# Patient Record
Sex: MALE | Race: WHITE | Employment: OTHER | URBAN - METROPOLITAN AREA
[De-identification: names, ages, dates, MRNs, and addresses within clinical notes are randomized per-mention and may not be internally consistent; named-entity substitution may affect disease eponyms.]

---

## 2020-03-23 ENCOUNTER — HOSPITAL ENCOUNTER (EMERGENCY)
Age: 85
Discharge: HOME | End: 2020-03-23
Payer: MEDICARE

## 2020-03-23 VITALS
TEMPERATURE: 97.8 F | RESPIRATION RATE: 18 BRPM | HEIGHT: 67 IN | BODY MASS INDEX: 22.76 KG/M2 | WEIGHT: 145 LBS | OXYGEN SATURATION: 96 % | DIASTOLIC BLOOD PRESSURE: 75 MMHG | HEART RATE: 81 BPM | SYSTOLIC BLOOD PRESSURE: 153 MMHG

## 2020-03-23 LAB
ALBUMIN SERPL-MCNC: 4.3 GM/DL (ref 3.4–5)
ALP BLD-CCNC: 88 IU/L (ref 40–129)
ALT SERPL-CCNC: 12 U/L (ref 10–40)
ANION GAP SERPL CALCULATED.3IONS-SCNC: 11 MMOL/L (ref 4–16)
ANION GAP SERPL CALCULATED.3IONS-SCNC: 14 MMOL/L (ref 4–16)
AST SERPL-CCNC: 23 IU/L (ref 15–37)
BACTERIA: NEGATIVE /HPF
BASOPHILS ABSOLUTE: 0 K/CU MM
BASOPHILS RELATIVE PERCENT: 0.2 % (ref 0–1)
BILIRUB SERPL-MCNC: 0.7 MG/DL (ref 0–1)
BILIRUBIN URINE: NEGATIVE MG/DL
BLOOD, URINE: ABNORMAL
BUN BLDV-MCNC: 13 MG/DL (ref 6–23)
BUN BLDV-MCNC: 16 MG/DL (ref 6–23)
CALCIUM SERPL-MCNC: 8.5 MG/DL (ref 8.3–10.6)
CALCIUM SERPL-MCNC: 8.8 MG/DL (ref 8.3–10.6)
CHLORIDE BLD-SCNC: 87 MMOL/L (ref 99–110)
CHLORIDE BLD-SCNC: 95 MMOL/L (ref 99–110)
CLARITY: CLEAR
CO2: 22 MMOL/L (ref 21–32)
CO2: 22 MMOL/L (ref 21–32)
COLOR: YELLOW
CREAT SERPL-MCNC: 0.5 MG/DL (ref 0.9–1.3)
CREAT SERPL-MCNC: 0.5 MG/DL (ref 0.9–1.3)
DIFFERENTIAL TYPE: ABNORMAL
EOSINOPHILS ABSOLUTE: 0 K/CU MM
EOSINOPHILS RELATIVE PERCENT: 0 % (ref 0–3)
GFR AFRICAN AMERICAN: >60 ML/MIN/1.73M2
GFR AFRICAN AMERICAN: >60 ML/MIN/1.73M2
GFR NON-AFRICAN AMERICAN: >60 ML/MIN/1.73M2
GFR NON-AFRICAN AMERICAN: >60 ML/MIN/1.73M2
GLUCOSE BLD-MCNC: 101 MG/DL (ref 70–99)
GLUCOSE BLD-MCNC: 142 MG/DL (ref 70–99)
GLUCOSE, URINE: NEGATIVE MG/DL
HCT VFR BLD CALC: 47.1 % (ref 42–52)
HEMOGLOBIN: 15.7 GM/DL (ref 13.5–18)
IMMATURE NEUTROPHIL %: 0.4 % (ref 0–0.43)
KETONES, URINE: ABNORMAL MG/DL
LEUKOCYTE ESTERASE, URINE: NEGATIVE
LYMPHOCYTES ABSOLUTE: 0.5 K/CU MM
LYMPHOCYTES RELATIVE PERCENT: 3.7 % (ref 24–44)
MCH RBC QN AUTO: 30 PG (ref 27–31)
MCHC RBC AUTO-ENTMCNC: 33.3 % (ref 32–36)
MCV RBC AUTO: 89.9 FL (ref 78–100)
MONOCYTES ABSOLUTE: 0.8 K/CU MM
MONOCYTES RELATIVE PERCENT: 6 % (ref 0–4)
NITRITE URINE, QUANTITATIVE: NEGATIVE
NUCLEATED RBC %: 0 %
PDW BLD-RTO: 13.7 % (ref 11.7–14.9)
PH, URINE: 6 (ref 5–8)
PLATELET # BLD: 363 K/CU MM (ref 140–440)
PMV BLD AUTO: 10.9 FL (ref 7.5–11.1)
POTASSIUM SERPL-SCNC: 4.3 MMOL/L (ref 3.5–5.1)
POTASSIUM SERPL-SCNC: 4.3 MMOL/L (ref 3.5–5.1)
PROTEIN UA: 30 MG/DL
RBC # BLD: 5.24 M/CU MM (ref 4.6–6.2)
RBC URINE: 14 /HPF (ref 0–3)
SEGMENTED NEUTROPHILS ABSOLUTE COUNT: 11.8 K/CU MM
SEGMENTED NEUTROPHILS RELATIVE PERCENT: 89.7 % (ref 36–66)
SODIUM BLD-SCNC: 123 MMOL/L (ref 135–145)
SODIUM BLD-SCNC: 128 MMOL/L (ref 135–145)
SPECIFIC GRAVITY UA: 1.01 (ref 1–1.03)
TOTAL IMMATURE NEUTOROPHIL: 0.05 K/CU MM
TOTAL NUCLEATED RBC: 0 K/CU MM
TOTAL PROTEIN: 7.1 GM/DL (ref 6.4–8.2)
TRICHOMONAS: ABNORMAL /HPF
UROBILINOGEN, URINE: NORMAL MG/DL (ref 0.2–1)
WBC # BLD: 13.1 K/CU MM (ref 4–10.5)
WBC UA: <1 /HPF (ref 0–2)

## 2020-03-23 PROCEDURE — 80048 BASIC METABOLIC PNL TOTAL CA: CPT

## 2020-03-23 PROCEDURE — 36415 COLL VENOUS BLD VENIPUNCTURE: CPT

## 2020-03-23 PROCEDURE — 81001 URINALYSIS AUTO W/SCOPE: CPT

## 2020-03-23 PROCEDURE — 87086 URINE CULTURE/COLONY COUNT: CPT

## 2020-03-23 PROCEDURE — 80053 COMPREHEN METABOLIC PANEL: CPT

## 2020-03-23 PROCEDURE — 4500000027

## 2020-03-23 PROCEDURE — 2580000003 HC RX 258: Performed by: PHYSICIAN ASSISTANT

## 2020-03-23 PROCEDURE — 85025 COMPLETE CBC W/AUTO DIFF WBC: CPT

## 2020-03-23 PROCEDURE — 99284 EMERGENCY DEPT VISIT MOD MDM: CPT

## 2020-03-23 PROCEDURE — 51798 US URINE CAPACITY MEASURE: CPT

## 2020-03-23 RX ORDER — 0.9 % SODIUM CHLORIDE 0.9 %
1000 INTRAVENOUS SOLUTION INTRAVENOUS ONCE
Status: COMPLETED | OUTPATIENT
Start: 2020-03-23 | End: 2020-03-23

## 2020-03-23 RX ADMIN — SODIUM CHLORIDE 1000 ML: 9 INJECTION, SOLUTION INTRAVENOUS at 10:09

## 2020-03-23 ASSESSMENT — PAIN SCALES - GENERAL
PAINLEVEL_OUTOF10: 8
PAINLEVEL_OUTOF10: 0

## 2020-03-23 ASSESSMENT — PAIN DESCRIPTION - LOCATION: LOCATION: ABDOMEN

## 2020-03-23 ASSESSMENT — PAIN DESCRIPTION - FREQUENCY: FREQUENCY: CONTINUOUS

## 2020-03-23 ASSESSMENT — PAIN DESCRIPTION - PAIN TYPE: TYPE: ACUTE PAIN

## 2020-03-23 ASSESSMENT — PAIN DESCRIPTION - DESCRIPTORS: DESCRIPTORS: ACHING;TENDER

## 2020-03-23 ASSESSMENT — PAIN DESCRIPTION - PROGRESSION: CLINICAL_PROGRESSION: GRADUALLY WORSENING

## 2020-03-23 NOTE — ED TRIAGE NOTES
Started to have abd pain on Saturday. C/o that he is having trouble with urination. Stated he is still going but its not a lot.

## 2020-03-23 NOTE — ED NOTES
16fr daniels cath put in place. 900cc output of clear yellow urine. Pt stated feeling of relief.       Kranthi Young RN  03/23/20 7907

## 2020-03-23 NOTE — ED PROVIDER NOTES
eMERGENCY dEPARTMENT eNCOUnter      PCP: No primary care provider on file. CHIEF COMPLAINT    Chief Complaint   Patient presents with    Abdominal Pain    Urinary Retention       HPI    Windy Severino is a 81 y.o. male who presents with the difficulty passing urine since the onset past 2-3 days. The patient has associated abdominal pain located in the suprapubic area of the abdomen. The pain quality is sharp and is very severe. There are no alleviating factors. The context is stating that he is dribbling some amount of urine but no full stream.  Patient has a history of a few episodes of urinary retention in the past but nothing in the last few years. Denies any injury or trauma. Has some moderate lower abdominal pain over the suprapubic area. Denies any fevers or chills. No chest pain or shortness of breath. No cough or cold symptoms. No noted blood no testicular pain or swelling. REVIEW OF SYSTEMS    GI: Patient complains of abdominal pain, No vomiting or diarrhea   Cardiac: No chest pain or syncope  Pulmonary: No difficulty breathing or new cough  General: No fevers or chills  : See HPI  See HPI for further details. All other systems reviewed and are negative.     PAST MEDICAL & SURGICAL HISTORY    Past Medical History:   Diagnosis Date    COPD (chronic obstructive pulmonary disease) ([facility] [address]     Prostatitis      Past Surgical History:   Procedure Laterality Date    HERNIA REPAIR         CURRENT MEDICATIONS    Current Outpatient Rx   Medication Sig Dispense Refill    aspirin 81 MG tablet Take 81 mg by mouth daily      Multiple Vitamins-Minerals (THERAPEUTIC MULTIVITAMIN-MINERALS) tablet Take 1 tablet by mouth daily      finasteride (PROSCAR) 5 MG tablet Take 1 tablet by mouth daily 20 tablet 0    tamsulosin (FLOMAX) 0.4 MG capsule Take 1 capsule by mouth daily 20 capsule 0       ALLERGIES    No Known Allergies    SOCIAL AND FAMILY HISTORY    Social History     Socioeconomic History    Marital status:      Spouse name: Not on file    Number of children: Not on file    Years of education: Not on file    Highest education level: Not on file   Occupational History    Not on file   Social Needs    Financial resource strain: Not on file    Food insecurity     Worry: Not on file     Inability: Not on file    Transportation needs     Medical: Not on file     Non-medical: Not on file   Tobacco Use    Smoking status: Current Every Day Smoker     Packs/day: 0.50     Types: Cigarettes    Smokeless tobacco: Current User   Substance and Sexual Activity    Alcohol use: Yes    Drug use: Not on file    Sexual activity: Not on file   Lifestyle    Physical activity     Days per week: Not on file     Minutes per session: Not on file    Stress: Not on file   Relationships    Social connections     Talks on phone: Not on file     Gets together: Not on file     Attends Buddhism service: Not on file     Active member of club or organization: Not on file     Attends meetings of clubs or organizations: Not on file     Relationship status: Not on file    Intimate partner violence     Fear of current or ex partner: Not on file     Emotionally abused: Not on file     Physically abused: Not on file     Forced sexual activity: Not on file   Other Topics Concern    Not on file   Social History Narrative    Not on file     No family history on file. PHYSICAL EXAM    VITAL SIGNS: BP (!) 153/75   Pulse 81   Temp 97.8 °F (36.6 °C) (Oral)   Resp 18   Ht 5' 7\" (1.702 m)   Wt 145 lb (65.8 kg)   SpO2 96%   BMI 22.71 kg/m²   Constitutional:  Well developed, well nourished. Afebrile.    Eyes:  Sclera nonicteric, conjunctiva moist  HENT:  Atraumatic, nose normal  Neck: Supple, no JVD  Respiratory:  No retractions, no accessory muscle use, normal breath sounds   Cardiovascular:  regular rate, normal rhythm, no murmurs  GI:  Soft, + suprapubic abdominal fullness and tenderness, bowel sounds present, no Neutrophil 0.05 K/CU MM    Immature Neutrophil % 0.4 0 - 0.43 %   Comprehensive Metabolic Panel   Result Value Ref Range    Sodium 123 (L) 135 - 145 MMOL/L    Potassium 4.3 3.5 - 5.1 MMOL/L    Chloride 87 (L) 99 - 110 mMol/L    CO2 22 21 - 32 MMOL/L    BUN 16 6 - 23 MG/DL    CREATININE 0.5 (L) 0.9 - 1.3 MG/DL    Glucose 142 (H) 70 - 99 MG/DL    Calcium 8.8 8.3 - 10.6 MG/DL    Alb 4.3 3.4 - 5.0 GM/DL    Total Protein 7.1 6.4 - 8.2 GM/DL    Total Bilirubin 0.7 0.0 - 1.0 MG/DL    ALT 12 10 - 40 U/L    AST 23 15 - 37 IU/L    Alkaline Phosphatase 88 40 - 129 IU/L    GFR Non-African American >60 >60 mL/min/1.73m2    GFR African American >60 >60 mL/min/1.73m2    Anion Gap 14 4 - 16   Basic Metabolic Panel w/ Reflex to MG   Result Value Ref Range    Sodium 128 (L) 135 - 145 MMOL/L    Potassium 4.3 3.5 - 5.1 MMOL/L    Chloride 95 (L) 99 - 110 mMol/L    CO2 22 21 - 32 MMOL/L    Anion Gap 11 4 - 16    BUN 13 6 - 23 MG/DL    CREATININE 0.5 (L) 0.9 - 1.3 MG/DL    Glucose 101 (H) 70 - 99 MG/DL    Calcium 8.5 8.3 - 10.6 MG/DL    GFR Non-African American >60 >60 mL/min/1.73m2    GFR African American >60 >60 mL/min/1.73m2           ED COURSE & MEDICAL DECISION MAKING    Pertinent Labs-See chart for details    Goodman catheter inserted and 900+ cc drained into the bag. See chart for details of medications given during the ED stay. Vitals:    03/23/20 0948 03/23/20 0954 03/23/20 1045 03/23/20 1303   BP: 111/86 111/86 (!) 158/70 (!) 153/75   Pulse:   81    Resp:       Temp:       TempSrc:       SpO2: 97%  95% 96%   Weight:       Height:              Vital signs and nursing notes reviewed during ED course. Patient care and presentation staffed with supervising MD.  All pertinent Lab data and radiographic results reviewed with patient at bedside. The patient and/or the family were informed of the results of any tests/labs/imaging, the treatment plan, and time was allotted to answer questions. Pt presents as above. Patient is afebrile 97% on room air. Non-tachycardic. Bladder scanner showed over 900 cc of urine. Patient attempted to urinate and was only able to get a small amount out. Urinary catheter placed, over 900 cc of urine was drained successfully, does not appear to be cloudy or bloody. CBC with slight leukocytosis CMP with normal serum creatinine. Patient does have hyponatremia, he is not here very frequently has not had this in the past.  Otherwise patient would be able to be discharged therefore he is very apprehensive about being admitted, will go ahead and try giving him a liter of fluids, recheck his sodium. He is alert, oriented, given this I am comfortable with this plan. Repeat BMP: with improved sodium to 128, will need to follow-up with PCP for recheck of his sodium encouraged good hydration. Patient comfortable with this work-up and plan. Patient will be discharged with Goodman to follow-up with urology for decision of removal.  Patient comfortable with this work-up and plan. Clinical  IMPRESSION    Acute urinary retention  Hyponatremia        Diagnosis and plan discussed in detail with patient who understands and agrees. Patient agrees to return emergency department if symptoms worsen or any new symptoms develop. Comment: Please note this report has been produced using speech recognition software and may contain errors related to that system including errors in grammar, punctuation, and spelling, as well as words and phrases that may be inappropriate. If there are any questions or concerns please feel free to contact the dictating provider for clarification.        Alejandro Leon PA-C  03/23/20 2356

## 2020-03-24 LAB
CULTURE: NORMAL
Lab: NORMAL
SPECIMEN: NORMAL

## 2020-04-08 ENCOUNTER — HOSPITAL ENCOUNTER (INPATIENT)
Age: 85
LOS: 2 days | Discharge: NURSING, REHAB OR LONG-TERM CARE | DRG: 040 | End: 2020-04-10
Attending: INTERNAL MEDICINE | Admitting: INTERNAL MEDICINE
Payer: MEDICARE

## 2020-04-08 ENCOUNTER — APPOINTMENT (OUTPATIENT)
Dept: CT IMAGING | Age: 85
DRG: 040 | End: 2020-04-08
Payer: MEDICARE

## 2020-04-08 ENCOUNTER — APPOINTMENT (OUTPATIENT)
Dept: GENERAL RADIOLOGY | Age: 85
DRG: 040 | End: 2020-04-08
Payer: MEDICARE

## 2020-04-08 PROBLEM — R42 DIZZINESS: Status: ACTIVE | Noted: 2020-04-08

## 2020-04-08 LAB
ALBUMIN SERPL-MCNC: 4.1 GM/DL (ref 3.4–5)
ALP BLD-CCNC: 114 IU/L (ref 40–129)
ALT SERPL-CCNC: 63 U/L (ref 10–40)
ANION GAP SERPL CALCULATED.3IONS-SCNC: 11 MMOL/L (ref 4–16)
AST SERPL-CCNC: 81 IU/L (ref 15–37)
BASOPHILS ABSOLUTE: 0 K/CU MM
BASOPHILS RELATIVE PERCENT: 0.5 % (ref 0–1)
BILIRUB SERPL-MCNC: 0.6 MG/DL (ref 0–1)
BUN BLDV-MCNC: 26 MG/DL (ref 6–23)
CALCIUM SERPL-MCNC: 9.3 MG/DL (ref 8.3–10.6)
CHLORIDE BLD-SCNC: 100 MMOL/L (ref 99–110)
CO2: 27 MMOL/L (ref 21–32)
CREAT SERPL-MCNC: 0.7 MG/DL (ref 0.9–1.3)
DIFFERENTIAL TYPE: ABNORMAL
EOSINOPHILS ABSOLUTE: 0 K/CU MM
EOSINOPHILS RELATIVE PERCENT: 0.5 % (ref 0–3)
GFR AFRICAN AMERICAN: >60 ML/MIN/1.73M2
GFR NON-AFRICAN AMERICAN: >60 ML/MIN/1.73M2
GLUCOSE BLD-MCNC: 153 MG/DL (ref 70–99)
HCT VFR BLD CALC: 49.7 % (ref 42–52)
HEMOGLOBIN: 16.1 GM/DL (ref 13.5–18)
IMMATURE NEUTROPHIL %: 0.6 % (ref 0–0.43)
LACTATE: 1.2 MMOL/L (ref 0.4–2)
LYMPHOCYTES ABSOLUTE: 0.6 K/CU MM
LYMPHOCYTES RELATIVE PERCENT: 6.8 % (ref 24–44)
MCH RBC QN AUTO: 30.2 PG (ref 27–31)
MCHC RBC AUTO-ENTMCNC: 32.4 % (ref 32–36)
MCV RBC AUTO: 93.2 FL (ref 78–100)
MONOCYTES ABSOLUTE: 0.5 K/CU MM
MONOCYTES RELATIVE PERCENT: 5.7 % (ref 0–4)
NUCLEATED RBC %: 0 %
PDW BLD-RTO: 14.2 % (ref 11.7–14.9)
PLATELET # BLD: 299 K/CU MM (ref 140–440)
PMV BLD AUTO: 11.2 FL (ref 7.5–11.1)
POTASSIUM SERPL-SCNC: 5.1 MMOL/L (ref 3.5–5.1)
PRO-BNP: 198.6 PG/ML
RBC # BLD: 5.33 M/CU MM (ref 4.6–6.2)
SEGMENTED NEUTROPHILS ABSOLUTE COUNT: 7.4 K/CU MM
SEGMENTED NEUTROPHILS RELATIVE PERCENT: 85.9 % (ref 36–66)
SODIUM BLD-SCNC: 138 MMOL/L (ref 135–145)
TOTAL IMMATURE NEUTOROPHIL: 0.05 K/CU MM
TOTAL NUCLEATED RBC: 0 K/CU MM
TOTAL PROTEIN: 6.7 GM/DL (ref 6.4–8.2)
TROPONIN T: <0.01 NG/ML
WBC # BLD: 8.6 K/CU MM (ref 4–10.5)

## 2020-04-08 PROCEDURE — 83880 ASSAY OF NATRIURETIC PEPTIDE: CPT

## 2020-04-08 PROCEDURE — 80053 COMPREHEN METABOLIC PANEL: CPT

## 2020-04-08 PROCEDURE — 84484 ASSAY OF TROPONIN QUANT: CPT

## 2020-04-08 PROCEDURE — 85025 COMPLETE CBC W/AUTO DIFF WBC: CPT

## 2020-04-08 PROCEDURE — 83605 ASSAY OF LACTIC ACID: CPT

## 2020-04-08 PROCEDURE — 71045 X-RAY EXAM CHEST 1 VIEW: CPT

## 2020-04-08 PROCEDURE — 70450 CT HEAD/BRAIN W/O DYE: CPT

## 2020-04-08 PROCEDURE — 99285 EMERGENCY DEPT VISIT HI MDM: CPT

## 2020-04-08 PROCEDURE — 93005 ELECTROCARDIOGRAM TRACING: CPT | Performed by: PHYSICIAN ASSISTANT

## 2020-04-08 PROCEDURE — 2140000000 HC CCU INTERMEDIATE R&B

## 2020-04-08 RX ORDER — TAMSULOSIN HYDROCHLORIDE 0.4 MG/1
0.4 CAPSULE ORAL DAILY
Status: DISCONTINUED | OUTPATIENT
Start: 2020-04-09 | End: 2020-04-10 | Stop reason: HOSPADM

## 2020-04-08 RX ORDER — PROMETHAZINE HYDROCHLORIDE 25 MG/1
12.5 TABLET ORAL EVERY 6 HOURS PRN
Status: DISCONTINUED | OUTPATIENT
Start: 2020-04-08 | End: 2020-04-10 | Stop reason: HOSPADM

## 2020-04-08 RX ORDER — SODIUM CHLORIDE 0.9 % (FLUSH) 0.9 %
10 SYRINGE (ML) INJECTION PRN
Status: DISCONTINUED | OUTPATIENT
Start: 2020-04-08 | End: 2020-04-10 | Stop reason: HOSPADM

## 2020-04-08 RX ORDER — POLYETHYLENE GLYCOL 3350 17 G/17G
17 POWDER, FOR SOLUTION ORAL DAILY PRN
Status: DISCONTINUED | OUTPATIENT
Start: 2020-04-08 | End: 2020-04-10 | Stop reason: HOSPADM

## 2020-04-08 RX ORDER — ASPIRIN 81 MG/1
81 TABLET ORAL DAILY
Status: DISCONTINUED | OUTPATIENT
Start: 2020-04-09 | End: 2020-04-10 | Stop reason: HOSPADM

## 2020-04-08 RX ORDER — ASPIRIN 300 MG/1
300 SUPPOSITORY RECTAL DAILY
Status: DISCONTINUED | OUTPATIENT
Start: 2020-04-09 | End: 2020-04-10 | Stop reason: HOSPADM

## 2020-04-08 RX ORDER — SODIUM CHLORIDE 0.9 % (FLUSH) 0.9 %
10 SYRINGE (ML) INJECTION EVERY 12 HOURS SCHEDULED
Status: DISCONTINUED | OUTPATIENT
Start: 2020-04-09 | End: 2020-04-10 | Stop reason: HOSPADM

## 2020-04-08 RX ORDER — ONDANSETRON 2 MG/ML
4 INJECTION INTRAMUSCULAR; INTRAVENOUS EVERY 6 HOURS PRN
Status: DISCONTINUED | OUTPATIENT
Start: 2020-04-08 | End: 2020-04-10 | Stop reason: HOSPADM

## 2020-04-08 RX ORDER — FINASTERIDE 5 MG/1
5 TABLET, FILM COATED ORAL DAILY
Status: DISCONTINUED | OUTPATIENT
Start: 2020-04-09 | End: 2020-04-10 | Stop reason: HOSPADM

## 2020-04-08 RX ORDER — ATORVASTATIN CALCIUM 40 MG/1
80 TABLET, FILM COATED ORAL NIGHTLY
Status: DISCONTINUED | OUTPATIENT
Start: 2020-04-09 | End: 2020-04-10 | Stop reason: HOSPADM

## 2020-04-08 ASSESSMENT — PAIN SCALES - GENERAL: PAINLEVEL_OUTOF10: 0

## 2020-04-09 ENCOUNTER — APPOINTMENT (OUTPATIENT)
Dept: ULTRASOUND IMAGING | Age: 85
DRG: 040 | End: 2020-04-09
Payer: MEDICARE

## 2020-04-09 ENCOUNTER — APPOINTMENT (OUTPATIENT)
Dept: MRI IMAGING | Age: 85
DRG: 040 | End: 2020-04-09
Payer: MEDICARE

## 2020-04-09 ENCOUNTER — APPOINTMENT (OUTPATIENT)
Dept: CT IMAGING | Age: 85
DRG: 040 | End: 2020-04-09
Payer: MEDICARE

## 2020-04-09 LAB
BACTERIA: ABNORMAL /HPF
BILIRUBIN URINE: NEGATIVE MG/DL
BLOOD, URINE: ABNORMAL
CHOLESTEROL: 146 MG/DL
CLARITY: ABNORMAL
COLOR: ABNORMAL
ERYTHROCYTE SEDIMENTATION RATE: 10 MM/HR (ref 0–20)
ESTIMATED AVERAGE GLUCOSE: 117 MG/DL
FOLATE: 20 NG/ML (ref 3.1–17.5)
GLUCOSE, URINE: NEGATIVE MG/DL
HBA1C MFR BLD: 5.7 % (ref 4.2–6.3)
HCT VFR BLD CALC: 48.2 % (ref 42–52)
HDLC SERPL-MCNC: 36 MG/DL
HEMOGLOBIN: 15.2 GM/DL (ref 13.5–18)
KETONES, URINE: ABNORMAL MG/DL
LDL CHOLESTEROL DIRECT: 103 MG/DL
LEUKOCYTE ESTERASE, URINE: ABNORMAL
MCH RBC QN AUTO: 30.1 PG (ref 27–31)
MCHC RBC AUTO-ENTMCNC: 31.5 % (ref 32–36)
MCV RBC AUTO: 95.4 FL (ref 78–100)
MUCUS: ABNORMAL HPF
NITRITE URINE, QUANTITATIVE: POSITIVE
PDW BLD-RTO: 14.3 % (ref 11.7–14.9)
PH, URINE: 8 (ref 5–8)
PLATELET # BLD: 285 K/CU MM (ref 140–440)
PMV BLD AUTO: 11.2 FL (ref 7.5–11.1)
PROTEIN UA: 100 MG/DL
RBC # BLD: 5.05 M/CU MM (ref 4.6–6.2)
RBC URINE: 19 /HPF (ref 0–3)
SPECIFIC GRAVITY UA: 1.01 (ref 1–1.03)
TRICHOMONAS: ABNORMAL /HPF
TRIGL SERPL-MCNC: 48 MG/DL
TRIPLE PHOSPHATE CRYSTALS: ABNORMAL /HPF
TROPONIN T: <0.01 NG/ML
TROPONIN T: <0.01 NG/ML
TSH HIGH SENSITIVITY: 1.22 UIU/ML (ref 0.27–4.2)
UROBILINOGEN, URINE: NORMAL MG/DL (ref 0.2–1)
VITAMIN B-12: 1629 PG/ML (ref 211–911)
WBC # BLD: 9.1 K/CU MM (ref 4–10.5)
WBC CLUMP: ABNORMAL /HPF
WBC UA: 299 /HPF (ref 0–2)

## 2020-04-09 PROCEDURE — 70496 CT ANGIOGRAPHY HEAD: CPT

## 2020-04-09 PROCEDURE — 92610 EVALUATE SWALLOWING FUNCTION: CPT

## 2020-04-09 PROCEDURE — 6370000000 HC RX 637 (ALT 250 FOR IP): Performed by: INTERNAL MEDICINE

## 2020-04-09 PROCEDURE — 2580000003 HC RX 258: Performed by: INTERNAL MEDICINE

## 2020-04-09 PROCEDURE — 87077 CULTURE AEROBIC IDENTIFY: CPT

## 2020-04-09 PROCEDURE — 83721 ASSAY OF BLOOD LIPOPROTEIN: CPT

## 2020-04-09 PROCEDURE — 6360000002 HC RX W HCPCS: Performed by: INTERNAL MEDICINE

## 2020-04-09 PROCEDURE — 93306 TTE W/DOPPLER COMPLETE: CPT

## 2020-04-09 PROCEDURE — 70551 MRI BRAIN STEM W/O DYE: CPT

## 2020-04-09 PROCEDURE — 6360000004 HC RX CONTRAST MEDICATION: Performed by: INTERNAL MEDICINE

## 2020-04-09 PROCEDURE — 97162 PT EVAL MOD COMPLEX 30 MIN: CPT

## 2020-04-09 PROCEDURE — 97530 THERAPEUTIC ACTIVITIES: CPT

## 2020-04-09 PROCEDURE — 85652 RBC SED RATE AUTOMATED: CPT

## 2020-04-09 PROCEDURE — 2140000000 HC CCU INTERMEDIATE R&B

## 2020-04-09 PROCEDURE — 85027 COMPLETE CBC AUTOMATED: CPT

## 2020-04-09 PROCEDURE — 84484 ASSAY OF TROPONIN QUANT: CPT

## 2020-04-09 PROCEDURE — 80061 LIPID PANEL: CPT

## 2020-04-09 PROCEDURE — 81001 URINALYSIS AUTO W/SCOPE: CPT

## 2020-04-09 PROCEDURE — 87086 URINE CULTURE/COLONY COUNT: CPT

## 2020-04-09 PROCEDURE — 93880 EXTRACRANIAL BILAT STUDY: CPT

## 2020-04-09 PROCEDURE — 83036 HEMOGLOBIN GLYCOSYLATED A1C: CPT

## 2020-04-09 PROCEDURE — 82607 VITAMIN B-12: CPT

## 2020-04-09 PROCEDURE — 87186 SC STD MICRODIL/AGAR DIL: CPT

## 2020-04-09 PROCEDURE — 36415 COLL VENOUS BLD VENIPUNCTURE: CPT

## 2020-04-09 PROCEDURE — 94761 N-INVAS EAR/PLS OXIMETRY MLT: CPT

## 2020-04-09 PROCEDURE — 84443 ASSAY THYROID STIM HORMONE: CPT

## 2020-04-09 PROCEDURE — APPSS180 APP SPLIT SHARED TIME > 60 MINUTES: Performed by: NURSE PRACTITIONER

## 2020-04-09 PROCEDURE — 82746 ASSAY OF FOLIC ACID SERUM: CPT

## 2020-04-09 PROCEDURE — 97166 OT EVAL MOD COMPLEX 45 MIN: CPT

## 2020-04-09 RX ORDER — SODIUM CHLORIDE 9 MG/ML
INJECTION, SOLUTION INTRAVENOUS CONTINUOUS
Status: DISCONTINUED | OUTPATIENT
Start: 2020-04-09 | End: 2020-04-10 | Stop reason: HOSPADM

## 2020-04-09 RX ORDER — SODIUM CHLORIDE 0.9 % (FLUSH) 0.9 %
10 SYRINGE (ML) INJECTION PRN
Status: DISCONTINUED | OUTPATIENT
Start: 2020-04-09 | End: 2020-04-10 | Stop reason: HOSPADM

## 2020-04-09 RX ORDER — CLOPIDOGREL BISULFATE 75 MG/1
75 TABLET ORAL DAILY
Status: DISCONTINUED | OUTPATIENT
Start: 2020-04-10 | End: 2020-04-09

## 2020-04-09 RX ORDER — LISINOPRIL 5 MG/1
5 TABLET ORAL DAILY
Status: DISCONTINUED | OUTPATIENT
Start: 2020-04-09 | End: 2020-04-10 | Stop reason: HOSPADM

## 2020-04-09 RX ORDER — CLOPIDOGREL BISULFATE 75 MG/1
300 TABLET ORAL ONCE
Status: DISCONTINUED | OUTPATIENT
Start: 2020-04-09 | End: 2020-04-09

## 2020-04-09 RX ADMIN — ENOXAPARIN SODIUM 40 MG: 40 INJECTION SUBCUTANEOUS at 08:39

## 2020-04-09 RX ADMIN — SODIUM CHLORIDE: 9 INJECTION, SOLUTION INTRAVENOUS at 14:22

## 2020-04-09 RX ADMIN — IOPAMIDOL 75 ML: 755 INJECTION, SOLUTION INTRAVENOUS at 14:09

## 2020-04-09 RX ADMIN — CEFTRIAXONE SODIUM 1 G: 1 INJECTION, POWDER, FOR SOLUTION INTRAMUSCULAR; INTRAVENOUS at 11:27

## 2020-04-09 RX ADMIN — LISINOPRIL 5 MG: 5 TABLET ORAL at 14:22

## 2020-04-09 RX ADMIN — SODIUM CHLORIDE, PRESERVATIVE FREE 10 ML: 5 INJECTION INTRAVENOUS at 14:09

## 2020-04-09 RX ADMIN — ATORVASTATIN CALCIUM 80 MG: 40 TABLET, FILM COATED ORAL at 21:35

## 2020-04-09 RX ADMIN — FINASTERIDE 5 MG: 5 TABLET, FILM COATED ORAL at 08:39

## 2020-04-09 RX ADMIN — ASPIRIN 81 MG: 81 TABLET, COATED ORAL at 08:39

## 2020-04-09 RX ADMIN — ATORVASTATIN CALCIUM 80 MG: 40 TABLET, FILM COATED ORAL at 00:43

## 2020-04-09 RX ADMIN — SODIUM CHLORIDE, PRESERVATIVE FREE 10 ML: 5 INJECTION INTRAVENOUS at 08:40

## 2020-04-09 RX ADMIN — TAMSULOSIN HYDROCHLORIDE 0.4 MG: 0.4 CAPSULE ORAL at 08:39

## 2020-04-09 ASSESSMENT — PAIN SCALES - GENERAL
PAINLEVEL_OUTOF10: 0

## 2020-04-09 NOTE — ED NOTES
Report given to floor nurse. Pt to be transported to floor.       Griselda Villegas RN  04/08/20 2741

## 2020-04-09 NOTE — ED PROVIDER NOTES
HERNIA REPAIR       Social History     Socioeconomic History    Marital status:      Spouse name: None    Number of children: None    Years of education: None    Highest education level: None   Occupational History    None   Social Needs    Financial resource strain: None    Food insecurity     Worry: None     Inability: None    Transportation needs     Medical: None     Non-medical: None   Tobacco Use    Smoking status: Current Every Day Smoker     Packs/day: 0.50     Types: Cigarettes    Smokeless tobacco: Current User   Substance and Sexual Activity    Alcohol use: Yes    Drug use: None    Sexual activity: None   Lifestyle    Physical activity     Days per week: None     Minutes per session: None    Stress: None   Relationships    Social connections     Talks on phone: None     Gets together: None     Attends Christian service: None     Active member of club or organization: None     Attends meetings of clubs or organizations: None     Relationship status: None    Intimate partner violence     Fear of current or ex partner: None     Emotionally abused: None     Physically abused: None     Forced sexual activity: None   Other Topics Concern    None   Social History Narrative    None       Medications/Allergies     Previous Medications    ASPIRIN 81 MG TABLET    Take 81 mg by mouth daily    FINASTERIDE (PROSCAR) 5 MG TABLET    Take 1 tablet by mouth daily    MULTIPLE VITAMINS-MINERALS (THERAPEUTIC MULTIVITAMIN-MINERALS) TABLET    Take 1 tablet by mouth daily    TAMSULOSIN (FLOMAX) 0.4 MG CAPSULE    Take 1 capsule by mouth daily     No Known Allergies     Physical Exam       ED Triage Vitals [04/08/20 2013]   BP Temp Temp Source Pulse Resp SpO2 Height Weight   (!) 182/77 97.9 °F (36.6 °C) Oral 73 18 96 % 5' 6\" (1.676 m) 120 lb (54.4 kg)     GENERAL APPEARANCE:  Elderly male in no acute distress. HEAD:  NC/AT. EYES:  Sclera anicteric.    ENT:  Ears, nose, mouth normal.     NECK:

## 2020-04-09 NOTE — ED NOTES
Bed: ED-31  Expected date: 4/8/20  Expected time: 8:02 PM  Means of arrival:   Comments:  [address] January Sanchez RN  04/08/20 2011

## 2020-04-09 NOTE — H&P
HISTORY AND PHYSICAL  (Hospitalist, Internal Medicine)  IDENTIFYING INFORMATION   PATIENT:  Nina Moody  MRN:  [id number]  ADMIT DATE: 4/8/2020  TIME OF EVALUATION: 4/8/2020 11:02 PM    CHIEF COMPLAINT     Dizziness  HISTORY OF PRESENT ILLNESS   Nina Moody is a 81 y.o. male admitted for dizziness, as per EMT report. On examination patient states that he does not know what he is here for nor does he remember why he came. Ancillary information obtained from nurse taking care of him in the emergency department. Patient was reported to have some suprapubic discomfort, however has a indwelling Goodman catheter which is draining urine, patient did not complain to me of a suprapubic pain. Pt otherwise has no complaints of CP, SOB, dizziness, N/V/C/D, abdominal pain, dysuria, joint pains, rash/boils, or fevers. PMH listed below:    PAST MEDICAL, SURGICAL, FAMILY, and SOCIAL HISTORY     Past Medical History:   Diagnosis Date    COPD (chronic obstructive pulmonary disease) ([facility] [address]     Prostatitis      Past Surgical History:   Procedure Laterality Date    HERNIA REPAIR       History reviewed. No pertinent family history. Family Hx of HTN  Family Hx as reviewed above, otherwise non-contributory  Social History     Socioeconomic History    Marital status:      Spouse name: None    Number of children: None    Years of education: None    Highest education level: None   Occupational History    None   Social Needs    Financial resource strain: None    Food insecurity     Worry: None     Inability: None    Transportation needs     Medical: None     Non-medical: None   Tobacco Use    Smoking status: Current Every Day Smoker     Packs/day: 0.50     Types: Cigarettes    Smokeless tobacco: Current User   Substance and Sexual Activity    Alcohol use:  Yes    Drug use: None    Sexual activity: None   Lifestyle    Physical activity     Days per week: None     Minutes per session: None    Stress: None   Relationships    Social connections     Talks on phone: None     Gets together: None     Attends Protestant service: None     Active member of club or organization: None     Attends meetings of clubs or organizations: None     Relationship status: None    Intimate partner violence     Fear of current or ex partner: None     Emotionally abused: None     Physically abused: None     Forced sexual activity: None   Other Topics Concern    None   Social History Narrative    None       MEDICATIONS   Medications Prior to Admission  Not in a hospital admission. Current Medications  No current facility-administered medications for this encounter. Current Outpatient Medications   Medication Sig Dispense Refill    aspirin 81 MG tablet Take 81 mg by mouth daily      Multiple Vitamins-Minerals (THERAPEUTIC MULTIVITAMIN-MINERALS) tablet Take 1 tablet by mouth daily      finasteride (PROSCAR) 5 MG tablet Take 1 tablet by mouth daily 20 tablet 0    tamsulosin (FLOMAX) 0.4 MG capsule Take 1 capsule by mouth daily 20 capsule 0         Allergies  No Known Allergies    REVIEW OF SYSTEMS   Within above limitations. 14 point review of systems reviewed. Pertinent positive or negative as per HPI or otherwise negative per 14 point systems review. PHYSICAL EXAM     Wt Readings from Last 3 Encounters:   04/08/20 120 lb (54.4 kg)   03/23/20 145 lb (65.8 kg)   04/07/18 140 lb (63.5 kg)       Blood pressure (!) 159/73, pulse 71, temperature 97.9 °F (36.6 °C), temperature source Oral, resp. rate 22, height 5' 6\" (1.676 m), weight 120 lb (54.4 kg), SpO2 94 %. General - AAO x 2, to himself and hospital, not to date or situation  Psych - Appropriate affect/speech. No agitation  Eyes - Eye lids intact. No scleral icterus  ENT - Lips wnl. External ear clear/dry/intact. No thyromegaly on inspection  Neuro - No gross peripheral or central neuro deficits on inspection  Heart - Sinus. RRR. S1 and S2 present.  No added HS/murmurs appreciated. No elevated JVD appreciated  Lung - Adequate air entry b/l, No crackles/wheezes appreciated  GI - Soft. No guarding/rigidity. No hepatosplenomegaly/ascites. BS+   - No CVA/suprapubic tenderness or palpable bladder distension  Skin - Intact. No rash/petechiae/ecchymosis. Warm extremities  MSK - Joints with normal ROM. No joint swellings    Lines/Drains/Airways/Wounds:  [unfilled]    LABS AND IMAGING   CBC  [unfilled]    Last 3 Hemoglobin  Lab Results   Component Value Date    HGB 16.1 04/08/2020    HGB 15.7 03/23/2020    HGB 15.3 04/07/2018     Last 3 WBC/ANC  Lab Results   Component Value Date    WBC 8.6 04/08/2020    WBC 13.1 03/23/2020    WBC 11.3 04/07/2018     No components found for: GRNLOCTYABS  Last 3 Platelets  No results found for: PLATELET  Chemistry  [unfilled]  [unfilled]  No results found for: LDH  Coagulation Studies  No results found for: PTT, INR  Liver Function Studies  Lab Results   Component Value Date    ALT 63 04/08/2020    AST 81 04/08/2020    ALKPHOS 114 04/08/2020       Recent Imaging        Relevant labs and imaging reviewed    ASSESSMENT AND PLAN   Corrie Unger is a 81 y.o. male p/w    Dizziness,  No focal neurological deficit on examination  CT with findings of hypodensity in the right occipital lobe and posterior parietal lobe, may be subacute infarct, admitted for further stroke work-up  -Follow-up MRI, carotid ultrasound, neurology consult  -Neurochecks every 4 hours  -PT/OT     Possible suprapubic pain, in setting of indwelling catheter and BPH  - c/w proscar and flomax  -Follow-up UA  -Consider changing Goodman catheter in the morning  -Consult urology if difficulty      Patient cannot recall his medications, need to be confirmed in the morning.       Case d/w ED provider    DVT ppx: Lovenox  Code status: Full code    [facility], Internal Medicine  4/8/2020 at 11:02 PM

## 2020-04-10 ENCOUNTER — HOSPITAL ENCOUNTER (INPATIENT)
Age: 85
LOS: 10 days | Discharge: NURSING, REHAB OR LONG-TERM CARE | DRG: 057 | End: 2020-04-20
Attending: PHYSICAL MEDICINE & REHABILITATION | Admitting: PHYSICAL MEDICINE & REHABILITATION
Payer: MEDICARE

## 2020-04-10 VITALS
BODY MASS INDEX: 15.41 KG/M2 | OXYGEN SATURATION: 97 % | HEART RATE: 72 BPM | RESPIRATION RATE: 24 BRPM | SYSTOLIC BLOOD PRESSURE: 138 MMHG | WEIGHT: 98.2 LBS | TEMPERATURE: 97.6 F | DIASTOLIC BLOOD PRESSURE: 59 MMHG | HEIGHT: 67 IN

## 2020-04-10 PROBLEM — I63.9 ACUTE CEREBROVASCULAR ACCIDENT (CVA) (HCC): Status: ACTIVE | Noted: 2020-04-10

## 2020-04-10 LAB
LV EF: 45 %
LVEF MODALITY: NORMAL

## 2020-04-10 PROCEDURE — 99223 1ST HOSP IP/OBS HIGH 75: CPT | Performed by: PHYSICAL MEDICINE & REHABILITATION

## 2020-04-10 PROCEDURE — 93312 ECHO TRANSESOPHAGEAL: CPT

## 2020-04-10 PROCEDURE — 94761 N-INVAS EAR/PLS OXIMETRY MLT: CPT

## 2020-04-10 PROCEDURE — 6370000000 HC RX 637 (ALT 250 FOR IP): Performed by: NURSE PRACTITIONER

## 2020-04-10 PROCEDURE — 1280000000 HC REHAB R&B

## 2020-04-10 PROCEDURE — 2580000003 HC RX 258: Performed by: INTERNAL MEDICINE

## 2020-04-10 PROCEDURE — 92526 ORAL FUNCTION THERAPY: CPT

## 2020-04-10 PROCEDURE — 33285 INSJ SUBQ CAR RHYTHM MNTR: CPT

## 2020-04-10 PROCEDURE — 0JH632Z INSERTION OF MONITORING DEVICE INTO CHEST SUBCUTANEOUS TISSUE AND FASCIA, PERCUTANEOUS APPROACH: ICD-10-PCS | Performed by: INTERNAL MEDICINE

## 2020-04-10 PROCEDURE — 6370000000 HC RX 637 (ALT 250 FOR IP): Performed by: INTERNAL MEDICINE

## 2020-04-10 PROCEDURE — 7100000001 HC PACU RECOVERY - ADDTL 15 MIN

## 2020-04-10 PROCEDURE — 6360000002 HC RX W HCPCS: Performed by: INTERNAL MEDICINE

## 2020-04-10 PROCEDURE — 2709999900 HC NON-CHARGEABLE SUPPLY

## 2020-04-10 PROCEDURE — 99233 SBSQ HOSP IP/OBS HIGH 50: CPT | Performed by: NURSE PRACTITIONER

## 2020-04-10 PROCEDURE — 93010 ELECTROCARDIOGRAM REPORT: CPT | Performed by: INTERNAL MEDICINE

## 2020-04-10 PROCEDURE — 97116 GAIT TRAINING THERAPY: CPT

## 2020-04-10 PROCEDURE — 7100000000 HC PACU RECOVERY - FIRST 15 MIN

## 2020-04-10 PROCEDURE — C1764 EVENT RECORDER, CARDIAC: HCPCS

## 2020-04-10 PROCEDURE — 97530 THERAPEUTIC ACTIVITIES: CPT

## 2020-04-10 RX ORDER — ATORVASTATIN CALCIUM 40 MG/1
80 TABLET, FILM COATED ORAL NIGHTLY
Status: DISCONTINUED | OUTPATIENT
Start: 2020-04-10 | End: 2020-04-20 | Stop reason: HOSPADM

## 2020-04-10 RX ORDER — POLYETHYLENE GLYCOL 3350 17 G/17G
17 POWDER, FOR SOLUTION ORAL DAILY PRN
Status: CANCELLED | OUTPATIENT
Start: 2020-04-10

## 2020-04-10 RX ORDER — LISINOPRIL 5 MG/1
5 TABLET ORAL DAILY
Status: DISCONTINUED | OUTPATIENT
Start: 2020-04-11 | End: 2020-04-20 | Stop reason: HOSPADM

## 2020-04-10 RX ORDER — ONDANSETRON 2 MG/ML
4 INJECTION INTRAMUSCULAR; INTRAVENOUS EVERY 6 HOURS PRN
Status: CANCELLED | OUTPATIENT
Start: 2020-04-10

## 2020-04-10 RX ORDER — ASPIRIN 81 MG/1
81 TABLET ORAL DAILY
Status: DISCONTINUED | OUTPATIENT
Start: 2020-04-11 | End: 2020-04-20 | Stop reason: HOSPADM

## 2020-04-10 RX ORDER — POLYETHYLENE GLYCOL 3350 17 G/17G
17 POWDER, FOR SOLUTION ORAL DAILY PRN
Status: DISCONTINUED | OUTPATIENT
Start: 2020-04-10 | End: 2020-04-10

## 2020-04-10 RX ORDER — TAMSULOSIN HYDROCHLORIDE 0.4 MG/1
0.4 CAPSULE ORAL DAILY
Status: CANCELLED | OUTPATIENT
Start: 2020-04-11

## 2020-04-10 RX ORDER — ATORVASTATIN CALCIUM 40 MG/1
80 TABLET, FILM COATED ORAL NIGHTLY
Status: CANCELLED | OUTPATIENT
Start: 2020-04-10

## 2020-04-10 RX ORDER — FINASTERIDE 5 MG/1
5 TABLET, FILM COATED ORAL DAILY
Status: CANCELLED | OUTPATIENT
Start: 2020-04-11

## 2020-04-10 RX ORDER — SODIUM CHLORIDE 0.9 % (FLUSH) 0.9 %
10 SYRINGE (ML) INJECTION PRN
Status: CANCELLED | OUTPATIENT
Start: 2020-04-10

## 2020-04-10 RX ORDER — TAMSULOSIN HYDROCHLORIDE 0.4 MG/1
0.4 CAPSULE ORAL DAILY
Status: DISCONTINUED | OUTPATIENT
Start: 2020-04-11 | End: 2020-04-20 | Stop reason: HOSPADM

## 2020-04-10 RX ORDER — ONDANSETRON 2 MG/ML
4 INJECTION INTRAMUSCULAR; INTRAVENOUS EVERY 6 HOURS PRN
Status: DISCONTINUED | OUTPATIENT
Start: 2020-04-10 | End: 2020-04-10

## 2020-04-10 RX ORDER — SODIUM CHLORIDE 0.9 % (FLUSH) 0.9 %
10 SYRINGE (ML) INJECTION EVERY 12 HOURS SCHEDULED
Status: CANCELLED | OUTPATIENT
Start: 2020-04-10

## 2020-04-10 RX ORDER — ACETAMINOPHEN 325 MG/1
650 TABLET ORAL EVERY 4 HOURS PRN
Status: DISCONTINUED | OUTPATIENT
Start: 2020-04-10 | End: 2020-04-20 | Stop reason: HOSPADM

## 2020-04-10 RX ORDER — PROMETHAZINE HYDROCHLORIDE 25 MG/1
12.5 TABLET ORAL EVERY 6 HOURS PRN
Status: DISCONTINUED | OUTPATIENT
Start: 2020-04-10 | End: 2020-04-10

## 2020-04-10 RX ORDER — PROMETHAZINE HYDROCHLORIDE 25 MG/1
12.5 TABLET ORAL EVERY 6 HOURS PRN
Status: CANCELLED | OUTPATIENT
Start: 2020-04-10

## 2020-04-10 RX ORDER — ASPIRIN 81 MG/1
81 TABLET ORAL DAILY
Status: CANCELLED | OUTPATIENT
Start: 2020-04-11

## 2020-04-10 RX ORDER — POLYETHYLENE GLYCOL 3350 17 G/17G
17 POWDER, FOR SOLUTION ORAL DAILY PRN
Status: DISCONTINUED | OUTPATIENT
Start: 2020-04-10 | End: 2020-04-20 | Stop reason: HOSPADM

## 2020-04-10 RX ORDER — LISINOPRIL 5 MG/1
5 TABLET ORAL DAILY
Status: CANCELLED | OUTPATIENT
Start: 2020-04-11

## 2020-04-10 RX ORDER — ASPIRIN 300 MG/1
300 SUPPOSITORY RECTAL DAILY
Status: DISCONTINUED | OUTPATIENT
Start: 2020-04-11 | End: 2020-04-10

## 2020-04-10 RX ORDER — CLOPIDOGREL BISULFATE 75 MG/1
300 TABLET ORAL ONCE
Status: COMPLETED | OUTPATIENT
Start: 2020-04-10 | End: 2020-04-10

## 2020-04-10 RX ORDER — SODIUM CHLORIDE 0.9 % (FLUSH) 0.9 %
10 SYRINGE (ML) INJECTION PRN
Status: DISCONTINUED | OUTPATIENT
Start: 2020-04-10 | End: 2020-04-14

## 2020-04-10 RX ORDER — SODIUM CHLORIDE 0.9 % (FLUSH) 0.9 %
10 SYRINGE (ML) INJECTION PRN
Status: DISCONTINUED | OUTPATIENT
Start: 2020-04-10 | End: 2020-04-10

## 2020-04-10 RX ORDER — ASPIRIN 300 MG/1
300 SUPPOSITORY RECTAL DAILY
Status: CANCELLED | OUTPATIENT
Start: 2020-04-11

## 2020-04-10 RX ORDER — SODIUM CHLORIDE 0.9 % (FLUSH) 0.9 %
10 SYRINGE (ML) INJECTION EVERY 12 HOURS SCHEDULED
Status: DISCONTINUED | OUTPATIENT
Start: 2020-04-10 | End: 2020-04-18

## 2020-04-10 RX ORDER — CLOPIDOGREL BISULFATE 75 MG/1
75 TABLET ORAL DAILY
Status: DISCONTINUED | OUTPATIENT
Start: 2020-04-11 | End: 2020-04-20 | Stop reason: HOSPADM

## 2020-04-10 RX ORDER — ONDANSETRON 4 MG/1
4 TABLET, ORALLY DISINTEGRATING ORAL 4 TIMES DAILY PRN
Status: DISCONTINUED | OUTPATIENT
Start: 2020-04-10 | End: 2020-04-20 | Stop reason: HOSPADM

## 2020-04-10 RX ORDER — FINASTERIDE 5 MG/1
5 TABLET, FILM COATED ORAL DAILY
Status: DISCONTINUED | OUTPATIENT
Start: 2020-04-11 | End: 2020-04-20 | Stop reason: HOSPADM

## 2020-04-10 RX ADMIN — CLOPIDOGREL BISULFATE 300 MG: 75 TABLET ORAL at 14:39

## 2020-04-10 RX ADMIN — TAMSULOSIN HYDROCHLORIDE 0.4 MG: 0.4 CAPSULE ORAL at 10:04

## 2020-04-10 RX ADMIN — ENOXAPARIN SODIUM 40 MG: 40 INJECTION SUBCUTANEOUS at 10:05

## 2020-04-10 RX ADMIN — LISINOPRIL 5 MG: 5 TABLET ORAL at 10:04

## 2020-04-10 RX ADMIN — SODIUM CHLORIDE: 9 INJECTION, SOLUTION INTRAVENOUS at 04:26

## 2020-04-10 RX ADMIN — ATORVASTATIN CALCIUM 80 MG: 40 TABLET, FILM COATED ORAL at 21:16

## 2020-04-10 RX ADMIN — Medication 10 ML: at 21:16

## 2020-04-10 RX ADMIN — ASPIRIN 81 MG: 81 TABLET, COATED ORAL at 10:04

## 2020-04-10 RX ADMIN — FINASTERIDE 5 MG: 5 TABLET, FILM COATED ORAL at 10:04

## 2020-04-10 RX ADMIN — CEFTRIAXONE SODIUM 1 G: 1 INJECTION, POWDER, FOR SOLUTION INTRAMUSCULAR; INTRAVENOUS at 10:04

## 2020-04-10 ASSESSMENT — PAIN SCALES - GENERAL
PAINLEVEL_OUTOF10: 0

## 2020-04-10 NOTE — PROCEDURES
[address] [place] [address], [address] [place] [address]                                 ECHOCARDIOGRAM    PATIENT NAME: Vasquez Dickinson                    :        1935  MED REC NO:   [id number]                          ROOM:       2972  ACCOUNT NO:   [de-identified]                           ADMIT DATE: 2020  PROVIDER:     Bruno Kaur MD    DATE OF STUDY:  04/10/2020    PROCEDURE:  KAYLEY.    INDICATION:  Stroke. TECHNIQUE:  This is an 81-year-old male patient, brought to noninvasive  lab today. Informed consent was obtained from the patient. The patient  received 2 mg of Versed and 25 mcg of fentanyl. Posterior pharynx was  anesthetized using lidocaine viscous gel. A mouthguard was placed. The  KAYLEY probe was advanced to the posterior pharynx and mid esophagus. Images were obtained. FINDINGS:  Left atrium is normal in size. No clot or thrombus noted in  the left atrium or left atrial appendage. Mitral valve leaflets are  slightly thickened. There is mild mitral prolapse noted. _____ mitral  regurgitation present. LV cavity is normal in size. LV function is  preserved, around 50% range present. No pericardial effusion noted. Aortic valve leaflets are slightly sclerotic. 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 Mild-to-moderate aortic  regurgitation noted. There is a central jet present. Ascending aorta  is normal.  No ASD or PFO is present. Color Doppler and bubble study  both were negative. Tricuspid valve and pulmonic valve are grossly  normal.  Ascending aorta is normal.  Mild plaque is noted in the  descending aorta. IMPRESSION:  1. Left atrium is normal in size. No clot or thrombus noted in the  left atrium or left atrial appendage. 2.  LV function is preserved, 50% to 55%. 3.  No pericardial effusion noted. 4.  No ASD or PFO is noted.   5.  Tricuspid valve and pulmonic valve are grossly normal.  6.  Mild prolapse of the mitral valve leaflets is present, both of them,  but _____ mitral regurgitation noted. 7.  Aortic valve leaflet is sclerotic, but not stenotic. Lambl's  excrescences are present. No thrombi are noted on that. Mild-to-moderate aortic regurgitation noted. There is a central jet  present. The patient tolerated the procedure well. No complications noted. No obvious cardioembolic source is noted for stroke.         Redmond Prader, MD    D: 04/10/2020 8:27:58       T: 04/10/2020 9:44:50     [email or web address]  Job#: [id number]     Doc#: [id number]    CC:

## 2020-04-10 NOTE — CARE COORDINATION
Call from [facility]. Pt has been approved to go to ARU when medically stable.  VIET
Called pt to discuss the PT recommendation for ARU. Pt is agreeable to go to ARU. Referral made to [facility]. Requested OT to see pt today via WB. Pt will not require a pre-cert. [facility] will have the Medical Director review and she will notify CM of his decision.    TE
Received referral for ARU. Will review patients clinicals and PT/OT notes.   Thank you for the referral.
ordered? NA  Was the patient given an antithrombotic by end of day 2? (day 1 starts on patients arrival date)  Yes  If the pt did not have an order for antithrombotic by day 2, did the physician document why the pt did not receive it? ( NPO status and an allergy to an antithrombotic are not acceptable reasons)  NA    Did the pt receive education on how to call 911 and documented that handout was given to pt/caregiver? Yes  Did the pt receive education on stroke symptoms and documented that handout was given to pt/caregiver? Yes  Did the patient receive education on risk factors for stroke and documented that handout was given to pt/caregiver? Yes  Does the pt have the personalized stroke factors checklist added the the AVS with appropriate risk factors checked? Yes    Did the pt receive a list of medications that are DC'D on the AVS ?  Yes-ARU  Was the pt assessed by PT/OT or SLP? If not is there a physician note that pt is back to baseline,no PT/OT/SLP eval needed or if there is an outpt referral. PT/OT phone #[phone] SLP phone # [phone]  Yes   If pt has history of A-fib/flutter do they have a RX for an anticoagulation medication or a reason charted by physician on why one was not prescribed to the patient?   Yes  Does the pt have  RX for a statin on DC? (pt does not need a RX for the following: LDL less than 70 MG/DL, allergy/intolerance or a written reason why one was not prescribed per physician)  Yes

## 2020-04-10 NOTE — PLAN OF CARE
speech-language that would indicate difficulty with the oral phase of the swallow. LTG   Bedside swallow eval to be completed 1 time with diet modifications as needed. Treatments may include speech/language/communication therapy, cognitive training, animal assisted therapy, group therapy, education, and/or dysphagia therapy based on the above goals. Co-treats where appropriate with PT or OT to facilitate patient goals in functional tasks. These goals were reviewed with this patient at the time of assessment and Juan Melara is in agreement. CASE MANAGEMENT:  Goals:   Assist patient/family with discharge planning, patient/family counseling, assistance in obtaining recommended equipment and other services, and coordination with insurance during ARU stay. Patient Goals: Patient will achieve maximum independence and return home with community support. PT IRF-NEO scores and goals for initial assessment:   Roll Left and Right  Assistance Needed: Independent  CARE Score: 6  Discharge Goal: Independent  Sit to Lying  Assistance Needed: Independent  CARE Score: 6  Discharge Goal: Independent  Sit to Stand  Assistance Needed: Supervision or touching assistance  CARE Score: 4  Discharge Goal: Independent  Chair/Bed-to-Chair Transfer  Assistance Needed: Supervision or touching assistance  CARE Score: 4  Discharge Goal: Independent  Toilet Transfer  Assistance Needed: Supervision or touching assistance  CARE Score: 4  Discharge Goal: Independent  Car Transfer  Assistance Needed: Supervision or touching assistance  CARE Score: 4  Discharge Goal: Independent  Walk 10 Feet?   Walk 10 Feet?: Yes  1 Step  1 Step?: Yes  Picking Up Object  Assistance Needed: Supervision or touching assistance  Physical Assistance Level: Less than 25%  CARE Score: 4  Discharge Goal: Independent                   OT IRF-NEO scores and goals for initial assessment:    Eating  Assistance Needed: Setup or clean-up assistance  CARE Score: 5  Discharge Goal: Independent  Oral Hygiene  Assistance Needed: Setup or clean-up assistance  CARE Score: 5  Discharge Goal: [id number]  Reason if not Attempted: Patient refused  CARE Score: 7  Discharge Goal: Independent  Shower/Bathe Self  Assistance Needed: Supervision or touching assistance  CARE Score: 4  Discharge Goal: Independent  Upper Body Dressing  Assistance Needed: Supervision or touching assistance  CARE Score: 4  Discharge Goal: Independent  Lower Body Dressing  Assistance Needed: Partial/moderate assistance  CARE Score: 3  Discharge Goal: Independent  Putting On/Taking Off Footwear  Assistance Needed: Supervision or touching assistance  CARE Score: 4  Discharge Goal: Independent    Activities Prior to Admit:   Homemaking Responsibilities: Yes  Active : No     Occupation: Retired  Leisure & Hobbies: (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 He appeared very knowledgable about music and music theory. )         Intensity of Therapy  Juan Melara will be seen a minimum of 3 hours of therapy per day/a minimum of 5 out of 7 days per week. [] In this rare instance due to the nature of this patient's medical involvement, this patient will be seen 15 hours per week (900 minutes within a 7 day period). Treatments may include therapeutic exercises, gait training, neuromuscular re-ed, transfer training, community reintegration, bed mobility, w/c mobility and training, self care, home mgmt, cognitive training, energy conservation,dysphagia tx, speech/language/communication therapy, group therapy, and patient/family education. In addition, dietician/nutritionist may monitor calorie count as well as intake and collaboratively work with SLP on dietary upgrades. Neuropsychology/Psychology may evaluate and provide necessary support.   Group therapy as appropriate to facilitate team.    ________________________________________________   ______________________  Patient/Significant Other      Date    I have reviewed this initial plan of care and agree with its contents:    Title   Name    Date    Time    Physician: Greta Chin 4/13/2020 6:59 PM    Case Mgmt: NIGEL Macdonald/MYAH-S  4/13/2020, 2:52 PM    OT: NOA Hdez, OTR/L 04/11/2020    PT: [place], [facility][note 3]1 [facility] 4/11/2020 12:06PM     RN:  Aurelio Hobson RN,    04/10/2020    ST:  Danica Rodriguez MA, CCC-SLP [place].  SP. [phone]     04/11/2020    Dietician

## 2020-04-10 NOTE — PROGRESS NOTES
Loop recorder insertion complete. Patient on monitor, call light within reach.
Intact light touch UE's/LE's b/l    Coordination/Cerebellum:       Tremors--none      Rapidly alternating movements: no dysdiadochokinesia b/l                Heel-to-Shin: no dysmetria b/l      Finger-to-Nose: no dysmetria b/l    Gait and stance:      Gait: deferred for safety due to vision loss I want him to walk with PT/OT      LABS:     Recent Labs     04/08/20 2027 04/09/20  0122   WBC 8.6 9.1     --    K 5.1  --      --    CO2 27  --    BUN 26*  --    CREATININE 0.7*  --    GLUCOSE 153*  --    ESR  --  10     Results for Kyleigh Pimentel (MRN [id number]) as of 4/9/2020 12:19   Ref. Range 4/9/2020 04:00   Color, UA Latest Ref Range: YELLOW  FLORES (A)   Clarity, UA Latest Ref Range: CLEAR  SLIGHTLY CLOUDY (A)   Bilirubin, Urine Latest Ref Range: NEGATIVE MG/DL NEGATIVE   Ketones, Urine Latest Ref Range: NEGATIVE MG/DL MODERATE (A)   Specific [place], UA Latest Ref Range: 1.001 - 1.035  1.015   Blood, Urine Latest Ref Range: NEGATIVE  SMALL (A)   Protein, UA Latest Ref Range: NEGATIVE MG/ (A)   Urobilinogen, Urine Latest Ref Range: 0.2 - 1.0 MG/DL NORMAL   Leukocyte Esterase, Urine Latest Ref Range: NEGATIVE  LARGE (A)   Glucose, Urine Latest Ref Range: NEGATIVE MG/DL NEGATIVE   Nitrite Urine, Quantitative Latest Ref Range: NEGATIVE  POSITIVE (A)   pH, Urine Latest Ref Range: 5.0 - 8.0  8.0   Mucus, UA Latest Ref Range: NEGATIVE HPF RARE (A)   WBC, UA Latest Ref Range: 0 - 2 / (H)   WBC Clumps, UA Latest Units: /HPF RARE   RBC, UA Latest Ref Range: 0 - 3 /HPF 19 (H)   Bacteria, UA Latest Ref Range: NEGATIVE /HPF MANY (A)   TRIPLE PHOSPHATE CRYSTALS Latest Units: /HPF OCCASIONAL   Trichomonas, UA Latest Ref Range: NONE SEEN /HPF NONE SEEN     Results for Kyleigh Pimentel (MRN [id number]) as of 4/9/2020 12:19   Ref. Range 4/9/2020 04:23   Vitamin B-12 Latest Ref Range: 211 - 911 pg/ml 1629 (H)     Results for Kyleigh Pimentel (MRN [id number]) as of 4/9/2020 12:19   Ref.  Range 4/9/2020 01:22
Labs     04/08/20 2027      K 5.1      CO2 27   BUN 26*   CREATININE 0.7*     Recent Labs     04/08/20 2027   AST 81*   ALT 63*   BILITOT 0.6   ALKPHOS 114     No results for input(s): INR in the last 72 hours.   Recent Labs     04/08/20 2027 04/09/20  0122 04/09/20  0423   TROPONINT <0.010 <0.010 <0.010      Imaging reviewed    Electronically signed by Rashmi Garcia MD on 4/9/2020 at 1:19 PM

## 2020-04-10 NOTE — DISCHARGE SUMMARY
compatible with COPD. No acute osseous abnormality. 1. No acute process. 2. COPD. Vl Dup Carotid Bilateral    Result Date: 4/9/2020  EXAMINATION: ULTRASOUND EVALUATION OF THE CAROTID ARTERIES 4/9/2020 COMPARISON: Correlation with concurrent MRI and CT HISTORY: Acute occipital lobe infarcts. FINDINGS: RIGHT: The right common carotid artery demonstrates peak systolic velocities of 45, 69 cm/sec in the proximal and distal segments respectively. The right internal carotid artery demonstrates peak systolic velocities of 571, 95, 102 cm/sec in the proximal, mid and distal segments respectively. The external carotid artery is patent. The vertebral artery demonstrates normal antegrade flow. Moderate to severe atherosclerotic plaque. ICA/CCA ratio of 1.8. LEFT: The left common carotid artery demonstrates peak systolic velocities of 66, 60 cm/sec in the proximal and distal segments respectively. The left internal carotid artery demonstrates peak systolic velocities of 58, 81, 65 cm/sec in the proximal, mid and distal segments respectively. The external carotid artery is patent but demonstrates markedly elevated velocity due to plaque. The vertebral artery demonstrates normal antegrade flow. Severe atherosclerotic plaque, particularly within the carotid bulb and proximal ICA. ICA/CCA ratio of 1.2. Bilateral internal carotid arteries demonstrate 0-50% stenosis. Patent bilateral vertebral arteries with normal directional flow. Extensive atherosclerotic plaque, left greater than right. Cta Head W Contrast    Result Date: 4/9/2020  EXAMINATION: CTA OF THE HEAD WITH CONTRAST 4/9/2020 1:58 pm: TECHNIQUE: CTA of the head/brain was performed with the administration of intravenous contrast. Multiplanar reformatted images are provided for review. MIP images are provided for review.  Dose modulation, iterative reconstruction, and/or weight based adjustment of the mA/kV was utilized to reduce the radiation dose to as

## 2020-04-10 NOTE — PROCEDURES
[address] [place] [address], [address] [facility]                            CARDIAC CATHETERIZATION    PATIENT NAME: Ksenia Blanco                    :        1935  MED REC NO:   [id number]                          ROOM:       9068  ACCOUNT NO:   [de-identified]                           ADMIT DATE: 2020  PROVIDER:     Mayra Villa MD    DATE OF PROCEDURE:  04/10/2020    PROCEDURE:  Loop recorder insertion. This is an 81-year-old male patient, who currently has a stroke. Therefore, a loop recorder is being placed to rule out paroxysmal atrial  fibrillation. TECHNIQUE:  Procedure was performed in the cath lab holding area. The  patient received 10 mL of 2% lidocaine in the left pectoral region, and  using the standard technique, loop recorder was inserted with no  complications. Device was interrogated and has good R-R signals  present. It is a [employer] device, serial number is  [id number]. IMPRESSION:  Successful loop recorder insertion for cryptogenic stroke,  for rule out atrial fibrillation. The patient tolerated the procedure well. No complications noted. Steri-Strips were applied and pressure dressing was placed.         Dedrick Valdovinos MD    D: 04/10/2020 8:51:03       T: 04/10/2020 9:18:32     [email or web address]  Job#: [id number]     Doc#: [id number]    CC:

## 2020-04-11 LAB
CULTURE: ABNORMAL
CULTURE: ABNORMAL
Lab: ABNORMAL
SPECIMEN: ABNORMAL
TOTAL COLONY COUNT: ABNORMAL

## 2020-04-11 PROCEDURE — 97162 PT EVAL MOD COMPLEX 30 MIN: CPT

## 2020-04-11 PROCEDURE — 2580000003 HC RX 258: Performed by: INTERNAL MEDICINE

## 2020-04-11 PROCEDURE — 6370000000 HC RX 637 (ALT 250 FOR IP): Performed by: INTERNAL MEDICINE

## 2020-04-11 PROCEDURE — 97116 GAIT TRAINING THERAPY: CPT

## 2020-04-11 PROCEDURE — 97166 OT EVAL MOD COMPLEX 45 MIN: CPT

## 2020-04-11 PROCEDURE — 97112 NEUROMUSCULAR REEDUCATION: CPT

## 2020-04-11 PROCEDURE — 6360000002 HC RX W HCPCS: Performed by: INTERNAL MEDICINE

## 2020-04-11 PROCEDURE — 92523 SPEECH SOUND LANG COMPREHEN: CPT

## 2020-04-11 PROCEDURE — 94761 N-INVAS EAR/PLS OXIMETRY MLT: CPT

## 2020-04-11 PROCEDURE — 1280000000 HC REHAB R&B

## 2020-04-11 PROCEDURE — 6370000000 HC RX 637 (ALT 250 FOR IP): Performed by: NURSE PRACTITIONER

## 2020-04-11 RX ADMIN — TAMSULOSIN HYDROCHLORIDE 0.4 MG: 0.4 CAPSULE ORAL at 09:10

## 2020-04-11 RX ADMIN — CEFTRIAXONE 1 G: 1 INJECTION, POWDER, FOR SOLUTION INTRAMUSCULAR; INTRAVENOUS at 10:47

## 2020-04-11 RX ADMIN — ATORVASTATIN CALCIUM 80 MG: 40 TABLET, FILM COATED ORAL at 21:56

## 2020-04-11 RX ADMIN — ENOXAPARIN SODIUM 40 MG: 40 INJECTION SUBCUTANEOUS at 09:10

## 2020-04-11 RX ADMIN — LISINOPRIL 5 MG: 5 TABLET ORAL at 09:10

## 2020-04-11 RX ADMIN — Medication 10 ML: at 21:56

## 2020-04-11 RX ADMIN — Medication 10 ML: at 09:11

## 2020-04-11 RX ADMIN — FINASTERIDE 5 MG: 5 TABLET, FILM COATED ORAL at 09:10

## 2020-04-11 RX ADMIN — CLOPIDOGREL BISULFATE 75 MG: 75 TABLET ORAL at 09:10

## 2020-04-11 RX ADMIN — ASPIRIN 81 MG: 81 TABLET, COATED ORAL at 09:10

## 2020-04-11 ASSESSMENT — PAIN SCALES - GENERAL
PAINLEVEL_OUTOF10: 0

## 2020-04-11 ASSESSMENT — PAIN - FUNCTIONAL ASSESSMENT: PAIN_FUNCTIONAL_ASSESSMENT: 0-10

## 2020-04-11 NOTE — PROGRESS NOTES
Speech Language Pathology  Facility/Department: [facility] ARU  Initial Speech/Language/Cognitive Assessment    NAME: Navjot Issa  : 1935   MRN: [id number]  ADMISSION DATE: 4/10/2020  ADMITTING DIAGNOSIS: has Dizziness and Acute cerebrovascular accident (CVA) ([address] on their problem list.  DATE ONSET: 2020    Date of Eval: 2020   Evaluating Therapist: TANK Green    RECENT RESULTS  CT OF HEAD/MRI:   No high-grade stenosis or focal occlusion involving the intracranial   vasculature.  No evidence of aneurysm. Primary Complaint: Some gaps in memory where he reports having lost track of time and cannot remember certain events in his past.    Pain:  Pain Assessment  Pain Assessment: 0-10  Pain Level: 0  Patient's Stated Pain Goal: No pain    Assessment:  Cognitive Diagnosis: Cognitive skills appear to be consistent with age. He demonstrated mild short-term memory deficits that appeared related to this acute incident. Aphasia Diagnosis: None present  Speech Diagnosis: Clear and precise speech and language  Communication Diagnosis: Functional for conversation   Diagnosis: No cognitive and/or speech-language impairment at the present time. Pt had mild difficulty with short-term memory loss. Recommendations:  Requires SLP Intervention: No     D/C Recommendations: To be determined  Referral To: Dysphagia evaluation    Plan:   Goals:      Patient/family involved in developing goals and treatment plan: SLP attempted to call the pt's niece, Ana Patterson at the number listed in his file, however, the number has been disconnected. Subjective:   Previous level of function and limitations: Pt was independent at home but had friends to drive him places. General  Additional Pertinent Hx: Pt reported having a 'peculiar upbringing', being raised by his aunt who reinforced the importance of education and philosophy. 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 A. and had

## 2020-04-11 NOTE — PROGRESS NOTES
Occupational Therapy                                                  [facility] ARU OCCUPATIONAL THERAPY EVALUATION    Chart Review:  Past Medical History:   Diagnosis Date    COPD (chronic obstructive pulmonary disease) ([address]     Prostatitis      Past Surgical History:   Procedure Laterality Date    HERNIA REPAIR       Social History:  Social/Functional History  Lives With: Alone  Type of Home: Apartment  Home Layout: One level  Home Access: Elevator  Bathroom Shower/Tub: Tub/Shower unit, Shower chair with back  Bathroom Toilet: Standard  Bathroom Equipment: Grab bars in shower  Bathroom Accessibility: Walker accessible  Home Equipment: Cane(Uses cane for fxl mobility)  Receives Help From: Friend(s)  ADL Assistance: [address]: Needs assistance  Homemaking Responsibilities: Yes  Ambulation Assistance: Independent  Transfer Assistance: Independent  Active : No  Patient's  Info: Hired help or friends to drive to Medication Review  Education: Bachelors degree  Occupation: Retired  Type of occupation: (Pt reported getting his degree in history, working in the education field, 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   He appeared very knowledgable about music and music theory. )  IADL Comments: Neighbors/friends assist with IADLs. (+) med mgmt  Additional Comments: Pt sleeps on flat bed at baseline    Restrictions:  Restrictions/Precautions  Restrictions/Precautions: General Precautions, Fall Risk  Required Braces or Orthoses?: No             Subjective  Subjective: \"I feel good today\"    Pain Level: 0       Objective:       Orientation  Overall Orientation Status: Impaired  Orientation Level: Disoriented to time, Oriented to person, Disoriented to place, Disoriented to situation(Oriented to year)        Vision  Vision: Impaired  Vision Exceptions: Wears glasses at all times  Vision - Basic Assessment  Prior Vision: Wears glasses all the time  Patient Visual Report: Other (add comment)(Pt reports \"distorted vision\")  Visual Field Cut: (Pt demo possible visual field cut. Unable to identify number of fingers in both R and L visual field)  Vision Comments: Impaired tracking to L side and through midline. Pt demo possible visual field cut. Unable to identify number of fingers in both R and L visual field. Hearing  Hearing: Within functional limits    ROM:      LUE AROM (degrees)  LUE AROM : WNL     Left Hand AROM (degrees)  Left Hand AROM: WNL     RUE AROM (degrees)  RUE AROM : WNL     Right Hand AROM (degrees)  Right Hand AROM: WNL    Strength:    LUE Strength  Gross LUE Strength: WFL  L Hand General: 4/5  RUE Strength  Gross RUE Strength: WFL  R Hand General: 4/5    Quality of Movement: Tone RUE  RUE Tone: Normotonic  Tone LUE  LUE Tone: Normotonic          Sensation:    Sensation  Overall Sensation Status: Impaired(Dec sensation in BUE)    ADL's:    ADL  Feeding: Setup(Per pt reports, able to self-feed breakfast this AM with setup assist to open containers)  Grooming: Stand by assistance, Verbal cueing(Pt brushed hair, washed hands, and brushed teeth w/c level with min verbal cues for completion)  UE Bathing: Stand by assistance, Verbal cueing(Sponge bath w/c level, requires mod verbal cues for completion of task)  LE Bathing: Verbal cueing, Contact guard assistance(Sponge bath w/c level, required mod verbal cues for completion of task. STS at sink with CGA to wash brunilda area.)  UE Dressing: Stand by assistance(Don shirt seated in w/c with SBA.)  LE Dressing: Moderate assistance, Verbal cueing, Stand by assistance(Don depends and pants seated in w/c with Mod A to thread LLE to manage catheter bag and assist to fasten at waist. Required mod verbal cues for orientation of depends.  Able to don/dof socks using figure four method with SBA.)  Toileting: (Pt declines needing to use bathroom)  Additional Comments: Pt requires extended time d/t dec attention to task. Bed Mobility:    Bed mobility  Supine to Sit: Stand by assistance(Min verbal cues for technique)    Transfers:    Transfers  Stand Pivot Transfers: Contact guard assistance(SPT EOB to w/c with RW and CGA for stability and safety. Mod verbal cues for technique and safety)  Sit to stand: Contact guard assistance(STS from EOB with RW and min verbal cues for hand placement)  Toilet Transfers  Toilet - Technique: Stand pivot  Equipment Used: Grab bars  Toilet Transfer: Contact guard assistance  Toilet Transfers Comments: SPT w/c<>toilet with RW and GB, provided mod verbal cues for technique and CGA for safety. Functional Mobility:    Balance  Sitting Balance: Supervision(Pt sits EOB with Sup)  Standing Balance: Contact guard assistance(CGA dynamic stance during ADLs for balance and safety)           Cognition:  Cognition  Overall Cognitive Status: Exceptions  Arousal/Alertness: Appropriate responses to stimuli  Following Commands:  Follows one step commands with increased time, Follows multistep commands with repitition  Attention Span: Difficulty attending to directions, Attends with cues to redirect  Memory: Decreased short term memory, Decreased recall of recent events  Safety Judgement: Decreased awareness of need for safety(Slightly impulsive, requires mod cues to slow down)  Problem Solving: Decreased awareness of errors, Assistance required to generate solutions  Insights: Fully aware of deficits  Initiation: Requires cues for some  Sequencing: Does not require cues  Cognition Comment: Pt confused at times, requires cues to redirect attention and problem solve, tangential conversation    Perception:         OT IRF-NEO scores and goals for initial assessment:    Eating  Assistance Needed: Setup or clean-up assistance  CARE Score: 5  Discharge Goal: Independent  Oral Hygiene  Assistance Needed: Setup or clean-up

## 2020-04-11 NOTE — H&P
Sveta Diaz    : 1935  [facility]  MRN: [id number]              History and physical      Admitting diagnosis: Right cerebrovascular accident    Comorbid diagnoses impacting rehabilitation: Left hemiparesis, dysphagia, dysarthria, gait disturbance, COPD, chronic indwelling urinary catheter with new UTI    Chief complaint: Fatigue and left-sided weakness. History of present illness: The patient is an 27-year-old right-hand-dominant male who presented to our ED on 2020 with acute onset of difficulty speaking and left-sided weakness. There was no head trauma, recent illness or seizure activity described at the onset of his weakness. He has had difficulty swallowing solids and liquids and is required a modified diet. Brain imaging showed a significant right parietal and occipital infarction without hemorrhage or gross mass-effect. He has been treated with antiplatelet therapy and high-dose statins while correcting his blood pressure. He has required a modified diet and continuation of a chronic indwelling Goodman. Urinary tract infection was identified as well. He is not safe to return home as he cannot do his own mobility, personal care and hygiene. Review of systems: He is slurring his speech and coughing with meals. He has left-sided weakness. He denies significant pain. He has some tingling in the left hand. No recent travels, traumas or obvious exposures to contagious diseases. The remainder of their review of systems was negative except as mentioned in the history of present illness. Social History: The patient lives alone in an elevator access single level apartment with a tub shower combination for bathing with a shower chair and a standard height commode. There are grab bars in the bathroom. He typically uses a cane for ambulation. His friend set up his medications. He does light homemaking and meal prep but also has friends to help with home upkeep.   He does not packet 17 g, 17 g, Oral, Daily PRN, C Salazar Quinteros MD    [COMPLETED] clopidogrel (PLAVIX) tablet 300 mg, 300 mg, Oral, Once, 300 mg at 04/10/20 1439 **FOLLOWED BY** [START ON 4/11/2020] clopidogrel (PLAVIX) tablet 75 mg, 75 mg, Oral, Daily, Ricci Cousins, APRN - CNP    Family History:   No family history on file. Exam:    Blood pressure (!) 143/63, pulse 101, temperature 98.3 °F (36.8 °C), temperature source Oral, resp. rate 22, height 5' 7\" (1.702 m), weight 109 lb 8 oz (49.7 kg), SpO2 96 %. General: Patient is seen reclined in bed. He has a diminutive elderly gentleman who is somewhat hard of hearing. He is pleasant and participates in simple conversation but has marked dysarthria. HEENT: Left facial droop. Marked dysarthria. Right visual field preference. Neck is supple and there is no adenopathy. No signs of trauma to his head or neck. Pulmonary: Shallow but clear. Cardiac: Regular rate and rhythm. Abdomen: Patient's abdomen was soft and nondistended. Bowel sounds were present throughout. There was no rebound, guarding or masses noted. Spinal exam: Skin overlying the spinous pink with marked bony prominences. Guarded trunk rotation due to generalized weakness. Upper extremities: Increased tone with weakness across left shoulder, elbow and wrist.  Very weak left grasp. Poor coordination. Right arm moves more freely. Lower extremities: Coarse movements only at the left knee and ankle. Incomplete ankle dorsiflexion. Trace edema. Heels clear. No signs of DVT. Sitting balance was fair-.  Standing balance was poor.     Lab Results   Component Value Date    WBC 9.1 04/09/2020    HGB 15.2 04/09/2020    HCT 48.2 04/09/2020    MCV 95.4 04/09/2020     04/09/2020     No results found for: INR, PROTIME  Lab Results   Component Value Date    CREATININE 0.7 (L) 04/08/2020    BUN 26 (H) 04/08/2020     04/08/2020    K 5.1 04/08/2020     04/08/2020    CO2 27 must monitor him for signs of dehydration. 4. Chronic indwelling Goodman catheter: Chronic prostatitis and urinary retention seems to be the reason for the indwelling Goodman. He will get a full course of antibiotics for his current infection. He is on both Proscar and Flomax. Will review with him proper care and maintenance of his catheter. 5. COPD: Aggressive pulmonary hygiene and and PRN nebulizers. 6. Urinary tract infection: Citrobacter brachii sensitive to ceftriaxone. IV Rocephin for a full 7-day course. I personally performed a history and physical on this patient within 24 hours of admission to the rehab unit. I have reviewed the preadmission screening and concur with its findings without change. A detailed plan of care will be established by hospital day 4 and I attest the patient is appropriate for inpatient rehabilitation at this time. I have compared the patient's current functional status noted during my history and physical with that of the preadmission screen and I have found no significant differences.

## 2020-04-11 NOTE — FLOWSHEET NOTE
again. Pain Level: 0       Objective:  Orientation  Overall Orientation Status: Within Normal Limits(alert and oriented to person and situation, oriented to setting but not building, does not recall day of week or date.)        Vision  Vision: Impaired(patient with significant impairments consistent with lower half impairment and right-sided impairments. not consistent with field cuts, but certainly with blurriness and difficulty recognizing things on right side and inferior half.  )  Vision Exceptions: Wears glasses at all times  Hearing  Hearing: Within functional limits    ROM:   PROM RLE (degrees)  RLE PROM: WFL  AROM RLE (degrees)  RLE AROM: WFL  PROM LLE (degrees)  LLE PROM: WFL  AROM LLE (degrees)  LLE AROM : WFL  PROM RUE (degrees)  RUE PROM: WFL  AROM RUE (degrees)  RUE AROM : WFL  PROM LUE (degrees)  LUE PROM: WFL  AROM LUE (degrees)  LUE AROM : WFL    Strength:    Strength RLE  Strength RLE: Exception  Comment: 4/5 grossly, impaired ecc control, good concentric control. Strength LLE  Strength LLE: Exception  Comment: 4/5 grossly, impaired ecc control, good concentric control. Strength RUE  Strength RUE: WFL  Strength LUE  Strength LUE: WFL       Bed Mobility:    Bed mobility  Bridging: Independent  Rolling to Left: Independent  Rolling to Right: Independent  Supine to Sit: Independent(no rails)  Sit to Supine: Independent  Scooting: Independent    Transfers:    Transfers  Sit to Stand: Supervision  Stand to sit: Supervision  Bed to Chair: Supervision  Stand Pivot Transfers: Supervision  Squat Pivot Transfers: Supervision  Lateral Transfers: Supervision  Car Transfer: Supervision  Comment: some visual impairment, requires increased time and patient caution    Ambulation:    Ambulation?: Yes  Ambulation 1  Surface: level tile  Device: Rolling Walker  Assistance: Stand by assistance  Quality of Gait: slow, safe.   impaired path at times whereby patient has decreased recognition of space around feet and [address]  Equipment Recommendations:  RW is necessary. Goals:        Long term goals  Time Frame for Long term goals : one week  Long term goal 1: indep all basic bed mobility skills, basic sit-stand-SPT and car xfers  Long term goal 2: mod indep ambulation with RW for 400 ft, safely, with independent performance of visual scanning of environment  Long term goal 3: mod indep up and down 12 stairs with one rail  Long term goal 4: indep with HEP, including activities to improve LUE coordination testing. Plan:    REQUIRES PT FOLLOW UP: Yes  Pt will be seen at least 60 minutes per day for a minimum of 5 days per week, plus group therapy as appropriate  Plan  Times per week: 5+  Current Treatment Recommendations: Strengthening, ROM, Balance Training, Functional Mobility Training, Transfer Training, ADL/Self-care Training, IADL Training, Cognitive/Perceptual Training, Endurance Training, Gait Training, Stair training, Neuromuscular Re-education, Home Exercise Program, Safety Education & Training, Equipment Evaluation, Education, & procurement, Patient/Caregiver Education & Training, Positioning    PT Individual Minutes  Time In: 1055  Time Out: 1200  Minutes: 65        Timed Code Treatment Minutes: 35 Minutes(plus 30 for eval)    Number of Minutes/Billable Intervention  PT Evaluation 30   Gait Training 15   Therapeutic Exercise 5   Neuro Re-Ed 15   Therapeutic Activity    Wheelchair Propulsion    Group    Other:    TOTAL 65         Electronically signed by:     Ese Dahl, PT    4/11/2020, 12:00 PM

## 2020-04-12 PROCEDURE — 6370000000 HC RX 637 (ALT 250 FOR IP): Performed by: NURSE PRACTITIONER

## 2020-04-12 PROCEDURE — 2580000003 HC RX 258: Performed by: INTERNAL MEDICINE

## 2020-04-12 PROCEDURE — 99232 SBSQ HOSP IP/OBS MODERATE 35: CPT | Performed by: PHYSICAL MEDICINE & REHABILITATION

## 2020-04-12 PROCEDURE — 6360000002 HC RX W HCPCS: Performed by: INTERNAL MEDICINE

## 2020-04-12 PROCEDURE — 1280000000 HC REHAB R&B

## 2020-04-12 PROCEDURE — 6370000000 HC RX 637 (ALT 250 FOR IP): Performed by: INTERNAL MEDICINE

## 2020-04-12 PROCEDURE — 94761 N-INVAS EAR/PLS OXIMETRY MLT: CPT

## 2020-04-12 RX ADMIN — CLOPIDOGREL BISULFATE 75 MG: 75 TABLET ORAL at 09:42

## 2020-04-12 RX ADMIN — ATORVASTATIN CALCIUM 80 MG: 40 TABLET, FILM COATED ORAL at 19:54

## 2020-04-12 RX ADMIN — ASPIRIN 81 MG: 81 TABLET, COATED ORAL at 09:42

## 2020-04-12 RX ADMIN — Medication 10 ML: at 09:44

## 2020-04-12 RX ADMIN — ENOXAPARIN SODIUM 40 MG: 40 INJECTION SUBCUTANEOUS at 09:42

## 2020-04-12 RX ADMIN — TAMSULOSIN HYDROCHLORIDE 0.4 MG: 0.4 CAPSULE ORAL at 09:42

## 2020-04-12 RX ADMIN — LISINOPRIL 5 MG: 5 TABLET ORAL at 09:42

## 2020-04-12 RX ADMIN — Medication 10 ML: at 19:54

## 2020-04-12 RX ADMIN — FINASTERIDE 5 MG: 5 TABLET, FILM COATED ORAL at 09:42

## 2020-04-12 RX ADMIN — CEFTRIAXONE 1 G: 1 INJECTION, POWDER, FOR SOLUTION INTRAMUSCULAR; INTRAVENOUS at 11:13

## 2020-04-12 ASSESSMENT — PAIN SCALES - GENERAL
PAINLEVEL_OUTOF10: 0
PAINLEVEL_OUTOF10: 0

## 2020-04-12 NOTE — PATIENT CARE CONFERENCE
environment  Equipment needed at discharge:TBD      PT IRF-NEO scores and goals for initial assessment:   Roll Left and Right  Assistance Needed: Independent  CARE Score: 6  Discharge Goal: Independent  Sit to Lying  Assistance Needed: Independent  CARE Score: 6  Discharge Goal: Independent  Sit to Stand  Assistance Needed: Supervision or touching assistance  CARE Score: 4  Discharge Goal: Independent  Chair/Bed-to-Chair Transfer  Assistance Needed: Supervision or touching assistance  CARE Score: 4  Discharge Goal: Independent  Toilet Transfer  Assistance Needed: Supervision or touching assistance  CARE Score: 4  Discharge Goal: Independent  Car Transfer  Assistance Needed: Supervision or touching assistance  CARE Score: 4  Discharge Goal: Independent  Walk 10 Feet? Walk 10 Feet?: Yes  1 Step  1 Step?: Yes  Picking Up Object  Assistance Needed: Supervision or touching assistance  Physical Assistance Level: Less than 25%  CARE Score: 4  Discharge Goal: Independent                     Fall Risk: []  Yes  []  No    OCCUPATIONAL THERAPY   Short term goals  Time Frame for Short term goals: STG=LTG :   Long term goals  Time Frame for Long term goals : 5-7 days  Long term goal 1: Pt will perform feeding with Mod I. NMET; setup  Long term goal 2: Pt will perform grooming with Mod I. NMET; supervision  Long term goal 3: Pt will perform bathing with Mod I, DME/AE PRN. NMET; min A  Long term goal 4: Pt will perform UB dressing with Mod I. NMET; supervision  Long term goal 5: Pt will perform LB dressing with Mod I, AE PRN. NMET; CGA + assist with catheter  Long term goals 6: Pt will don/dof footwear with Mod I, AE PRN. NMET; supervision. Requiring cues for orientation and sequencing 2* inconsistent performance  Long term goal 7: Pt will complete toileting with Mod I, DME PRN. ??? Has catheter  Long term goal 8: Pt will perform all functional transfers, including toilet and shower, with Mod I, DME PRN.  NMET; CGA  Long term Pleasant    Ongoing tx following discharge: [x]HHC PT  OT NSG   []OUTPATIENT     [] No Further Treatment     [] Family/Caregiver Training  []  Transitional Living Arrangement   [] Home Assessment (date  )     [] Family Conference   []  Therapeutic Pass       []  Other: (specify)    Estimated Discharge Date: 4/21/2020    Estimated Discharge Destination: []home alone   []home alone with assist prn  [x]Continuous supervision []Return home with spouse/family   [] Assisted living  []SNF     See team signature page for team members participating in today's conference. Goals have been updated to reflect recent status.       I have led this Team Conference and agree with the plan, Roger Rutherford MD, 4/14/2020, 1:02 PM    Team conference note transcribed this date by: Linh Arroyo, [place], [id number] [facility], Therapy Coordinator

## 2020-04-12 NOTE — PROGRESS NOTES
Kacy Duran    : 1935  Acct #: [de-identified]  MRN: [id number]              PM&R Progress Note      Admitting diagnosis: Right cerebrovascular accident     Comorbid diagnoses impacting rehabilitation: Left hemiparesis, dysphagia, dysarthria, gait disturbance, COPD, chronic indwelling urinary catheter with new UTI    Chief complaint: Visual disturbances of various sorts. Does not like the hospital food. Prior (baseline) level of function: Independent. Current level of function:         Current  IRF-NEO and Goals:   Hearing, Speech, and Vision  Expression of Ideas and Wants: Without difficulty  Understanding Verbal and Non-Verbal Content: Understands  Prior Functioning: Everyday Activities  Self Care: Independent  Indoor Mobility (Ambulation):  Independent  Stairs: Independent  Functional Cognition: Independent  Prior Device Use: None of the given options    Eating  Assistance Needed: Setup or clean-up assistance  CARE Score: 5  Discharge Goal: Independent  Oral Hygiene  Assistance Needed: Setup or clean-up assistance  CARE Score: 5  Discharge Goal: [id number]  Reason if not Attempted: Patient refused  CARE Score: 7  Discharge Goal: Independent  Shower/Bathe Self  Assistance Needed: Supervision or touching assistance  CARE Score: 4  Discharge Goal: Independent  Upper Body Dressing  Assistance Needed: Supervision or touching assistance  CARE Score: 4  Discharge Goal: Independent  Lower Body Dressing  Assistance Needed: Partial/moderate assistance  CARE Score: 3  Discharge Goal: Independent  Putting On/Taking Off Footwear  Assistance Needed: Supervision or touching assistance  CARE Score: 4  Discharge Goal: Independent    Roll Left and Right  Assistance Needed: Independent  CARE Score: 6  Discharge Goal: Independent  Sit to Lying  Assistance Needed: Independent  CARE Score: 6  Discharge Goal: Independent  Sit to Stand  Assistance Needed: Supervision or touching assistance  CARE Score: 4  Discharge Goal: Independent  Chair/Bed-to-Chair Transfer  Assistance Needed: Supervision or touching assistance  CARE Score: 4  Discharge Goal: Independent  Toilet Transfer  Assistance Needed: Supervision or touching assistance  CARE Score: 4  Discharge Goal: Independent  Car Transfer  Assistance Needed: Supervision or touching assistance  CARE Score: 4  Discharge Goal: Independent   Walk 10 Feet? Walk 10 Feet?: Yes  1 Step  1 Step?: Yes  Picking Up Object  Assistance Needed: Supervision or touching assistance  Physical Assistance Level: Less than 25%  CARE Score: 4  Discharge Goal: Independent                     I      Exam:    Blood pressure 135/69, pulse 72, temperature 97.8 °F (36.6 °C), temperature source Oral, resp. rate 16, height 5' 7\" (1.702 m), weight 109 lb 8 oz (49.7 kg), SpO2 94 %. General: Sitting up in a bed. Paraphasias noted. Alert and in no distress. Oriented x1-2. HEENT: Mild left facial droop. Mucous membranes moist.  Visual fields seem full. Neck supple. Pulmonary: Shallow but clear. Cardiac: Regular rate and rhythm. Abdomen: Patient's abdomen is soft and nondistended. Bowel sounds were present throughout. There was no rebound, guarding or masses noted. Upper extremities: Clumsy hand movements bilaterally with weaker  on the left. Lower extremities: Clumsy movements of the legs with weaker ankle dorsiflexion on the left. No signs of DVT. Sitting balance was fair. Standing balance was poor.     Lab Results   Component Value Date    WBC 9.1 04/09/2020    HGB 15.2 04/09/2020    HCT 48.2 04/09/2020    MCV 95.4 04/09/2020     04/09/2020     No results found for: INR, PROTIME  Lab Results   Component Value Date    CREATININE 0.7 (L) 04/08/2020    BUN 26 (H) 04/08/2020     04/08/2020    K 5.1 04/08/2020     04/08/2020    CO2 27 04/08/2020     Lab Results   Component Value Date    ALT 63 (H) 04/08/2020    AST 81 (H) 04/08/2020    ALKPHOS 114 04/08/2020    BILITOT 0.6 04/08/2020       Expected length of stay  prior to a supervised level of function for discharge home with a walker and [facility] OT/PT is 2 weeks. Recommendations:    1. Right cerebrovascular accident with left hemiparesis: Developing the routine for his daily occupational and physical therapy with speech-language pathology. He requires a modified diet for thickness of liquids and consistency of solids. He requires ongoing treatment of his urinary tract infection. Ongoing treatment with antiplatelet therapy and a statin. He requires DVT prophylaxis. We must emphasize pulmonary hygiene and bowel and bladder retraining, noting that he has a chronic indwelling Goodman catheter. 2. DVT prophylaxis: Lovenox 40 mg subcu daily. I must monitor his hemoglobin and platelet count periodically while on this medication. Weightbearing activities are pursued daily. GI prophylaxis offered. No signs of acute bleeding. 3. Dysphagia: Patient is on a diet modified for thickness of liquids and consistency of solids. He must be upright for any meals. Seems to be tolerating his oral medications. I must monitor him for signs of dehydration. 4. Chronic indwelling Goodman catheter: Chronic prostatitis and urinary retention seems to be the reason for the indwelling Goodman. He will get a full course of antibiotics for his current infection. He is on both Proscar and Flomax. Will review with him proper care and maintenance of his catheter. 5. COPD: Aggressive pulmonary hygiene and and PRN nebulizers. 6. Urinary tract infection: Citrobacter brachii sensitive to ceftriaxone.   IV Rocephin for a full 7-day course.

## 2020-04-13 LAB
EKG ATRIAL RATE: 72 BPM
EKG DIAGNOSIS: NORMAL
EKG P AXIS: 78 DEGREES
EKG P-R INTERVAL: 124 MS
EKG Q-T INTERVAL: 412 MS
EKG QRS DURATION: 80 MS
EKG QTC CALCULATION (BAZETT): 451 MS
EKG R AXIS: 73 DEGREES
EKG T AXIS: 77 DEGREES
EKG VENTRICULAR RATE: 72 BPM

## 2020-04-13 PROCEDURE — 6360000002 HC RX W HCPCS: Performed by: INTERNAL MEDICINE

## 2020-04-13 PROCEDURE — 97530 THERAPEUTIC ACTIVITIES: CPT

## 2020-04-13 PROCEDURE — 6370000000 HC RX 637 (ALT 250 FOR IP): Performed by: INTERNAL MEDICINE

## 2020-04-13 PROCEDURE — 2580000003 HC RX 258: Performed by: INTERNAL MEDICINE

## 2020-04-13 PROCEDURE — 92610 EVALUATE SWALLOWING FUNCTION: CPT

## 2020-04-13 PROCEDURE — 94761 N-INVAS EAR/PLS OXIMETRY MLT: CPT

## 2020-04-13 PROCEDURE — 97112 NEUROMUSCULAR REEDUCATION: CPT

## 2020-04-13 PROCEDURE — 97535 SELF CARE MNGMENT TRAINING: CPT

## 2020-04-13 PROCEDURE — 99232 SBSQ HOSP IP/OBS MODERATE 35: CPT | Performed by: PHYSICAL MEDICINE & REHABILITATION

## 2020-04-13 PROCEDURE — 6370000000 HC RX 637 (ALT 250 FOR IP): Performed by: NURSE PRACTITIONER

## 2020-04-13 PROCEDURE — 1280000000 HC REHAB R&B

## 2020-04-13 PROCEDURE — 97116 GAIT TRAINING THERAPY: CPT

## 2020-04-13 RX ADMIN — LISINOPRIL 5 MG: 5 TABLET ORAL at 09:03

## 2020-04-13 RX ADMIN — ATORVASTATIN CALCIUM 80 MG: 40 TABLET, FILM COATED ORAL at 20:05

## 2020-04-13 RX ADMIN — CLOPIDOGREL BISULFATE 75 MG: 75 TABLET ORAL at 09:03

## 2020-04-13 RX ADMIN — ENOXAPARIN SODIUM 40 MG: 40 INJECTION SUBCUTANEOUS at 09:04

## 2020-04-13 RX ADMIN — Medication 10 ML: at 16:02

## 2020-04-13 RX ADMIN — CEFTRIAXONE 1 G: 1 INJECTION, POWDER, FOR SOLUTION INTRAMUSCULAR; INTRAVENOUS at 16:01

## 2020-04-13 RX ADMIN — ASPIRIN 81 MG: 81 TABLET, COATED ORAL at 09:03

## 2020-04-13 RX ADMIN — Medication 10 ML: at 20:06

## 2020-04-13 RX ADMIN — Medication 10 ML: at 09:06

## 2020-04-13 RX ADMIN — FINASTERIDE 5 MG: 5 TABLET, FILM COATED ORAL at 09:03

## 2020-04-13 RX ADMIN — TAMSULOSIN HYDROCHLORIDE 0.4 MG: 0.4 CAPSULE ORAL at 09:04

## 2020-04-13 ASSESSMENT — PAIN SCALES - GENERAL: PAINLEVEL_OUTOF10: 0

## 2020-04-13 NOTE — PROGRESS NOTES
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 He appeared very knowledgable about music and music theory. )  IADL Comments: Neighbors/friends assist with IADLs. (+) med mgmt  Additional Comments: Pt sleeps on flat bed at baseline    Objective                                                                                    Goals:  (Update in navigator)  Short term goals  Time Frame for Short term goals: STG=LTG:  Long term goals  Time Frame for Long term goals : 5-7 days  Long term goal 1: Pt will perform feeding with Mod I.  Long term goal 2: Pt will perform grooming with Mod I.  Long term goal 3: Pt will perform bathing with Mod I, DME/AE PRN. Long term goal 4: Pt will perform UB dressing with Mod I.  Long term goal 5: Pt will perform LB dressing with Mod I, AE PRN. Long term goals 6: Pt will don/dof footwear with Mod I, AE PRN. Long term goal 7: Pt will complete toileting with Mod I, DME PRN. Long term goal 8: Pt will perform all functional transfers, including toilet and shower, with Mod I, DME PRN. Long term goal 9: Pt will participate in ther ex/ther act with emphasis on dynamic stance >15 min to increase independence with standing ADL/IADL tasks and fxl mobility.:        Plan of Care                                                                              Times per week: 5 days per week for a minimum of 60 minutes/day plus group as appropriate for 60 minutes.   Treatment to include Plan  Times per week: 6x  Times per day: Daily  Plan weeks: 5- days  Current Treatment Recommendations: Strengthening,

## 2020-04-13 NOTE — PLAN OF CARE
Problem: Falls - Risk of:  Goal: Will remain free from falls  Outcome: Ongoing  Goal: Absence of physical injury  Outcome: Ongoing     Problem: SAFETY  Goal: Free from intentional harm  Outcome: Ongoing     Problem: DAILY CARE  Goal: Daily care needs are met  Outcome: Ongoing     Problem: PAIN  Goal: Patient's pain/discomfort is manageable  Outcome: Ongoing     Problem: SKIN INTEGRITY  Goal: Skin integrity is maintained or improved  Outcome: Ongoing     Problem: KNOWLEDGE DEFICIT  Goal: Patient/S.O. demonstrates understanding of disease process, treatment plan, medications, and discharge instructions.   Outcome: Ongoing     Problem: DISCHARGE BARRIERS  Goal: Patient's continuum of care needs are met  Outcome: Ongoing

## 2020-04-13 NOTE — PROGRESS NOTES
Case Management Admission Note      Patient:Xavier Razo      :1935  MIX:[id number]  Rehab Dx/Hx: Acute cerebrovascular accident (CVA) ([facility] [I63.9]  Acute cerebrovascular accident (CVA) ([facility] [I63.9]    Chief Complaint:   Past Medical History:   Diagnosis Date    COPD (chronic obstructive pulmonary disease) ([facility]     Prostatitis      Past Surgical History:   Procedure Laterality Date    HERNIA REPAIR       No Known Allergies  Precautions: falls    Date of Admit: 4/10/2020  Room #: 1027/1027-A      Current functional status at time of admit:        Home Living/DME Available:      Type of Home: Apartment  Home Access: Elevator  Bathroom Shower/Tub: Tub/Shower unit, Shower chair with back  Bathroom Toilet: Standard  Bathroom Equipment: Grab bars in [address]: Cane(Uses cane for fxl mobility)       IADL Hx:   Homemaking Responsibilities: Yes  Active : No     Occupation: Retired  Leisure & Hobbies: (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 He appeared very knowledgable about music and music theory. )       Spouse: No spouse  Family: Patient reports that he has a daughter near [facility], but a niece is listed as his     Comments: Patient reports that he will not be returning to his apartment, but will go stay with someone else. He is vague about who this may be and then states that he is trying to reach [facility] because she and her family would help him. Patient gives permission for MSW to contact his niece.      Maryjo Koo, 2020, 4:27 PM

## 2020-04-13 NOTE — PROGRESS NOTES
4  Discharge Goal: Independent  Chair/Bed-to-Chair Transfer  Assistance Needed: Supervision or touching assistance  CARE Score: 4  Discharge Goal: Independent  Toilet Transfer  Assistance Needed: Supervision or touching assistance  CARE Score: 4  Discharge Goal: Independent  Car Transfer  Assistance Needed: Supervision or touching assistance  CARE Score: 4  Discharge Goal: Independent   Walk 10 Feet? Walk 10 Feet?: Yes  1 Step  1 Step?: Yes  Picking Up Object  Assistance Needed: Supervision or touching assistance  Physical Assistance Level: Less than 25%  CARE Score: 4  Discharge Goal: Independent                     I      Exam:    Blood pressure (!) 141/61, pulse 80, temperature 98 °F (36.7 °C), temperature source Oral, resp. rate 16, height 5' 7\" (1.702 m), weight 109 lb 8 oz (49.7 kg), SpO2 95 %. General: Sitting up in wheelchair. Talkative. Somewhat tangential.  In no distress. HEENT: Visual fields are full. Neck supple. Mild dysarthria. MMM. Pulmonary: Shallow but clear. Cardiac: Regular rate and rhythm. Abdomen: Patient's abdomen is soft and nondistended. Bowel sounds were present throughout. There was no rebound, guarding or masses noted. Upper extremities: Clumsy left hand with functional  strength. No cramping. Lower extremities: Poor coordination in the distal left lower limb. 4-/5 strength across left ankle. No signs of DVT. Sitting balance was fair+.   Standing balance was fair-.    Lab Results   Component Value Date    WBC 9.1 04/09/2020    HGB 15.2 04/09/2020    HCT 48.2 04/09/2020    MCV 95.4 04/09/2020     04/09/2020     No results found for: INR, PROTIME  Lab Results   Component Value Date    CREATININE 0.7 (L) 04/08/2020    BUN 26 (H) 04/08/2020     04/08/2020    K 5.1 04/08/2020     04/08/2020    CO2 27 04/08/2020     Lab Results   Component Value Date    ALT 63 (H) 04/08/2020    AST 81 (H) 04/08/2020    ALKPHOS 114 04/08/2020    BILITOT

## 2020-04-13 NOTE — PROGRESS NOTES
Speech Language Pathology  Facility/Department: [address] [place] [address]   CLINICAL BEDSIDE SWALLOW EVALUATION    NAME: Aidan Dela Cruz  : 1935  MRN: [id number]    ADMISSION DATE: 4/10/2020  ADMITTING DIAGNOSIS: has Dizziness; Acute cerebrovascular accident (CVA) ([address] Hemiparesis of left nondominant side as late effect of cerebral infarction ([address] Dysphagia due to recent stroke; Dysarthria due to acute stroke ([address] Gait disturbance; Simple chronic bronchitis ([address] Urinary tract infection associated with indwelling urethral catheter ([address] and Acute cystitis without hematuria on their problem list.  ONSET DATE: 2020    Recent Chest Xray/CT of Chest:  FINDINGS:   The cardiomediastinal silhouette is unremarkable.  No pneumothorax, vascular   congestion, consolidation, or pleural effusion is identified.  Findings   compatible with COPD.  No acute osseous abnormality.           Impression   1. No acute process. 2. COPD. Date of Eval: 2020  Evaluating Therapist: Phil Foley    Current Diet level:  Current Diet : Dysphagia Minced and Moist (Dysphagia II)  Current Liquid Diet : Thin      Primary Complaint  Patient Complaint: Vision changes and UE weakness    Pain:  Pain Assessment  Pain Assessment: 0-10  Pain Level: 0  Patient's Stated Pain Goal: No pain    Reason for Referral  Aidan Dela Cruz was referred for a bedside swallow evaluation to assess the efficiency of his swallow function, identify signs and symptoms of aspiration and make recommendations regarding safe dietary consistencies, effective compensatory strategies, and safe eating environment. Impression  This 80 yo male was admitted to the ED on 2020 with acute onset of difficulty speaking and L sided weakness. Pt with a R parietal and occipital infarct wtihout hemorrhage. UTI was also identified. Pt lives alone in an apt. Pt with dysphagia and was admitted on a minced and moist diet.     Dysphagia Diagnosis: Swallow function having teeth present. Speech/language eval completed over weekend. Visual perceptual deficits are present as pt tried to keep setting cup on space between chair and window ledge. Some memory deficits observed. Will f/u with PT/OT in fx for any speech needs. Patient Education Response: Verbalizes understanding  Safety Devices in place: Yes  Type of devices:  All fall risk precautions in place       Therapy Time  SLP Individual Minutes  Time In: 1100  Time Out: 1 [place] January  Minutes: [address], [address] [facility]- SLP  4/13/2020 1:16 PM

## 2020-04-13 NOTE — PROGRESS NOTES
--> Chair:   CG  Toilet Transfer (if applicable): Other:    Assistive device required for transfer:   2ww    Gait:    Distance:  949+19N9+77' with 2ww, 48' with straight cane   Assistance: With 2ww, SBA on straight line gait, CG on turns with cues for foot placement in walker and obstacle awareness and negotiation. With cane, CG  Device:  As noted  Gait Quality:  Pt more steady with 2ww. Use of straight cane increased pain in L foot with nursing called in to assess foot      Additional Therapeutic activities/exercises completed this date:     []   Nu-step:  Time:        Level:         #Steps:       []   Rebounder:    []  Seated     []  Standing        [x]   Balance trainin square activity with min assist with pt able to improve in the ssequence and decrease balance to CG with repetition.          []   Postural training    []   Supine ther ex (reps/sets):     []   Seated ther ex (reps/sets):     []   Standing ther ex (reps/sets):     []   Picking up object from floor (standing):                   []   Reacher used   [x]   Other: PNF standing all 4 diagonals with SBA after CGA and max verbal cues for orientation to task, then pt able to reach 4-6\" from midline with good weight shift and rotation   []   Other:    Comments:      Patient/Caregiver Education and Training:   []   Role of PT  []   Education about Dx  []   Use of call light for assist   []   Wheelchair mobility/management  []   HEP provided and explained   []   Treatment plan reviewed  []   Home safety  []   Body mechanics  []   Positioning  [x]   Bed Mobility/Transfer technique  [x]   Gait technique/sequencing  []   Proper use of assistive device/adaptive equipment  [x]   Stair training/Advanced mobility safety and technique  []   Reinforced patient's precautions/mobility while maintaining precautions  []   Postural awareness  []   Family/caregiver training  [x]   Progress was updated and reviewed in Rehabtracker with patient and/or family this music.)  Leisure & Hobbies: (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 He appeared very knowledgable about music and music theory. )  IADL Comments: Neighbors/friends assist with IADLs. (+) med mgmt  Additional Comments: Pt sleeps on flat bed at baseline    Objective                                                                                    Goals:  (Update in navigator)   : Long term goals  Time Frame for Long term goals : one week  Long term goal 1: indep all basic bed mobility skills, basic sit-stand-SPT and car xfers  Long term goal 2: mod indep ambulation with RW for 400 ft, safely, with independent performance of visual scanning of environment  Long term goal 3: mod indep up and down 12 stairs with one rail  Long term goal 4: indep with HEP, including activities to improve LUE coordination testing.:        Plan of Care                                                                              Times per week: 5 days per week for a minimum of 60 minutes/day plus group as appropriate for 60 minutes.   Treatment to include Current Treatment Recommendations: Strengthening, ROM, Balance Training, Functional Mobility Training, Transfer Training, ADL/Self-care Training, IADL Training, Cognitive/Perceptual Training, Endurance Training, Gait Training, Stair training, Neuromuscular Re-education, Home Exercise Program, Safety Education & Training, Equipment Evaluation, Education, & procurement, Patient/Caregiver Education & Training, Positioning    Electronically signed by   Saul Simon, PT  4/13/2020, 3:58 PM

## 2020-04-14 PROCEDURE — 97535 SELF CARE MNGMENT TRAINING: CPT

## 2020-04-14 PROCEDURE — 6370000000 HC RX 637 (ALT 250 FOR IP): Performed by: NURSE PRACTITIONER

## 2020-04-14 PROCEDURE — 97116 GAIT TRAINING THERAPY: CPT

## 2020-04-14 PROCEDURE — 1280000000 HC REHAB R&B

## 2020-04-14 PROCEDURE — 97530 THERAPEUTIC ACTIVITIES: CPT

## 2020-04-14 PROCEDURE — 99233 SBSQ HOSP IP/OBS HIGH 50: CPT | Performed by: PHYSICAL MEDICINE & REHABILITATION

## 2020-04-14 PROCEDURE — 6360000002 HC RX W HCPCS: Performed by: INTERNAL MEDICINE

## 2020-04-14 PROCEDURE — 2580000003 HC RX 258: Performed by: INTERNAL MEDICINE

## 2020-04-14 PROCEDURE — 6370000000 HC RX 637 (ALT 250 FOR IP): Performed by: INTERNAL MEDICINE

## 2020-04-14 PROCEDURE — 97112 NEUROMUSCULAR REEDUCATION: CPT

## 2020-04-14 PROCEDURE — 94761 N-INVAS EAR/PLS OXIMETRY MLT: CPT

## 2020-04-14 RX ADMIN — ENOXAPARIN SODIUM 40 MG: 40 INJECTION SUBCUTANEOUS at 10:48

## 2020-04-14 RX ADMIN — CLOPIDOGREL BISULFATE 75 MG: 75 TABLET ORAL at 10:48

## 2020-04-14 RX ADMIN — LISINOPRIL 5 MG: 5 TABLET ORAL at 10:48

## 2020-04-14 RX ADMIN — Medication 10 ML: at 20:24

## 2020-04-14 RX ADMIN — Medication 10 ML: at 10:54

## 2020-04-14 RX ADMIN — ASPIRIN 81 MG: 81 TABLET, COATED ORAL at 10:48

## 2020-04-14 RX ADMIN — ATORVASTATIN CALCIUM 80 MG: 40 TABLET, FILM COATED ORAL at 20:23

## 2020-04-14 RX ADMIN — CEFTRIAXONE 1 G: 1 INJECTION, POWDER, FOR SOLUTION INTRAMUSCULAR; INTRAVENOUS at 16:54

## 2020-04-14 RX ADMIN — FINASTERIDE 5 MG: 5 TABLET, FILM COATED ORAL at 10:48

## 2020-04-14 RX ADMIN — TAMSULOSIN HYDROCHLORIDE 0.4 MG: 0.4 CAPSULE ORAL at 10:48

## 2020-04-14 NOTE — PROGRESS NOTES
Occupational Therapy  Physical Rehabilitation: OCCUPATIONAL THERAPY     [x] daily progress note       [] discharge       Patient Name:  Kala Gatica   :  1935 MRN: [id number]  Room:  [address] [place] [address] Date of Admission: 4/10/2020  Rehabilitation Diagnosis:   Acute cerebrovascular accident (CVA) [facility]) [I63.9]  Acute cerebrovascular accident (CVA) ([facility] [address] [I63.9]       Date 2020       Day of ARU Week:  5   Time IN/OUT 1442/1542   Individual Tx Minutes 60   Group Tx Minutes    Co-Treat Minutes    Concurrent Tx Minutes    TOTAL Tx Time Mins 60   Variance Time    Variance Time []   Refusal due to:     []   Medical hold/reason:    []   Illness   []   Off Unit for test/procedure  []   Extra time needed to complete task  []   Therapeutic need  []   Other (specify):   Restrictions Restrictions/Precautions: General Precautions, Fall Risk         Communication with other providers: [x]   OK to see per nursing:     []   Spoke with team member regarding:      Subjective observations and cognitive status: Pt in bed on approach, agreeable to tx session, pleasantly confused.        Pain level/location:    /10       Location: Denied   Discharge recommendations  Anticipated discharge date:    Destination: []home alone   [x]home alone w assist prn vs   [] home w/ family    [x] Continuous supervision       []SNF    [] Assisted living     [] Other:   Continued therapy: [x]HHC OT  []OUTPATIENT  OT   [] No Further OT  Equipment needs:  TBD; pt's discharge location/setup is unclear     Toileting:   Denied need      Bed Mobility:           []   Pt received out of bed   Supine --> Sit:  Supervision      Transfers:    Sit--> Stand:  CGA-SBA  Stand --> Sit:   CGA  Stand-Pivot:   CGA, min safety cues for RW  Other:    Assistive device required for transfer:   RW      Functional Mobility:    Assistance:  CGA ~ 60 ft x2, max cues for topographical navigation of unit 2* vision and decreased memory  Device:   [x]   Rolling Walker     [] Standard Walker []   Wheelchair        []   D[place]       []   4-Wheeled Krys [place]         []   Cardiac Walker       []   Other:        Additional Therapeutic activities/exercises completed this date:     []   ADL Training   [x]   Balance/Postural training: Pt stood x 13 min at RW with SBA while completing dynamic stand task while reaching outside base of support to facilitate increased balance for ADL tasks and transfers. [x]   Bed/Transfer Training   [x]   Endurance Training   []   Neuromuscular Re-ed   []   Nu-step:  Time:        Level:         #Steps:       []   Rebounder:    []  Seated     []  Standing        []   Supine Ther Ex (reps/sets):     [x]   Seated Ther Ex (reps/sets): To increase BUE endurance for ADLs and transfers pt completed 3 sets x15 reps on [place] c 36#   []   Standing Ther Ex (reps/sets):     [x]   Visual perceptual retraining: Pt completed tabletop ax addressing visual scanning, tracking, saccades, memory: letter x cancellation (initially completed 4 errors, however with repetition pt's performance improved, progressing to self error correction and requiring no cues by end of activity), and 2 mazes (able to complete c 0 VCs but required finger scanning). During aforementioned standing activity, pt completed visual scanning activity requiring pt to match cards. In 13 mins pt matched 16 cards, c 3 VCs for error ID/correction. Pt initially with very poor implementation of organized scanning strategy, however with repetition including [place] assist twice to perform saccades as required to read, pt then demo'ed carryover remainder of activity. Comments: All intervention performed to increase pt's strength, endurance, ax tolerance, and balance in prep for increased I c ADL/IADLs and functional transfers/mobility.        Patient/Caregiver Education and Training:   []   [employer] Equipment Use  [x]   Bed Mobility/Transfer Technique/Safety  []   Energy Conservation Tips  []   Family

## 2020-04-14 NOTE — PROGRESS NOTES
(H) 04/08/2020    AST 81 (H) 04/08/2020    ALKPHOS 114 04/08/2020    BILITOT 0.6 04/08/2020       Expected length of stay  prior to a supervised level of function for discharge home with a walker and [address] OT/PT is 4/21/2020. Recommendations:    1. Right cerebrovascular accident with left hemiparesis:   Improving physical tolerance but limited insight limits the benefit of his daily occupational and physical therapy with speech-language pathology.   He has been advanced to a dental soft diet with thin liquids under nursing and therapy supervision. 1 more day of Rocephin to complete his UTI treatment. Tolerating the antiplatelet therapy and statin. Emphasizing pulmonary hygiene and bowel and bladder retraining, noting that he has a chronic indwelling Goodman catheter. 2. DVT prophylaxis: Lovenox 40 mg subcu daily.  I must monitor his hemoglobin and platelet count periodically while on this medication.  Weightbearing activities are pursued daily.  GI prophylaxis offered.  No  signs of acute blood loss. Walking a little more each day. 3. Dysphagia: Patient is on a dental soft diet with thin liquids. No clinical signs of aspiration at this point. 4. Chronic indwelling Goodman catheter: Chronic prostatitis and urinary retention seems to be the reason for the indwelling Goodman. Chas Avila will get a full course of antibiotics for his current infection.  He is on both Proscar and Flomax.  Expect nursing to review with him proper care and maintenance of his catheter. 5. COPD: Less coughing noted. Aggressive pulmonary hygiene and and PRN nebulizers. 6. Urinary tract infection: Citrobacter brachii sensitive to ceftriaxone.  IV Rocephin for a full 7-day course.     Counseling and Coordination of Care: In care conference today I met with the patient's OT, PT, RN and . We discussed the patient's problems, progress and prognosis.  Disposition issues were clarified and plans were established for ongoing

## 2020-04-14 NOTE — PROGRESS NOTES
Nutrition Assessment    Type and Reason for Visit: Reassess    Nutrition Recommendations:   Continue current diet and oral nutrition supplement    Nutrition Assessment: Improved intake on Dental Soft Diet, consuming 76% to all of recent meals and taking some frozen oral supplement as well. Malnutrition Assessment:  · Malnutrition Status: Meets the criteria for moderate malnutrition(maybe greater degree depending on po intake )  · Context: Chronic illness  · Findings of the 6 clinical characteristics of malnutrition (Minimum of 2 out of 6 clinical characteristics is required to make the diagnosis of moderate or severe Protein Calorie Malnutrition based on AND/ASPEN Guidelines):  1. Energy Intake-Unable to assess,      2. Weight Loss-No significant weight loss(remains underweight), in 3 months  3. Fat Loss-Moderate subcutaneous fat loss, Orbital  4. Muscle Loss-Moderate muscle mass loss, Temples (temporalis muscle), Clavicles (pectoralis and deltoids)  5. Fluid Accumulation-No significant fluid accumulation, Extremities  6.  Strength-Not measured    Nutrition Risk Level: Moderate    Nutrient Needs:  · Estimated Daily Total Kcal: 7528-4848 (30-35 markel/kg current weight)   · Estimated Daily Protein (g): 75 (1.5 g/kg)  · Estimated Daily Total Fluid (ml/day): 1700 (1ml/markel)     Nutrition Diagnosis:   · Problem:  Moderate malnutrition  · Etiology: related to Cognitive or neurological impairment, Insufficient energy/nutrient consumption     Signs and symptoms:  as evidenced by BMI, Weight loss, Diet history of poor intake    Objective Information:  · Nutrition-Focused Physical Findings: thin male, missing teeth and dentures misplaced but does not like to wear them   · Wound Type: None  · Current Nutrition Therapies:  · Oral Diet Orders: Dental Soft   · Oral Diet intake: %  · Oral Nutrition Supplement (ONS) Orders: Standard High Calorie Oral Supplement  · ONS intake: 51-75%  · Anthropometric Measures:  · Ht:

## 2020-04-14 NOTE — PROGRESS NOTES
IV finally removed. Swelling in arm reduced greatly, no complaints of pain at site. Will continue to monitor.

## 2020-04-14 NOTE — PROGRESS NOTES
Occupational Therapy  Physical Rehabilitation: OCCUPATIONAL THERAPY     [x] daily progress note       [] discharge       Patient Name:  Lazarus Daniels   :  1935 MRN: [id number]  Room:  [address] [place] [address] Date of Admission: 4/10/2020  Rehabilitation Diagnosis:   Acute cerebrovascular accident (CVA) [facility]) [I63.9]  Acute cerebrovascular accident (CVA) ([facility] [address] [I63.9]       Date 2020       Day of ARU Week:  5   Time IN/OUT 5963-6596   Individual Tx Minutes    Group Tx Minutes    Co-Treat Minutes    Concurrent Tx Minutes    TOTAL Tx Time Mins 60   Variance Time    Variance Time []   Refusal due to:     []   Medical hold/reason:    []   Illness   []   Off Unit for test/procedure  []   Extra time needed to complete task  []   Therapeutic need  []   Other (specify):   Restrictions Restrictions/Precautions  Restrictions/Precautions: General Precautions, Fall Risk  Required Braces or Orthoses?: No      Communication with other providers: [x]   OK to see per nursing:     []   Spoke with team member regarding:      Subjective observations and cognitive status: Pt agree with therapy session. Pain level/location:    /10       Location: Denied   Discharge recommendations  Anticipated discharge date:  TBD  Destination: []home alone   []home alone w assist prn [] home w/ family    [] Continuous supervision       []SNF            [] Assisted living     [] Other:   Continued therapy: []HHC OT  []OUTPATIENT  OT   [] No Further OT  Equipment needs: TBD   (HIT F2 to transition between stars)    Eating/Feeding:        Extremity Used:        []    R UE []    L UE         Dentures: []   N/A    []    Partial []    Full  Adaptive Equipment Used:        Grooming:   Supervison   Oral Hygiene:  Supervision      Bathing:  Engage sponge bathing with Min A with LB in w/c. CGA with UB during standing position due to LOB noted x2.        Dressing:      Upper Body Dressing:  Supervision  Lower Body Dressing: Require CGA in stance after Min A and music theory. )  IADL Comments: Neighbors/friends assist with IADLs. (+) med mgmt  Additional Comments: Pt sleeps on flat bed at baseline    Objective                                                                                    Goals:  (Update in navigator)  Short term goals  Time Frame for Short term goals: STG=LTG:  Long term goals  Time Frame for Long term goals : 5-7 days  Long term goal 1: Pt will perform feeding with Mod I.  Long term goal 2: Pt will perform grooming with Mod I.  Long term goal 3: Pt will perform bathing with Mod I, DME/AE PRN. Long term goal 4: Pt will perform UB dressing with Mod I.  Long term goal 5: Pt will perform LB dressing with Mod I, AE PRN. Long term goals 6: Pt will don/dof footwear with Mod I, AE PRN. Long term goal 7: Pt will complete toileting with Mod I, DME PRN. Long term goal 8: Pt will perform all functional transfers, including toilet and shower, with Mod I, DME PRN. Long term goal 9: Pt will participate in ther ex/ther act with emphasis on dynamic stance >15 min to increase independence with standing ADL/IADL tasks and fxl mobility.:        Plan of Care                                                                              Times per week: 5 days per week for a minimum of 60 minutes/day plus group as appropriate for 60 minutes.   Treatment to include Plan  Times per week: 6x  Times per day: Daily  Plan weeks: 5- days  Current Treatment Recommendations: Strengthening, Endurance Training, Self-Care / ADL, Equipment Evaluation, Education, & procurement, Balance Training, Home Management Training, Cognitive/Perceptual Training, Functional Mobility Training, Safety Education & Training    Electronically signed by   JEFFREY Ash  4/14/2020, 8:10 AM

## 2020-04-14 NOTE — PROGRESS NOTES
Case Management Note     [] Daily  [x] Weekly Care Conference Note  [] Discharge    Patient:Xavier Armstrong  J:[id number]  Rehab Dx/Hx: Acute cerebrovascular accident (CVA) ([facility] [address] [I63.9]  Acute cerebrovascular accident (CVA) ([facility] [address] [I63.9]  Contact Info: (Name/phone):  Date:  4/14/2020 Contact:  [] Spouse/Family  [] Insurance    [x] Team Care Conference  [] Patient  [] Other Comments: Patient lives alone in an apartment with elevator access. Patient requires CGA/SBA with transfers and ambulation, Min A with bathing, supervision with upper body dressing and CGA with lower body dressing. Patient will likely discharge with a daniels catheter. He is also displaying a visual field cut and some cognitive deficits. Signed:      Date:  4/14/2020 Contact:  [] Spouse/Family  [] Insurance    [] Team Care Conference  [x] Patient  [] Other Comments: MSW informed patient of planned discharge date. Patient verbalizes understanding that he will need assistance at discharge, but is struggling to understand the details. He also states that he [place] lot of friends\" but cannot remember their names. MSW located a  for his phone so that it can be charged to access contacts for patient. Equipment Needs       [] Estee Salamanca  []  [facility] 2 W   []  [place]  [] Wheelchair  [] Tub Seat  [] Tub Bench  [] Bedside Commode  []    []  []    Cane vs. 2W [facility]   Anticipated Discharge Date      4/21/2020   Home Eval Scheduled Date:    Additional Therapy Needs after Discharge: [x] [facility] AT [facility]  [x] MSW  [x] PT     [x] Nursing  [x] OT    [] Aide  [x] ST               Current level of fx:   Bathing:   Grooming:   Dressing:   Mobility:   Cognition:   Communication:   Swallowing:   Nursing issues:   Signed:  Jeannine Johnson, 4/14/2020, 3:09 PM

## 2020-04-14 NOTE — PROGRESS NOTES
Physical Therapy  [x] daily progress note       [] discharge       Patient Name:  Petey Mcclain   :  1935 MRN: [id number]  Room:  [address] [place], [place] [address] Date of Admission: 4/10/2020  Rehabilitation Diagnosis:   Acute cerebrovascular accident (CVA) [facility]) [I63.9]  Acute cerebrovascular accident (CVA) ([facility] [address] [I63.9]       Date 2020       Day of ARU Week:  5   Time IN/OUT 1312/1412   Individual Tx Minutes 60   Group Tx Minutes    Co-Treat Minutes    Concurrent Tx Minutes    TOTAL Tx Time Mins 60   Variance Time    Variance Time []   Refusal due to:     []   Medical hold/reason:    []   Illness   []   Off Unit for test/procedure  []   Extra time needed to complete task  []   Therapeutic need  []   Other (specify):   Restrictions Restrictions/Precautions  Restrictions/Precautions: General Precautions, Fall Risk  Required Braces or Orthoses?: No      Interdisciplinary communication [x]   Cleared for therapy per nursing     []   RN notified about issues during session  []   RN updated on pt performance  []   Spoke with   []   Spoke with OT  []   Spoke with MD  []   Other:    Subjective observations and cognitive status: Pt pleasant and agreeable to therapy.     Pain level/location:   0 /10       Location:    Discharge recommendations  Anticipated discharge date:  20  Destination: []home alone   []home alone with assist PRN     [] home w/ family      [] Continuous supervision  []SNF    [] Assisted living     [x] Other: recommend supervision  Continued therapy: [x]HHC PT  []OUTPATIENT  PT   [] No Further PT  []SNF PT  Caregiver training recommended: [x]Yes  [] No   Equipment needs: 2ww vs straight cane     Bed Mobility:           [x]   Pt received out of bed   Scooting:  supervision  Lying --> Sit:  supervision  Sit --> lying:  supervision  Bed features used: [] Yes  [x] No       Transfers:    Sit--> Stand:  Supervision with 40% cues for hand placement  Stand --> Sit:   supervision  Assistive device cane training during therapy, dual tasking and obstacle negotiation strategies. Elevator trials when able     Assessment:  Pt showed improved balance reactions today allowing for improved training on straight cane, but feel at this time pt should use 2ww with staff.      Treatment/Activity Tolerance:   [x] Tolerated treatment with no adverse effects    [] Patient limited by fatigue  [] Patient limited by pain   [] Patient limited by medical complications:    [] Adverse reaction to Tx:   [] Significant change in status    Barrier/s to progress/learning:   []   None  [x]   Cognition  []   Hearing deficit  []   Pre-morbid mental/psychological status   []   Motivation  []   Communication  []   Anxiety  [x]   Vision deficit  [x]   Attention  []   Other:      Safety:       [x]  bed alarm set    []  chair alarm set    []  Pt refused alarms                []  Telesitter activated      [x]  Gait belt used during tx session      []other:         Number of Minutes/Billable Intervention  Gait Training 30   Therapeutic Exercise    Neuro Re-Ed 30   Therapeutic Activity    Wheelchair Propulsion    Group    Other:    TOTAL 60         Social History  Social/Functional History  Lives With: Alone  Type of Home: Apartment  Home Layout: One level  Home Access: Elevator  Bathroom Shower/Tub: Tub/Shower unit, Shower chair with back  Bathroom Toilet: Standard  Bathroom Equipment: Grab bars in shower  Bathroom Accessibility: Walker accessible  Home Equipment: Cane(Uses cane for fxl mobility)  Receives Help From: Friend(s)  ADL Assistance: Independent  Homemaking Assistance: Needs assistance  Homemaking Responsibilities: Yes  Ambulation Assistance: Independent  Transfer Assistance: Independent  Active : No  Patient's  Info: Hired help or friends to drive to [place]  Education: Bachelors degree  Occupation: Retired  Type of occupation: (Pt reported getting his degree in history, working in the education field, 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 He appeared very knowledgable about music and music theory. )  IADL Comments: Neighbors/friends assist with IADLs. (+) med mgmt  Additional Comments: Pt sleeps on flat bed at baseline    Objective                                                                                    Goals:  (Update in navigator)   : Long term goals  Time Frame for Long term goals : one week  Long term goal 1: indep all basic bed mobility skills, basic sit-stand-SPT and car xfers  Long term goal 2: mod indep ambulation with RW for 400 ft, safely, with independent performance of visual scanning of environment  Long term goal 3: mod indep up and down 12 stairs with one rail  Long term goal 4: indep with HEP, including activities to improve LUE coordination testing.:        Plan of Care                                                                              Times per week: 5 days per week for a minimum of 60 minutes/day plus group as appropriate for 60 minutes.   Treatment to include Current Treatment Recommendations: Strengthening, ROM, Balance Training, Functional Mobility Training, Transfer Training, ADL/Self-care Training, IADL Training, Cognitive/Perceptual Training, Endurance Training, Gait Training, Stair training, Neuromuscular Re-education, Home Exercise Program, Safety Education & Training, Equipment Evaluation, Education, & procurement, Patient/Caregiver Education & Training, Positioning    Electronically signed by   Josemanuel Shannon  4/14/2020, 3:45 PM

## 2020-04-15 LAB
ANION GAP SERPL CALCULATED.3IONS-SCNC: 9 MMOL/L (ref 4–16)
BUN BLDV-MCNC: 18 MG/DL (ref 6–23)
CALCIUM SERPL-MCNC: 8.8 MG/DL (ref 8.3–10.6)
CHLORIDE BLD-SCNC: 98 MMOL/L (ref 99–110)
CO2: 27 MMOL/L (ref 21–32)
CREAT SERPL-MCNC: 0.6 MG/DL (ref 0.9–1.3)
GFR AFRICAN AMERICAN: >60 ML/MIN/1.73M2
GFR NON-AFRICAN AMERICAN: >60 ML/MIN/1.73M2
GLUCOSE BLD-MCNC: 95 MG/DL (ref 70–99)
HCT VFR BLD CALC: 42.4 % (ref 42–52)
HEMOGLOBIN: 13.4 GM/DL (ref 13.5–18)
MCH RBC QN AUTO: 30 PG (ref 27–31)
MCHC RBC AUTO-ENTMCNC: 31.6 % (ref 32–36)
MCV RBC AUTO: 94.9 FL (ref 78–100)
PDW BLD-RTO: 14.3 % (ref 11.7–14.9)
PLATELET # BLD: 286 K/CU MM (ref 140–440)
PMV BLD AUTO: 11.2 FL (ref 7.5–11.1)
POTASSIUM SERPL-SCNC: 4.7 MMOL/L (ref 3.5–5.1)
RBC # BLD: 4.47 M/CU MM (ref 4.6–6.2)
SODIUM BLD-SCNC: 134 MMOL/L (ref 135–145)
WBC # BLD: 7.9 K/CU MM (ref 4–10.5)

## 2020-04-15 PROCEDURE — 6370000000 HC RX 637 (ALT 250 FOR IP): Performed by: INTERNAL MEDICINE

## 2020-04-15 PROCEDURE — 97130 THER IVNTJ EA ADDL 15 MIN: CPT

## 2020-04-15 PROCEDURE — 99232 SBSQ HOSP IP/OBS MODERATE 35: CPT | Performed by: PHYSICAL MEDICINE & REHABILITATION

## 2020-04-15 PROCEDURE — 80048 BASIC METABOLIC PNL TOTAL CA: CPT

## 2020-04-15 PROCEDURE — 97535 SELF CARE MNGMENT TRAINING: CPT

## 2020-04-15 PROCEDURE — 94761 N-INVAS EAR/PLS OXIMETRY MLT: CPT

## 2020-04-15 PROCEDURE — 6360000002 HC RX W HCPCS: Performed by: INTERNAL MEDICINE

## 2020-04-15 PROCEDURE — 97129 THER IVNTJ 1ST 15 MIN: CPT

## 2020-04-15 PROCEDURE — 97116 GAIT TRAINING THERAPY: CPT

## 2020-04-15 PROCEDURE — 6370000000 HC RX 637 (ALT 250 FOR IP): Performed by: NURSE PRACTITIONER

## 2020-04-15 PROCEDURE — 1280000000 HC REHAB R&B

## 2020-04-15 PROCEDURE — 85027 COMPLETE CBC AUTOMATED: CPT

## 2020-04-15 PROCEDURE — 97110 THERAPEUTIC EXERCISES: CPT

## 2020-04-15 RX ADMIN — ASPIRIN 81 MG: 81 TABLET, COATED ORAL at 08:29

## 2020-04-15 RX ADMIN — TAMSULOSIN HYDROCHLORIDE 0.4 MG: 0.4 CAPSULE ORAL at 08:29

## 2020-04-15 RX ADMIN — FINASTERIDE 5 MG: 5 TABLET, FILM COATED ORAL at 08:29

## 2020-04-15 RX ADMIN — ATORVASTATIN CALCIUM 80 MG: 40 TABLET, FILM COATED ORAL at 21:52

## 2020-04-15 RX ADMIN — LISINOPRIL 5 MG: 5 TABLET ORAL at 08:29

## 2020-04-15 RX ADMIN — ENOXAPARIN SODIUM 40 MG: 40 INJECTION SUBCUTANEOUS at 08:29

## 2020-04-15 RX ADMIN — CLOPIDOGREL BISULFATE 75 MG: 75 TABLET ORAL at 08:29

## 2020-04-15 NOTE — PROGRESS NOTES
Variance/Reason:  [] Refusal due to   [] Medical hold/reason  [] Illness   [] Off Unit for test/procedure  [] Extra time needed to complete task  [] Other (specify)    Activity completed: Pt seen for cog assessment via BCAT    Pain: denies  Current Diet: Dietary Nutrition Supplements: Standard High Calorie Oral Supplement  DIET DENTAL SOFT;  Subjective: alert and cooperative; visual deficits persist      Goals and POC: Co-treats where appropriate with PT or OT to facilitate patient goals in functional tasks. [facility]         The Brief Cognitive Assessment Tool (BCAT®) was administered 4/15/2020 to assess the following cognitive domains: orientation, verbal recall, visual recognition, visual recall, attention, abstraction, language, executive functions, and visuo-spatial processing. The emphasis of the tool is assessing contextual memory, executive functions, and attentional capacity. Cognitive Impairment Scale:  NORMAL:    46-50   NO FUNCTIONAL DEFICIT; INDEPENDENT LIVING; MAY BE  SUBJECTIVE MEMORY COMPLAINTS BUT LITTLE TO NO EVIDENCE  MILD COGNITIVE  IMPAIRMENT; 34-46   GENERALLY FUNCTIONAL WITH EARLY SPECIFIC FX DECLINE (IADL)   LOWER SCORES MORE SUGGESTIVE OF ADDITIONAL SUPPORT NEEDS   BOTTOM RANGE SCORES CONSIDER MED MGMT AND SUPPORT FOR COMMUNITY REINTEGRATION    MILD DEMENTIA 26-34   IADL DEFICITS; CLEARLY OBJECTIVE EVIDENCE OF MEMORY AND OTHER COGNITIVE DECLINE; MED MGMT AND COMMUNITY REINTEGRATION SUPPORT NEEDED    MODERATE TO   SEVERE DEMENTIA 0-25   MODERATE (UPPER END OF RANGE)--PERVASIVE FX DEFICITS (IADLS) BUT ADLS GENERALLY INTACT   MARKED DEFICITS IN MEMORY AND EXECUTIVE FX; BEHAVIORAL AND PSYCH SYMPTOMS ARE COMMON      Dementia Impairment Scale:  Mild Cognitive Impairment  38  Mild Dementia  28  Moderate Dementia  18      BCAT score for Petey Mcclain: 36/50 indicating mild cognitive impairment.   Strengths:  Ability to put names to objects  Ability to concentrate and focus  Determine how objects insight and awareness of limitations for problem solving in fx via implelmentation of strategies with min cues with 75% acc. Short-term Goals  Timeframe for Short-term Goals: No swallow tx recommended. Alarm placed: []bed [x]chair   []other:   []JORGE activated        Barriers to progress:   [] Fatigue        [x] Cognitive Deficits   [] Memory Deficits   [] Reduced Attn   [] Self Limiting Behaviors    [x] Reduced insight/awareness     [x] Visual Deficits   [] Premorbid Conditions  [] Impulsivity     [] Other      Education/Interventions used this date: Results and recommendations and rehab expectations. [x]   Progress was updated and reviewed in Rehabtracker with patient and/or family this         date. [] Attending Care Conference for pt this date. See Team Patient Care Conference Note for updates.     Interventions used this date:  [] Speech/Language Treatment    [] Instruction in HEP    Group   [] Dysphagia Treatment   [x] Cognitive Skill Isak    Other:         Assessment / Impression                                                          Treatment/Activity Tolerance:   [x] Tolerated Treatment well:     [] Patient limited by fatigue/pain:       [] Patient limited by medical complications:    [] Adverse Reaction to Tx:   [] Significant change in status:      Electronically Signed by  Jamaica Leung MA, CCC-SLP    4/15/2020  3:00 PM

## 2020-04-15 NOTE — PROGRESS NOTES
MSW talked with patient again. He states that he is having difficulty using the phone. MSW offered to help him go through the names. As MSW read each name, patient was still unspecific about who might be a contact for him. He states now that his daughter lives in [facility], but is currently in [facility] [place] [facility]. He mentions going to [place]. After patient rejected everyone who seems to be in the [facility] area, MSW asked who was in [place]. Patient reports that his daughter has a house there and then states that Rita Rice has her \"headquarters\" there. Patient states that he does not want to call his daughter because he does not want her to \"rush back. \"  Patient then states that he could just go back to his apartment. MSW reviewed that he should have a support system in place when he is discharged. He suggested that we just discharge him at the front of the hospital.  He also states that he could go to a nursing facility until he can contact someone. NIGEL Junior/AMIRAH  4/15/2020, 3:38 PM    MSW talked with patient again. He continues to state that he does not want to call his daughter. He now describes her as traveling around the [place]. He would consider assisted living. He would likely be eligible for Aid and Attendance through [facility]. He could also be eligible for Assisted Living Waiver. MSW left message for [facility] to determine cost and availability.   NIGEL Junior/AMIRAH  4/15/2020, 3:57 PM

## 2020-04-15 NOTE — PROGRESS NOTES
celso)  Short term goals  Time Frame for Short term goals: STG=LTG:  Long term goals  Time Frame for Long term goals : 5-7 days  Long term goal 1: Pt will perform feeding with Mod I.  Long term goal 2: Pt will perform grooming with Mod I.  Long term goal 3: Pt will perform bathing with Mod I, DME/AE PRN. Long term goal 4: Pt will perform UB dressing with Mod I.  Long term goal 5: Pt will perform LB dressing with Mod I, AE PRN. Long term goals 6: Pt will don/dof footwear with Mod I, AE PRN. Long term goal 7: Pt will complete toileting with Mod I, DME PRN. Long term goal 8: Pt will perform all functional transfers, including toilet and shower, with Mod I, DME PRN. Long term goal 9: Pt will participate in ther ex/ther act with emphasis on dynamic stance >15 min to increase independence with standing ADL/IADL tasks and fxl mobility.:        Plan of Care                                                                              Times per week: 5 days per week for a minimum of 60 minutes/day plus group as appropriate for 60 minutes.   Treatment to include Plan  Times per week: 6x  Times per day: Daily  Plan weeks: 5- days  Current Treatment Recommendations: Strengthening, Endurance Training, Self-Care / ADL, Equipment Evaluation, Education, & procurement, Balance Training, Home Management Training, Cognitive/Perceptual Training, Functional Mobility Training, Safety Education & Training    Electronically signed by   Yessenia Holman. mindi Owens  4/15/2020, 8:09 AM

## 2020-04-16 PROCEDURE — 97130 THER IVNTJ EA ADDL 15 MIN: CPT

## 2020-04-16 PROCEDURE — 6370000000 HC RX 637 (ALT 250 FOR IP): Performed by: NURSE PRACTITIONER

## 2020-04-16 PROCEDURE — 99233 SBSQ HOSP IP/OBS HIGH 50: CPT | Performed by: PHYSICAL MEDICINE & REHABILITATION

## 2020-04-16 PROCEDURE — 6370000000 HC RX 637 (ALT 250 FOR IP): Performed by: INTERNAL MEDICINE

## 2020-04-16 PROCEDURE — 97116 GAIT TRAINING THERAPY: CPT

## 2020-04-16 PROCEDURE — 1280000000 HC REHAB R&B

## 2020-04-16 PROCEDURE — 6360000002 HC RX W HCPCS: Performed by: INTERNAL MEDICINE

## 2020-04-16 PROCEDURE — 97530 THERAPEUTIC ACTIVITIES: CPT

## 2020-04-16 PROCEDURE — 94761 N-INVAS EAR/PLS OXIMETRY MLT: CPT

## 2020-04-16 PROCEDURE — 97112 NEUROMUSCULAR REEDUCATION: CPT

## 2020-04-16 PROCEDURE — 97535 SELF CARE MNGMENT TRAINING: CPT

## 2020-04-16 PROCEDURE — 97129 THER IVNTJ 1ST 15 MIN: CPT

## 2020-04-16 RX ADMIN — ASPIRIN 81 MG: 81 TABLET, COATED ORAL at 08:14

## 2020-04-16 RX ADMIN — FINASTERIDE 5 MG: 5 TABLET, FILM COATED ORAL at 08:14

## 2020-04-16 RX ADMIN — ATORVASTATIN CALCIUM 80 MG: 40 TABLET, FILM COATED ORAL at 20:12

## 2020-04-16 RX ADMIN — LISINOPRIL 5 MG: 5 TABLET ORAL at 08:14

## 2020-04-16 RX ADMIN — TAMSULOSIN HYDROCHLORIDE 0.4 MG: 0.4 CAPSULE ORAL at 08:14

## 2020-04-16 RX ADMIN — CLOPIDOGREL BISULFATE 75 MG: 75 TABLET ORAL at 08:14

## 2020-04-16 RX ADMIN — ENOXAPARIN SODIUM 40 MG: 40 INJECTION SUBCUTANEOUS at 08:14

## 2020-04-16 NOTE — PROGRESS NOTES
Treatment Recommendations: Strengthening, ROM, Balance Training, Functional Mobility Training, Transfer Training, ADL/Self-care Training, IADL Training, Cognitive/Perceptual Training, Endurance Training, Gait Training, Stair training, Neuromuscular Re-education, Home Exercise Program, Safety Education & Training, Equipment Evaluation, Education, & procurement, Patient/Caregiver Education & Training, Positioning    Electronically signed by   Razia Espinal, PT  4/16/2020, 9:08 AM

## 2020-04-16 NOTE — PROGRESS NOTES
Occupational Therapy  Physical Rehabilitation: OCCUPATIONAL THERAPY     [x] daily progress note       [] discharge       Patient Name:  Miesha Murguia   :  1935 MRN: [id number]  Room:  [address] [place], [place] [address] Date of Admission: 4/10/2020  Rehabilitation Diagnosis:   Acute cerebrovascular accident (CVA) [facility]) [I63.9]  Acute cerebrovascular accident (CVA) ([facility] [address] [I63.9]       Date 2020       Day of ARU Week:  7   Time IN/OUT 1400/1505   Individual Tx Minutes 72   Group Tx Minutes    Co-Treat Minutes    Concurrent Tx Minutes    TOTAL Tx Time Mins 65   Variance Time    Variance Time []   Refusal due to:     []   Medical hold/reason:    []   Illness   []   Off Unit for test/procedure  []   Extra time needed to complete task  []   Therapeutic need  []   Other (specify):   Restrictions Restrictions/Precautions: General Precautions, Fall Risk         Communication with other providers: [x]   OK to see per nursing:     []   Spoke with team member regarding:      Subjective observations and cognitive status: Pt in W/C on approach, urine observed to be red/orange. Contacted LPN who checked catheter site; everything was intact so this was likely 2* light trauma. At beginning of session pt doubled over from W/C seat feeling floor, stating \"I see an airline receipt\", requiring redirection and re-education on vision deficits from CVA. Pt also stated \"I don't know what happened but in 2 days I lost 25% of my intellect. \"  Pt verbalized understanding that he had a stroke, but did not comprehend this was the cause of his deficits. Pain level/location:    /10       Location: Denied   Discharge recommendations  Anticipated discharge date:    Destination: []home alone   []home alone w assist prn   [] home w/ family    [x] Continuous supervision       [x]SNF    [x] Assisted living     [] Other:   Continued therapy: []HHC OT  []OUTPATIENT  OT   [] No Further OT  Equipment needs: SC?      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 He appeared very knowledgable about music and music theory. )  IADL Comments: Neighbors/friends assist with IADLs. (+) med mgmt  Additional Comments: Pt sleeps on flat bed at baseline    Objective                                                                                    Goals:  (Update in navigator)  Short term goals  Time Frame for Short term goals: STG=LTG:  Long term goals  Time Frame for Long term goals : 5-7 days  Long term goal 1: Pt will perform feeding with Mod I.  Long term goal 2: Pt will perform grooming with Mod I.  Long term goal 3: Pt will perform bathing with Mod I, DME/AE PRN. Long term goal 4: Pt will perform UB dressing with Mod I.  Long term goal 5: Pt will perform LB dressing with Mod I, AE PRN. Long term goals 6: Pt will don/dof footwear with Mod I, AE PRN. Long term goal 7: Pt will complete toileting with Mod I, DME PRN. Long term goal 8: Pt will perform all functional transfers, including toilet and shower, with Mod I, DME PRN. Long term goal 9: Pt will participate in ther ex/ther act with emphasis on dynamic stance >15 min to increase independence with standing ADL/IADL tasks and fxl mobility.:        Plan of Care                                                                              Times per week: 5 days per week for a minimum of 60 minutes/day plus group as appropriate for 60 minutes.   Treatment to include Plan  Times per week: 6x  Times per day: Daily  Plan weeks: 5- days  Current Treatment Recommendations: Strengthening, Endurance Training, Self-Care / ADL, Equipment Evaluation, Education, & procurement, Balance Training, Home Management Training, Cognitive/Perceptual Training, Functional Mobility Training, Safety Education &

## 2020-04-17 LAB
AMMONIA: 41 UMOL/L (ref 16–60)
ANION GAP SERPL CALCULATED.3IONS-SCNC: 11 MMOL/L (ref 4–16)
BACTERIA: ABNORMAL /HPF
BILIRUBIN URINE: NEGATIVE MG/DL
BLOOD, URINE: ABNORMAL
BUN BLDV-MCNC: 14 MG/DL (ref 6–23)
CALCIUM SERPL-MCNC: 9.1 MG/DL (ref 8.3–10.6)
CHLORIDE BLD-SCNC: 98 MMOL/L (ref 99–110)
CLARITY: ABNORMAL
CO2: 27 MMOL/L (ref 21–32)
COLOR: ABNORMAL
CREAT SERPL-MCNC: 0.6 MG/DL (ref 0.9–1.3)
GFR AFRICAN AMERICAN: >60 ML/MIN/1.73M2
GFR NON-AFRICAN AMERICAN: >60 ML/MIN/1.73M2
GLUCOSE BLD-MCNC: 142 MG/DL (ref 70–99)
GLUCOSE, URINE: NEGATIVE MG/DL
HCT VFR BLD CALC: 44.9 % (ref 42–52)
HEMOGLOBIN: 14.2 GM/DL (ref 13.5–18)
KETONES, URINE: NEGATIVE MG/DL
LEUKOCYTE ESTERASE, URINE: ABNORMAL
MCH RBC QN AUTO: 30 PG (ref 27–31)
MCHC RBC AUTO-ENTMCNC: 31.6 % (ref 32–36)
MCV RBC AUTO: 94.9 FL (ref 78–100)
MUCUS: ABNORMAL HPF
NITRITE URINE, QUANTITATIVE: NEGATIVE
PDW BLD-RTO: 14.2 % (ref 11.7–14.9)
PH, URINE: 6 (ref 5–8)
PLATELET # BLD: 374 K/CU MM (ref 140–440)
PMV BLD AUTO: 10.7 FL (ref 7.5–11.1)
POTASSIUM SERPL-SCNC: 5 MMOL/L (ref 3.5–5.1)
PROTEIN UA: 100 MG/DL
RBC # BLD: 4.73 M/CU MM (ref 4.6–6.2)
RBC URINE: 2312 /HPF (ref 0–3)
SODIUM BLD-SCNC: 136 MMOL/L (ref 135–145)
SPECIFIC GRAVITY UA: 1.01 (ref 1–1.03)
SQUAMOUS EPITHELIAL: 1 /HPF
TRICHOMONAS: ABNORMAL /HPF
UROBILINOGEN, URINE: NORMAL MG/DL (ref 0.2–1)
WBC # BLD: 9.1 K/CU MM (ref 4–10.5)
WBC UA: 74 /HPF (ref 0–2)

## 2020-04-17 PROCEDURE — 97130 THER IVNTJ EA ADDL 15 MIN: CPT

## 2020-04-17 PROCEDURE — 80048 BASIC METABOLIC PNL TOTAL CA: CPT

## 2020-04-17 PROCEDURE — 99232 SBSQ HOSP IP/OBS MODERATE 35: CPT | Performed by: PHYSICAL MEDICINE & REHABILITATION

## 2020-04-17 PROCEDURE — 1280000000 HC REHAB R&B

## 2020-04-17 PROCEDURE — 6370000000 HC RX 637 (ALT 250 FOR IP): Performed by: PHYSICAL MEDICINE & REHABILITATION

## 2020-04-17 PROCEDURE — 6370000000 HC RX 637 (ALT 250 FOR IP): Performed by: INTERNAL MEDICINE

## 2020-04-17 PROCEDURE — 97129 THER IVNTJ 1ST 15 MIN: CPT

## 2020-04-17 PROCEDURE — 97150 GROUP THERAPEUTIC PROCEDURES: CPT

## 2020-04-17 PROCEDURE — 85027 COMPLETE CBC AUTOMATED: CPT

## 2020-04-17 PROCEDURE — 81001 URINALYSIS AUTO W/SCOPE: CPT

## 2020-04-17 PROCEDURE — 82140 ASSAY OF AMMONIA: CPT

## 2020-04-17 PROCEDURE — 97116 GAIT TRAINING THERAPY: CPT

## 2020-04-17 PROCEDURE — 97530 THERAPEUTIC ACTIVITIES: CPT

## 2020-04-17 PROCEDURE — 87086 URINE CULTURE/COLONY COUNT: CPT

## 2020-04-17 PROCEDURE — 6370000000 HC RX 637 (ALT 250 FOR IP): Performed by: NURSE PRACTITIONER

## 2020-04-17 PROCEDURE — 6360000002 HC RX W HCPCS: Performed by: INTERNAL MEDICINE

## 2020-04-17 RX ADMIN — FINASTERIDE 5 MG: 5 TABLET, FILM COATED ORAL at 09:36

## 2020-04-17 RX ADMIN — ASPIRIN 81 MG: 81 TABLET, COATED ORAL at 09:42

## 2020-04-17 RX ADMIN — ENOXAPARIN SODIUM 40 MG: 40 INJECTION SUBCUTANEOUS at 09:36

## 2020-04-17 RX ADMIN — LISINOPRIL 5 MG: 5 TABLET ORAL at 09:35

## 2020-04-17 RX ADMIN — TAMSULOSIN HYDROCHLORIDE 0.4 MG: 0.4 CAPSULE ORAL at 09:36

## 2020-04-17 RX ADMIN — ATORVASTATIN CALCIUM 80 MG: 40 TABLET, FILM COATED ORAL at 21:55

## 2020-04-17 RX ADMIN — ACETAMINOPHEN 650 MG: 325 TABLET ORAL at 09:36

## 2020-04-17 RX ADMIN — CLOPIDOGREL BISULFATE 75 MG: 75 TABLET ORAL at 09:36

## 2020-04-17 ASSESSMENT — PAIN SCALES - GENERAL: PAINLEVEL_OUTOF10: 1

## 2020-04-17 NOTE — PROGRESS NOTES
MSW received a return call from Dalia at [facility]. They will accept patient at discharge. MSW also received a voicemail from Audie at [facility] They are anticipating an opening soon and could talk to him about moving there also. NIGEL Guerin/AMIRAH  4/17/2020, 10:21 AM    MSW talked with patient about plan for discharge. He asks about his apartment. MSW discussed with him that it seems that he now seems to need more support that he can obtain by living alone and having friends around. He agrees. MSW discusses with him that he may need to give up his apartment if he is able to go to assisted living after [facility]. He acknowledges that he does not have adequate assistance at his apartment. MSW discussed discharge plan with Dr. Raymond Aguila who states patient will be ready for discharge on Monday 4/20. MSW informed therapy , Rizwan Karimi.   NIGEL Guerin/AMIRAH  4/17/2020, 2:49 PM

## 2020-04-17 NOTE — PROGRESS NOTES
Occupational Therapy  Physical Rehabilitation: OCCUPATIONAL THERAPY     [x] daily progress note       [] discharge       Patient Name:  Corrie Unger   :  1935 MRN: [id number]  Room:  [address] [place] [address] Date of Admission: 4/10/2020  Rehabilitation Diagnosis:   Acute cerebrovascular accident (CVA) [facility]) [I63.9]  Acute cerebrovascular accident (CVA) ([facility] [address] [I63.9]       Date 2020       Day of ARU Week:  1   Time IN/OUT 1310/1340   Individual Tx Minutes 30   Group Tx Minutes    Co-Treat Minutes    Concurrent Tx Minutes    TOTAL Tx Time Mins 30   Variance Time    Variance Time []   Refusal due to:     []   Medical hold/reason:    []   Illness   []   Off Unit for test/procedure  []   Extra time needed to complete task  []   Therapeutic need  []   Other (specify):   Restrictions Restrictions/Precautions: General Precautions, Fall Risk         Communication with other providers: [x]   OK to see per nursing:     []   Spoke with team member regarding:      Subjective observations and cognitive status: Pt in recliner on approach, agreeable to tx session. Once standing, therapist observed drops of urine on floor, discovered that catheter bag tubing disconnected from catheter. Therapist reattached and notified RN.       Pain level/location:    /10       Location: Denied   Discharge recommendations  Anticipated discharge date:   Destination: []home alone   []home alone w assist prn   [] home w/ family    [] Continuous supervision       [x]SNF    [] Assisted living     [] Other:   Continued therapy: []HHC OT  []OUTPATIENT  OT   [] No Further OT  Equipment needs: N/A     Toileting:   N/A       Bed Mobility:           [x]   Pt received out of bed       Transfers:    Sit--> Stand:  SBA  Stand --> Sit:   SBA  Stand-Pivot:   SBA  Other:    Assistive device required for transfer:   RW      Functional Mobility:    Assistance:  SBA ~75 ft x2; despite time on unit pt continues to demo poor topographical recall, cue dependent cognitive retraining        Treatment/Activity Tolerance:   [x] Tolerated treatment with no adverse effects    [] Patient limited by fatigue  [] Patient limited by pain   [] Patient limited by medical complications:    [] Adverse reaction to Tx:   [] Significant change in status    Safety:       []  bed alarm set    [x]  chair alarm set    []  Pt refused alarms                []  Telesitter activated      [x]  Gait belt used during tx session      []other:       Number of Minutes/Billable Intervention  Therapeutic Exercise    ADL Self-care    Neuro Re-Ed    Therapeutic Activity 30   Group    Other:    TOTAL 30       Social History  Social/Functional History  Lives With: Alone  Type of Home: Apartment  Home Layout: One level  Home Access: Elevator  Bathroom Shower/Tub: Tub/Shower unit, Shower chair with back  Bathroom Toilet: Standard  Bathroom Equipment: Grab bars in shower  Bathroom Accessibility: Walker accessible  Home Equipment: Cane(Uses cane for fxl mobility)  Receives Help From: Friend(s)  ADL Assistance: [address]: Needs assistance  Homemaking Responsibilities: Yes  Ambulation Assistance: Independent  Transfer Assistance: Independent  Active : No  Patient's  Info: Hired help or friends to drive to [employer]  Education: Bachelors degree  Occupation: Retired  Type of occupation: (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   He appeared very knowledgable about music and music theory. )  IADL Comments: Neighbors/friends assist with IADLs. (+) med mgmt  Additional Comments: Pt sleeps on flat bed at baseline    Objective                                                                                    Goals:  (Update in navigator)  Short term goals  Time Frame for Short term goals:

## 2020-04-17 NOTE — DISCHARGE INSTR - COC
Continuity of Care Form    Patient Name: Velvet Almodovar   :  1935  MRN:  [id number]    Admit date:  4/10/2020  Discharge date:  2020    Code Status Order: Full Code   Advance Directives:   [address] [facility] Documentation     Date/Time Healthcare Directive Type of Healthcare Directive Copy in [facility] [address] Agent's Name Healthcare Agent's Phone Number    04/10/20 1314  No, patient does not have an advance directive for healthcare treatment -- -- -- -- --          Admitting Physician:  Trace Cabezas MD  PCP: No primary care provider on file. Discharging Nurse: Darci Mortensen RN  [address] Unit/Room#: 1027/1027-A  Discharging Unit Phone Number: [phone]    Emergency Contact:   Extended Emergency Contact Information  Primary Emergency Contact: [facility] Phone: [phone]  Relation: Niece/Nephew    Past Surgical History:  Past Surgical History:   Procedure Laterality Date    HERNIA REPAIR         Immunization History: There is no immunization history on file for this patient.     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 [facility]) I69.354    Dysphagia due to recent stroke I69.391    Dysarthria due to acute stroke ([facility] [address] I63.9, R47.1    Gait disturbance R26.9    Simple chronic bronchitis (HCC) J41.0    Urinary tract infection associated with indwelling urethral catheter (HCC) T83.511A, N39.0    Acute cystitis without hematuria N30.00       Isolation/Infection:   Isolation          No Isolation        Patient Infection Status     None to display          Nurse Assessment:  Last Vital Signs: /70   Pulse 72   Temp 97.8 °F (36.6 °C) (Oral)   Resp 16   Ht 5' 7\" (1.702 m)   Wt 109 lb 8 oz (49.7 kg)   SpO2 92%   BMI 17.15 kg/m²     Last documented pain score (0-10

## 2020-04-17 NOTE — PROGRESS NOTES
the opportunity for pt & group members to have fun, build camaraderie, increase endurance, improve breathing techniques, balance, and strength. Also focused on warm-up, warm-down, endurance monitoring & strategies, problem solving, functional mobility, safety, and general social & communication opportunities with other group members.       Functional areas targeted:   [x] Communication [] Memory  [x] Coordination    [] Kitchen Safety [x] Problem Solving  [x] Strengthening     [x] Attention  [x] Endurance   [] Mobility    [x] Awareness/Insight [x] Balance  [] Transfers  [x] Social/Pragmatics [] Sequencing  [] Equipment Use    [x] Safety   [] Other (specify)    Participation of Mendez Kamara:  [x] Actively participated             Assessment / Impression                                                          Treatment/Activity Tolerance:   [x] Tolerated Treatment well:     [] Patient limited by fatigue/pain:       [] Patient limited by medical complications:    [] Adverse Reaction to Tx:   [] Significant change in status    Number of Minutes/Billable Intervention  Therapeutic Exercise    ADL Self-care    Neuro Re-Ed    Therapeutic Activity    Group 60   Other:    TOTAL 60     Electronically signed by   mindi Oconnor  4/17/2020, 8:28 AM

## 2020-04-17 NOTE — PROGRESS NOTES
[facility] UNIT  SPEECH/LANGUAGE PATHOLOGY      [x] Daily           [] Discharge    Patient:Xavier Aguila      ODW:[birth year]  MJY:[id number]  Rehab Dx/Hx: Acute cerebrovascular accident (CVA) ([facility] [address] [I63.9]  Acute cerebrovascular accident (CVA) ([facility] [address] [I63.9]   No Known Allergies  Precautions:  Restrictions/Precautions: General Precautions, Fall Risk          Home Situation/IADL:   Social/Functional History  Lives With: Alone  Type of Home: Apartment  Home Layout: One level  Home Access: Elevator  Bathroom Shower/Tub: Tub/Shower unit, Shower chair with back  Bathroom Toilet: Standard  Bathroom Equipment: Grab bars in shower  Bathroom Accessibility: Walker accessible  Home Equipment: Cane(Uses cane for fxl mobility)  Receives Help From: Friend(s)  ADL Assistance: [address]: Needs assistance  Homemaking Responsibilities: Yes  Ambulation Assistance: Independent  Transfer Assistance: Independent  Active : No  Patient's  Info: Hired help or friends to drive to [employer]  Education: Bachelors degree  Occupation: Retired  Type of occupation: (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   He appeared very knowledgable about music and music theory. )  IADL Comments: Neighbors/friends assist with IADLs. (+) med mgmt  Additional Comments: Pt sleeps on flat bed at baseline      Date of Admit: 4/10/2020  Room #: 1027/1027-A     ST Number of Minutes/Billable Intervention  Cog/Memory Deficits 45    Aphasia/Language     Dysarthria/Speech     Apraxia/Speech     Dysphagia/Swallowing     Group     Other    TOTAL Minutes Billed  45              Date: 4/17/2020  Day of ARU Week:  1       SLP Individual Minutes  Time In: 1110  Time Out: 5585  Minutes: 39 group       Barriers to progress:   [] Fatigue        [x] Cognitive Deficits   [x] Memory Deficits   [x] Reduced Attn   [] Self Limiting Behaviors    [] Reduced insight/awareness     [] Visual Deficits   [] Premorbid Conditions  [] Impulsivity     [] Other      Education/Interventions used this date: visual scanning strategies. [x]   Progress was updated and reviewed in Rehabtracker with patient and/or family this         date. [] Attending Care Conference for pt this date. See Team Patient Care Conference Note for updates.     Interventions used this date:  [] Speech/Language Treatment    [] Instruction in HEP    Group   [] Dysphagia Treatment   [x] Cognitive Skill Isak    Other:         Assessment / Impression                                                          Treatment/Activity Tolerance:   [x] Tolerated Treatment well:     [] Patient limited by fatigue/pain:       [] Patient limited by medical complications:    [] Adverse Reaction to Tx:   [] Significant change in status:      Electronically Signed by  Lyssa Mccall MA, [id number] [facility]    4/17/2020  11:44 AM

## 2020-04-18 LAB
CULTURE: NORMAL
Lab: NORMAL
SPECIMEN: NORMAL

## 2020-04-18 PROCEDURE — 6360000002 HC RX W HCPCS: Performed by: INTERNAL MEDICINE

## 2020-04-18 PROCEDURE — 1280000000 HC REHAB R&B

## 2020-04-18 PROCEDURE — 94761 N-INVAS EAR/PLS OXIMETRY MLT: CPT

## 2020-04-18 PROCEDURE — 6370000000 HC RX 637 (ALT 250 FOR IP): Performed by: INTERNAL MEDICINE

## 2020-04-18 PROCEDURE — 6370000000 HC RX 637 (ALT 250 FOR IP): Performed by: NURSE PRACTITIONER

## 2020-04-18 RX ADMIN — CLOPIDOGREL BISULFATE 75 MG: 75 TABLET ORAL at 09:36

## 2020-04-18 RX ADMIN — ATORVASTATIN CALCIUM 80 MG: 40 TABLET, FILM COATED ORAL at 20:10

## 2020-04-18 RX ADMIN — ENOXAPARIN SODIUM 40 MG: 40 INJECTION SUBCUTANEOUS at 09:36

## 2020-04-18 RX ADMIN — ASPIRIN 81 MG: 81 TABLET, COATED ORAL at 09:30

## 2020-04-18 RX ADMIN — LISINOPRIL 5 MG: 5 TABLET ORAL at 09:36

## 2020-04-18 RX ADMIN — TAMSULOSIN HYDROCHLORIDE 0.4 MG: 0.4 CAPSULE ORAL at 09:36

## 2020-04-18 RX ADMIN — FINASTERIDE 5 MG: 5 TABLET, FILM COATED ORAL at 09:36

## 2020-04-18 ASSESSMENT — PAIN SCALES - GENERAL: PAINLEVEL_OUTOF10: 0

## 2020-04-18 NOTE — PROGRESS NOTES
Harshila Labs    : 1935  Acct #: [de-identified]  MRN: [id number]              PM&R Progress Note      Admitting diagnosis: Right cerebrovascular accident     Comorbid diagnoses impacting rehabilitation: Left hemiparesis, dysphagia, dysarthria, gait disturbance, COPD, chronic indwelling urinary catheter with new UTI    Chief complaint: Tired after therapy. Reports not sleeping well. Prior (baseline) level of function: Independent. Current level of function:         Current  IRF-NEO and Goals:   Hearing, Speech, and Vision  Expression of Ideas and Wants: Without difficulty  Understanding Verbal and Non-Verbal Content: Understands  Prior Functioning: Everyday Activities  Self Care: Independent  Indoor Mobility (Ambulation):  Independent  Stairs: Independent  Functional Cognition: Independent  Prior Device Use: None of the given options    Eating  Assistance Needed: Setup or clean-up assistance  CARE Score: 5  Discharge Goal: Independent  Oral Hygiene  Assistance Needed: Setup or clean-up assistance  CARE Score: 5  Discharge Goal: [address]  Reason if not Attempted: Patient refused  CARE Score: 7  Discharge Goal: Independent  Shower/Bathe Self  Assistance Needed: Supervision or touching assistance  CARE Score: 4  Discharge Goal: Independent  Upper Body Dressing  Assistance Needed: Supervision or touching assistance  CARE Score: 4  Discharge Goal: Independent  Lower Body Dressing  Assistance Needed: Partial/moderate assistance  CARE Score: 3  Discharge Goal: Independent  Putting On/Taking Off Footwear  Assistance Needed: Supervision or touching assistance  CARE Score: 4  Discharge Goal: Independent    Roll Left and Right  Assistance Needed: Independent  CARE Score: 6  Discharge Goal: Independent  Sit to Lying  Assistance Needed: Independent  CARE Score: 6  Discharge Goal: Independent  Sit to Stand  Assistance Needed: Supervision or touching assistance  CARE Score: 4  Discharge Goal: Independent  Chair/Bed-to-Chair Transfer  Assistance Needed: Supervision or touching assistance  CARE Score: 4  Discharge Goal: Independent  Toilet Transfer  Assistance Needed: Supervision or touching assistance  CARE Score: 4  Discharge Goal: Independent  Car Transfer  Assistance Needed: Supervision or touching assistance  CARE Score: 4  Discharge Goal: Independent   Walk 10 Feet? Walk 10 Feet?: Yes  1 Step  1 Step?: Yes  Picking Up Object  Assistance Needed: Supervision or touching assistance  Physical Assistance Level: Less than 25%  CARE Score: 4  Discharge Goal: Independent                     I      Exam:    Blood pressure 124/68, pulse 78, temperature 98 °F (36.7 °C), temperature source Oral, resp. rate 18, height 5' 7\" (1.702 m), weight 109 lb 8 oz (49.7 kg), SpO2 96 %. General: Lying back in bed. Poor attention to his left side. Mild left facial droop. Easily distracted. HEENT: Slurred speech. MMM. Neck supple. Pulmonary: Shallow but clear. Cardiac: RRR. Abdomen: Patient's abdomen is soft and nondistended. Bowel sounds were present throughout. There was no rebound, guarding or masses noted. Upper extremities: Clumsy left upper limb movements with incomplete grasp. Lower extremities: Stiffness across left knee and ankle with poor control. No signs of DVT. Sitting balance was fair. Standing balance was poor.     Lab Results   Component Value Date    WBC 9.1 04/17/2020    HGB 14.2 04/17/2020    HCT 44.9 04/17/2020    MCV 94.9 04/17/2020     04/17/2020     No results found for: INR, PROTIME  Lab Results   Component Value Date    CREATININE 0.6 (L) 04/17/2020    BUN 14 04/17/2020     04/17/2020    K 5.0 04/17/2020    CL 98 (L) 04/17/2020    CO2 27 04/17/2020     Lab Results   Component Value Date    ALT 63 (H) 04/08/2020    AST 81 (H) 04/08/2020    ALKPHOS 114 04/08/2020    BILITOT 0.6 04/08/2020       Expected length of stay  prior to a min assist level at a local skilled nursing facility continuing OT/PT is 4/20/2020. Recommendations:       1. Right cerebrovascular accident with left hemiparesis: Remains inconsistent with his participation in the daily occupational and physical therapy with speech-language pathology.  Visual disturbance creates balance difficulties and visual spatial obstacles to transfer safety. Ilya Atwood has been advanced to a dental soft diet with thin liquids under nursing and therapy supervision.  He has completed his Rocephin for the UTI today.  Tolerating the antiplatelet therapy and statin.  Emphasizing pulmonary hygiene and bowel and bladder retraining, noting that he has a chronic indwelling Goodman catheter. It seems unlikely with his limited support at home that he will make it there safely. Ongoing therapies in a more structured setting such as a skilled nursing facility is the plan. 2. DVT prophylaxis: Lovenox 40 mg subcu daily.  I must monitor his hemoglobin and platelet count periodically while on this medication.  Weightbearing activities are gradually increasing.  GI prophylaxis offered.  No new bruising or swelling.  Nii Blizzard is inconsistent. .    3. Dysphagia: Patient is on a dental soft diet with thin liquids.  No clinical signs of aspiration at this point. 4. Chronic indwelling Goodman catheter: Chronic prostatitis and urinary retention seems to be the reason for the indwelling Reji Martínez has now completed a full course of antibiotics for his most recent infection.  He is on both Proscar and Flomax.  Expect nursing to review with him proper care and maintenance of his catheter. 5. COPD: Less coughing noted.  Aggressive pulmonary hygiene and and PRN nebulizers.   6. Urinary tract infection: Citrobacter brachii sensitive to ceftriaxone.  IV Rocephin 7-day course completed.

## 2020-04-19 PROCEDURE — 6370000000 HC RX 637 (ALT 250 FOR IP): Performed by: INTERNAL MEDICINE

## 2020-04-19 PROCEDURE — 1280000000 HC REHAB R&B

## 2020-04-19 PROCEDURE — 99232 SBSQ HOSP IP/OBS MODERATE 35: CPT | Performed by: PHYSICAL MEDICINE & REHABILITATION

## 2020-04-19 PROCEDURE — 6360000002 HC RX W HCPCS: Performed by: INTERNAL MEDICINE

## 2020-04-19 PROCEDURE — 6370000000 HC RX 637 (ALT 250 FOR IP): Performed by: NURSE PRACTITIONER

## 2020-04-19 RX ORDER — TAMSULOSIN HYDROCHLORIDE 0.4 MG/1
0.4 CAPSULE ORAL DAILY
Qty: 30 CAPSULE | Refills: 0
Start: 2020-04-20

## 2020-04-19 RX ORDER — FINASTERIDE 5 MG/1
5 TABLET, FILM COATED ORAL DAILY
Qty: 30 TABLET | Refills: 0
Start: 2020-04-20

## 2020-04-19 RX ORDER — ATORVASTATIN CALCIUM 80 MG/1
80 TABLET, FILM COATED ORAL NIGHTLY
Qty: 30 TABLET | Refills: 0
Start: 2020-04-19

## 2020-04-19 RX ORDER — POLYETHYLENE GLYCOL 3350 17 G/17G
17 POWDER, FOR SOLUTION ORAL DAILY PRN
Qty: 527 G | Refills: 1 | COMMUNITY
Start: 2020-04-19 | End: 2020-05-19

## 2020-04-19 RX ORDER — CLOPIDOGREL BISULFATE 75 MG/1
75 TABLET ORAL DAILY
Qty: 30 TABLET | Refills: 0
Start: 2020-04-20

## 2020-04-19 RX ORDER — ASPIRIN 81 MG/1
81 TABLET ORAL DAILY
Qty: 30 TABLET | Refills: 3 | COMMUNITY
Start: 2020-04-20

## 2020-04-19 RX ORDER — LISINOPRIL 5 MG/1
5 TABLET ORAL DAILY
Qty: 30 TABLET | Refills: 0
Start: 2020-04-20

## 2020-04-19 RX ADMIN — CLOPIDOGREL BISULFATE 75 MG: 75 TABLET ORAL at 09:14

## 2020-04-19 RX ADMIN — ENOXAPARIN SODIUM 40 MG: 40 INJECTION SUBCUTANEOUS at 09:14

## 2020-04-19 RX ADMIN — ASPIRIN 81 MG: 81 TABLET, COATED ORAL at 09:14

## 2020-04-19 RX ADMIN — FINASTERIDE 5 MG: 5 TABLET, FILM COATED ORAL at 09:14

## 2020-04-19 RX ADMIN — LISINOPRIL 5 MG: 5 TABLET ORAL at 09:14

## 2020-04-19 RX ADMIN — TAMSULOSIN HYDROCHLORIDE 0.4 MG: 0.4 CAPSULE ORAL at 09:14

## 2020-04-19 RX ADMIN — ATORVASTATIN CALCIUM 80 MG: 40 TABLET, FILM COATED ORAL at 21:56

## 2020-04-19 ASSESSMENT — PAIN SCALES - GENERAL: PAINLEVEL_OUTOF10: 0

## 2020-04-20 VITALS
HEIGHT: 67 IN | DIASTOLIC BLOOD PRESSURE: 69 MMHG | RESPIRATION RATE: 16 BRPM | BODY MASS INDEX: 17.19 KG/M2 | WEIGHT: 109.5 LBS | OXYGEN SATURATION: 94 % | SYSTOLIC BLOOD PRESSURE: 127 MMHG | HEART RATE: 70 BPM | TEMPERATURE: 97.8 F

## 2020-04-20 PROCEDURE — 6370000000 HC RX 637 (ALT 250 FOR IP): Performed by: PHYSICAL MEDICINE & REHABILITATION

## 2020-04-20 PROCEDURE — 97535 SELF CARE MNGMENT TRAINING: CPT

## 2020-04-20 PROCEDURE — 97129 THER IVNTJ 1ST 15 MIN: CPT

## 2020-04-20 PROCEDURE — 6370000000 HC RX 637 (ALT 250 FOR IP): Performed by: INTERNAL MEDICINE

## 2020-04-20 PROCEDURE — 94761 N-INVAS EAR/PLS OXIMETRY MLT: CPT

## 2020-04-20 PROCEDURE — 99239 HOSP IP/OBS DSCHRG MGMT >30: CPT | Performed by: PHYSICAL MEDICINE & REHABILITATION

## 2020-04-20 PROCEDURE — 97530 THERAPEUTIC ACTIVITIES: CPT

## 2020-04-20 PROCEDURE — 97116 GAIT TRAINING THERAPY: CPT

## 2020-04-20 PROCEDURE — 6370000000 HC RX 637 (ALT 250 FOR IP): Performed by: NURSE PRACTITIONER

## 2020-04-20 PROCEDURE — 97110 THERAPEUTIC EXERCISES: CPT

## 2020-04-20 RX ADMIN — ASPIRIN 81 MG: 81 TABLET, COATED ORAL at 09:33

## 2020-04-20 RX ADMIN — ACETAMINOPHEN 650 MG: 325 TABLET ORAL at 09:33

## 2020-04-20 RX ADMIN — LISINOPRIL 5 MG: 5 TABLET ORAL at 09:32

## 2020-04-20 RX ADMIN — FINASTERIDE 5 MG: 5 TABLET, FILM COATED ORAL at 09:33

## 2020-04-20 RX ADMIN — CLOPIDOGREL BISULFATE 75 MG: 75 TABLET ORAL at 09:33

## 2020-04-20 RX ADMIN — TAMSULOSIN HYDROCHLORIDE 0.4 MG: 0.4 CAPSULE ORAL at 09:33

## 2020-04-20 ASSESSMENT — PAIN DESCRIPTION - ORIENTATION: ORIENTATION: RIGHT;LEFT

## 2020-04-20 ASSESSMENT — PAIN DESCRIPTION - DESCRIPTORS: DESCRIPTORS: ACHING;SORE

## 2020-04-20 ASSESSMENT — PAIN SCALES - GENERAL
PAINLEVEL_OUTOF10: 0
PAINLEVEL_OUTOF10: 3

## 2020-04-20 ASSESSMENT — PAIN - FUNCTIONAL ASSESSMENT: PAIN_FUNCTIONAL_ASSESSMENT: ACTIVITIES ARE NOT PREVENTED

## 2020-04-20 ASSESSMENT — PAIN DESCRIPTION - LOCATION: LOCATION: ARM

## 2020-04-20 ASSESSMENT — PAIN DESCRIPTION - ONSET: ONSET: ON-GOING

## 2020-04-20 ASSESSMENT — PAIN DESCRIPTION - PROGRESSION: CLINICAL_PROGRESSION: NOT CHANGED

## 2020-04-20 ASSESSMENT — PAIN DESCRIPTION - FREQUENCY: FREQUENCY: INTERMITTENT

## 2020-04-20 NOTE — PROGRESS NOTES
this         date. Treatment Plan for Next Session: Continue OT POC, visual and cognitive retraining        Treatment/Activity Tolerance:   [x] Tolerated treatment with no adverse effects    [] Patient limited by fatigue  [] Patient limited by pain   [] Patient limited by medical complications:    [] Adverse reaction to Tx:   [] Significant change in status    Safety:       []  bed alarm set    [x]  chair alarm set    []  Pt refused alarms                []  Telesitter activated      [x]  Gait belt used during tx session      []other:       Number of Minutes/Billable Intervention  Therapeutic Exercise    ADL Self-care    Neuro Re-Ed    Therapeutic Activity 30   Group    Other:    TOTAL 30       Social History  Social/Functional History  Lives With: Alone  Type of Home: Apartment  Home Layout: One level  Home Access: Elevator  Bathroom Shower/Tub: Tub/Shower unit, Shower chair with back  Bathroom Toilet: Standard  Bathroom Equipment: Grab bars in shower  Bathroom Accessibility: Walker accessible  Home Equipment: Cane(Uses cane for fxl mobility)  Receives Help From: Friend(s)  ADL Assistance: [address]: Needs assistance  Homemaking Responsibilities: Yes  Ambulation Assistance: Independent  Transfer Assistance: Independent  Active : No  Patient's  Info: Hired help or friends to drive to [employer]  Education: Bachelors degree  Occupation: Retired  Type of occupation: (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   He appeared very knowledgable about music and music theory. )  IADL Comments: Neighbors/friends assist with IADLs. (+) med mgmt  Additional Comments: Pt sleeps on flat bed at baseline    Objective

## 2020-04-20 NOTE — PLAN OF CARE
Problem: Falls - Risk of:  Goal: Will remain free from falls  Description: Will remain free from falls  Outcome: Ongoing  Goal: Absence of physical injury  Description: Absence of physical injury  Outcome: Ongoing     Problem: SAFETY  Goal: Free from intentional harm  Outcome: Ongoing     Problem: DAILY CARE  Goal: Daily care needs are met  Outcome: Ongoing     Problem: PAIN  Goal: Patient's pain/discomfort is manageable  Outcome: Ongoing     Problem: SKIN INTEGRITY  Goal: Skin integrity is maintained or improved  Outcome: Ongoing     Problem: KNOWLEDGE DEFICIT  Goal: Patient/S.O. demonstrates understanding of disease process, treatment plan, medications, and discharge instructions.   Outcome: Ongoing     Problem: DISCHARGE BARRIERS  Goal: Patient's continuum of care needs are met  Outcome: Ongoing     Problem: Infection:  Goal: Will remain free from infection  Description: Will remain free from infection  Outcome: Ongoing     Problem: Safety:  Goal: Free from accidental physical injury  Description: Free from accidental physical injury  Outcome: Ongoing  Goal: Free from intentional harm  Description: Free from intentional harm  Outcome: Ongoing     Problem: Daily Care:  Goal: Daily care needs are met  Description: Daily care needs are met  Outcome: Ongoing     Problem: Pain:  Goal: Patient's pain/discomfort is manageable  Description: Patient's pain/discomfort is manageable  Outcome: Ongoing     Problem: Skin Integrity:  Goal: Skin integrity will stabilize  Description: Skin integrity will stabilize  Outcome: Ongoing     Problem: Discharge Planning:  Goal: Patients continuum of care needs are met  Description: Patients continuum of care needs are met  Outcome: Ongoing

## 2020-04-20 NOTE — PROGRESS NOTES
Scott Mara    : 1935  Acct #: [de-identified]  MRN: [id number]              PM&R Progress Note      Admitting diagnosis: Right cerebrovascular accident     Comorbid diagnoses impacting rehabilitation: Left hemiparesis, dysphagia, dysarthria, gait disturbance, COPD, chronic indwelling urinary catheter with new UTI    Chief complaint: Verbalizes awareness that he is going to a nursing home tomorrow. Prior (baseline) level of function: Independent. Current level of function:         Current  IRF-NEO and Goals:   Hearing, Speech, and Vision  Expression of Ideas and Wants: Without difficulty  Understanding Verbal and Non-Verbal Content: Understands  Prior Functioning: Everyday Activities  Self Care: Independent  Indoor Mobility (Ambulation):  Independent  Stairs: Independent  Functional Cognition: Independent  Prior Device Use: None of the given options    Eating  Assistance Needed: Setup or clean-up assistance  CARE Score: 5  Discharge Goal: Independent  Oral Hygiene  Assistance Needed: Setup or clean-up assistance  CARE Score: 5  Discharge Goal: [address]  Reason if not Attempted: Patient refused  CARE Score: 7  Discharge Goal: Independent  Shower/Bathe Self  Assistance Needed: Supervision or touching assistance  CARE Score: 4  Discharge Goal: Independent  Upper Body Dressing  Assistance Needed: Supervision or touching assistance  CARE Score: 4  Discharge Goal: Independent  Lower Body Dressing  Assistance Needed: Partial/moderate assistance  CARE Score: 3  Discharge Goal: Independent  Putting On/Taking Off Footwear  Assistance Needed: Supervision or touching assistance  CARE Score: 4  Discharge Goal: Independent    Roll Left and Right  Assistance Needed: Independent  CARE Score: 6  Discharge Goal: Independent  Sit to Lying  Assistance Needed: Independent  CARE Score: 6  Discharge Goal: Independent  Sit to Stand  Assistance Needed: Supervision or touching assistance  CARE Score: 4  Discharge Goal: Independent  Chair/Bed-to-Chair Transfer  Assistance Needed: Supervision or touching assistance  CARE Score: 4  Discharge Goal: Independent  Toilet Transfer  Assistance Needed: Supervision or touching assistance  CARE Score: 4  Discharge Goal: Independent  Car Transfer  Assistance Needed: Supervision or touching assistance  CARE Score: 4  Discharge Goal: Independent   Walk 10 Feet? Walk 10 Feet?: Yes  1 Step  1 Step?: Yes  Picking Up Object  Assistance Needed: Supervision or touching assistance  Physical Assistance Level: Less than 25%  CARE Score: 4  Discharge Goal: Independent                     I      Exam:    Blood pressure 111/63, pulse 66, temperature 98.2 °F (36.8 °C), temperature source Oral, resp. rate 16, height 5' 7\" (1.702 m), weight 109 lb 8 oz (49.7 kg), SpO2 94 %. General: Lying back in bed. Poor attention. Mild left facial droop. HEENT: Decreased attention to the left visual field. Slurred speech. MMM. Pulmonary: No rales or rhonchi. Cardiac: RRR. Abdomen: Patient's abdomen is soft and nondistended. Bowel sounds were present throughout. There was no rebound, guarding or masses noted. Upper extremities: Clumsy left hand and elbow weakness. No swelling or bruising. Lower extremities: No signs of DVT. Heels clear. Coarse movements of the left knee and ankle. Sitting balance was fair. Standing balance was poor.     Lab Results   Component Value Date    WBC 9.1 04/17/2020    HGB 14.2 04/17/2020    HCT 44.9 04/17/2020    MCV 94.9 04/17/2020     04/17/2020     No results found for: INR, PROTIME  Lab Results   Component Value Date    CREATININE 0.6 (L) 04/17/2020    BUN 14 04/17/2020     04/17/2020    K 5.0 04/17/2020    CL 98 (L) 04/17/2020    CO2 27 04/17/2020     Lab Results   Component Value Date    ALT 63 (H) 04/08/2020    AST 81 (H) 04/08/2020    ALKPHOS 114 04/08/2020    BILITOT 0.6 04/08/2020       Expected length of stay  prior to a minimum physical assistance level function at a local skilled nursing facility with ongoing OT/PT is 4/20/2020. Recommendations:    1. Right cerebrovascular accident with left hemiparesis: His poor insight and carryover are severely limiting progress in his daily occupational and physical therapy with speech-language pathology.  Visual disturbance creates balance difficulties and visual spatial obstacles to transfer safety. Meet Adams has been advanced to a dental soft diet with thin liquids under nursing and therapy supervision.  He has completed his Rocephin for the UTI today.  Tolerating the antiplatelet therapy and statin.  Emphasizing pulmonary hygiene and bowel and bladder retraining, noting that he has a chronic indwelling Goodman catheter.  It seems unlikely with his limited support at home that he will make it there safely.  Ongoing therapies in a more structured setting such as a skilled nursing facility is the plan. 2. DVT prophylaxis: Lovenox 40 mg subcu daily.  I must monitor his hemoglobin and platelet count periodically while on this medication.  Weightbearing activities are gradually increasing.  GI prophylaxis offered.  No new bruising or swelling.   Walking is inconsistent.  .    3. Dysphagia: Patient is on a dental soft diet with thin liquids.  No clinical signs of aspiration at this point. 4. Chronic indwelling Goodman catheter: Chronic prostatitis and urinary retention seems to be the reason for the indwelling Goodman. Meet Adams has now completed a full course of antibiotics for his most recent infection. Meet Adams is on both Proscar and Flomax.  Expect nursing to review with him proper care and maintenance of his catheter. 5. COPD: Less coughing noted.  Aggressive pulmonary hygiene and and PRN nebulizers.   6. Urinary tract infection: Citrobacter brachii sensitive to ceftriaxone.  IV Rocephin 7-day course completed.

## 2020-04-20 NOTE — PROGRESS NOTES
History  Lives With: Alone  Type of Home: Apartment  Home Layout: One level  Home Access: Elevator  Bathroom Shower/Tub: Tub/Shower unit, Shower chair with back  Bathroom Toilet: Standard  Bathroom Equipment: Grab bars in [address] Accessibility: Walker accessible  Home Equipment: Cane(Uses cane for fxl mobility)  Receives Help From: Friend(s)  ADL Assistance: Independent  Homemaking Assistance: Needs assistance  Homemaking Responsibilities: Yes  Ambulation Assistance: Independent  Transfer Assistance: Independent  Active : No  Patient's  Info: Hired help or friends to drive to [place]  Education: Bachelors degree  Occupation: Retired  Type of occupation: (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 He appeared very knowledgable about music and music theory. )  IADL Comments: Neighbors/friends assist with IADLs. (+) med mgmt  Additional Comments: Pt sleeps on flat bed at baseline    Objective                                                                                    Goals:  (Update in navigator)   : Long term goals  Time Frame for Long term goals : one week  Long term goal 1: indep all basic bed mobility skills, basic sit-stand-SPT and car xfers  Long term goal 2: mod indep ambulation with RW for 400 ft, safely, with independent performance of visual scanning of environment  Long term goal 3: mod indep up and down 12 stairs with one rail  Long term goal 4: indep with HEP, including activities to improve LUE coordination testing.:        Plan of Care                                                                              Times per week: 5 days per week for a minimum of 60 minutes/day plus group as appropriate for 60 minutes.   Treatment to include Current Treatment Recommendations:

## 2020-04-20 NOTE — PROGRESS NOTES
Case mgt met with patient in room. Patient did not schedule his own transport to [facility]. QCT today @1400 to [address] [place]. Dana Key RN aware.

## 2020-04-22 ENCOUNTER — HOSPITAL ENCOUNTER (OUTPATIENT)
Age: 85
Setting detail: SPECIMEN
Discharge: HOME | End: 2020-04-22
Payer: MEDICARE

## 2020-04-22 LAB
ALBUMIN SERPL-MCNC: 3.6 GM/DL (ref 3.4–5)
ALP BLD-CCNC: 86 IU/L (ref 40–128)
ALT SERPL-CCNC: 33 U/L (ref 10–40)
ANION GAP SERPL CALCULATED.3IONS-SCNC: 10 MMOL/L (ref 4–16)
AST SERPL-CCNC: 30 IU/L (ref 15–37)
BASOPHILS ABSOLUTE: 0.1 K/CU MM
BASOPHILS RELATIVE PERCENT: 0.7 % (ref 0–1)
BILIRUB SERPL-MCNC: 0.5 MG/DL (ref 0–1)
BUN BLDV-MCNC: 16 MG/DL (ref 6–23)
CALCIUM SERPL-MCNC: 8.8 MG/DL (ref 8.3–10.6)
CHLORIDE BLD-SCNC: 99 MMOL/L (ref 99–110)
CHOLESTEROL: 100 MG/DL
CO2: 28 MMOL/L (ref 21–32)
CREAT SERPL-MCNC: 0.6 MG/DL (ref 0.9–1.3)
DIFFERENTIAL TYPE: ABNORMAL
EOSINOPHILS ABSOLUTE: 0.3 K/CU MM
EOSINOPHILS RELATIVE PERCENT: 3.4 % (ref 0–3)
GFR AFRICAN AMERICAN: >60 ML/MIN/1.73M2
GFR NON-AFRICAN AMERICAN: >60 ML/MIN/1.73M2
GLUCOSE BLD-MCNC: 78 MG/DL (ref 70–99)
HCT VFR BLD CALC: 39.9 % (ref 42–52)
HDLC SERPL-MCNC: 38 MG/DL
HEMOGLOBIN: 12.7 GM/DL (ref 13.5–18)
IMMATURE NEUTROPHIL %: 0.3 % (ref 0–0.43)
LDL CHOLESTEROL DIRECT: 55 MG/DL
LYMPHOCYTES ABSOLUTE: 1.1 K/CU MM
LYMPHOCYTES RELATIVE PERCENT: 15.3 % (ref 24–44)
MCH RBC QN AUTO: 30.6 PG (ref 27–31)
MCHC RBC AUTO-ENTMCNC: 31.8 % (ref 32–36)
MCV RBC AUTO: 96.1 FL (ref 78–100)
MONOCYTES ABSOLUTE: 0.8 K/CU MM
MONOCYTES RELATIVE PERCENT: 10.2 % (ref 0–4)
NUCLEATED RBC %: 0 %
PDW BLD-RTO: 14.3 % (ref 11.7–14.9)
PLATELET # BLD: 348 K/CU MM (ref 140–440)
PMV BLD AUTO: 10.7 FL (ref 7.5–11.1)
POTASSIUM SERPL-SCNC: 5 MMOL/L (ref 3.5–5.1)
RBC # BLD: 4.15 M/CU MM (ref 4.6–6.2)
SEGMENTED NEUTROPHILS ABSOLUTE COUNT: 5.2 K/CU MM
SEGMENTED NEUTROPHILS RELATIVE PERCENT: 70.1 % (ref 36–66)
SODIUM BLD-SCNC: 137 MMOL/L (ref 135–145)
TOTAL IMMATURE NEUTOROPHIL: 0.02 K/CU MM
TOTAL NUCLEATED RBC: 0 K/CU MM
TOTAL PROTEIN: 5.4 GM/DL (ref 6.4–8.2)
TRIGL SERPL-MCNC: 44 MG/DL
WBC # BLD: 7.5 K/CU MM (ref 4–10.5)

## 2020-04-22 PROCEDURE — 85025 COMPLETE CBC W/AUTO DIFF WBC: CPT

## 2020-04-22 PROCEDURE — 83721 ASSAY OF BLOOD LIPOPROTEIN: CPT

## 2020-04-22 PROCEDURE — 80053 COMPREHEN METABOLIC PANEL: CPT

## 2020-04-22 PROCEDURE — 80061 LIPID PANEL: CPT

## 2020-04-22 PROCEDURE — 36415 COLL VENOUS BLD VENIPUNCTURE: CPT

## 2020-05-08 ENCOUNTER — CARE COORDINATION (OUTPATIENT)
Dept: CASE MANAGEMENT | Age: 85
End: 2020-05-08

## 2020-05-13 ENCOUNTER — CARE COORDINATION (OUTPATIENT)
Dept: CASE MANAGEMENT | Age: 85
End: 2020-05-13

## 2020-05-21 NOTE — DISCHARGE SUMMARY
Decreased attention to the left visual field. Slurred speech. MMM. Pulmonary: No rales or rhonchi. Cardiac: RRR. Abdomen: Patient's abdomen is soft and nondistended. Bowel sounds were present throughout. There was no rebound, guarding or masses noted. Upper extremities: Clumsy left hand and elbow weakness. No swelling or bruising. Lower extremities: No signs of DVT. Heels clear. Coarse movements of the left knee and ankle. Sitting balance was fair. Standing balance was poor. Lab Results   Component Value Date    WBC 7.5 04/22/2020    HGB 12.7 (L) 04/22/2020    HCT 39.9 (L) 04/22/2020    MCV 96.1 04/22/2020     04/22/2020     No results found for: INR, PROTIME  Lab Results   Component Value Date    CREATININE 0.6 (L) 04/22/2020    BUN 16 04/22/2020     04/22/2020    K 5.0 04/22/2020    CL 99 04/22/2020    CO2 28 04/22/2020     Lab Results   Component Value Date    ALT 33 04/22/2020    AST 30 04/22/2020    ALKPHOS 86 04/22/2020    BILITOT 0.5 04/22/2020         The patient presented to the ARU with the above history requiring a multidisciplinary treatment plan including close medical supervision by the [facility] Sin Director. The patient participated in the prescribed therapy treatment plan with poor compliance and limited tolerance. They avoided significant medical complications. By the time of discharge the patient had become minimally safer with adaptive equipment to transfer and toilet with a FWW with the supervision of staff. He did not have support at home to provide the level of assistance he would need in order to prepare him going home, so the decision was made to continue his recovery at a local skilled nursing facility. The patient was tolerating an oral diet without choking/coughing and was back to their baseline with regards to bowel and bladder control. Discharge instructions were reviewed with the patient.  The patient is to follow up with the PCP in 1 week. No driving. A skilled nursing facility for ongoing PT/OT/RN will be arranged. Nancy CLARKE   Home Medication Instructions FAYE:[id number]    Printed on:05/21/20 3464   Medication Information                      aspirin 81 MG EC tablet  Take 1 tablet by mouth daily             atorvastatin (LIPITOR) 80 MG tablet  Take 1 tablet by mouth nightly             clopidogrel (PLAVIX) 75 MG tablet  Take 1 tablet by mouth daily             finasteride (PROSCAR) 5 MG tablet  Take 1 tablet by mouth daily             lisinopril (PRINIVIL;ZESTRIL) 5 MG tablet  Take 1 tablet by mouth daily             tamsulosin (FLOMAX) 0.4 MG capsule  Take 1 capsule by mouth daily                 CONDITION ON DISCHARGE: Stable. The prognosis is fair- for further improvements in ADL's and safety with adapted gait/transfers. Record review, patient exam, discharge instructions, medication reconciliation and summary for this discharge visit took more than 30 minutes.

## 2020-08-05 NOTE — PROGRESS NOTES
[facility] UNIT  SPEECH/LANGUAGE PATHOLOGY      [x] Daily           [] Discharge    Patient:Xavier Easton      VQK:[birth year]  OTL:[id number]  Rehab Dx/Hx: Acute cerebrovascular accident (CVA) ([facility] [address] [I63.9]  Acute cerebrovascular accident (CVA) ([facility] [address] [I63.9]   No Known Allergies  Precautions:  Restrictions/Precautions: General Precautions, Fall Risk          Home Situation/IADL:   Social/Functional History  Lives With: Alone  Type of Home: Apartment  Home Layout: One level  Home Access: Elevator  Bathroom Shower/Tub: Tub/Shower unit, Shower chair with back  Bathroom Toilet: Standard  Bathroom Equipment: Grab bars in shower  Bathroom Accessibility: Walker accessible  Home Equipment: Cane(Uses cane for fxl mobility)  Receives Help From: Friend(s)  ADL Assistance: [facility]: Needs assistance  Homemaking Responsibilities: Yes  Ambulation Assistance: Independent  Transfer Assistance: Independent  Active : No  Patient's  Info: Hired help or friends to drive to [place]  Education: Bachelors degree  Occupation: Retired  Type of occupation: (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   He appeared very knowledgable about music and music theory. )  IADL Comments: Neighbors/friends assist with IADLs. (+) med mgmt  Additional Comments: Pt sleeps on flat bed at baseline      Date of Admit: 4/10/2020  Room #: 1027/1027-A     ST Number of Minutes/Billable Intervention  Cog/Memory Deficits 30    Aphasia/Language     Dysarthria/Speech     Apraxia/Speech     Dysphagia/Swallowing     Group     Other    TOTAL Minutes Billed  30              Date: 4/16/2020  Day of ARU Week:  7       SLP Individual Minutes  Time In: 7755  Time Out: [facility]  Minutes: 30 Variance/Reason:  [] Refusal due to   [] Medical hold/reason  [] Illness   [] Off Unit for test/procedure  [] Extra time needed to complete task  [] Other (specify)    Activity completed: 30    Pain: denies  Current Diet: Dietary Nutrition Supplements: Standard High Calorie Oral Supplement  DIET DENTAL SOFT;  Subjective: Pt hallucinating this date. Stated, \"I woke up and I was in a tiny 8x8 room. It didn't seem like the place I went to sleep in. There was a lady and she had me rearranging the knick-knacks on her wall. Now they told me I've been here in the hospital and never left but I swear I was there. I have a lot of dreams that I come in and out on. \"  \"Whatever this is it hit me hard; my mind is not right. When I look at direct light it pops around. \"  Pt with tangential utterances, confabulations, and reduced attn. Pt appears to take some tasks that have occurred and get them mixed up with other things. Pt talks about his dtr who is a  and is in [facility]. He jumped stories and said she's working on the movie [employer] right now. Then he said she's on [employer].  Then he reported she is [place] Howard . Assisted CM in getting permission to contact people to assist with d/c planning. Pt gave 3 names: Pieter Doran who he reported is a [facility] nurse and helps him with housekeeping. George Devries drives him around. Marcus who is a good friend at [facility]. Pt asked why it was important for these people to help--doesn't recognize limitations even though he will comment on them. Goals and POC: Co-treats where appropriate with PT or OT to facilitate patient goals in functional tasks. LTG                           Short-term Goals  Timeframe for Short-term Goals: 3x/week x2 weeks 30 mins min  LTG: Improve problem solving and memory for implementation and carryover of strategies for safe return home .    Goal 1: Pt will  be given memory and visual strategies to assist with carryover 167.64

## 2020-08-13 ENCOUNTER — APPOINTMENT (OUTPATIENT)
Dept: CT IMAGING | Age: 85
DRG: 252 | End: 2020-08-13
Payer: MEDICARE

## 2020-08-13 ENCOUNTER — HOSPITAL ENCOUNTER (INPATIENT)
Age: 85
LOS: 14 days | Discharge: NURSING, REHAB OR LONG-TERM CARE | DRG: 252 | End: 2020-08-27
Attending: EMERGENCY MEDICINE | Admitting: INTERNAL MEDICINE
Payer: MEDICARE

## 2020-08-13 ENCOUNTER — APPOINTMENT (OUTPATIENT)
Dept: GENERAL RADIOLOGY | Age: 85
DRG: 252 | End: 2020-08-13
Payer: MEDICARE

## 2020-08-13 ENCOUNTER — APPOINTMENT (OUTPATIENT)
Dept: ULTRASOUND IMAGING | Age: 85
DRG: 252 | End: 2020-08-13
Payer: MEDICARE

## 2020-08-13 PROBLEM — I70.201 FEMORAL ARTERY OCCLUSION, RIGHT (HCC): Status: ACTIVE | Noted: 2020-08-13

## 2020-08-13 PROBLEM — G93.40 ACUTE ENCEPHALOPATHY: Status: ACTIVE | Noted: 2020-08-13

## 2020-08-13 LAB
ALBUMIN SERPL-MCNC: 4.2 GM/DL (ref 3.4–5)
ALP BLD-CCNC: 95 IU/L (ref 40–129)
ALT SERPL-CCNC: 12 U/L (ref 10–40)
AMMONIA: 51 UMOL/L (ref 16–60)
ANION GAP SERPL CALCULATED.3IONS-SCNC: 14 MMOL/L (ref 4–16)
APTT: 29.8 SECONDS (ref 25.1–37.1)
APTT: 33 SECONDS (ref 25.1–37.1)
AST SERPL-CCNC: 19 IU/L (ref 15–37)
BACTERIA: NEGATIVE /HPF
BASOPHILS ABSOLUTE: 0 K/CU MM
BASOPHILS RELATIVE PERCENT: 0.3 % (ref 0–1)
BILIRUB SERPL-MCNC: 0.7 MG/DL (ref 0–1)
BILIRUBIN URINE: NEGATIVE MG/DL
BLOOD, URINE: NEGATIVE
BUN BLDV-MCNC: 43 MG/DL (ref 6–23)
CALCIUM SERPL-MCNC: 9.4 MG/DL (ref 8.3–10.6)
CHLORIDE BLD-SCNC: 99 MMOL/L (ref 99–110)
CLARITY: ABNORMAL
CO2: 21 MMOL/L (ref 21–32)
COLOR: ABNORMAL
CREAT SERPL-MCNC: 0.8 MG/DL (ref 0.9–1.3)
DIFFERENTIAL TYPE: ABNORMAL
EOSINOPHILS ABSOLUTE: 0 K/CU MM
EOSINOPHILS RELATIVE PERCENT: 0.1 % (ref 0–3)
GFR AFRICAN AMERICAN: >60 ML/MIN/1.73M2
GFR NON-AFRICAN AMERICAN: >60 ML/MIN/1.73M2
GLUCOSE BLD-MCNC: 111 MG/DL (ref 70–99)
GLUCOSE BLD-MCNC: 124 MG/DL (ref 70–99)
GLUCOSE, URINE: NEGATIVE MG/DL
HCT VFR BLD CALC: 45.9 % (ref 42–52)
HEMOGLOBIN: 15.2 GM/DL (ref 13.5–18)
IMMATURE NEUTROPHIL %: 0.5 % (ref 0–0.43)
KETONES, URINE: ABNORMAL MG/DL
LACTATE: 1.1 MMOL/L (ref 0.4–2)
LEUKOCYTE ESTERASE, URINE: ABNORMAL
LYMPHOCYTES ABSOLUTE: 0.6 K/CU MM
LYMPHOCYTES RELATIVE PERCENT: 6.7 % (ref 24–44)
MCH RBC QN AUTO: 29 PG (ref 27–31)
MCHC RBC AUTO-ENTMCNC: 33.1 % (ref 32–36)
MCV RBC AUTO: 87.6 FL (ref 78–100)
MONOCYTES ABSOLUTE: 0.9 K/CU MM
MONOCYTES RELATIVE PERCENT: 9.3 % (ref 0–4)
MUCUS: ABNORMAL HPF
NITRITE URINE, QUANTITATIVE: POSITIVE
NUCLEATED RBC %: 0 %
PDW BLD-RTO: 14.7 % (ref 11.7–14.9)
PH, URINE: 8 (ref 5–8)
PLATELET # BLD: 241 K/CU MM (ref 140–440)
PMV BLD AUTO: 11.4 FL (ref 7.5–11.1)
POTASSIUM SERPL-SCNC: 4.4 MMOL/L (ref 3.5–5.1)
PROTEIN UA: >500 MG/DL
RBC # BLD: 5.24 M/CU MM (ref 4.6–6.2)
RBC URINE: 19 /HPF (ref 0–3)
SEGMENTED NEUTROPHILS ABSOLUTE COUNT: 7.7 K/CU MM
SEGMENTED NEUTROPHILS RELATIVE PERCENT: 83.1 % (ref 36–66)
SODIUM BLD-SCNC: 134 MMOL/L (ref 135–145)
SPECIFIC GRAVITY UA: 1.04 (ref 1–1.03)
TOTAL IMMATURE NEUTOROPHIL: 0.05 K/CU MM
TOTAL NUCLEATED RBC: 0 K/CU MM
TOTAL PROTEIN: 6.9 GM/DL (ref 6.4–8.2)
TRICHOMONAS: ABNORMAL /HPF
TRIPLE PHOSPHATE CRYSTALS: ABNORMAL /HPF
TROPONIN T: <0.01 NG/ML
UROBILINOGEN, URINE: NORMAL MG/DL (ref 0.2–1)
WBC # BLD: 9.3 K/CU MM (ref 4–10.5)
WBC UA: 22 /HPF (ref 0–2)
YEAST: ABNORMAL /HPF

## 2020-08-13 PROCEDURE — 85025 COMPLETE CBC W/AUTO DIFF WBC: CPT

## 2020-08-13 PROCEDURE — 81001 URINALYSIS AUTO W/SCOPE: CPT

## 2020-08-13 PROCEDURE — 70450 CT HEAD/BRAIN W/O DYE: CPT

## 2020-08-13 PROCEDURE — 2580000003 HC RX 258: Performed by: NURSE PRACTITIONER

## 2020-08-13 PROCEDURE — 75635 CT ANGIO ABDOMINAL ARTERIES: CPT

## 2020-08-13 PROCEDURE — 82962 GLUCOSE BLOOD TEST: CPT

## 2020-08-13 PROCEDURE — 6360000002 HC RX W HCPCS: Performed by: EMERGENCY MEDICINE

## 2020-08-13 PROCEDURE — 2500000003 HC RX 250 WO HCPCS: Performed by: EMERGENCY MEDICINE

## 2020-08-13 PROCEDURE — 96366 THER/PROPH/DIAG IV INF ADDON: CPT

## 2020-08-13 PROCEDURE — 74176 CT ABD & PELVIS W/O CONTRAST: CPT

## 2020-08-13 PROCEDURE — 93926 LOWER EXTREMITY STUDY: CPT

## 2020-08-13 PROCEDURE — 80053 COMPREHEN METABOLIC PANEL: CPT

## 2020-08-13 PROCEDURE — 6360000002 HC RX W HCPCS: Performed by: NURSE PRACTITIONER

## 2020-08-13 PROCEDURE — 94761 N-INVAS EAR/PLS OXIMETRY MLT: CPT

## 2020-08-13 PROCEDURE — 71045 X-RAY EXAM CHEST 1 VIEW: CPT

## 2020-08-13 PROCEDURE — 6360000004 HC RX CONTRAST MEDICATION: Performed by: THORACIC SURGERY (CARDIOTHORACIC VASCULAR SURGERY)

## 2020-08-13 PROCEDURE — 99285 EMERGENCY DEPT VISIT HI MDM: CPT

## 2020-08-13 PROCEDURE — 4500000027

## 2020-08-13 PROCEDURE — 96365 THER/PROPH/DIAG IV INF INIT: CPT

## 2020-08-13 PROCEDURE — 93005 ELECTROCARDIOGRAM TRACING: CPT | Performed by: EMERGENCY MEDICINE

## 2020-08-13 PROCEDURE — 83605 ASSAY OF LACTIC ACID: CPT

## 2020-08-13 PROCEDURE — 2000000000 HC ICU R&B

## 2020-08-13 PROCEDURE — 85730 THROMBOPLASTIN TIME PARTIAL: CPT

## 2020-08-13 PROCEDURE — 84484 ASSAY OF TROPONIN QUANT: CPT

## 2020-08-13 PROCEDURE — C9113 INJ PANTOPRAZOLE SODIUM, VIA: HCPCS | Performed by: NURSE PRACTITIONER

## 2020-08-13 PROCEDURE — 71250 CT THORAX DX C-: CPT

## 2020-08-13 PROCEDURE — 36415 COLL VENOUS BLD VENIPUNCTURE: CPT

## 2020-08-13 PROCEDURE — 6370000000 HC RX 637 (ALT 250 FOR IP): Performed by: NURSE PRACTITIONER

## 2020-08-13 PROCEDURE — 82140 ASSAY OF AMMONIA: CPT

## 2020-08-13 RX ORDER — SODIUM CHLORIDE 0.9 % (FLUSH) 0.9 %
10 SYRINGE (ML) INJECTION EVERY 12 HOURS SCHEDULED
Status: DISCONTINUED | OUTPATIENT
Start: 2020-08-13 | End: 2020-08-24

## 2020-08-13 RX ORDER — PANTOPRAZOLE SODIUM 40 MG/10ML
40 INJECTION, POWDER, LYOPHILIZED, FOR SOLUTION INTRAVENOUS DAILY
Status: DISCONTINUED | OUTPATIENT
Start: 2020-08-13 | End: 2020-08-24

## 2020-08-13 RX ORDER — HEPARIN SODIUM 10000 [USP'U]/100ML
18 INJECTION, SOLUTION INTRAVENOUS CONTINUOUS
Status: DISCONTINUED | OUTPATIENT
Start: 2020-08-13 | End: 2020-08-15

## 2020-08-13 RX ORDER — SODIUM CHLORIDE 0.9 % (FLUSH) 0.9 %
10 SYRINGE (ML) INJECTION PRN
Status: DISCONTINUED | OUTPATIENT
Start: 2020-08-13 | End: 2020-08-24

## 2020-08-13 RX ORDER — LIDOCAINE HYDROCHLORIDE 10 MG/ML
5 INJECTION, SOLUTION EPIDURAL; INFILTRATION; INTRACAUDAL; PERINEURAL SEE ADMIN INSTRUCTIONS
Status: COMPLETED | OUTPATIENT
Start: 2020-08-13 | End: 2020-08-14

## 2020-08-13 RX ORDER — LISINOPRIL 5 MG/1
5 TABLET ORAL DAILY
Status: DISCONTINUED | OUTPATIENT
Start: 2020-08-13 | End: 2020-08-24

## 2020-08-13 RX ORDER — SODIUM CHLORIDE 9 MG/ML
INJECTION, SOLUTION INTRAVENOUS CONTINUOUS
Status: ACTIVE | OUTPATIENT
Start: 2020-08-13 | End: 2020-08-14

## 2020-08-13 RX ORDER — HEPARIN SODIUM 1000 [USP'U]/ML
80 INJECTION, SOLUTION INTRAVENOUS; SUBCUTANEOUS ONCE
Status: COMPLETED | OUTPATIENT
Start: 2020-08-13 | End: 2020-08-13

## 2020-08-13 RX ORDER — ASPIRIN 81 MG/1
81 TABLET ORAL DAILY
Status: DISCONTINUED | OUTPATIENT
Start: 2020-08-13 | End: 2020-08-20 | Stop reason: SDUPTHER

## 2020-08-13 RX ORDER — ACETAMINOPHEN 650 MG/1
650 SUPPOSITORY RECTAL EVERY 6 HOURS PRN
Status: DISCONTINUED | OUTPATIENT
Start: 2020-08-13 | End: 2020-08-22

## 2020-08-13 RX ORDER — NICOTINE 21 MG/24HR
1 PATCH, TRANSDERMAL 24 HOURS TRANSDERMAL DAILY
Status: DISCONTINUED | OUTPATIENT
Start: 2020-08-13 | End: 2020-08-24

## 2020-08-13 RX ORDER — HEPARIN SODIUM 1000 [USP'U]/ML
40 INJECTION, SOLUTION INTRAVENOUS; SUBCUTANEOUS PRN
Status: DISCONTINUED | OUTPATIENT
Start: 2020-08-13 | End: 2020-08-15

## 2020-08-13 RX ORDER — FINASTERIDE 5 MG/1
5 TABLET, FILM COATED ORAL DAILY
Status: DISCONTINUED | OUTPATIENT
Start: 2020-08-13 | End: 2020-08-24

## 2020-08-13 RX ORDER — CLOPIDOGREL BISULFATE 75 MG/1
75 TABLET ORAL DAILY
Status: DISCONTINUED | OUTPATIENT
Start: 2020-08-13 | End: 2020-08-16

## 2020-08-13 RX ORDER — ACETAMINOPHEN 325 MG/1
650 TABLET ORAL EVERY 6 HOURS PRN
Status: DISCONTINUED | OUTPATIENT
Start: 2020-08-13 | End: 2020-08-19

## 2020-08-13 RX ORDER — HEPARIN SODIUM 1000 [USP'U]/ML
80 INJECTION, SOLUTION INTRAVENOUS; SUBCUTANEOUS PRN
Status: DISCONTINUED | OUTPATIENT
Start: 2020-08-13 | End: 2020-08-15

## 2020-08-13 RX ORDER — TAMSULOSIN HYDROCHLORIDE 0.4 MG/1
0.4 CAPSULE ORAL DAILY
Status: DISCONTINUED | OUTPATIENT
Start: 2020-08-13 | End: 2020-08-24

## 2020-08-13 RX ORDER — ATORVASTATIN CALCIUM 80 MG/1
80 TABLET, FILM COATED ORAL NIGHTLY
Status: DISCONTINUED | OUTPATIENT
Start: 2020-08-13 | End: 2020-08-16

## 2020-08-13 RX ORDER — PROMETHAZINE HYDROCHLORIDE 25 MG/1
12.5 TABLET ORAL EVERY 6 HOURS PRN
Status: DISCONTINUED | OUTPATIENT
Start: 2020-08-13 | End: 2020-08-24

## 2020-08-13 RX ORDER — POLYETHYLENE GLYCOL 3350 17 G/17G
17 POWDER, FOR SOLUTION ORAL DAILY PRN
Status: DISCONTINUED | OUTPATIENT
Start: 2020-08-13 | End: 2020-08-24

## 2020-08-13 RX ORDER — ONDANSETRON 2 MG/ML
4 INJECTION INTRAMUSCULAR; INTRAVENOUS EVERY 6 HOURS PRN
Status: DISCONTINUED | OUTPATIENT
Start: 2020-08-13 | End: 2020-08-24

## 2020-08-13 RX ADMIN — CLOPIDOGREL BISULFATE 75 MG: 75 TABLET ORAL at 18:40

## 2020-08-13 RX ADMIN — ATORVASTATIN CALCIUM 80 MG: 80 TABLET, FILM COATED ORAL at 21:07

## 2020-08-13 RX ADMIN — FINASTERIDE 5 MG: 5 TABLET, FILM COATED ORAL at 18:40

## 2020-08-13 RX ADMIN — LISINOPRIL 5 MG: 5 TABLET ORAL at 18:40

## 2020-08-13 RX ADMIN — HEPARIN SODIUM 3990 UNITS: 1000 INJECTION, SOLUTION INTRAVENOUS; SUBCUTANEOUS at 22:56

## 2020-08-13 RX ADMIN — HEPARIN SODIUM 18 UNITS/KG/HR: 10000 INJECTION, SOLUTION INTRAVENOUS at 15:40

## 2020-08-13 RX ADMIN — SODIUM CHLORIDE: 9 INJECTION, SOLUTION INTRAVENOUS at 18:41

## 2020-08-13 RX ADMIN — IOPAMIDOL 90 ML: 755 INJECTION, SOLUTION INTRAVENOUS at 13:41

## 2020-08-13 RX ADMIN — PANTOPRAZOLE SODIUM 40 MG: 40 INJECTION, POWDER, FOR SOLUTION INTRAVENOUS at 18:40

## 2020-08-13 RX ADMIN — ASPIRIN 81 MG: 81 TABLET, COATED ORAL at 18:40

## 2020-08-13 RX ADMIN — HEPARIN SODIUM 3990 UNITS: 1000 INJECTION, SOLUTION INTRAVENOUS; SUBCUTANEOUS at 15:39

## 2020-08-13 RX ADMIN — TAMSULOSIN HYDROCHLORIDE 0.4 MG: 0.4 CAPSULE ORAL at 21:07

## 2020-08-13 NOTE — PROGRESS NOTES
Patient arrived to unit fromED, bedside report received from Pastor Felton. Patient is confused to place and situation but oriented to self on cardiac monitor and on Room air with Sats at 98%     Skin assessment performed with Charlotte Lai RN  Noted from mid calf to toes right red/purple leg with abrasions to 4th and 5th toes. Skin tear to right lower arm which was open when arriving to unit but was cleaned and dressing applied,coccyx slightly red but no open areas and bruising scattered to BUE. Patient oriented to room and call light within reach. Educated by this RN to not get up without assistance to prevent falls, and all needs assessed. Patient states understanding at this time.

## 2020-08-13 NOTE — H&P
History and Physical  MADY Espino-BC   Internal Medicine Hospitalist      Name:  Jacqueline Diana /Age/Sex: 1935  (81 y.o. male)   MRN & CSN:  [id number] & [id number] Admission Date/Time: 2020 11:07 AM   Location:  04/04TR-04 PCP: No primary care provider on file. Hospital Day: 1      Supervising Physician: Dr. Yehuda Cat      Chief Complaint: Altered Mental Status     Assessment and Plan:   Jacqueline Diana is a 81 y.o. 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 - admit inpatient, telemetry monitoring        - CT surgery, Dr. Radha Angeles consulted in ED, plan for interventional procedure tomorrow       - NPO after MN       - c/w Heparin gtt started in ED, follow protocol       - NV checks       - cont gentle IVF       - daily weights, monitor I/O       - check lab works in AM including platelet monitoring     Acute encephalopathy, unknown etiology - could be d/t possible UTI.  UA appears positive for UTI - asymptomatic, no fever, WBC normal.       - hold antibiotics for now       - cont to monitor for signs/sympotms of infection        - CT head non acute       - Neuro checks      Multiple irregular pulmonary nodules measure up to 7 mm - as per CTA. - asymptomatic       - recommended non-contrast Chest CT at 3-6 months     COPD - not in exacerbation, breathing stable on room air.        - pulse ox monitoring     Tobacco abuse - on Nicotine patch, tobacco cessation education.  Chronic Illnesses: will continue current home medications unless contraindicated by above plan and assessment. - CVA        - gait disturbance       - Prostatitis     Current diagnosis and plan of management discussed with the patient at the time of admission in lay language who agree to the above plan and disposition of admission for further care.  All including headache, paresthesias, abdominal pain, nausea, vomiting, changes in bowels, dysuria, hematuria, frequency or urgency, and B/L weakness. Upon interview, the patient provided the history as above. ED Course: Discussed case with ED physician prior to admission. ROS: Ten point ROS reviewed and negative, unless as noted above per HPI. Objective:   No intake or output data in the 24 hours ending 08/13/20 1541     Vitals:   Vitals:    08/13/20 1111 08/13/20 1146 08/13/20 1318 08/13/20 1448   BP:  (!) 155/76  (!) 152/79   Pulse:  85 89 86   Resp:  18 18 18   Temp:  98.6 °F (37 °C)     TempSrc:  Oral     SpO2:  94% 94% 95%   Weight: 110 lb (49.9 kg)      Height: 5' 7.5\" (1.715 m)        Physical Exam: 08/13/20    GEN  -Awake, alert, appearing Frail, elderly male, sitting upright in bed, cooperative but unable to give adequate history. Appears given age. EYES -Pupils are equally round. No vision changes. No scleral erythema, discharge, or conjunctivitis. HENT -MM are moist. Oral pharynx without exudates, no evidence of thrush. NECK -Supple, no apparent thyromegaly or masses. RESP -LS CTA equal bilat, no wheezes, rales or rhonchi. Symmetric chest movement. No respiratory distress noted. C/V  -S1/S2 auscultated. RRR without appreciable M/R/G. No JVD or carotid bruits. Peripheral pulses equal bilaterally and palpable. Peripheral pulses equal bilaterally and palpable. No peripheral edema. GI  -Abdomen is soft, round, non-distended, no significant tenderness. No masses or guarding. + BS in all quadrants. Rectal exam deferred.   -Goodman catheter is not present. LYMPH  -No palpable cervical lymphadenopathy and no hepatosplenomegaly. No petechiae or ecchymoses. MS  -B/L extremities strong muscles strength. Full movements. No gross joint deformities. No swelling, intact sensation symmetrical.   SKIN  -Warm, dry. No open wounds or ulcers.  Right foot toes cyanotic/bluish discoloration, decreased pulse at right lower leg, sensation intact. NEURO  -CN 2-12 appear grossly intact, normal speech, no lateralizing weakness. PSYC  -Awake, alert, oriented x 4. Appropriate affect. Past Medical History:      Past Medical History:   Diagnosis Date    COPD (chronic obstructive pulmonary disease) ([address]     Prostatitis      Past Surgery History:  Patient  has a past surgical history that includes hernia repair. Social History:    FAM HX: Assessed: family history includes No Known Problems in his father and mother. Soc HX:   Social History     Socioeconomic History    Marital status:      Spouse name: None    Number of children: None    Years of education: None    Highest education level: None   Occupational History    None   Social Needs    Financial resource strain: None    Food insecurity     Worry: None     Inability: None    Transportation needs     Medical: None     Non-medical: None   Tobacco Use    Smoking status: Current Every Day Smoker     Packs/day: 0.50     Types: Cigarettes   Substance and Sexual Activity    Alcohol use: Yes    Drug use: Not Currently    Sexual activity: Not Currently   Lifestyle    Physical activity     Days per week: None     Minutes per session: None    Stress: None   Relationships    Social connections     Talks on phone: None     Gets together: None     Attends [employer] service: None     Active member of club or organization: None     Attends meetings of clubs or organizations: None     Relationship status: None    Intimate partner violence     Fear of current or ex partner: None     Emotionally abused: None     Physically abused: None     Forced sexual activity: None   Other Topics Concern    None   Social History Narrative    None     TOBACCO:   reports that he has been smoking cigarettes. He has been smoking about 0.50 packs per day. He does not have any smokeless tobacco history on file. ETOH:   reports current alcohol use.   Drugs:  reports previous drug use. Allergies: No Known Allergies  Medications:   Medications:    Infusions:    heparin (Porcine) 18 Units/kg/hr (08/13/20 1540)     PRN Meds: heparin (porcine), 80 Units/kg, PRN  heparin (porcine), 40 Units/kg, PRN      Prior to Admission Meds:  Prior to Admission medications    Medication Sig Start Date End Date Taking? Authorizing Provider   aspirin 81 MG EC tablet Take 1 tablet by mouth daily 4/20/20   FINA Choi MD   atorvastatin (LIPITOR) 80 MG tablet Take 1 tablet by mouth nightly 4/19/20   FINA Choi MD   lisinopril (PRINIVIL;ZESTRIL) 5 MG tablet Take 1 tablet by mouth daily 4/20/20   FINA Choi MD   finasteride (PROSCAR) 5 MG tablet Take 1 tablet by mouth daily 4/20/20   FINA Choi MD   tamsulosin [facility]) 0.4 MG capsule Take 1 capsule by mouth daily 4/20/20   FINA Choi MD   clopidogrel (PLAVIX) 75 MG tablet Take 1 tablet by mouth daily 4/20/20   FINA Choi MD     Data:     Laboratory this visit:  Reviewed  Recent Labs     08/13/20  1135   WBC 9.3   HGB 15.2   HCT 45.9         Recent Labs     08/13/20  1135   *   K 4.4   CL 99   CO2 21   BUN 43*   CREATININE 0.8*     Recent Labs     08/13/20  1135   AST 19   ALT 12   BILITOT 0.7   ALKPHOS 95     No results for input(s): INR in the last 72 hours. No results for input(s): CKTOTAL, CKMB, CKMBINDEX in the last 72 hours. Invalid input(s): Cynthia Hunter input(s): PRO-BNP    Radiology this visit:  Reviewed. Ct Abdomen Pelvis Wo Contrast Additional Contrast? None    Result Date: 8/13/2020  EXAMINATION: CTA OF THE AORTA WITH LOWER EXTREMITY RUNOFF; CT OF THE ABDOMEN AND PELVIS WITHOUT CONTRAST; CT OF THE CHEST WITHOUT CONTRAST 8/13/2020 1:05 pm TECHNIQUE: CTA of the pelvis and bilateral lower extremities was performed after the administration of intravenous contrast.   Multiplanar reformatted images are provided for review. MIP images are provided for review.  Dose modulation, superficial femoral artery is occluded just distal to its origin. Thready flow reconstitutes in the mid left popliteal artery with possible three-vessel runoff to the left foot. The venous structures are grossly intact. Surgical clips along the medial right lower extremity may be related to prior vein removal. Remainder of exam: The thyroid is within normal limits. No pericardial or pleural effusion. The esophagus is within normal limits. No mediastinal adenopathy. Moderate emphysematous changes. Scarring at the lung apices. Irregular nodules in the right lung include a 7 x 5 mm nodule in the superior segment of the right lower lobe on axial image 61 and a 7 x 3 mm nodule in the right upper lobe on axial image 44 as well as an 8 x 6 mm irregular nodule in the right upper lobe on axial image 35. No consolidations. The central airways are patent. Mild mucoid debris along the [place]. No pneumothorax. No suspicious hepatic lesions. Possible hepatic steatosis. The kidneys, adrenals, pancreas, bile ducts, and stomach are without acute process. The ureters are nondilated. The bladder is within normal limits. Moderate to severe enlargement of the prostate. Suspected extensive sigmoid diverticulosis. The appendix is not visualized. No bowel obstruction. No lymphadenopathy. No free fluid or free air. No acute osseous abnormality. Diffuse osteopenia. 1. No acute process in the chest, abdomen, or pelvis, although motion artifact limits the exam. 2. Severe diffuse atherosclerosis in the abdominal aorta and bilateral iliac arteries, with likely moderate to severe stenoses suspected bilaterally in the external iliac arteries. 3. Dense calcific atherosclerosis of the right common femoral artery, which fills the vessel lumen. 4. Distal occlusion of the right superficial femoral artery. The distal trifurcation vessels are patent on the right.   Evaluation of the right popliteal artery is precluded by motion artifact. 5. The left superficial femoral artery is occluded just distal to its origin. Flow reconstitutes in the mid left popliteal artery, with likely three-vessel patency to the foot. 6. Multiple irregular pulmonary nodules measure up to 7 mm in average dimension. Follow-up per guidelines below. 7. Incidental findings include coronary atherosclerosis, moderate emphysema, and moderate to severe enlargement of the prostate. RECOMMENDATIONS: Multiple pulmonary nodules. Most severe: 7.0 mm solid suspicious pulmonary nodule within the upper lobe. Recommend a non-contrast Chest CT at 3-6 months, then another non-contrast Chest CT at 18-24 months. These guidelines do not apply to immunocompromised patients and patients with cancer. Follow up in patients with significant comorbidities as clinically warranted. For lung cancer screening, adhere to Lung-RADS guidelines. Reference: Radiology. 2017; 284(1):228-43. Ct Head Wo Contrast    Result Date: 8/13/2020  EXAMINATION: CT OF THE HEAD WITHOUT CONTRAST  8/13/2020 1:04 pm TECHNIQUE: CT of the head was performed without the administration of intravenous contrast. Dose modulation, iterative reconstruction, and/or weight based adjustment of the mA/kV was utilized to reduce the radiation dose to as low as reasonably achievable. COMPARISON: 04/09/2020 HISTORY: ORDERING SYSTEM PROVIDED HISTORY: altered mental status TECHNOLOGIST PROVIDED HISTORY: Reason for exam:->altered mental status Has a \"code stroke\" or \"stroke alert\" been called? ->No Reason for Exam: AMS/ confusion Acuity: Acute Type of Exam: Initial Relevant Medical/Surgical History: hx CVA FINDINGS: BRAIN/VENTRICLES: The ventricles and sulci are diffusely enlarged. Low attenuation is seen in the periventricular and subcortical white matter. No acute intracranial hemorrhage or acute infarct is identified. Encephalomalacia right parietooccipital region. Encephalomalacia also noted in the left occipital lobe.  ORBITS: The visualized portion of the orbits demonstrate no acute abnormality. SINUSES: The visualized paranasal sinuses and mastoid air cells demonstrate no acute abnormality. SOFT TISSUES/SKULL:  No acute abnormality of the visualized skull or soft tissues. No acute intracranial abnormality. Diffuse atrophic changes with findings suggesting chronic microvascular ischemia and old infarcts in the right parietooccipital region and the left occipital lobe     Ct Chest Wo Contrast    Result Date: 8/13/2020  EXAMINATION: CTA OF THE AORTA WITH LOWER EXTREMITY RUNOFF; CT OF THE ABDOMEN AND PELVIS WITHOUT CONTRAST; CT OF THE CHEST WITHOUT CONTRAST 8/13/2020 1:05 pm TECHNIQUE: CTA of the pelvis and bilateral lower extremities was performed after the administration of intravenous contrast.   Multiplanar reformatted images are provided for review. MIP images are provided for review. Dose modulation, iterative reconstruction, and/or weight based adjustment of the mA/kV was utilized to reduce the radiation dose to as low as reasonably achievable.; CT of the abdomen and pelvis was performed without the administration of intravenous contrast. Multiplanar reformatted images are provided for review. Dose modulation, iterative reconstruction, and/or weight based adjustment of the mA/kV was utilized to reduce the radiation dose to as low as reasonably achievable.; CT of the chest was performed without the administration of intravenous contrast. Multiplanar reformatted images are provided for review. Dose modulation, iterative reconstruction, and/or weight based adjustment of the mA/kV was utilized to reduce the radiation dose to as low as reasonably achievable.  COMPARISON: None HISTORY: ORDERING SYSTEM PROVIDED HISTORY: Evaluate abd pain TECHNOLOGIST PROVIDED HISTORY: Reason for exam:-> Evaluate abd pain Reason for Exam: Abd pain/ aneurysm Acuity: Acute Type of Exam: Initial Additional signs and symptoms: 90 mL Isovue 370, Lot# [id number] FINDINGS: Vasculature: Noncontrast CT demonstrates no evidence of an intramural hematoma. Coronary atherosclerosis. No evidence of aortic dissection or aneurysmal dilation. Dense focal calcific atherosclerosis in the distal thoracic aorta. Severe diffuse atherosclerosis in the abdominal aorta. The renal and visceral arteries are grossly patent. Severe diffuse atherosclerosis in the iliac arteries. Suspected moderately focal moderate to severe stenosis in the external iliac arteries bilaterally with motion artifact limiting the exam.  Severe calcific atherosclerosis in the right common femoral artery which fills the lumen. The right superficial femoral artery is severely diseased, with distal occlusion. Flow reconstitutes in the proximal trifurcation vessels, which are patent to the right foot. Evaluation of the right popliteal artery is precluded by motion artifact. The left superficial femoral artery is occluded just distal to its origin. Thready flow reconstitutes in the mid left popliteal artery with possible three-vessel runoff to the left foot. The venous structures are grossly intact. Surgical clips along the medial right lower extremity may be related to prior vein removal. Remainder of exam: The thyroid is within normal limits. No pericardial or pleural effusion. The esophagus is within normal limits. No mediastinal adenopathy. Moderate emphysematous changes. Scarring at the lung apices. Irregular nodules in the right lung include a 7 x 5 mm nodule in the superior segment of the right lower lobe on axial image 61 and a 7 x 3 mm nodule in the right upper lobe on axial image 44 as well as an 8 x 6 mm irregular nodule in the right upper lobe on axial image 35. No consolidations. The central airways are patent. Mild mucoid debris along the [place]. No pneumothorax. No suspicious hepatic lesions. Possible hepatic steatosis.  The kidneys, adrenals, pancreas, bile ducts, and stomach are without acute process. The ureters are nondilated. The bladder is within normal limits. Moderate to severe enlargement of the prostate. Suspected extensive sigmoid diverticulosis. The appendix is not visualized. No bowel obstruction. No lymphadenopathy. No free fluid or free air. No acute osseous abnormality. Diffuse osteopenia. 1. No acute process in the chest, abdomen, or pelvis, although motion artifact limits the exam. 2. Severe diffuse atherosclerosis in the abdominal aorta and bilateral iliac arteries, with likely moderate to severe stenoses suspected bilaterally in the external iliac arteries. 3. Dense calcific atherosclerosis of the right common femoral artery, which fills the vessel lumen. 4. Distal occlusion of the right superficial femoral artery. The distal trifurcation vessels are patent on the right. Evaluation of the right popliteal artery is precluded by motion artifact. 5. The left superficial femoral artery is occluded just distal to its origin. Flow reconstitutes in the mid left popliteal artery, with likely three-vessel patency to the foot. 6. Multiple irregular pulmonary nodules measure up to 7 mm in average dimension. Follow-up per guidelines below. 7. Incidental findings include coronary atherosclerosis, moderate emphysema, and moderate to severe enlargement of the prostate. RECOMMENDATIONS: Multiple pulmonary nodules. Most severe: 7.0 mm solid suspicious pulmonary nodule within the upper lobe. Recommend a non-contrast Chest CT at 3-6 months, then another non-contrast Chest CT at 18-24 months. These guidelines do not apply to immunocompromised patients and patients with cancer. Follow up in patients with significant comorbidities as clinically warranted. For lung cancer screening, adhere to Lung-RADS guidelines. Reference: Radiology. 2017; 284(1):228-43.      Xr Chest Portable    Result Date: 8/13/2020  EXAMINATION: ONE XRAY VIEW OF THE CHEST 8/13/2020 11:24 am COMPARISON: 04/08/2020 HISTORY: ORDERING SYSTEM PROVIDED HISTORY: AMS TECHNOLOGIST PROVIDED HISTORY: Reason for exam:->AMS Reason for Exam: AMS Follow-up exam FINDINGS: No focal consolidation, pleural effusion or pneumothorax. The cardiomediastinal silhouette is stable. No overt pulmonary edema. The osseous structures are stable. Emphysematous changes. Stable exam.  No acute cardiopulmonary findings. Vl Dup Lower Extremity Arteries Right    Result Date: 8/13/2020  EXAMINATION: ARTERIAL DUPLEX ULTRASOUND OF THE RIGHT LOWER EXTREMITY  8/13/2020 1:00 pm TECHNIQUE: Duplex ultrasound and Doppler images were obtained of the lower extremity. COMPARISON: Runoff from earlier the same day. HISTORY: ORDERING SYSTEM PROVIDED HISTORY: toes blue and cold, cannot feel pulse in foot, ankle, politeal TECHNOLOGIST PROVIDED HISTORY: Reason for exam:->toes blue and cold, cannot feel pulse in foot, ankle, politeal FINDINGS: Waveforms are monophasic throughout, including in the right common femoral artery. No flow in the distal right superficial femoral artery. No flow Flow velocities were measured as follows: Com. Fem. 38 cm/sec SFA Prox. 62 cm/sec SFA Mid.       16 cm/sec Pop.             76 cm/sec PTA             30, 35, 40 cm/sec QUYNH             19 cm/sec Peroneal        18 cm/sec     1. Occlusion in the distal superficial femoral artery, with flow reconstituted in the distal popliteal artery. 2. Abnormal waveforms in the right common femoral artery suggest significant disease in the right iliac artery. Cta Chest Abdomen Aorta Runoff Recon    Result Date: 8/13/2020  EXAMINATION: CTA OF THE AORTA WITH LOWER EXTREMITY RUNOFF; CT OF THE ABDOMEN AND PELVIS WITHOUT CONTRAST; CT OF THE CHEST WITHOUT CONTRAST 8/13/2020 1:05 pm TECHNIQUE: CTA of the pelvis and bilateral lower extremities was performed after the administration of intravenous contrast.   Multiplanar reformatted images are provided for review.   MIP images are provided for review. Dose modulation, iterative reconstruction, and/or weight based adjustment of the mA/kV was utilized to reduce the radiation dose to as low as reasonably achievable.; CT of the abdomen and pelvis was performed without the administration of intravenous contrast. Multiplanar reformatted images are provided for review. Dose modulation, iterative reconstruction, and/or weight based adjustment of the mA/kV was utilized to reduce the radiation dose to as low as reasonably achievable.; CT of the chest was performed without the administration of intravenous contrast. Multiplanar reformatted images are provided for review. Dose modulation, iterative reconstruction, and/or weight based adjustment of the mA/kV was utilized to reduce the radiation dose to as low as reasonably achievable. COMPARISON: None HISTORY: ORDERING SYSTEM PROVIDED HISTORY: Evaluate abd pain TECHNOLOGIST PROVIDED HISTORY: Reason for exam:-> Evaluate abd pain Reason for Exam: Abd pain/ aneurysm Acuity: Acute Type of Exam: Initial Additional signs and symptoms: 90 mL Isovue 370, Lot# [id number] FINDINGS: Vasculature: Noncontrast CT demonstrates no evidence of an intramural hematoma. Coronary atherosclerosis. No evidence of aortic dissection or aneurysmal dilation. Dense focal calcific atherosclerosis in the distal thoracic aorta. Severe diffuse atherosclerosis in the abdominal aorta. The renal and visceral arteries are grossly patent. Severe diffuse atherosclerosis in the iliac arteries. Suspected moderately focal moderate to severe stenosis in the external iliac arteries bilaterally with motion artifact limiting the exam.  Severe calcific atherosclerosis in the right common femoral artery which fills the lumen. The right superficial femoral artery is severely diseased, with distal occlusion. Flow reconstitutes in the proximal trifurcation vessels, which are patent to the right foot.  Evaluation of the right popliteal artery is popliteal artery is precluded by motion artifact. 5. The left superficial femoral artery is occluded just distal to its origin. Flow reconstitutes in the mid left popliteal artery, with likely three-vessel patency to the foot. 6. Multiple irregular pulmonary nodules measure up to 7 mm in average dimension. Follow-up per guidelines below. 7. Incidental findings include coronary atherosclerosis, moderate emphysema, and moderate to severe enlargement of the prostate. RECOMMENDATIONS: Multiple pulmonary nodules. Most severe: 7.0 mm solid suspicious pulmonary nodule within the upper lobe. Recommend a non-contrast Chest CT at 3-6 months, then another non-contrast Chest CT at 18-24 months. These guidelines do not apply to immunocompromised patients and patients with cancer. Follow up in patients with significant comorbidities as clinically warranted. For lung cancer screening, adhere to Lung-RADS guidelines. Reference: Radiology. 2017; 284(1):228-43. EKG this visit:   EKG: Normal sinus rhythm, rate 86   Biatrial enlargement   Left ventricular hypertrophy   Cannot rule out Inferior infarct , age undetermined   Abnormal ECG   When compared with ECG of 08-APR-2020 20:17,   Criteria for Anterior infarct are no longer present   Minimal criteria for Inferior infarct are now present   Non-specific change in ST segment in Lateral leads   Nonspecific T wave abnormality, worse in Lateral leads     Current Treatment Team:  Treatment Team: Attending Provider: Lizy Reid MD; Registered Nurse: Kaye Poole; Registered Nurse: Fili Hargrove.  House, RN; Consulting Physician: Regine Turner MD; Consulting Physician: MADY Moseley CNP; Consulting Physician: MD Lino Llamas APRN-BC [facility]  8/13/2020 4:51 PM      Electronically signed by MADY Moseley CNP on 8/13/2020 at 4:51 PM

## 2020-08-13 NOTE — ED NOTES
Patient washed up and sheets changed d/t bed bugs. All belongings removed and bagged      Sarah Watson RN  08/13/20 [id number]

## 2020-08-13 NOTE — ED NOTES
[id number] repaged hospitalist     Fly Trujillo  08/13/20 [id number]  [id number] repaged hospitalist     Fly Trujillo  08/13/20 [id number]  [id number] [facility] mid level with [facility] returned call      Fly Trujillo  08/13/20 [id number]

## 2020-08-13 NOTE — CARE COORDINATION
LSW was consulted to assist this pt with discharge planning. Pt here today for AMS. Dr. Rich Leo explained pt has Femoral artery occlusion, right. Pt unable to give consent for surgery which he needs for the blockage. He is currently on heparin drip. There is no family to contact @ this time and requesting CM assistance. If LSW finds family to contact, LSW to notify hospitalist.      Pancho Ashley called niece listed in chart, Mandeep Esteves [phone] but # is disconnected. LSW completed search for her on Internet and called the following #'s listed as options or people related to her:  [phone] - disconnected or not in service  [phone] - disconnected or not in service  [phone] - Left VM. [address] [place] [address] - No one picked-up and unable to leave VM as it's not set up.  [address] [facility]- related to [facility]. Left  to call LSW. LSW received assistance from colleague, Betsy Ha- ARLEENW/CM who reviewed notes in chart which stated pt has HHA/friend, Bello Bill [phone]. LSW called her and left VM for return call. LSW then called Isela Rinne as pt was there in April and May. She has no additional contact #'s for pt. LSW then called Juhi and left her VM about pt and if she has any contact #'s for pt. LSW then called pt's apartment complex-  [facility] and received [employer] answering service for the apartments. LSW spoke to [facility] and explained situation. She will have apartment mgr call LSW. LSW received call from [facility]. She noted contact info she has for pt is: Matthew Jay- nephew [phone]  Mandeep Esteves- niece [phone]  KUILHRZXRD FSRHIQ- Dtr [phone]    LSW called Jane Dukes and left her VM. Then, LSW called Jad and was able to speak to him. Jad reports he lives in [place] & he has been trying to reach pt last few days.  Jad noted pt's last living sibling (sister)  a few days ago and pt never attended services or responded to

## 2020-08-13 NOTE — CONSULTS
Department of Cardiovascular & Thoracic Surgery   Consult Note    Reason for Consult:  PAD    Date of Consult: 8/13/20    History Obtained From:  patient     HISTORY OF PRESENT ILLNESS:    Called to see patient in ED. The patient is a 81 y.o. male who presents with with significant confusion. Unable to obtain any history from patient. He is trying to pull out IVs, etc.  He has no family or friends available to discuss. He was brought in by EMS due to [address]. He was apparently admitted in April with CVA and went to ARU after this. ED evaluation noted discoloration or R foot and difficulty getting pulses. Consult requested for evaluation of PAD. Past Medical History:        Diagnosis Date    COPD (chronic obstructive pulmonary disease) [facility]     Prostatitis      Past Surgical History:        Procedure Laterality Date    HERNIA REPAIR       Current Medications:   Current Facility-Administered Medications: heparin (porcine) injection 3,990 Units, 80 Units/kg, Intravenous, Once  heparin (porcine) injection 3,990 Units, 80 Units/kg, Intravenous, PRN  heparin (porcine) injection 2,000 Units, 40 Units/kg, Intravenous, PRN  heparin 25,000 unit in sodium chloride 0.45% 250 mL infusion, 18 Units/kg/hr, Intravenous, Continuous  Allergies:  No Known Allergies    Social History:   Social History     Socioeconomic History    Marital status:      Spouse name: Not on file    Number of children: Not on file    Years of education: Not on file    Highest education level: Not on file   Occupational History    Not on file   Social Needs    Financial resource strain: Not on file    Food insecurity     Worry: Not on file     Inability: Not on file    Transportation needs     Medical: Not on file     Non-medical: Not on file   Tobacco Use    Smoking status: Current Every Day Smoker     Packs/day: 0.50     Types: Cigarettes   Substance and Sexual Activity    Alcohol use:  Yes    Drug use: Not Currently    Sexual activity: Not Currently   Lifestyle    Physical activity     Days per week: Not on file     Minutes per session: Not on file    Stress: Not on file   Relationships    Social connections     Talks on phone: Not on file     Gets together: Not on file     Attends Restorationism service: Not on file     Active member of club or organization: Not on file     Attends meetings of clubs or organizations: Not on file     Relationship status: Not on file    Intimate partner violence     Fear of current or ex partner: Not on file     Emotionally abused: Not on file     Physically abused: Not on file     Forced sexual activity: Not on file   Other Topics Concern    Not on file   Social History Narrative    Not on file       Family History:    History reviewed. No pertinent family history. REVIEW OF SYSTEMS:  ROS: Is as noted above in HPI. Complete system review is done and is negative except as noted above. EXAM:  Blood pressure (!) 152/79, pulse 86, temperature 98.6 °F (37 °C), temperature source Oral, resp. rate 18, height 5' 7.5\" (1.715 m), weight 110 lb (49.9 kg), SpO2 95 %. General appearance: No apparent distress, appears stated age Skin: unremarkable, dry,   HEENT Normocephalic, atraumatic without obvious deformity. EOMI, conjunctiva normal, hearing intact  Neck: Supple, Trachea midline   Lungs: Good respiratory effort.  Clear to auscultation,  Heart: Regular rate/ rhythm   Abdomen: Soft, non-tender or non-distended   Extremities: min edema warm to ankle,  no clubbing or right foot discolored  Neurologic: confused, does notfollows commands  Mental status: normal affect    DATA:  Images Reviewed  Art Duplex  CTA  B chronic PAD,  R CFA and SFA with severe stenosis,  Runoff appears patent    IMPRESSION  Patient Active Problem List   Diagnosis    Dizziness    Acute cerebrovascular accident (CVA) ([address]    Hemiparesis of left nondominant side as late effect of cerebral infarction ([address]    Dysphagia due to recent stroke    Dysarthria due to acute stroke ([facility] [address]    Gait disturbance    Simple chronic bronchitis (HCC)    Urinary tract infection associated with indwelling urethral catheter (HCC)    Acute cystitis without hematuria       PAD    RECOMMENDATIONS:    He remains confused and there is not family available to discuss further. His PAD is clearly chronic. Unclear how much it's impacting him. Recommend keeping him normotensive and avoid pressors to avoid foot ischemia issues. In his current mental state, he would not do well with an attempted angiogram.  Hopefully, this can be avoided as the foot does not appear threatened at this time.      Mary Martinez MD

## 2020-08-13 NOTE — ED NOTES
Bed: 04-04  Expected date: 8/13/20  Expected time:   Means of arrival: [facility]  Comments:     Sam Prince.  MIGUEL Watson  08/13/20 7697

## 2020-08-13 NOTE — ED NOTES
UNABLE TO OBTAIN ANY PULSES TO RIGHT LOWER LEG WITH DOPPLER. Sam Prince.  House, RN  08/13/20 7422 [facility]

## 2020-08-13 NOTE — ED NOTES
Patient attempting multiple times to get out of bed, patient hooked up to monitor and IV, patient confused and altered     [place] Camera.  [facility]  08/13/20 3686

## 2020-08-13 NOTE — ED PROVIDER NOTES
Triage Chief Complaint:   Altered Mental Status      Yocha Dehe:  Jimenez Garcia is a 81 y.o. male that presents to the emergency department with altered mental status. Brought in by EMS. Apparently friend came to see him and stated he was not acting right. Last known well is at least 2 to 3 days ago by people that live in the same complex this time. Lives by himself. Patient per chart review has a history of COPD, prostatitis. Has had urinary tract infections and urinary retention in the past.  Patient is awake and alert and oriented only to self. Believes it is the 1940s. Difficult to understand, mumbling voice. . Patient was admitted in April for a stroke with left hemiparesis, dysphasia, dysarthria, gait disturbance. . He apparently went to ARU after this. He was discharged home on May 7    Past Medical History:   Diagnosis Date    COPD (chronic obstructive pulmonary disease) ([facility] [address]     Prostatitis      Past Surgical History:   Procedure Laterality Date    HERNIA REPAIR       History reviewed. No pertinent family history. Social History     Socioeconomic History    Marital status:      Spouse name: Not on file    Number of children: Not on file    Years of education: Not on file    Highest education level: Not on file   Occupational History    Not on file   Social Needs    Financial resource strain: Not on file    Food insecurity     Worry: Not on file     Inability: Not on file    Transportation needs     Medical: Not on file     Non-medical: Not on file   Tobacco Use    Smoking status: Current Every Day Smoker     Packs/day: 0.50     Types: Cigarettes   Substance and Sexual Activity    Alcohol use:  Yes    Drug use: Not Currently    Sexual activity: Not Currently   Lifestyle    Physical activity     Days per week: Not on file     Minutes per session: Not on file    Stress: Not on file   Relationships    Social connections     Talks on phone: Not on file     Gets together: Not on file     Attends Amish service: Not on file     Active member of club or organization: Not on file     Attends meetings of clubs or organizations: Not on file     Relationship status: Not on file    Intimate partner violence     Fear of current or ex partner: Not on file     Emotionally abused: Not on file     Physically abused: Not on file     Forced sexual activity: Not on file   Other Topics Concern    Not on file   Social History Narrative    Not on file     Current Facility-Administered Medications   Medication Dose Route Frequency Provider Last Rate Last Dose    heparin (porcine) injection 3,990 Units  80 Units/kg Intravenous Once Romel Moses MD        heparin (porcine) injection 3,990 Units  80 Units/kg Intravenous PRN Romel Moses MD        heparin (porcine) injection 2,000 Units  40 Units/kg Intravenous PRN Romel Moses MD        heparin 25,000 unit in sodium chloride 0.45% 250 mL infusion  18 Units/kg/hr Intravenous Continuous Romel Moses MD         Current Outpatient Medications   Medication Sig Dispense Refill    aspirin 81 MG EC tablet Take 1 tablet by mouth daily 30 tablet 3    atorvastatin (LIPITOR) 80 MG tablet Take 1 tablet by mouth nightly 30 tablet 0    lisinopril (PRINIVIL;ZESTRIL) 5 MG tablet Take 1 tablet by mouth daily 30 tablet 0    finasteride (PROSCAR) 5 MG tablet Take 1 tablet by mouth daily 30 tablet 0    tamsulosin (FLOMAX) 0.4 MG capsule Take 1 capsule by mouth daily 30 capsule 0    clopidogrel (PLAVIX) 75 MG tablet Take 1 tablet by mouth daily 30 tablet 0     No Known Allergies  Nursing Notes Reviewed    ROS:  Poor historian. Physical Exam:  ED Triage Vitals   Enc Vitals Group      BP       Pulse       Resp       Temp       Temp src       SpO2       Weight       Height       Head Circumference       Peak Flow       Pain Score       Pain Loc       Pain Edu? Excl. in [address]?       My pulse oximetry interpretation is which is within the normal range    GENERAL APPEARANCE: Awake and alert. Cooperative. No acute distress. Patient is alert and oriented only to self. HEAD: Normocephalic. Atraumatic. EYES: EOM's grossly intact. Sclera anicteric. ENT: Mucous membranes are moist. Tolerates saliva. No trismus. NECK: Supple. No meningismus. Trachea midline. HEART: RRR. Radial pulses 2+. LUNGS: Respirations unlabored. CTAB. No wheezing or stridor. Speaks in full sentences. ABDOMEN: Soft. Non-tender. No guarding or rebound. Normal bowel sounds. EXTREMITIES: No acute deformities. No pitting edema. SKIN: Warm and dry. NEUROLOGICAL: No gross facial drooping. Moves all 4 extremities spontaneously. Strength is equal in all extremities. Sensation intact. Patient is awake, alert, oriented only to self. PSYCHIATRIC: Confused.     I have reviewed and interpreted all of the currently available lab results from this visit (if applicable):  Results for orders placed or performed during the hospital encounter of 08/13/20   CBC with Auto Diff   Result Value Ref Range    WBC 9.3 4.0 - 10.5 K/CU MM    RBC 5.24 4.6 - 6.2 M/CU MM    Hemoglobin 15.2 13.5 - 18.0 GM/DL    Hematocrit 45.9 42 - 52 %    MCV 87.6 78 - 100 FL    MCH 29.0 27 - 31 PG    MCHC 33.1 32.0 - 36.0 %    RDW 14.7 11.7 - 14.9 %    Platelets 102 217 - 403 K/CU MM    MPV 11.4 (H) 7.5 - 11.1 FL    Differential Type AUTOMATED DIFFERENTIAL     Segs Relative 83.1 (H) 36 - 66 %    Lymphocytes % 6.7 (L) 24 - 44 %    Monocytes % 9.3 (H) 0 - 4 %    Eosinophils % 0.1 0 - 3 %    Basophils % 0.3 0 - 1 %    Segs Absolute 7.7 K/CU MM    Lymphocytes Absolute 0.6 K/CU MM    Monocytes Absolute 0.9 K/CU MM    Eosinophils Absolute 0.0 K/CU MM    Basophils Absolute 0.0 K/CU MM    Nucleated RBC % 0.0 %    Total Nucleated RBC 0.0 K/CU MM    Total Immature Neutrophil 0.05 K/CU MM    Immature Neutrophil % 0.5 (H) 0 - 0.43 %   CMP   Result Value Ref Range    Sodium 134 (L) 135 - 145 MMOL/L    Potassium 4.4 3.5 - 5.1 MMOL/L Chloride 99 99 - 110 mMol/L    CO2 21 21 - 32 MMOL/L    BUN 43 (H) 6 - 23 MG/DL    CREATININE 0.8 (L) 0.9 - 1.3 MG/DL    Glucose 124 (H) 70 - 99 MG/DL    Calcium 9.4 8.3 - 10.6 MG/DL    Alb 4.2 3.4 - 5.0 GM/DL    Total Protein 6.9 6.4 - 8.2 GM/DL    Total Bilirubin 0.7 0.0 - 1.0 MG/DL    ALT 12 10 - 40 U/L    AST 19 15 - 37 IU/L    Alkaline Phosphatase 95 40 - 129 IU/L    GFR Non-African American >60 >60 mL/min/1.73m2    GFR African American >60 >60 mL/min/1.73m2    Anion Gap 14 4 - 16   Troponin   Result Value Ref Range    Troponin T <0.010 <0.01 NG/ML   Lactic Acid, Plasma   Result Value Ref Range    Lactate 1.1 0.4 - 2.0 mMOL/L   Ammonia Level   Result Value Ref Range    Ammonia 51 16 - 60 UMOL/L   POCT Glucose   Result Value Ref Range    POC Glucose 111 (H) 70 - 99 MG/DL   EKG 12 Lead   Result Value Ref Range    Ventricular Rate 86 BPM    Atrial Rate 86 BPM    P-R Interval 122 ms    QRS Duration 90 ms    Q-T Interval 378 ms    QTc Calculation (Bazett) 452 ms    P Axis 78 degrees    R Axis 68 degrees    T Axis 79 degrees    Diagnosis       Normal sinus rhythm  Biatrial enlargement  Left ventricular hypertrophy  Cannot rule out Inferior infarct , age undetermined  Abnormal ECG  When compared with ECG of 08-APR-2020 20:17,  Criteria for Anterior infarct are no longer present  Minimal criteria for Inferior infarct are now present  Non-specific change in ST segment in Lateral leads  Nonspecific T wave abnormality, worse in Lateral leads          Radiographs:  [] Radiologist's Wet Read Report Reviewed:      XR CHEST PORTABLE (Final result)   Result time 08/13/20 11:49:41   Final result by Tayo Seaman MD (08/13/20 11:49:41)                 Impression:     Stable exam.  No acute cardiopulmonary findings.              Narrative:     EXAMINATION:   ONE XRAY VIEW OF THE CHEST     8/13/2020 11:24 am     COMPARISON:   04/08/2020     HISTORY:   ORDERING SYSTEM PROVIDED HISTORY: AMS   TECHNOLOGIST PROVIDED HISTORY:   Reason visualized skull or soft   tissues.                       XR CHEST PORTABLE (Final result)   Result time 08/13/20 11:49:41   Final result by Kristen Jefferson MD (08/13/20 11:49:41)                 Impression:     Stable exam.  No acute cardiopulmonary findings. Narrative:     EXAMINATION:   ONE XRAY VIEW OF THE CHEST     8/13/2020 11:24 am     COMPARISON:   04/08/2020     HISTORY:   ORDERING SYSTEM PROVIDED HISTORY: AMS   TECHNOLOGIST PROVIDED HISTORY:   Reason for exam:->AMS   Reason for Exam: AMS     Follow-up exam     FINDINGS:   No focal consolidation, pleural effusion or pneumothorax.  The   cardiomediastinal silhouette is stable.  No overt pulmonary edema.  The   osseous structures are stable.  Emphysematous changes. CTA CHEST ABDOMEN AORTA RUNOFF RECON (Preliminary result)   Result time 08/13/20 14:10:04   Procedure changed from [facility] Additional Contrast? None   Preliminary result by Jacquelyn Finney MD (08/13/20 14:10:04)                 Impression:     1. No acute process in the chest, abdomen, or pelvis, although motion   artifact limits exam.   2. Severe diffuse atherosclerosis in the abdominal aorta and bilateral iliac   arteries, with likely moderate to severe stenoses suspected bilaterally in   the external iliac arteries. 3. Dense calcific atherosclerosis of the right common femoral artery, which   fills the vessel lumen. 4. Distal occlusion of the right superficial femoral artery.  The distal   trifurcation vessels are patent on the right.  Evaluation of the right   popliteal artery is precluded by motion artifact. 5. The left superficial femoral artery is occluded just distal to its origin. Flow reconstitutes in the mid left popliteal artery, with likely three-vessel   patency to the foot. 6. Multiple irregular pulmonary nodules measure up to 7 mm in average   dimension.  Follow-up per guidelines below.    7. Incidental findings mis-transcribed.)    MD Lisa Garcia MD  08/13/20 120 [facility] Sigrid Zarate MD  08/13/20 0296

## 2020-08-13 NOTE — ED NOTES
Patients IV wrapped on coban d/t patient pulling at it. Patient reminded not to touch it. Mazin Alston.  MIGUEL Watson  08/13/20 0420

## 2020-08-14 LAB
ANION GAP SERPL CALCULATED.3IONS-SCNC: 11 MMOL/L (ref 4–16)
APTT: 30.8 SECONDS (ref 25.1–37.1)
APTT: 33.2 SECONDS (ref 25.1–37.1)
APTT: >240 SECONDS (ref 25.1–37.1)
APTT: >240 SECONDS (ref 25.1–37.1)
BASOPHILS ABSOLUTE: 0.1 K/CU MM
BASOPHILS RELATIVE PERCENT: 0.6 % (ref 0–1)
BUN BLDV-MCNC: 40 MG/DL (ref 6–23)
CALCIUM SERPL-MCNC: 9.4 MG/DL (ref 8.3–10.6)
CHLORIDE BLD-SCNC: 101 MMOL/L (ref 99–110)
CO2: 25 MMOL/L (ref 21–32)
CREAT SERPL-MCNC: 0.9 MG/DL (ref 0.9–1.3)
DIFFERENTIAL TYPE: ABNORMAL
EOSINOPHILS ABSOLUTE: 0.1 K/CU MM
EOSINOPHILS RELATIVE PERCENT: 0.6 % (ref 0–3)
GFR AFRICAN AMERICAN: >60 ML/MIN/1.73M2
GFR NON-AFRICAN AMERICAN: >60 ML/MIN/1.73M2
GLUCOSE BLD-MCNC: 107 MG/DL (ref 70–99)
HCT VFR BLD CALC: 46.9 % (ref 42–52)
HEMOGLOBIN: 15.4 GM/DL (ref 13.5–18)
IMMATURE NEUTROPHIL %: 0.3 % (ref 0–0.43)
LYMPHOCYTES ABSOLUTE: 0.9 K/CU MM
LYMPHOCYTES RELATIVE PERCENT: 9.8 % (ref 24–44)
MCH RBC QN AUTO: 29.1 PG (ref 27–31)
MCHC RBC AUTO-ENTMCNC: 32.8 % (ref 32–36)
MCV RBC AUTO: 88.5 FL (ref 78–100)
MONOCYTES ABSOLUTE: 0.9 K/CU MM
MONOCYTES RELATIVE PERCENT: 10.3 % (ref 0–4)
NUCLEATED RBC %: 0 %
PDW BLD-RTO: 14.9 % (ref 11.7–14.9)
PLATELET # BLD: 248 K/CU MM (ref 140–440)
PMV BLD AUTO: 11.6 FL (ref 7.5–11.1)
POTASSIUM SERPL-SCNC: 4 MMOL/L (ref 3.5–5.1)
RBC # BLD: 5.3 M/CU MM (ref 4.6–6.2)
SEGMENTED NEUTROPHILS ABSOLUTE COUNT: 7 K/CU MM
SEGMENTED NEUTROPHILS RELATIVE PERCENT: 78.4 % (ref 36–66)
SODIUM BLD-SCNC: 137 MMOL/L (ref 135–145)
TOTAL IMMATURE NEUTOROPHIL: 0.03 K/CU MM
TOTAL NUCLEATED RBC: 0 K/CU MM
WBC # BLD: 8.9 K/CU MM (ref 4–10.5)

## 2020-08-14 PROCEDURE — 87086 URINE CULTURE/COLONY COUNT: CPT

## 2020-08-14 PROCEDURE — 6360000002 HC RX W HCPCS: Performed by: NURSE PRACTITIONER

## 2020-08-14 PROCEDURE — 6370000000 HC RX 637 (ALT 250 FOR IP): Performed by: NURSE PRACTITIONER

## 2020-08-14 PROCEDURE — 36569 INSJ PICC 5 YR+ W/O IMAGING: CPT

## 2020-08-14 PROCEDURE — 6360000002 HC RX W HCPCS: Performed by: EMERGENCY MEDICINE

## 2020-08-14 PROCEDURE — 85730 THROMBOPLASTIN TIME PARTIAL: CPT

## 2020-08-14 PROCEDURE — 2580000003 HC RX 258: Performed by: NURSE PRACTITIONER

## 2020-08-14 PROCEDURE — C1751 CATH, INF, PER/CENT/MIDLINE: HCPCS

## 2020-08-14 PROCEDURE — C9113 INJ PANTOPRAZOLE SODIUM, VIA: HCPCS | Performed by: NURSE PRACTITIONER

## 2020-08-14 PROCEDURE — 2500000003 HC RX 250 WO HCPCS: Performed by: EMERGENCY MEDICINE

## 2020-08-14 PROCEDURE — 87077 CULTURE AEROBIC IDENTIFY: CPT

## 2020-08-14 PROCEDURE — 94761 N-INVAS EAR/PLS OXIMETRY MLT: CPT

## 2020-08-14 PROCEDURE — 85025 COMPLETE CBC W/AUTO DIFF WBC: CPT

## 2020-08-14 PROCEDURE — 37799 UNLISTED PX VASCULAR SURGERY: CPT

## 2020-08-14 PROCEDURE — 2500000003 HC RX 250 WO HCPCS: Performed by: NURSE PRACTITIONER

## 2020-08-14 PROCEDURE — 80048 BASIC METABOLIC PNL TOTAL CA: CPT

## 2020-08-14 PROCEDURE — 93010 ELECTROCARDIOGRAM REPORT: CPT | Performed by: INTERNAL MEDICINE

## 2020-08-14 PROCEDURE — 92610 EVALUATE SWALLOWING FUNCTION: CPT

## 2020-08-14 PROCEDURE — 76937 US GUIDE VASCULAR ACCESS: CPT

## 2020-08-14 PROCEDURE — 2000000000 HC ICU R&B

## 2020-08-14 PROCEDURE — 2580000003 HC RX 258: Performed by: FAMILY MEDICINE

## 2020-08-14 PROCEDURE — 87186 SC STD MICRODIL/AGAR DIL: CPT

## 2020-08-14 PROCEDURE — 6360000002 HC RX W HCPCS: Performed by: FAMILY MEDICINE

## 2020-08-14 RX ADMIN — SODIUM CHLORIDE, PRESERVATIVE FREE 10 ML: 5 INJECTION INTRAVENOUS at 08:41

## 2020-08-14 RX ADMIN — HEPARIN SODIUM 26 UNITS/KG/HR: 10000 INJECTION, SOLUTION INTRAVENOUS at 09:21

## 2020-08-14 RX ADMIN — CEFTRIAXONE SODIUM 1 G: 1 INJECTION, POWDER, FOR SOLUTION INTRAMUSCULAR; INTRAVENOUS at 12:03

## 2020-08-14 RX ADMIN — SODIUM CHLORIDE, PRESERVATIVE FREE 10 ML: 5 INJECTION INTRAVENOUS at 21:13

## 2020-08-14 RX ADMIN — LIDOCAINE HYDROCHLORIDE ANHYDROUS 5 ML: 10 INJECTION, SOLUTION INFILTRATION at 12:19

## 2020-08-14 RX ADMIN — HEPARIN SODIUM 18 UNITS/KG/HR: 10000 INJECTION, SOLUTION INTRAVENOUS at 23:13

## 2020-08-14 RX ADMIN — ASPIRIN 81 MG: 81 TABLET, COATED ORAL at 08:40

## 2020-08-14 RX ADMIN — LISINOPRIL 5 MG: 5 TABLET ORAL at 08:40

## 2020-08-14 RX ADMIN — SODIUM CHLORIDE, PRESERVATIVE FREE 10 ML: 5 INJECTION INTRAVENOUS at 21:14

## 2020-08-14 RX ADMIN — HEPARIN SODIUM 3760 UNITS: 1000 INJECTION, SOLUTION INTRAVENOUS; SUBCUTANEOUS at 09:51

## 2020-08-14 RX ADMIN — ATORVASTATIN CALCIUM 80 MG: 80 TABLET, FILM COATED ORAL at 21:13

## 2020-08-14 RX ADMIN — PANTOPRAZOLE SODIUM 40 MG: 40 INJECTION, POWDER, FOR SOLUTION INTRAVENOUS at 06:07

## 2020-08-14 RX ADMIN — TAMSULOSIN HYDROCHLORIDE 0.4 MG: 0.4 CAPSULE ORAL at 21:13

## 2020-08-14 RX ADMIN — CLOPIDOGREL BISULFATE 75 MG: 75 TABLET ORAL at 08:40

## 2020-08-14 RX ADMIN — FINASTERIDE 5 MG: 5 TABLET, FILM COATED ORAL at 08:40

## 2020-08-14 ASSESSMENT — PAIN - FUNCTIONAL ASSESSMENT
PAIN_FUNCTIONAL_ASSESSMENT: PREVENTS OR INTERFERES SOME ACTIVE ACTIVITIES AND ADLS

## 2020-08-14 ASSESSMENT — PAIN DESCRIPTION - ONSET
ONSET: ON-GOING

## 2020-08-14 ASSESSMENT — PAIN DESCRIPTION - PAIN TYPE
TYPE: ACUTE PAIN

## 2020-08-14 ASSESSMENT — PAIN DESCRIPTION - PROGRESSION
CLINICAL_PROGRESSION: NOT CHANGED

## 2020-08-14 ASSESSMENT — PAIN SCALES - GENERAL
PAINLEVEL_OUTOF10: 0
PAINLEVEL_OUTOF10: 0
PAINLEVEL_OUTOF10: 2
PAINLEVEL_OUTOF10: 0
PAINLEVEL_OUTOF10: 0
PAINLEVEL_OUTOF10: 2
PAINLEVEL_OUTOF10: 0

## 2020-08-14 ASSESSMENT — PAIN DESCRIPTION - ORIENTATION
ORIENTATION: MID;OUTER

## 2020-08-14 ASSESSMENT — PAIN DESCRIPTION - DESCRIPTORS
DESCRIPTORS: ACHING;DISCOMFORT

## 2020-08-14 ASSESSMENT — PAIN DESCRIPTION - FREQUENCY
FREQUENCY: CONTINUOUS

## 2020-08-14 ASSESSMENT — PAIN DESCRIPTION - LOCATION
LOCATION: FOOT

## 2020-08-14 NOTE — PROGRESS NOTES
Speech Language Pathology  Facility/Department: [facility] ICU   CLINICAL BEDSIDE SWALLOW EVALUATION    NAME: Yoli Salguero  : 1935  MRN: [id number]    IMPRESSIONS: Yoli Salguero was referred for a bedside swallow evaluation after being admitted to [facility] with right femoral artery occlusion, acute encephalopathy suspected d/t UTI. Medical hx includes CVA (2020; involving bilateral parietal and occipital lobes, right cerebellum), COPD. He was previously seen by SLP during admission for CVA and was recommended dysphagia II diet/thin liquids. Pt seen for evaluation seated upright in bed, alert, confused, cooperative. Oral mechanism examination WFL without focal deficits. Noted pt is edentulous and he states his dentures are not present at the hospital. He was presented with PO trials of thin liquids via tsp/cup/straw, puree, and regular solids. Oral stage mildly impaired with slow/reduced mastication of solids, adequate bolus formation/AP transit/clearance. Pharyngeal stage grossly WFL with intact swallow initiation, reduced vs age-appropriate laryngeal elevation. No overt s/s aspiration noted across all trials. Recommend initiation of dysphagia II diet/thin liquids. SLP will follow to monitor 1-2x. Results/recommendations d/w pt, RN.    ADMISSION DATE: 2020  ADMITTING DIAGNOSIS: has Dizziness; Acute cerebrovascular accident (CVA) ([address] Hemiparesis of left nondominant side as late effect of cerebral infarction ([address] Dysphagia due to recent stroke; Dysarthria due to acute stroke ([address] Gait disturbance; Simple chronic bronchitis ([address] Urinary tract infection associated with indwelling urethral catheter ([address] Acute cystitis without hematuria;  Femoral artery occlusion, right ([address] and Acute encephalopathy on their problem list.  ONSET DATE: this admission    Recent Chest Xray/CT of Chest: see chart    Date of Eval: 2020  Evaluating Therapist: Debo Veliz    Current Diet level:  Current Diet : NPO  Current Liquid Diet : NPO      Primary Complaint  Patient Complaint: reports confusion, loss of memory immediately prior to admission    Pain:  Pain Assessment  Pain Assessment: 0-10  Pain Level: 0  Patient's Stated Pain Goal: No pain  Pain Type: Acute pain  Pain Location: Foot  Pain Orientation: Mid, Outer  Pain Descriptors: Aching, Discomfort  Pain Frequency: Continuous  Pain Onset: On-going  Clinical Progression: Not changed  Functional Pain Assessment: Prevents or interferes some active activities and ADLs  Non-Pharmaceutical Pain Intervention(s): Relaxation techniques, Rest  Response to Pain Intervention: Patient Satisfied  Multiple Pain Sites: No  RASS Score: Alert and calm    Reason for Referral  Jesse Hargrove was referred for a bedside swallow evaluation to assess the efficiency of his swallow function, identify signs and symptoms of aspiration and make recommendations regarding safe dietary consistencies, effective compensatory strategies, and safe eating environment. Impression  Dysphagia Diagnosis: Swallow function appears grossly intact  Dysphagia Outcome Severity Scale: Level 5: Mild dysphagia- Distant supervision. May need one diet consistency restricted     Treatment Plan  Requires SLP Intervention: Yes  Duration/Frequency of Treatment: monitor 1-2x/LOS          Recommended Diet and Intervention  Diet Solids Recommendation: Dysphagia Minced and Moist (Dysphagia II)  Liquid Consistency Recommendation: Thin  Recommended Form of Meds: PO     Therapeutic Interventions: Diet tolerance monitoring;Patient/Family education    Compensatory Swallowing Strategies  Compensatory Swallowing Strategies: Upright as possible for all oral intake    Treatment/Goals  Short-term Goals  Timeframe for Short-term Goals: length of admission  Goal 1: Pt will tolerate dysphagia II diet/thin liquids with adequate oral manipulation/clearance and no s/s aspiration.   Goal 2: Pt/caregivers will indicate understanding of all recommendations. General  Chart Reviewed: Yes  Behavior/Cognition: Alert; Cooperative;Confused  Respiratory Status: Room air  O2 Device: Nasal cannula  Communication Observation: (cognitive communication deficits?)  Follows Directions: Simple  Dentition: Edentulous  Patient Positioning: Upright in bed  Baseline Vocal Quality: Normal  Prior Dysphagia History: yes; previously recommended dysphagia II/thin liquids  Consistencies Administered: Reg solid; Dysphagia Pureed (Dysphagia I); Thin - cup; Thin - straw; Thin - teaspoon; Ice Chips           Vision/Hearing  Vision  Vision: Impaired  Vision Exceptions: Wears glasses at all times  Hearing  Hearing: Within functional limits    Oral Motor Deficits  Oral/Motor  Oral Motor: Within functional limits    Oral Phase Dysfunction  Oral Phase  Oral Phase: WFL     Indicators of Pharyngeal Phase Dysfunction   Pharyngeal Phase  Pharyngeal Phase: WFL    Prognosis  Prognosis  Prognosis for safe diet advancement: guarded  Individuals consulted  Consulted and agree with results and recommendations: Patient;RN    Education  Patient Education: recommendations, plan  Patient Education Response: Needs reinforcement;Verbalizes understanding  Safety Devices in place: Yes  Type of devices:  All fall risk precautions in place       Therapy Time  SLP Individual Minutes  Time In: 33 [phone] 74  Time Out: [facility]  Minutes: [address] [facility], [address] [address]  8/14/2020 2:40 PM

## 2020-08-14 NOTE — PROGRESS NOTES
Hospitalist Progress Note      Name:  Gloria Licona /Age/Sex: 1935  (81 y.o. male)   MRN & CSN:  [id number] & [id number] Admission Date/Time: 2020 11:07 AM   Location:  -A PCP: No primary care provider on file. Gloria Licona is a 81 y.o.  male  who presents with Altered Mental Status      Assessment and Plan:   PAD  - CTA runoff noted  - heparin gtt  - CTS consulted, poor candidate for angio so planning conservative mx  - asa, statin, plavix  - NV checks    Acute Metabolic encephalopathy 2/2 UTI likely  - start IV Rocephin  - check urine cx  - CT head non acute   - NPO, get SLP eval then okay to start diet            - COPD  - Tobacco abuse - on Nicotine patch, tobacco cessation education.  - CVA   - gait disturbance  - Prostatitis            Diet Diet NPO, After Midnight   Code Status Full Code     Medications:   Medications:    aspirin  81 mg Oral Daily    atorvastatin  80 mg Oral Nightly    clopidogrel  75 mg Oral Daily    finasteride  5 mg Oral Daily    lisinopril  5 mg Oral Daily    tamsulosin  0.4 mg Oral Daily    sodium chloride flush  10 mL Intravenous 2 times per day    pantoprazole  40 mg Intravenous Daily    nicotine  1 patch Transdermal Daily    lidocaine PF  5 mL Intradermal See Admin Instructions    sodium chloride flush  10 mL Intravenous 2 times per day      Infusions:    heparin (Porcine) 26 Units/kg/hr (20 0921)    sodium chloride 75 mL/hr at 20 1841     PRN Meds: heparin (porcine), 80 Units/kg, PRN  heparin (porcine), 40 Units/kg, PRN  sodium chloride flush, 10 mL, PRN  acetaminophen, 650 mg, Q6H PRN    Or  acetaminophen, 650 mg, Q6H PRN  polyethylene glycol, 17 g, Daily PRN  promethazine, 12.5 mg, Q6H PRN    Or  ondansetron, 4 mg, Q6H PRN  sodium chloride flush, 10 mL, PRN      Subjective:     Doing ok, no distress  Objective:        Intake/Output Summary (Last 24 hours) at 2020 1124  Last data filed at 2020 0841  Gross per 24 hour   Intake 132.54 ml   Output 120 ml   Net 12.54 ml      Vitals:   Vitals:    08/14/20 0840   BP: 133/68   Pulse:    Resp:    Temp:    SpO2:      Physical Exam:   Gen:  awake, alert, no apparent distress  Head/Eyes:  Normocephalic atraumatic, EOMI   NECK:   symmetrical, trachea midline  LUNGS: Normal Effort   CARDIOVASCULAR:  Normal rate  ABDOMEN:  non distended  MUSCULOSKELETAL:  ROM limited  NEUROLOGIC: Alert and Oriented,  Cranial nerves II-XII are grossly intact.    SKIN:  Right foot toes purplish, cold, tender to palpation      Data:       CBC   Recent Labs     08/13/20  1135 08/14/20  0300   WBC 9.3 8.9   HGB 15.2 15.4   HCT 45.9 46.9    248      BMP   Recent Labs     08/13/20  1135 08/14/20  0300   * 137   K 4.4 4.0   CL 99 101   CO2 21 25   BUN 43* 40*   CREATININE 0.8* 0.9         Electronically signed by Zachary Jiang MD on 8/14/2020 at 11:24 AM

## 2020-08-14 NOTE — FLOWSHEET NOTE
Pt was NPO for IR procedure; however, IR states that nothing is scheduled at this time. This nurse called Dr. Duy Johnson and gave him update on pt. Advised no doppler on pedal pulse, but did get doppler on post tibial pulse. Per Dr. Carson Foxburg, heparin drip to be continued at this time and no further plans for angiogram or IR procedure. Pt remains NPO until swallow eval is performed and then he can have a general diet per Dr. Jairo George and Dr. Carson Foxburg. Waiting swallow eval.  PICC was placed successfully. Pt was picking at the IV site bandage and advised not to do so and why.   still in pt's room. Pt cooperative at this time.

## 2020-08-14 NOTE — PROGRESS NOTES
Dr. Brenda Razo placed a nursing order for next aptt lab draw - if heparin is to be restarted, then it will be started at 18 units/kg/hr and no bolus.    Nursing order placed and this nurse will advise night rn of same

## 2020-08-14 NOTE — PROGRESS NOTES
This nurse rec'd call from [place], pharmacist to see if the next aptt check that results in heparin drip restarting - can we restart at the 18 units/kg/hr and no bolus (in hopes to avoid the spike in aptt.   This nurse asked Dr. Emelia Pendleton of same and waiting for response

## 2020-08-14 NOTE — FLOWSHEET NOTE
Shift handoff from Sandy to Duane Ramming. Pt turned and skin assessment completed. Drip handed off. Heparin stopped for lab levels, will recheck. Pt npo for possible procedure. shaina in room as pt is confused.

## 2020-08-14 NOTE — CONSULTS
Education regarding insertion of PICC reviewed with patient. Risk/benefit/and alternatives discussed. verbalized understanding. Consent given by patient. Time out done at 1145. PICC line inserted right upper arm  without difficulty per protocol. Pt tolerated well. Good blood return and flushes easily. ECG tip confirmation system utilized for tip placement with P wave changes noted by RN during insertion and ECG strips left on chart. OK to use PICC line at this time. Educational booklet left at bedside.   Johan Purvis

## 2020-08-14 NOTE — PROGRESS NOTES
Lab called with aptt > 240. Per mar- heparin was held and Dr. Yusuf Sarabia was called with results.

## 2020-08-14 NOTE — PROGRESS NOTES
Shift handoff from [facility] to [facility]. Pt turned and skin assessment completed.  in room.  Call light at bedside

## 2020-08-14 NOTE — CARE COORDINATION
LSW notes from 8/13 appreciated. Attempted to reach lizette Rodas ar [phone]; LVM for return call.  Jose J Storey RN

## 2020-08-15 LAB
APTT: 81.7 SECONDS (ref 25.1–37.1)
APTT: 93.4 SECONDS (ref 25.1–37.1)
HCT VFR BLD CALC: 42.5 % (ref 42–52)
HEMOGLOBIN: 13.7 GM/DL (ref 13.5–18)
MCH RBC QN AUTO: 28.9 PG (ref 27–31)
MCHC RBC AUTO-ENTMCNC: 32.2 % (ref 32–36)
MCV RBC AUTO: 89.7 FL (ref 78–100)
PDW BLD-RTO: 14.7 % (ref 11.7–14.9)
PLATELET # BLD: 244 K/CU MM (ref 140–440)
PMV BLD AUTO: 11.5 FL (ref 7.5–11.1)
RBC # BLD: 4.74 M/CU MM (ref 4.6–6.2)
WBC # BLD: 8.5 K/CU MM (ref 4–10.5)

## 2020-08-15 PROCEDURE — 6360000002 HC RX W HCPCS: Performed by: NURSE PRACTITIONER

## 2020-08-15 PROCEDURE — 6370000000 HC RX 637 (ALT 250 FOR IP): Performed by: NURSE PRACTITIONER

## 2020-08-15 PROCEDURE — 2580000003 HC RX 258: Performed by: NURSE PRACTITIONER

## 2020-08-15 PROCEDURE — 6370000000 HC RX 637 (ALT 250 FOR IP): Performed by: FAMILY MEDICINE

## 2020-08-15 PROCEDURE — 85730 THROMBOPLASTIN TIME PARTIAL: CPT

## 2020-08-15 PROCEDURE — 85027 COMPLETE CBC AUTOMATED: CPT

## 2020-08-15 PROCEDURE — 2500000003 HC RX 250 WO HCPCS: Performed by: EMERGENCY MEDICINE

## 2020-08-15 PROCEDURE — 2580000003 HC RX 258: Performed by: FAMILY MEDICINE

## 2020-08-15 PROCEDURE — 1200000000 HC SEMI PRIVATE

## 2020-08-15 PROCEDURE — C9113 INJ PANTOPRAZOLE SODIUM, VIA: HCPCS | Performed by: NURSE PRACTITIONER

## 2020-08-15 PROCEDURE — 6360000002 HC RX W HCPCS: Performed by: FAMILY MEDICINE

## 2020-08-15 RX ADMIN — SODIUM CHLORIDE, PRESERVATIVE FREE 10 ML: 5 INJECTION INTRAVENOUS at 20:36

## 2020-08-15 RX ADMIN — TAMSULOSIN HYDROCHLORIDE 0.4 MG: 0.4 CAPSULE ORAL at 20:35

## 2020-08-15 RX ADMIN — FINASTERIDE 5 MG: 5 TABLET, FILM COATED ORAL at 09:29

## 2020-08-15 RX ADMIN — HEPARIN SODIUM 18 UNITS/KG/HR: 10000 INJECTION, SOLUTION INTRAVENOUS at 09:26

## 2020-08-15 RX ADMIN — CEFTRIAXONE SODIUM 1 G: 1 INJECTION, POWDER, FOR SOLUTION INTRAMUSCULAR; INTRAVENOUS at 12:42

## 2020-08-15 RX ADMIN — SODIUM CHLORIDE, PRESERVATIVE FREE 10 ML: 5 INJECTION INTRAVENOUS at 20:35

## 2020-08-15 RX ADMIN — RIVAROXABAN 20 MG: 20 TABLET, FILM COATED ORAL at 18:52

## 2020-08-15 RX ADMIN — SODIUM CHLORIDE, PRESERVATIVE FREE 10 ML: 5 INJECTION INTRAVENOUS at 09:30

## 2020-08-15 RX ADMIN — LISINOPRIL 5 MG: 5 TABLET ORAL at 09:29

## 2020-08-15 RX ADMIN — CLOPIDOGREL BISULFATE 75 MG: 75 TABLET ORAL at 09:29

## 2020-08-15 RX ADMIN — PANTOPRAZOLE SODIUM 40 MG: 40 INJECTION, POWDER, FOR SOLUTION INTRAVENOUS at 05:29

## 2020-08-15 RX ADMIN — ASPIRIN 81 MG: 81 TABLET, COATED ORAL at 09:29

## 2020-08-15 RX ADMIN — ATORVASTATIN CALCIUM 80 MG: 80 TABLET, FILM COATED ORAL at 20:35

## 2020-08-15 ASSESSMENT — PAIN SCALES - GENERAL: PAINLEVEL_OUTOF10: 0

## 2020-08-15 NOTE — PROGRESS NOTES
Hospitalist Progress Note      Name:  Esme Sherman /Age/Sex: 1935  (81 y.o. male)   MRN & CSN:  [id number] & [id number] Admission Date/Time: 2020 11:07 AM   Location:  -A PCP: No primary care provider on file. Esme Sherman is a 81 y.o.  male  who presents with Altered Mental Status      Assessment and Plan:   PAD  - CTA runoff noted  - heparin gtt, change to oral xarelto, d/w CTS  - CTS consulted, no pulses but poor candidate for angio so planning conservative mx, start oral AC  - asa, statin, plavix  - NV checks     Acute Metabolic encephalopathy exacerbated 2/2 UTI likely, also likely has underlying dementia   - start IV Rocephin  - check urine cx pending  - CT head non acute   - SLP done    tx to med-surg              - COPD  - Tobacco abuse - on Nicotine patch, tobacco cessation education.  - CVA   - gait disturbance  - Prostatitis            Diet DIET GENERAL; Dysphagia Minced and Moist   Code Status Full Code     Medications:   Medications:    cefTRIAXone (ROCEPHIN) IV  1 g Intravenous Q24H    aspirin  81 mg Oral Daily    atorvastatin  80 mg Oral Nightly    clopidogrel  75 mg Oral Daily    finasteride  5 mg Oral Daily    lisinopril  5 mg Oral Daily    tamsulosin  0.4 mg Oral Daily    sodium chloride flush  10 mL Intravenous 2 times per day    pantoprazole  40 mg Intravenous Daily    nicotine  1 patch Transdermal Daily    sodium chloride flush  10 mL Intravenous 2 times per day      Infusions:    heparin (Porcine) 18 Units/kg/hr (08/15/20 0926)     PRN Meds: heparin (porcine), 80 Units/kg, PRN  heparin (porcine), 40 Units/kg, PRN  sodium chloride flush, 10 mL, PRN  acetaminophen, 650 mg, Q6H PRN    Or  acetaminophen, 650 mg, Q6H PRN  polyethylene glycol, 17 g, Daily PRN  promethazine, 12.5 mg, Q6H PRN    Or  ondansetron, 4 mg, Q6H PRN  sodium chloride flush, 10 mL, PRN      Subjective:   No distress    Objective:        Intake/Output Summary (Last 24 hours) at 8/15/2020 1041  Last data filed at 8/15/2020 0900  Gross per 24 hour   Intake 2263.19 ml   Output 775 ml   Net 1488.19 ml      Vitals:   Vitals:    08/15/20 1000   BP: (!) 166/153   Pulse: 92   Resp: 18   Temp:    SpO2:      Physical Exam:   Gen:  awake, alert, no apparent distress  Head/Eyes:  Normocephalic atraumatic, EOMI   NECK:   symmetrical, trachea midline  LUNGS: Normal Effort   CARDIOVASCULAR:  Normal rate  ABDOMEN:  non distended  MUSCULOSKELETAL:  ROM limited  NEUROLOGIC: Alert and Oriented,  Cranial nerves II-XII are grossly intact.    SKIN:  Right foot no pulses, ischemic toes      Data:       CBC   Recent Labs     08/13/20  1135 08/14/20  0300 08/15/20  0600   WBC 9.3 8.9 8.5   HGB 15.2 15.4 13.7   HCT 45.9 46.9 42.5    248 244      BMP   Recent Labs     08/13/20  1135 08/14/20  0300   * 137   K 4.4 4.0   CL 99 101   CO2 21 25   BUN 43* 40*   CREATININE 0.8* 0.9         Electronically signed by Erick Kolb MD on 8/15/2020 at 10:41 AM

## 2020-08-15 NOTE — PROGRESS NOTES
Morning assessment complete. No pedal pulse dopplered on RLE. Dr. Levi Gunn at the bedside and already aware.

## 2020-08-16 LAB
CULTURE: ABNORMAL
CULTURE: ABNORMAL
Lab: ABNORMAL
SPECIMEN: ABNORMAL

## 2020-08-16 PROCEDURE — 1200000000 HC SEMI PRIVATE

## 2020-08-16 PROCEDURE — C9113 INJ PANTOPRAZOLE SODIUM, VIA: HCPCS | Performed by: NURSE PRACTITIONER

## 2020-08-16 PROCEDURE — 94761 N-INVAS EAR/PLS OXIMETRY MLT: CPT

## 2020-08-16 PROCEDURE — 2580000003 HC RX 258: Performed by: NURSE PRACTITIONER

## 2020-08-16 PROCEDURE — 6370000000 HC RX 637 (ALT 250 FOR IP): Performed by: NURSE PRACTITIONER

## 2020-08-16 PROCEDURE — 6360000002 HC RX W HCPCS: Performed by: NURSE PRACTITIONER

## 2020-08-16 PROCEDURE — 6370000000 HC RX 637 (ALT 250 FOR IP): Performed by: FAMILY MEDICINE

## 2020-08-16 PROCEDURE — 6370000000 HC RX 637 (ALT 250 FOR IP): Performed by: INTERNAL MEDICINE

## 2020-08-16 RX ORDER — ATORVASTATIN CALCIUM 20 MG/1
20 TABLET, FILM COATED ORAL NIGHTLY
Status: DISCONTINUED | OUTPATIENT
Start: 2020-08-16 | End: 2020-08-24

## 2020-08-16 RX ORDER — METOPROLOL SUCCINATE 25 MG/1
25 TABLET, EXTENDED RELEASE ORAL DAILY
Status: DISCONTINUED | OUTPATIENT
Start: 2020-08-16 | End: 2020-08-24

## 2020-08-16 RX ORDER — CIPROFLOXACIN 500 MG/1
500 TABLET, FILM COATED ORAL EVERY 12 HOURS SCHEDULED
Status: DISPENSED | OUTPATIENT
Start: 2020-08-16 | End: 2020-08-23

## 2020-08-16 RX ADMIN — FINASTERIDE 5 MG: 5 TABLET, FILM COATED ORAL at 10:30

## 2020-08-16 RX ADMIN — SODIUM CHLORIDE, PRESERVATIVE FREE 10 ML: 5 INJECTION INTRAVENOUS at 10:31

## 2020-08-16 RX ADMIN — CIPROFLOXACIN HYDROCHLORIDE 500 MG: 500 TABLET, FILM COATED ORAL at 10:30

## 2020-08-16 RX ADMIN — LISINOPRIL 5 MG: 5 TABLET ORAL at 10:30

## 2020-08-16 RX ADMIN — PANTOPRAZOLE SODIUM 40 MG: 40 INJECTION, POWDER, FOR SOLUTION INTRAVENOUS at 05:20

## 2020-08-16 RX ADMIN — ASPIRIN 81 MG: 81 TABLET, COATED ORAL at 10:30

## 2020-08-16 RX ADMIN — METOPROLOL SUCCINATE 25 MG: 25 TABLET, EXTENDED RELEASE ORAL at 16:54

## 2020-08-16 RX ADMIN — TAMSULOSIN HYDROCHLORIDE 0.4 MG: 0.4 CAPSULE ORAL at 20:26

## 2020-08-16 RX ADMIN — SODIUM CHLORIDE, PRESERVATIVE FREE 10 ML: 5 INJECTION INTRAVENOUS at 20:27

## 2020-08-16 RX ADMIN — ATORVASTATIN CALCIUM 20 MG: 20 TABLET, FILM COATED ORAL at 20:26

## 2020-08-16 RX ADMIN — CLOPIDOGREL BISULFATE 75 MG: 75 TABLET ORAL at 10:30

## 2020-08-16 ASSESSMENT — PAIN SCALES - GENERAL: PAINLEVEL_OUTOF10: 0

## 2020-08-16 NOTE — CONSULTS
[address] [place] [address], [address] [facility]                                  CONSULTATION    PATIENT NAME: Adair Liz                    :        1935  MED REC NO:   [id number]                          ROOM:       2118  ACCOUNT NO:   [de-identified]                           ADMIT DATE: 2020  PROVIDER:     DANIEL Alexis    CONSULT DATE:  2020    CHIEF COMPLAINT:  PAD. HISTORY OF PRESENT ILLNESS:  This is an 44-year-old male with a past  medical history of COPD, CVA, who was brought to the Emergency  Department with altered mental status. The patient was noted to have a  UTI. He has been treated for that with antibiotics. He also has  underlying dementia. We previously saw the patient back in 2020 for  an acute CVA. KAYLEY was done at the time, no left atrial appendage  thrombus was noted. EF was preserved to 45% with mild to moderate AI  and _____ noted at the time as well. He did have a loop monitor placed  at that time too. His mental status appears to have cleared somewhat. He is able to tell me where he is, what his birthday is, what his name  is, and who the President is. He appears to be alert and oriented x4. It was noted that his right foot has some discoloration to it. It is  cooler to the touch. Unable to find pulses with a Doppler. CTA was  done and an arterial duplex that shows right distal SFA occlusion. We  were consulted for peripheral arterial disease with possible need for  peripheral angiogram.    PAST MEDICAL HISTORY:  COPD, prostatitis, and CVA. PAST SURGICAL HISTORY:  Hernia repair. SOCIAL HISTORY:  The patient smokes half pack a day. Does not drink. FAMILY HISTORY:  Noncontributory. ALLERGIES:  No known drug allergies.     HOME MEDICATIONS:  The patient was on aspirin 81 mg daily, Lipitor 80 mg  at bedtime, lisinopril 5 mg daily, Proscar, Flomax, and Plavix 75 mg  daily. REVIEW OF SYSTEMS:  10-point review of systems was reviewed and is  negative unless noted in the HPI. PHYSICAL EXAMINATION:  GENERAL:  Awake and alert male, sitting up in bed, in no acute distress. HEAD:  Atraumatic and normocephalic. EYES:  No scleral erythema, discharge, or conjunctivitis noted. NECK:  Trachea is midline. No apparent masses. CARDIOVASCULAR:  Regular rate and rhythm. No murmurs, rubs, or gallops  auscultated. LUNGS:  Clear to auscultation bilaterally. Good rise and fall of the  chest.  ABDOMEN:  Soft and nontender. MUSCULOSKELETAL:  No obvious joint deformities. SKIN:  Dusky bluish color noted to the right great toe area. It is  cooler to the touch compared to his left foot. The patient does admit  to pain and discomfort in his foot. He states that the discoloration is  new within the last few weeks. NEUROLOGIC:  Alert and oriented x4. PSYCHIATRIC:  Normal mood and affect. RADIOLOGY:  Last KAYLEY was done on 04/10/2020, EF was 45% at that time  with mild-to-moderate AI noted and _____ noted. EKG was done and it  shows normal sinus rhythm with biatrial enlargement and LVH. Arterial  duplex is noted and it shows occlusion in the distal SFA with flow  reconstituted in the distal popliteal artery and abnormal waveforms in  the right common femoral artery which suggest significant disease in the  right iliac artery. CT abdomen and pelvis is also done that showed no  acute process in the chest, abdomen, and pelvis but severe diffuse  atherosclerosis in the abdominal aorta, bilateral iliac arteries with  moderate to severe stenosis in the external iliac arteries, dense  calcific atherosclerosis in the right common femoral artery, and distal  occlusion of the right SFA. The distal trifurcation of the vessels are  patent on the right, unable to evaluate the right popliteal artery. LABORATORY DATA:  CBC is done and is within normal limits.   BNP was  drawn two days ago and it is within normal limits. Creatinine stable at  0.9. IMPRESSION/PLAN:  An 56-year-old male with a past medical history of CVA  now presents with altered mental status which is due to underlying  dementia and UTI.  UTI is treated. He does appear to be back to his  baseline mental status at this point. He is able to answer all of my  questions. Does state that he would like to proceed with peripheral  angiogram.  I did try to call his nephew and niece and left voice mails,  await their call back. May potentially proceed with peripheral  angiogram tomorrow pending discussion with niece and nephew.     Patient seen and chart reviewed  Agree with above  Continue medical treatment     DANIEL Collins    D: 08/16/2020 11:19:31       T: 08/16/2020 12:05:57     [email or web address]  Job#: [id number]     Doc#: [id number]    CC:

## 2020-08-16 NOTE — CONSULTS
[facility]-[id number]  Hx of CVA- s/p Loop   Now presents with sig. PAD  Rt. Great toe discolored and cool to touch  CTA shows distal Rt. SFA occlusion and sig. Iliac and CFA stenosis  May need peripheral  Attempting to get in touch with Next of kin  His POA was his sister who recently passed away. Thanks for consult-will consider angiogram pending discussion with family    Was able to discuss with Pt.'s nephew Jad over phone- he is Next of Kin, POA passed away a week ago. Explained risk vs. Benefit to procedure and he gives consent. RN witnessed-consent signed and placed in soft chart. Right foot selective angio tomorrow  discussed with family  Hold AC for now   Keep on ASA and plavix  Recent CVA   High risk of bleeding with three meds     PAST MEDICAL HISTORY:  History of having COPD, history of recent stroke,  history of prostatitis and urinary retention present.       Loop recorder check showed   NSVT showed, pAFIB  May be able to stop plavix and keep on AC and low dose ASA   Add Toprol to control rate

## 2020-08-16 NOTE — PROGRESS NOTES
Hospitalist Progress Note      Name:  Jesse Hragrove /Age/Sex: 1935  (81 y.o. male)   MRN & CSN:  [id number] & [id number] Admission Date/Time: 2020 11:07 AM   [facility]: No primary care provider on file.        Jesse Hargrove is a 81 y.o.  male  who presents with Altered Mental Status      Assessment and Plan:   PAD  - CTA runoff noted  - heparin gtt, change to oral xarelto, d/w CTS  - CTS consulted, no pulses but poor candidate for angio so planning conservative mx, start oral AC  - asa, statin, plavix  - NV checks  - consult cardio     Acute Metabolic encephalopathy exacerbated 2/2 UTI likely, also likely has underlying dementia   - stable  - start IV Rocephin, change to oral cipro  - check urine cx +providencia rettgeri  - CT head non acute   - SLP done        consult palliative care for code status, goals and family support.          - COPD  - Tobacco abuse - on Nicotine patch, tobacco cessation education.  - CVA   - gait disturbance  - Prostatitis            Diet DIET GENERAL; Dysphagia Minced and Moist   Code Status Full Code     Medications:   Medications:    ciprofloxacin  500 mg Oral 2 times per day    rivaroxaban  20 mg Oral Daily    aspirin  81 mg Oral Daily    atorvastatin  80 mg Oral Nightly    clopidogrel  75 mg Oral Daily    finasteride  5 mg Oral Daily    lisinopril  5 mg Oral Daily    tamsulosin  0.4 mg Oral Daily    sodium chloride flush  10 mL Intravenous 2 times per day    pantoprazole  40 mg Intravenous Daily    nicotine  1 patch Transdermal Daily    sodium chloride flush  10 mL Intravenous 2 times per day      Infusions:   PRN Meds: sodium chloride flush, 10 mL, PRN  acetaminophen, 650 mg, Q6H PRN    Or  acetaminophen, 650 mg, Q6H PRN  polyethylene glycol, 17 g, Daily PRN  promethazine, 12.5 mg, Q6H PRN    Or  ondansetron, 4 mg, Q6H PRN  sodium chloride flush, 10 mL, PRN      Subjective:   No distress, still has pain in right toes    Objective: Intake/Output Summary (Last 24 hours) at 8/16/2020 0951  Last data filed at 8/16/2020 0826  Gross per 24 hour   Intake 645 ml   Output 800 ml   Net -155 ml      Vitals:   Vitals:    08/16/20 0934   BP: (!) 144/67   Pulse: 88   Resp: 16   Temp: 98 °F (36.7 °C)   SpO2: 94%     Physical Exam:   Gen:  awake, alert, no apparent distress  Head/Eyes:  Normocephalic atraumatic, EOMI   NECK:   symmetrical, trachea midline  LUNGS: Normal Effort   CARDIOVASCULAR:  Normal rate  ABDOMEN:  non distended  MUSCULOSKELETAL:  ROM WNL  NEUROLOGIC: Alert and Oriented,  Cranial nerves II-XII are grossly intact.    SKIN:  Right toes purple, tender to palpation      Data:       CBC   Recent Labs     08/13/20  1135 08/14/20  0300 08/15/20  0600   WBC 9.3 8.9 8.5   HGB 15.2 15.4 13.7   HCT 45.9 46.9 42.5    248 244      BMP   Recent Labs     08/13/20  1135 08/14/20  0300   * 137   K 4.4 4.0   CL 99 101   CO2 21 25   BUN 43* 40*   CREATININE 0.8* 0.9         Electronically signed by Merlinda Atta, MD on 8/16/2020 at 9:51 AM

## 2020-08-17 LAB
ALBUMIN SERPL-MCNC: 4 GM/DL (ref 3.4–5)
ALP BLD-CCNC: 111 IU/L (ref 40–128)
ALT SERPL-CCNC: 27 U/L (ref 10–40)
ANION GAP SERPL CALCULATED.3IONS-SCNC: 7 MMOL/L (ref 4–16)
APTT: 30.5 SECONDS (ref 25.1–37.1)
AST SERPL-CCNC: 30 IU/L (ref 15–37)
BILIRUB SERPL-MCNC: 0.6 MG/DL (ref 0–1)
BUN BLDV-MCNC: 16 MG/DL (ref 6–23)
CALCIUM SERPL-MCNC: 9.2 MG/DL (ref 8.3–10.6)
CHLORIDE BLD-SCNC: 98 MMOL/L (ref 99–110)
CO2: 30 MMOL/L (ref 21–32)
CREAT SERPL-MCNC: 0.7 MG/DL (ref 0.9–1.3)
GFR AFRICAN AMERICAN: >60 ML/MIN/1.73M2
GFR NON-AFRICAN AMERICAN: >60 ML/MIN/1.73M2
GLUCOSE BLD-MCNC: 107 MG/DL (ref 70–99)
HCT VFR BLD CALC: 47.8 % (ref 42–52)
HEMOGLOBIN: 15.3 GM/DL (ref 13.5–18)
INR BLD: 0.99 INDEX
MCH RBC QN AUTO: 28.7 PG (ref 27–31)
MCHC RBC AUTO-ENTMCNC: 32 % (ref 32–36)
MCV RBC AUTO: 89.5 FL (ref 78–100)
PDW BLD-RTO: 14.8 % (ref 11.7–14.9)
PLATELET # BLD: 311 K/CU MM (ref 140–440)
PMV BLD AUTO: 11.1 FL (ref 7.5–11.1)
POTASSIUM SERPL-SCNC: 4.7 MMOL/L (ref 3.5–5.1)
PROTHROMBIN TIME: 12 SECONDS (ref 11.7–14.5)
RBC # BLD: 5.34 M/CU MM (ref 4.6–6.2)
SODIUM BLD-SCNC: 135 MMOL/L (ref 135–145)
TOTAL PROTEIN: 6.3 GM/DL (ref 6.4–8.2)
WBC # BLD: 6.9 K/CU MM (ref 4–10.5)

## 2020-08-17 PROCEDURE — U0002 COVID-19 LAB TEST NON-CDC: HCPCS

## 2020-08-17 PROCEDURE — 2580000003 HC RX 258: Performed by: INTERNAL MEDICINE

## 2020-08-17 PROCEDURE — 6370000000 HC RX 637 (ALT 250 FOR IP): Performed by: INTERNAL MEDICINE

## 2020-08-17 PROCEDURE — C9113 INJ PANTOPRAZOLE SODIUM, VIA: HCPCS | Performed by: NURSE PRACTITIONER

## 2020-08-17 PROCEDURE — 85027 COMPLETE CBC AUTOMATED: CPT

## 2020-08-17 PROCEDURE — 6370000000 HC RX 637 (ALT 250 FOR IP): Performed by: FAMILY MEDICINE

## 2020-08-17 PROCEDURE — 94761 N-INVAS EAR/PLS OXIMETRY MLT: CPT

## 2020-08-17 PROCEDURE — 85610 PROTHROMBIN TIME: CPT

## 2020-08-17 PROCEDURE — 99223 1ST HOSP IP/OBS HIGH 75: CPT | Performed by: NURSE PRACTITIONER

## 2020-08-17 PROCEDURE — 80053 COMPREHEN METABOLIC PANEL: CPT

## 2020-08-17 PROCEDURE — 6370000000 HC RX 637 (ALT 250 FOR IP): Performed by: NURSE PRACTITIONER

## 2020-08-17 PROCEDURE — 6360000002 HC RX W HCPCS: Performed by: NURSE PRACTITIONER

## 2020-08-17 PROCEDURE — 36415 COLL VENOUS BLD VENIPUNCTURE: CPT

## 2020-08-17 PROCEDURE — 1200000000 HC SEMI PRIVATE

## 2020-08-17 PROCEDURE — 2580000003 HC RX 258: Performed by: NURSE PRACTITIONER

## 2020-08-17 PROCEDURE — 85730 THROMBOPLASTIN TIME PARTIAL: CPT

## 2020-08-17 RX ORDER — SODIUM CHLORIDE 9 MG/ML
INJECTION, SOLUTION INTRAVENOUS CONTINUOUS
Status: DISCONTINUED | OUTPATIENT
Start: 2020-08-17 | End: 2020-08-19

## 2020-08-17 RX ORDER — ZIPRASIDONE MESYLATE 20 MG/ML
20 INJECTION, POWDER, LYOPHILIZED, FOR SOLUTION INTRAMUSCULAR ONCE
Status: COMPLETED | OUTPATIENT
Start: 2020-08-17 | End: 2020-08-17

## 2020-08-17 RX ADMIN — FINASTERIDE 5 MG: 5 TABLET, FILM COATED ORAL at 08:46

## 2020-08-17 RX ADMIN — SODIUM CHLORIDE: 9 INJECTION, SOLUTION INTRAVENOUS at 23:34

## 2020-08-17 RX ADMIN — CIPROFLOXACIN HYDROCHLORIDE 500 MG: 500 TABLET, FILM COATED ORAL at 08:45

## 2020-08-17 RX ADMIN — PANTOPRAZOLE SODIUM 40 MG: 40 INJECTION, POWDER, FOR SOLUTION INTRAVENOUS at 05:14

## 2020-08-17 RX ADMIN — LISINOPRIL 5 MG: 5 TABLET ORAL at 08:45

## 2020-08-17 RX ADMIN — METOPROLOL SUCCINATE 25 MG: 25 TABLET, EXTENDED RELEASE ORAL at 08:45

## 2020-08-17 RX ADMIN — ATORVASTATIN CALCIUM 20 MG: 20 TABLET, FILM COATED ORAL at 19:55

## 2020-08-17 RX ADMIN — SODIUM CHLORIDE: 9 INJECTION, SOLUTION INTRAVENOUS at 10:47

## 2020-08-17 RX ADMIN — TAMSULOSIN HYDROCHLORIDE 0.4 MG: 0.4 CAPSULE ORAL at 19:55

## 2020-08-17 RX ADMIN — SODIUM CHLORIDE, PRESERVATIVE FREE 10 ML: 5 INJECTION INTRAVENOUS at 08:49

## 2020-08-17 RX ADMIN — CIPROFLOXACIN HYDROCHLORIDE 500 MG: 500 TABLET, FILM COATED ORAL at 19:55

## 2020-08-17 RX ADMIN — ZIPRASIDONE MESYLATE 20 MG: 20 INJECTION, POWDER, LYOPHILIZED, FOR SOLUTION INTRAMUSCULAR at 23:16

## 2020-08-17 RX ADMIN — ASPIRIN 81 MG: 81 TABLET, COATED ORAL at 08:46

## 2020-08-17 ASSESSMENT — PAIN SCALES - GENERAL: PAINLEVEL_OUTOF10: 0

## 2020-08-17 NOTE — PROGRESS NOTES
Hospitalist Progress Note      Name:  Tristan Moody /Age/Sex: 1935  (81 y.o. male)   MRN & CSN:  [id number] & [id number] Admission Date/Time: 2020 11:07 AM   [facility] PCP: No primary care provider on file. Tristan Moody is a 81 y.o.  male  who presents with Altered Mental Status      Assessment and Plan:   PAD  - CTA runoff noted  - heparin gtt off  - CTS consulted, no pulses but poor candidate for angio so planning conservative mx  - asa, statin, plavix  - NV checks  - consult cardio, plan for peripheral angio today     Acute Metabolic encephalopathy exacerbated 2/2 UTI likely, also likely has underlying dementia   - stable  - start IV Rocephin, changed to oral cipro  - check urine cx +providencia rettgeri  - CT head non acute   - SLP done           consult palliative care for code status, goals and family support.           - COPD  - Tobacco abuse - on Nicotine patch, tobacco cessation education.  - CVA   - gait disturbance  - Prostatitis            Diet DIET GENERAL; Dysphagia Minced and Moist   Code Status Full Code     Medications:   Medications:    ciprofloxacin  500 mg Oral 2 times per day    atorvastatin  20 mg Oral Nightly    metoprolol succinate  25 mg Oral Daily    aspirin  81 mg Oral Daily    finasteride  5 mg Oral Daily    lisinopril  5 mg Oral Daily    tamsulosin  0.4 mg Oral Daily    sodium chloride flush  10 mL Intravenous 2 times per day    pantoprazole  40 mg Intravenous Daily    nicotine  1 patch Transdermal Daily    sodium chloride flush  10 mL Intravenous 2 times per day      Infusions:   PRN Meds: sodium chloride flush, 10 mL, PRN  acetaminophen, 650 mg, Q6H PRN    Or  acetaminophen, 650 mg, Q6H PRN  polyethylene glycol, 17 g, Daily PRN  promethazine, 12.5 mg, Q6H PRN    Or  ondansetron, 4 mg, Q6H PRN  sodium chloride flush, 10 mL, PRN      Subjective:   Doing ok, no distress    Objective:        Intake/Output Summary (Last 24 hours) at 8/17/2020 0901  Last data filed at 8/17/2020 6094  Gross per 24 hour   Intake 610 ml   Output 1500 ml   Net -890 ml      Vitals:   Vitals:    08/17/20 0813   BP: (!) 147/71   Pulse: 76   Resp: 17   Temp: 98.7 °F (37.1 °C)   SpO2: 94%     Physical Exam:   Gen:  awake, alert, no apparent distress  Head/Eyes:  Normocephalic atraumatic, EOMI   NECK:   symmetrical, trachea midline  LUNGS: Normal Effort   CARDIOVASCULAR:  Normal rate  ABDOMEN:  non distended  MUSCULOSKELETAL:  ROM WNL  NEUROLOGIC: Alert and Oriented,  Cranial nerves II-XII are grossly intact.    SKIN:  Right foot toes purple, +mild tender      Data:       CBC   Recent Labs     08/15/20  0600 08/17/20  0642   WBC 8.5 6.9   HGB 13.7 15.3   HCT 42.5 47.8    311      BMP   Recent Labs     08/17/20  0642      K 4.7   CL 98*   CO2 30   BUN 16   CREATININE 0.7*         Electronically signed by Meena Jameson MD on 8/17/2020 at 9:01 AM

## 2020-08-17 NOTE — CARE COORDINATION
CM met with pt to continue with discharge planning. Pt is alert and oriented x3. Pt shared that he lives alone in the [facility] apartments. Pt shared that he has a one floor apartment. He has friends that go to the store for him for groceries. Pt DME includes a walker and cane and an electric scooter in storage. Pt does not have a PCP. Pt has insurance and is able to afford his medications. Pt informed CM that his sister  last week and he has a nephew Marina Marinelli that CM could speak with. [phone];  Plan is for Angiogram tomorrow  CM will continue to follow until after angiogram in regards to  discharge planning [facility]  CM placed a PS to Dr Lucía Zepeda regarding prob placement and need for covid.  [facility]

## 2020-08-17 NOTE — PLAN OF CARE
Problem: Falls - Risk of:  Goal: Will remain free from falls  Description: Will remain free from falls  8/17/2020 0602 by Caro Badillo RN  Outcome: Ongoing  8/16/2020 1629 by Darletta Blizzard, RN  Outcome: Ongoing  Goal: Absence of physical injury  Description: Absence of physical injury  8/17/2020 0602 by Caro Badillo RN  Outcome: Ongoing  8/16/2020 1629 by Darletta Blizzard, RN  Outcome: Ongoing

## 2020-08-17 NOTE — PROGRESS NOTES
Cardiology Progress Note       Gloria Licona is a 81 y.o. male   1935     SUBJECTIVE:   Patient seen and examined no chest pain no shortness of breath eating breakfast rhythm is sinus  OBJECTIVE:    Review of Systems:  General appearance: alert, appears stated age and cooperative  Skin: Skin color, texture, normal. No rashes or lesions  HEENT: No nose bleed, headache, vision problems  CV: C/O chest pain, tightness, pressure,   Respiratory: C/o no SOB, DAVILA, Orthopnea, PND  GI: No abdominal pain, black stool, bloating  Limbs: No c/o edema, pain, swelling, intermittent claudication, joint pains  Neuro: No dizziness, lightheadedness, syncope, gait problems, memory problems  Psych: grossly normal. No SI/depression. Vitals:   Blood pressure (!) 147/71, pulse 76, temperature 98.7 °F (37.1 °C), temperature source Oral, resp. rate 17, height 5' 7.5\" (1.715 m), weight 97 lb (44 kg), SpO2 94 %.     HEENT: AT, NC, PERRLA  Neck: No JVD  Heart: S1 S2 audible, no murmur   Lungs: CTA   Abdomen: Nontender   Limbs: No edema   CNS: no focal deficit      Past Medical History:   Diagnosis Date    COPD (chronic obstructive pulmonary disease) ([address]     Prostatitis         Patient Active Problem List   Diagnosis    Dizziness    Acute cerebrovascular accident (CVA) ([address]    Hemiparesis of left nondominant side as late effect of cerebral infarction ([address]    Dysphagia due to recent stroke    Dysarthria due to acute stroke ([address]    Gait disturbance    Simple chronic bronchitis (HCC)    Urinary tract infection associated with indwelling urethral catheter (HCC)    Acute cystitis without hematuria    Femoral artery occlusion, right (HCC)    Acute encephalopathy        No Known Allergies     Current Inpatient Medications:    Current Facility-Administered Medications   Medication Dose Route Frequency Provider Last Rate Last Dose    ciprofloxacin (CIPRO) tablet 500 mg  500 mg Oral 2 times per day Merlinda Atta, MD   Stopped at 08/16/20 2137    atorvastatin (LIPITOR) tablet 20 mg  20 mg Oral Nightly Tori Oakes MD   20 mg at 08/16/20 2026    metoprolol succinate (TOPROL XL) extended release tablet 25 mg  25 mg Oral Daily Tori Oakes MD   25 mg at 08/16/20 1654    aspirin EC tablet 81 mg  81 mg Oral Daily Leslie Meyers, APRN - CNP   81 mg at 08/16/20 1030    finasteride (PROSCAR) tablet 5 mg  5 mg Oral Daily Raghu Moat, APRN - CNP   5 mg at 08/16/20 1030    lisinopril (PRINIVIL;ZESTRIL) tablet 5 mg  5 mg Oral Daily Raghu Moat, APRN - CNP   5 mg at 08/16/20 1030    tamsulosin (FLOMAX) capsule 0.4 mg  0.4 mg Oral Daily Raghu Moat, APRN - CNP   0.4 mg at 08/16/20 2026    sodium chloride flush 0.9 % injection 10 mL  10 mL Intravenous 2 times per day Raghu Moat, APRN - CNP   Stopped at 08/16/20 2145    sodium chloride flush 0.9 % injection 10 mL  10 mL Intravenous PRN Raghu Moat, APRN - CNP        acetaminophen (TYLENOL) tablet 650 mg  650 mg Oral Q6H PRN Raghu Moat, APRN - CNP        Or    acetaminophen (TYLENOL) suppository 650 mg  650 mg Rectal Q6H PRN Raghu Moat, APRN - CNP        polyethylene glycol (GLYCOLAX) packet 17 g  17 g Oral Daily PRN Raghu Moat, APRN - CNP        promethazine (PHENERGAN) tablet 12.5 mg  12.5 mg Oral Q6H PRN Raghu Moat, APRN - CNP        Or    ondansetron (ZOFRAN) injection 4 mg  4 mg Intravenous Q6H PRN Leslie Meyers, APRN - CNP        pantoprazole (PROTONIX) injection 40 mg  40 mg Intravenous Daily Leslie Meyers, APRN - CNP   40 mg at 08/17/20 0514    nicotine (NICODERM CQ) 21 MG/24HR 1 patch  1 patch Transdermal Daily Raghu Moat, APRN - CNP   1 patch at 08/16/20 1030    sodium chloride flush 0.9 % injection 10 mL  10 mL Intravenous 2 times per day MADY aZpata CNP   10 mL at 08/16/20 2027    sodium chloride flush 0.9 % injection 10 mL  10 mL Intravenous PRN MADY Zapata CNP               Labs:  CBC with Differential:    Lab Results   Component Value Date    WBC 6.9 08/17/2020    RBC 5.34 08/17/2020    HGB 15.3 08/17/2020    HCT 47.8 08/17/2020     08/17/2020    MCV 89.5 08/17/2020    MCH 28.7 08/17/2020    MCHC 32.0 08/17/2020    RDW 14.8 08/17/2020    SEGSPCT 78.4 08/14/2020    LYMPHOPCT 9.8 08/14/2020    MONOPCT 10.3 08/14/2020    BASOPCT 0.6 08/14/2020    MONOSABS 0.9 08/14/2020    LYMPHSABS 0.9 08/14/2020    EOSABS 0.1 08/14/2020    BASOSABS 0.1 08/14/2020    DIFFTYPE AUTOMATED DIFFERENTIAL 08/14/2020     CMP:    Lab Results   Component Value Date     08/17/2020    K 4.7 08/17/2020    CL 98 08/17/2020    CO2 30 08/17/2020    BUN 16 08/17/2020    CREATININE 0.7 08/17/2020    GFRAA >60 08/17/2020    LABGLOM >60 08/17/2020    GLUCOSE 107 08/17/2020    PROT 6.3 08/17/2020    LABALBU 4.0 08/17/2020    CALCIUM 9.2 08/17/2020    BILITOT 0.6 08/17/2020    ALKPHOS 111 08/17/2020    AST 30 08/17/2020    ALT 27 08/17/2020     Hepatic Function Panel:    Lab Results   Component Value Date    ALKPHOS 111 08/17/2020    ALT 27 08/17/2020    AST 30 08/17/2020    PROT 6.3 08/17/2020    BILITOT 0.6 08/17/2020    LABALBU 4.0 08/17/2020     Magnesium:  No results found for: MG  PT/INR:    Lab Results   Component Value Date    PROTIME 12.0 08/17/2020    INR 0.99 08/17/2020     Last 3 Troponin:  No results found for: TROPONINI  U/A:    Lab Results   Component Value Date    COLORU FLORES 08/13/2020    WBCUA 22 08/13/2020    RBCUA 19 08/13/2020    MUCUS FEW 08/13/2020    TRICHOMONAS NONE SEEN 08/13/2020    YEAST MANY 08/13/2020    BACTERIA NEGATIVE 08/13/2020    CLARITYU CLOUDY 08/13/2020    SPECGRAV 1.040 08/13/2020    LEUKOCYTESUR MODERATE 08/13/2020    UROBILINOGEN NORMAL 08/13/2020    BILIRUBINUR NEGATIVE 08/13/2020    BLOODU NEGATIVE 08/13/2020    GLUCOSEU NEGATIVE 07/01/2011     ABG:  No results found for: PHART, XQR5UYR, PO2ART, IRV4UTI, BEART, THGBART, YKU7PWJ, H3IMXTLQ  FLP:    Lab Results   Component Value Date    TRIG 44 04/22/2020 HDL 38 04/22/2020    LDLDIRECT 55 04/22/2020     TSH:  No results found for: TSH   DATA:   ECG: Sinus Rhythm       ASSESSMENT:   1  PVD   for peripheral angiogram this afternoon  2 COPD no active wheezing  3 history of CVA no residual deficit  PLAN   Peripheral angiogram later on today

## 2020-08-18 ENCOUNTER — APPOINTMENT (OUTPATIENT)
Dept: CT IMAGING | Age: 85
DRG: 252 | End: 2020-08-18
Payer: MEDICARE

## 2020-08-18 LAB
BACTERIA: NEGATIVE /HPF
BILIRUBIN URINE: NEGATIVE MG/DL
BLOOD, URINE: NEGATIVE
CLARITY: CLEAR
COLOR: YELLOW
GLUCOSE, URINE: NEGATIVE MG/DL
HYALINE CASTS: 0 /LPF
KETONES, URINE: NEGATIVE MG/DL
LEUKOCYTE ESTERASE, URINE: ABNORMAL
MUCUS: ABNORMAL HPF
NITRITE URINE, QUANTITATIVE: NEGATIVE
PH, URINE: 7 (ref 5–8)
PROTEIN UA: NEGATIVE MG/DL
RBC URINE: 1 /HPF (ref 0–3)
SARS-COV-2: NOT DETECTED
SOURCE: NORMAL
SPECIFIC GRAVITY UA: 1.01 (ref 1–1.03)
TRICHOMONAS: ABNORMAL /HPF
UROBILINOGEN, URINE: NORMAL MG/DL (ref 0.2–1)
WBC UA: 4 /HPF (ref 0–2)

## 2020-08-18 PROCEDURE — 6370000000 HC RX 637 (ALT 250 FOR IP): Performed by: INTERNAL MEDICINE

## 2020-08-18 PROCEDURE — C1887 CATHETER, GUIDING: HCPCS

## 2020-08-18 PROCEDURE — C9113 INJ PANTOPRAZOLE SODIUM, VIA: HCPCS | Performed by: NURSE PRACTITIONER

## 2020-08-18 PROCEDURE — 2580000003 HC RX 258: Performed by: INTERNAL MEDICINE

## 2020-08-18 PROCEDURE — 6360000002 HC RX W HCPCS: Performed by: NURSE PRACTITIONER

## 2020-08-18 PROCEDURE — C1894 INTRO/SHEATH, NON-LASER: HCPCS

## 2020-08-18 PROCEDURE — 81001 URINALYSIS AUTO W/SCOPE: CPT

## 2020-08-18 PROCEDURE — 6370000000 HC RX 637 (ALT 250 FOR IP): Performed by: NURSE PRACTITIONER

## 2020-08-18 PROCEDURE — 6370000000 HC RX 637 (ALT 250 FOR IP): Performed by: FAMILY MEDICINE

## 2020-08-18 PROCEDURE — C1769 GUIDE WIRE: HCPCS

## 2020-08-18 PROCEDURE — 2580000003 HC RX 258: Performed by: NURSE PRACTITIONER

## 2020-08-18 PROCEDURE — 70450 CT HEAD/BRAIN W/O DYE: CPT

## 2020-08-18 PROCEDURE — 2709999900 HC NON-CHARGEABLE SUPPLY

## 2020-08-18 PROCEDURE — 1200000000 HC SEMI PRIVATE

## 2020-08-18 PROCEDURE — 51701 INSERT BLADDER CATHETER: CPT

## 2020-08-18 PROCEDURE — 94761 N-INVAS EAR/PLS OXIMETRY MLT: CPT

## 2020-08-18 RX ORDER — LORAZEPAM 1 MG/1
1 TABLET ORAL ONCE
Status: COMPLETED | OUTPATIENT
Start: 2020-08-18 | End: 2020-08-18

## 2020-08-18 RX ADMIN — SODIUM CHLORIDE: 9 INJECTION, SOLUTION INTRAVENOUS at 19:54

## 2020-08-18 RX ADMIN — CIPROFLOXACIN HYDROCHLORIDE 500 MG: 500 TABLET, FILM COATED ORAL at 07:52

## 2020-08-18 RX ADMIN — PROMETHAZINE HYDROCHLORIDE 12.5 MG: 25 TABLET ORAL at 19:54

## 2020-08-18 RX ADMIN — HYDRALAZINE HYDROCHLORIDE 10 MG: 20 INJECTION INTRAMUSCULAR; INTRAVENOUS at 04:51

## 2020-08-18 RX ADMIN — TAMSULOSIN HYDROCHLORIDE 0.4 MG: 0.4 CAPSULE ORAL at 19:54

## 2020-08-18 RX ADMIN — LORAZEPAM 1 MG: 1 TABLET ORAL at 07:51

## 2020-08-18 RX ADMIN — PANTOPRAZOLE SODIUM 40 MG: 40 INJECTION, POWDER, FOR SOLUTION INTRAVENOUS at 06:37

## 2020-08-18 RX ADMIN — FINASTERIDE 5 MG: 5 TABLET, FILM COATED ORAL at 07:52

## 2020-08-18 RX ADMIN — METOPROLOL SUCCINATE 25 MG: 25 TABLET, EXTENDED RELEASE ORAL at 07:52

## 2020-08-18 RX ADMIN — LISINOPRIL 5 MG: 5 TABLET ORAL at 07:52

## 2020-08-18 RX ADMIN — SODIUM CHLORIDE: 9 INJECTION, SOLUTION INTRAVENOUS at 15:43

## 2020-08-18 RX ADMIN — SODIUM CHLORIDE, PRESERVATIVE FREE 10 ML: 5 INJECTION INTRAVENOUS at 06:37

## 2020-08-18 RX ADMIN — ATORVASTATIN CALCIUM 20 MG: 20 TABLET, FILM COATED ORAL at 19:54

## 2020-08-18 RX ADMIN — ASPIRIN 81 MG: 81 TABLET, COATED ORAL at 07:52

## 2020-08-18 RX ADMIN — CIPROFLOXACIN HYDROCHLORIDE 500 MG: 500 TABLET, FILM COATED ORAL at 19:54

## 2020-08-18 ASSESSMENT — PAIN SCALES - GENERAL: PAINLEVEL_OUTOF10: 0

## 2020-08-18 NOTE — CONSULTS
[facility] Palliative Medicine Consultation    Fara Gallego  1935  [id number]  PCP: no PCP    Reason for Consult:      __x__ Advance Care Planning  __x__ Transition of Care Planning  __x__ Psychosocial Support  __x__ Symptom Management    Recommendations: See impression for details     1. Continue with the excellent care of this patient with right femoral artery occlusion  2. Spoke with Layne Contreras and he wishes to be [employer] code. He seem to understand his significant health issues at this time and is able to tell me meaning of DNR-CCA and why he wishes code change  3. Palliative care will continue to follow for goals of care and family support. Requesting Physician:  Jose Juan Gaston MD    CHIEF COMPLAINT:  AMS    History Obtained From:  electronic records review, patient    HISTORY OF PRESENT ILLNESS: Layne Contreras is an 52-year-old man who presented to the emergency room on 8/13/2020 with altered mental status. Per ED records apparently a friend came to visit him and stated that he was \"not acting right\". Last known well was 2 to 3 days prior to that. He lives by himself at [facility] and has been isolating himself due to [place] pandemic most of the time. Past medical history is significant for COPD and prostatitis, CVA, gait disturbance. Upon arrival to the emergency room he was awake and alert but oriented only to himself. He thought time was 1940s. He had mumbling voice and was slightly difficult to understand. He did have a prior stroke with left hemiparesis aphasia dysarthria and gait disturbance in April 2020. After his CVA he did go to ARU and was then discharged back to his apartment on May 7. While in the emergency room staff noted that toes of right foot appeared blue. They were unable to palpate pulses to the foot. Doppler for pulses right ankle, foot, and behind knee. Dr. Omid Dick was immediately consulted.   CBC showed white count of 9.3 with mild shift, hemoglobin 15.2, ammonia 51, normal troponin, EKG normal sinus rhythm with rate of 86, normal electrolytes. Dr. Iqra Birch did see patients in ED but when it is unable to consult him for procedure secondary to altered mental status. Venous duplex of right lower extremity did show right femoral artery occlusion. He was started on heparin drip in the emergency department. Angiogram planned for 8/18/2020    Altered mental status, acute encephalopathy, was thought to be possibly related to UTI. UA did appear positive for UTI however he was asymptomatic, no fever and normal white blood cell count. Head CT was completed which demonstrated no acute findings. Past Medical History:  Past Medical History:   Diagnosis Date    [address] (chronic obstructive pulmonary disease) [address]     Prostatitis        Social History:    Social History     Socioeconomic History    Marital status:      Spouse name: None    Number of children: None    Years of education: None    Highest education level: None   Occupational History    None   Social Needs    Financial resource strain: None    Food insecurity     Worry: None     Inability: None    Transportation needs     Medical: None     Non-medical: None   Tobacco Use    Smoking status: Current Every Day Smoker     Packs/day: 0.50     Types: Cigarettes   Substance and Sexual Activity    Alcohol use:  Yes    Drug use: Not Currently    Sexual activity: Not Currently   Lifestyle    Physical activity     Days per week: None     Minutes per session: None    Stress: None   Relationships    Social connections     Talks on phone: None     Gets together: None     Attends Hinduism service: None     Active member of club or organization: None     Attends meetings of clubs or organizations: None     Relationship status: None    Intimate partner violence     Fear of current or ex partner: None     Emotionally abused: None     Physically abused: None     Forced sexual activity: None Other Topics Concern    None   Social History Narrative    None       Family History:    Family History   Problem Relation Age of Onset    No Known Problems Mother     No Known Problems Father        Old records: reviewed    No Known Allergies           Current Medications:      Current Facility-Administered Medications:     0.9 % sodium chloride infusion, , Intravenous, Continuous, Saba Issa MD, Last Rate: 75 mL/hr at 08/17/20 1047    ciprofloxacin (CIPRO) tablet 500 mg, 500 mg, Oral, 2 times per day, Devin Swan MD, 500 mg at 08/17/20 1955    atorvastatin (LIPITOR) tablet 20 mg, 20 mg, Oral, Nightly, Saba Issa MD, 20 mg at 08/17/20 1955    metoprolol succinate (TOPROL XL) extended release tablet 25 mg, 25 mg, Oral, Daily, Saba Issa MD, 25 mg at 08/17/20 0845    aspirin EC tablet 81 mg, 81 mg, Oral, Daily, MADY Wilcox CNP, 81 mg at 08/17/20 0846    finasteride (PROSCAR) tablet 5 mg, 5 mg, Oral, Daily, MADY Shipley CNP, 5 mg at 08/17/20 0846    lisinopril (PRINIVIL;ZESTRIL) tablet 5 mg, 5 mg, Oral, Daily, MADY Shipley CNP, 5 mg at 08/17/20 0845    tamsulosin (FLOMAX) capsule 0.4 mg, 0.4 mg, Oral, Daily, MADY Shipley CNP, 0.4 mg at 08/17/20 1955    sodium chloride flush 0.9 % injection 10 mL, 10 mL, Intravenous, 2 times per day, MADY Wilcox CNP, Stopped at 08/16/20 2145    sodium chloride flush 0.9 % injection 10 mL, 10 mL, Intravenous, PRN, MADY Wilcox CNP    acetaminophen (TYLENOL) tablet 650 mg, 650 mg, Oral, Q6H PRN **OR** acetaminophen (TYLENOL) suppository 650 mg, 650 mg, Rectal, Q6H PRN, MADY Wilcox CNP    polyethylene glycol (GLYCOLAX) packet 17 g, 17 g, Oral, Daily PRN, MADY Wilcox CNP    promethazine (PHENERGAN) tablet 12.5 mg, 12.5 mg, Oral, Q6H PRN **OR** ondansetron (ZOFRAN) injection 4 mg, 4 mg, Intravenous, Q6H PRN, MADY Wilcox CNP    pantoprazole (PROTONIX) injection 40 mg, 40 mg, Intravenous, Daily, MADY Shipley CNP, 40 mg at 08/17/20 0514    nicotine (NICODERM CQ) 21 MG/24HR 1 patch, 1 patch, Transdermal, Daily, MADY Shipley - CNP, 1 patch at 08/17/20 0846    sodium chloride flush 0.9 % injection 10 mL, 10 mL, Intravenous, 2 times per day, MADY Zapata - CNP, 10 mL at 08/17/20 0849    sodium chloride flush 0.9 % injection 10 mL, 10 mL, Intravenous, PRN, Mayte Miller APRN - CNP    REVIEW OF SYSTEMS:      CONSTITUTIONAL:  negative for fevers, chills, diaphoresis, activity change, appetite change, night sweats and unexpected weight change.    HEENT:  negative for hearing loss,  sinus pressure, nasal congestion, epistaxis and snoring  RESPIRATORY: Denies shortness of breath, wheeze, cough  CARDIOVASCULAR:  Negative for chest pain, palpitations, exertional chest pressure/discomfort, edema, syncope  GASTROINTESTINAL: negative for nausea, vomiting, diarrhea, constipation, blood in stool and abdominal pain  GENITOURINARY:  negative for frequency, dysuria and hematuria  HEMATOLOGIC/LYMPHATIC:  negative for easy bruising, bleeding and lymphadenopathy  ALLERGIC/IMMUNOLOGIC:  negative for recurrent infections, angioedema, anaphylaxis and drug reaction  MUSCULOSKELETAL:  negative for  pain, joint swelling, decreased range of motion and muscle weakness  NEURO: negative for headache, AMS, decrease sensations  SKIN: Negative for rashes or lesions, + discoloration of toes of right foot    PHYSICAL EXAM:    BP (!) 177/84 Comment: it is anxious   Pulse 80   Temp 97.7 °F (36.5 °C) (Oral)   Resp 17   Ht 5' 7.5\" (1.715 m)   Wt 97 lb (44 kg)   SpO2 94%   BMI 14.97 kg/m²       CONSTITUTIONAL:  awake, alert, cooperative, no apparent distress, and appears stated age, does not appear confused at this time  NECK:  Supple, symmetrical, trachea midline, no adenopathy, thyroid symmetric, not enlarged and no tenderness, skin normal  LUNGS:  no increased work of breathing and clear to auscultation. No accessory muscle use  CARDIOVASCULAR:  normal S1 and S2, no edema and no JVD  ABDOMEN:  normal bowel sounds, non-distended and no masses palpated, and no tenderness to palpation. No hepatospleenomegaly  LYMPHADENOPATHY:  no axillary or supraclavicular adenopathy. No cervical adnenopathy  PSYCHIATRIC: Oriented to person place and time. No obvious depression or anxiety. MUSCULOSKELETAL: No obvious misalignment or effusion of the joints. No clubbing, cyanosis of the digits. SKIN: Cyanosis noted of toes of right foot    DATA:    CBC:  Recent Labs     08/15/20  0600 08/17/20  0642   WBC 8.5 6.9   RBC 4.74 5.34   HGB 13.7 15.3   HCT 42.5 47.8    311   MCV 89.7 89.5   MCH 28.9 28.7   MCHC 32.2 32.0   RDW 14.7 14.8      BMP:  Recent Labs     08/17/20  0642      K 4.7   CL 98*   CO2 30   BUN 16   CREATININE 0.7*   CALCIUM 9.2   GLUCOSE 107*      ABG:  No results for input(s): PH, PO2ART, LSH7OQN, HCO3, BEART, O2SAT in the last 72 hours. BNP  No results found for: BNP   D-Dimer:  No results found for: DDIMER     Cultures:    Urine: 8/16/2020 - [facility] 75,000 CFU/ml      Radiology studies this hospitalization:  8/13/2020 venous duplex lower extremity arteries right  Occlusion in the distal superficial femoral artery, with flow reconstituted in the distal popliteal artery. Abnormal waveforms in the right common femoral artery suggestive significant disease of the right iliac artery      Assessment/plan:    1. Symptom Management/Pain Control:  Annemarie Suggs does not appear to be confused at this time. He states that he feels his pain is well controlled at this time. He denies any nausea vomiting or abdominal pain    2. Plan/goals of care:  Patient lives at at home by himself in a single floor apartment at [facility]. He states he has neighbors that occasionally stop in and check on him.   He has a daughter who is currently in [place] who he states he is unable to get a hold of. His sister recently . He does have a nephew [facility] whom I attempted to contact at [phone] but was unable to speak to him and left a message. 3.  CODE STATUS:  DNR-CCA , he states \"when it is my time to go I am going to go on a 1 any that foolishness\"      4. Other Recommendations:  Please call with any palliative questions or concerns    Patient was seen with total face-to-face time greater than 60 minutes. More than 50% of the visit was counseling and education regarding palliative care as well as counseling on preventative health maintenance follow-up.

## 2020-08-18 NOTE — PROGRESS NOTES
Hospitalist Progress Note      Name:  Jimenez Garcia /Age/Sex: 1935  (81 y.o. male)   MRN & CSN:  [id number] & [id number] Admission Date/Time: 2020 11:07 AM   [facility]  [facility] PCP: No primary care provider on file. Jimenez Garcia is a 81 y.o.  male  who presents with Altered Mental Status      Assessment and Plan:   PAD  - CTA runoff noted  - heparin gtt off  - CTS consulted, no pulses but poor candidate for angio so planning conservative mx  - asa, statin, plavix  - NV checks  - consult cardio, plan for peripheral angio today     Acute Metabolic encephalopathy exacerbated 2/2 UTI likely, also likely has underlying dementia   - got agitated ;last night and in restraints now  - start IV Rocephin, changed to oral cipro  - check urine cx +providencia rettgeri  - CT head non acute   - SLP done           consult palliative care for code status, goals and family support.  DNR CCA           - COPD  - Tobacco abuse - on Nicotine patch, tobacco cessation education.  - CVA   - gait disturbance  - Prostatitis            Diet Diet NPO Time Specified   Code Status DNR-CCA     Medications:   Medications:    ciprofloxacin  500 mg Oral 2 times per day    atorvastatin  20 mg Oral Nightly    metoprolol succinate  25 mg Oral Daily    aspirin  81 mg Oral Daily    finasteride  5 mg Oral Daily    lisinopril  5 mg Oral Daily    tamsulosin  0.4 mg Oral Daily    sodium chloride flush  10 mL Intravenous 2 times per day    pantoprazole  40 mg Intravenous Daily    nicotine  1 patch Transdermal Daily    sodium chloride flush  10 mL Intravenous 2 times per day      Infusions:    sodium chloride 75 mL/hr at 20 2334     PRN Meds: hydrALAZINE (APRESOLINE) ivpb, 10 mg, Q4H PRN  sodium chloride flush, 10 mL, PRN  acetaminophen, 650 mg, Q6H PRN    Or  acetaminophen, 650 mg, Q6H PRN  polyethylene glycol, 17 g, Daily PRN  promethazine, 12.5 mg, Q6H PRN    Or  ondansetron, 4 mg, Q6H PRN  sodium chloride flush, 10 mL, PRN      Subjective:   Doing ok, no distress    Objective:        Intake/Output Summary (Last 24 hours) at 8/18/2020 0933  Last data filed at 8/18/2020 0849  Gross per 24 hour   Intake 1499.5 ml   Output 325 ml   Net 1174.5 ml      Vitals:   Vitals:    08/18/20 0743   BP: (!) 157/74   Pulse: 95   Resp: 22   Temp: 97.9 °F (36.6 °C)   SpO2: 97%     Physical Exam:   Gen: sleeping  Head/Eyes:  Normocephalic atraumatic, eyes closed  NECK:   symmetrical, trachea midline  LUNGS: Normal Effort   CARDIOVASCULAR:  Normal rate  ABDOMEN:  non distended  MUSCULOSKELETAL:  ROM limited  NEUROLOGIC: sleeping  SKIN:  no bruising or bleeding, normal skin color,  no redness      Data:       CBC   Recent Labs     08/17/20  0642   WBC 6.9   HGB 15.3   HCT 47.8         BMP   Recent Labs     08/17/20  0642      K 4.7   CL 98*   CO2 30   BUN 16   CREATININE 0.7*         Electronically signed by Cele Haskins MD on 8/18/2020 at 9:33 AM

## 2020-08-18 NOTE — PROGRESS NOTES
Palliative Care RN visit. Patient somnolent at this time. Patient was restless and agitated last night. Dose of Geodon given and restraints applied. Dose of Ativan this morning prior to scheduled Angiogram. D/T change is mental status, they were unable to perform the Angiogram and staff took him down for a STAT CT. Case Management has updated his nephew. We will continue to follow as needed.

## 2020-08-18 NOTE — PROGRESS NOTES
Patient awake in bed pulling tele off and fidgeting in bed. Upon walking in, patient talking in full sentences but not logical or appropriate words or responses. Patient remains with legs pulled to abd and will not straighten with or without assistance. Patient not violent but not following directions and remains pulling at tele. With lots of encouragement and redirection able to place tele back on patient, change depend and cover patient, before this nurse left room patient resting with eyes closed resp even and unlabored. Will monitor.  remains at bedside.

## 2020-08-18 NOTE — PROGRESS NOTES
Daily Progress Note    Had some confusion last night   Placed in restraint  Doing better this am  Less agitated   For right leg angio today if stable  Labs stable   HTN on meds     Pt. Awake, confused  HR stable, NSR, BP stable  Denies CP, SOB, Rt. Foot still painful    PAD    Pt. Noted to have Rt. LE PAD on Art. Duplex and CTA with runoff    Discoloration to Rt. Toes- appears slightly worse today    We were planning for peripheral angiogram but do not believe we can do it today because of AMS and confusion- he was in restraints last night    On ASA, Statin for now -cont med. Tx.    AMS    UTI- on ABx for this    Also has hx of CVA 4/20    Per primary    Will cont. To follow    KAYLEY-4/20   Summary   Left ventricular systolic function is low normal.   Ejection fraction is visually estimated at 45%. No evidence of thrombus within the left atrial appendage. Negative bubble study; no ASD or PFO noted. Lambl's excrescence noted on the aortic valve. Mild to moderate aortic regurgitation. Slight prolapse of the anterior mitral valve leaflet. Calcified chordae tendineae near the mitral valve leaflet tips. No evidence of any pericardial effusion. PAST MEDICAL HISTORY:  COPD, prostatitis, and CVA.     PAST SURGICAL HISTORY:  Hernia repair.     SOCIAL HISTORY:  The patient smokes half pack a day. Does not drink.     FAMILY HISTORY:  Noncontributory.     ALLERGIES:  No known drug allergies.     HOME MEDICATIONS:  The patient was on aspirin 81 mg daily, Lipitor 80 mg  at bedtime, lisinopril 5 mg daily, Proscar, Flomax, and Plavix 75 mg  daily.     Objective:   BP (!) 157/74   Pulse 95   Temp 97.9 °F (36.6 °C) (Oral)   Resp 22   Ht 5' 7.5\" (1.715 m)   Wt 101 lb 4 oz (45.9 kg)   SpO2 97%   BMI 15.62 kg/m²       Intake/Output Summary (Last 24 hours) at 8/18/2020 0826  Last data filed at 8/18/2020 1071  Gross per 24 hour   Intake 1844.5 ml   Output 325 ml   Net 1519.5 ml       Medications:   Scheduled Meds:   ciprofloxacin  500 mg Oral 2 times per day    atorvastatin  20 mg Oral Nightly    metoprolol succinate  25 mg Oral Daily    aspirin  81 mg Oral Daily    finasteride  5 mg Oral Daily    lisinopril  5 mg Oral Daily    tamsulosin  0.4 mg Oral Daily    sodium chloride flush  10 mL Intravenous 2 times per day    pantoprazole  40 mg Intravenous Daily    nicotine  1 patch Transdermal Daily    sodium chloride flush  10 mL Intravenous 2 times per day      Infusions:   sodium chloride 75 mL/hr at 08/17/20 2334      PRN Meds:  hydrALAZINE (APRESOLINE) ivpb, sodium chloride flush, acetaminophen **OR** acetaminophen, polyethylene glycol, promethazine **OR** ondansetron, sodium chloride flush       Physical Exam:  Vitals:    08/18/20 0743   BP: (!) 157/74   Pulse: 95   Resp: 22   Temp: 97.9 °F (36.6 °C)   SpO2: 97%        General: AAO, NAD  Chest: Nontender  Cardiac: First and Second Heart Sounds are Normal, No Murmurs or Gallops noted  Lungs:Clear to auscultation and percussion. Abdomen: Soft, NT, ND, +BS  Extremities: No clubbing, no edema  Vascular:  Equal 2+ peripheral pulses. Lab Data:  CBC:   Recent Labs     08/17/20  0642   WBC 6.9   HGB 15.3   HCT 47.8   MCV 89.5        BMP:   Recent Labs     08/17/20  0642      K 4.7   CL 98*   CO2 30   BUN 16   CREATININE 0.7*     LIVER PROFILE:   Recent Labs     08/17/20  0642   AST 30   ALT 27   BILITOT 0.6   ALKPHOS 111     PT/INR:   Recent Labs     08/17/20  0642   PROTIME 12.0   INR 0.99     APTT:   Recent Labs     08/15/20  1115 08/17/20  0642   APTT 93.4* 30.5     BNP:  No results for input(s): BNP in the last 72 hours.       Assessment:  Patient Active Problem List    Diagnosis Date Noted    Femoral artery occlusion, right ([facility] 08/13/2020    Acute encephalopathy 08/13/2020    Hemiparesis of left nondominant side as late effect of cerebral infarction ([facility]     Dysphagia due to recent stroke     Dysarthria due to acute stroke ([facility] [address] [facility]

## 2020-08-18 NOTE — PROGRESS NOTES
Cath lab called for this nurse to transport to angiogram, once assisted onto the cath lab table, patient would not straighten legs or correlate. This is new from 8/17/20. Dr. Inocencoi Mcgill did not do angiogram and this nurse transported patient to CT for head CT per Dr. Inocencio Mcgill due to [facility] then back to unit. Patient remains lethargic, not speaking and not cooperative. Will monitor.

## 2020-08-18 NOTE — PROGRESS NOTES
Pt confused this shift pulling off tele, pulling at IV lines, getting out of bed constant, taking his clothes off, grabbing at things in the air that are not here. Dr. Briana Kirby.  Pt medicated per orders

## 2020-08-18 NOTE — CARE COORDINATION
CM collaborated with nursing who informed CM that pt was restless last klever and he was restrained for a period overnight. Pt was given Ativan. CM attempted to see pt and pt is sleeping. Resp non labored and pt did not respond to gentle touch and voice. CM called Jad lizette [phone]. CM informed that pt is a loner, a hermit. Pt is an alcoholic and lives alone in [place]. 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 He does not think he has a daughter. CM informed since pt went AWOL he has had different alias names. Jad informed CM that he wants to know how pt is doing. He is unable to care for pt. From previous notes it shows that pt has been to ARU for rehab in April of 2020. CM will continue to follow for discharge planning. Palliative saw pt yest and pt was oriented. CM will continue to follow.   1400 CM into see pt and pt remains very sleepy. Unable to answer CM . CM will continue to follow.  [facility]

## 2020-08-18 NOTE — PROGRESS NOTES
Pt is now threatening to hit staff and trying to leave. He is getting verbally abusive not able to redirect BP elevated. NP notified pt placed in restraints.  Will continue to monitor

## 2020-08-18 NOTE — PROGRESS NOTES
Patient lethargic and increased confusion noted compared to 8/17/2020 during this nurses shift. Will monitor.

## 2020-08-18 NOTE — PLAN OF CARE
Problem: Falls - Risk of:  Goal: Will remain free from falls  Description: Will remain free from falls  8/18/2020 0008 by Yulia Hernandez RN  Outcome: Ongoing  8/17/2020 1036 by Jose Miguel Russo LPN  Outcome: Ongoing  Goal: Absence of physical injury  Description: Absence of physical injury  8/18/2020 0008 by Yulia Hernandez RN  Outcome: Ongoing  8/17/2020 1036 by Jose Miguel Russo LPN  Outcome: Ongoing     Problem: Skin Integrity:  Goal: Will show no infection signs and symptoms  Description: Will show no infection signs and symptoms  8/18/2020 0008 by Yulia Hernandez RN  Outcome: Ongoing  8/17/2020 1036 by Jose Miguel Russo LPN  Outcome: Ongoing  Goal: Absence of new skin breakdown  Description: Absence of new skin breakdown  8/18/2020 0008 by Yulia Hernandez RN  Outcome: Ongoing  8/17/2020 1036 by Jose Miguel Russo LPN  Outcome: Ongoing     Problem: Coping:  Goal: Ability to remain calm will improve  Description: Ability to remain calm will improve  Outcome: Ongoing     Problem: Safety:  Goal: Ability to remain free from injury will improve  Description: Ability to remain free from injury will improve  Outcome: Ongoing     Problem: Self-Care:  Goal: Ability to participate in self-care as condition permits will improve  Description: Ability to participate in self-care as condition permits will improve  Outcome: Ongoing

## 2020-08-19 LAB
AMMONIA: 24 UMOL/L (ref 16–60)
HCT VFR BLD CALC: 40.3 % (ref 42–52)
HEMOGLOBIN: 13 GM/DL (ref 13.5–18)
MCH RBC QN AUTO: 29.2 PG (ref 27–31)
MCHC RBC AUTO-ENTMCNC: 32.3 % (ref 32–36)
MCV RBC AUTO: 90.6 FL (ref 78–100)
PDW BLD-RTO: 15 % (ref 11.7–14.9)
PLATELET # BLD: 310 K/CU MM (ref 140–440)
PMV BLD AUTO: 10.9 FL (ref 7.5–11.1)
RBC # BLD: 4.45 M/CU MM (ref 4.6–6.2)
WBC # BLD: 8.1 K/CU MM (ref 4–10.5)

## 2020-08-19 PROCEDURE — 6370000000 HC RX 637 (ALT 250 FOR IP): Performed by: INTERNAL MEDICINE

## 2020-08-19 PROCEDURE — 2580000003 HC RX 258: Performed by: INTERNAL MEDICINE

## 2020-08-19 PROCEDURE — 36415 COLL VENOUS BLD VENIPUNCTURE: CPT

## 2020-08-19 PROCEDURE — C1769 GUIDE WIRE: HCPCS

## 2020-08-19 PROCEDURE — B410ZZZ FLUOROSCOPY OF ABDOMINAL AORTA: ICD-10-PCS | Performed by: INTERNAL MEDICINE

## 2020-08-19 PROCEDURE — 1200000000 HC SEMI PRIVATE

## 2020-08-19 PROCEDURE — 92526 ORAL FUNCTION THERAPY: CPT

## 2020-08-19 PROCEDURE — 6360000002 HC RX W HCPCS

## 2020-08-19 PROCEDURE — 36200 PLACE CATHETER IN AORTA: CPT

## 2020-08-19 PROCEDURE — C1894 INTRO/SHEATH, NON-LASER: HCPCS

## 2020-08-19 PROCEDURE — 82140 ASSAY OF AMMONIA: CPT

## 2020-08-19 PROCEDURE — 2709999900 HC NON-CHARGEABLE SUPPLY

## 2020-08-19 PROCEDURE — B41FZZZ FLUOROSCOPY OF RIGHT LOWER EXTREMITY ARTERIES: ICD-10-PCS | Performed by: INTERNAL MEDICINE

## 2020-08-19 PROCEDURE — 2500000003 HC RX 250 WO HCPCS

## 2020-08-19 PROCEDURE — 85027 COMPLETE CBC AUTOMATED: CPT

## 2020-08-19 PROCEDURE — 94761 N-INVAS EAR/PLS OXIMETRY MLT: CPT

## 2020-08-19 PROCEDURE — B41GZZZ FLUOROSCOPY OF LEFT LOWER EXTREMITY ARTERIES: ICD-10-PCS | Performed by: INTERNAL MEDICINE

## 2020-08-19 PROCEDURE — 75630 X-RAY AORTA LEG ARTERIES: CPT

## 2020-08-19 PROCEDURE — C9113 INJ PANTOPRAZOLE SODIUM, VIA: HCPCS | Performed by: NURSE PRACTITIONER

## 2020-08-19 PROCEDURE — 6360000002 HC RX W HCPCS: Performed by: NURSE PRACTITIONER

## 2020-08-19 PROCEDURE — 6360000004 HC RX CONTRAST MEDICATION

## 2020-08-19 RX ORDER — ATROPINE SULFATE 0.4 MG/ML
0.5 AMPUL (ML) INJECTION
Status: ACTIVE | OUTPATIENT
Start: 2020-08-19 | End: 2020-08-19

## 2020-08-19 RX ORDER — SODIUM CHLORIDE 9 MG/ML
INJECTION, SOLUTION INTRAVENOUS CONTINUOUS
Status: DISCONTINUED | OUTPATIENT
Start: 2020-08-19 | End: 2020-08-20

## 2020-08-19 RX ORDER — ACETAMINOPHEN 325 MG/1
650 TABLET ORAL EVERY 4 HOURS PRN
Status: DISCONTINUED | OUTPATIENT
Start: 2020-08-19 | End: 2020-08-20 | Stop reason: SDUPTHER

## 2020-08-19 RX ORDER — SODIUM CHLORIDE 0.9 % (FLUSH) 0.9 %
10 SYRINGE (ML) INJECTION PRN
Status: DISCONTINUED | OUTPATIENT
Start: 2020-08-19 | End: 2020-08-24

## 2020-08-19 RX ORDER — CILOSTAZOL 50 MG/1
50 TABLET ORAL 2 TIMES DAILY
Status: DISCONTINUED | OUTPATIENT
Start: 2020-08-19 | End: 2020-08-24

## 2020-08-19 RX ORDER — SODIUM CHLORIDE 0.9 % (FLUSH) 0.9 %
10 SYRINGE (ML) INJECTION EVERY 12 HOURS SCHEDULED
Status: DISCONTINUED | OUTPATIENT
Start: 2020-08-19 | End: 2020-08-24

## 2020-08-19 RX ADMIN — TAMSULOSIN HYDROCHLORIDE 0.4 MG: 0.4 CAPSULE ORAL at 20:55

## 2020-08-19 RX ADMIN — RIVAROXABAN 10 MG: 10 TABLET, FILM COATED ORAL at 18:02

## 2020-08-19 RX ADMIN — SODIUM CHLORIDE: 9 INJECTION, SOLUTION INTRAVENOUS at 20:56

## 2020-08-19 RX ADMIN — CIPROFLOXACIN HYDROCHLORIDE 500 MG: 500 TABLET, FILM COATED ORAL at 20:55

## 2020-08-19 RX ADMIN — CILOSTAZOL 50 MG: 50 TABLET ORAL at 20:55

## 2020-08-19 RX ADMIN — ATORVASTATIN CALCIUM 20 MG: 20 TABLET, FILM COATED ORAL at 20:55

## 2020-08-19 RX ADMIN — PANTOPRAZOLE SODIUM 40 MG: 40 INJECTION, POWDER, FOR SOLUTION INTRAVENOUS at 05:10

## 2020-08-19 ASSESSMENT — PAIN SCALES - GENERAL
PAINLEVEL_OUTOF10: 0

## 2020-08-19 NOTE — PROGRESS NOTES
Daily Progress Note     patient is more awake alert this am  Discussed with primary team  Plan for right leg angio today  He has a DNR-=spended for angio  Patient agree for that  CT head no acute process  Labs stable    KAYLEY-4/20   Summary   Left ventricular systolic function is low normal.   Ejection fraction is visually estimated at 45%.   No evidence of thrombus within the left atrial appendage.   Negative bubble study; no ASD or PFO noted.   Lambl's excrescence noted on the aortic valve.   Mild to moderate aortic regurgitation.   Slight prolapse of the anterior mitral valve leaflet.   Calcified chordae tendineae near the mitral valve leaflet tips.   No evidence of any pericardial effusion.     PAST MEDICAL HISTORY:  COPD, prostatitis, and CVA.     PAST SURGICAL HISTORY:  Hernia repair.     SOCIAL HISTORY:  The patient smokes half pack a day.  Does not drink.     FAMILY HISTORY:  Noncontributory.     ALLERGIES:  No known drug allergies.     HOME MEDICATIONS:  The patient was on aspirin 81 mg daily, Lipitor 80 mg  at bedtime, lisinopril 5 mg daily, Proscar, Flomax, and Plavix 75 mg  daily.       Objective:   BP (!) 163/75   Pulse 78   Temp 98 °F (36.7 °C) (Oral)   Resp 15   Ht 5' 7.5\" (1.715 m)   Wt 101 lb 4 oz (45.9 kg)   SpO2 94%   BMI 15.62 kg/m²       Intake/Output Summary (Last 24 hours) at 8/19/2020 0859  Last data filed at 8/18/2020 1543  Gross per 24 hour   Intake 635 ml   Output 225 ml   Net 410 ml       Medications:   Scheduled Meds:   ciprofloxacin  500 mg Oral 2 times per day    atorvastatin  20 mg Oral Nightly    metoprolol succinate  25 mg Oral Daily    aspirin  81 mg Oral Daily    finasteride  5 mg Oral Daily    lisinopril  5 mg Oral Daily    tamsulosin  0.4 mg Oral Daily    sodium chloride flush  10 mL Intravenous 2 times per day    pantoprazole  40 mg Intravenous Daily    nicotine  1 patch Transdermal Daily    sodium chloride flush  10 mL Intravenous 2 times per day Infusions:   sodium chloride 75 mL/hr at 08/18/20 1954      PRN Meds:  hydrALAZINE (APRESOLINE) ivpb, sodium chloride flush, acetaminophen **OR** acetaminophen, polyethylene glycol, promethazine **OR** ondansetron, sodium chloride flush       Physical Exam:  Vitals:    08/19/20 0757   BP: (!) 163/75   Pulse: 78   Resp: 15   Temp:    SpO2: 94%        General: awake alert  Chest: Nontender  Cardiac: sinus   Lungs:Clear to auscultation and percussion. Abdomen: Soft, NT, ND, +BS  Extremities: no edema   Right leg discoloration   Vascular:  Equal 2+ peripheral pulses. Lab Data:  CBC:   Recent Labs     08/17/20  0642 08/19/20  0527   WBC 6.9 8.1   HGB 15.3 13.0*   HCT 47.8 40.3*   MCV 89.5 90.6    310     BMP:   Recent Labs     08/17/20  0642      K 4.7   CL 98*   CO2 30   BUN 16   CREATININE 0.7*     LIVER PROFILE:   Recent Labs     08/17/20  0642   AST 30   ALT 27   BILITOT 0.6   ALKPHOS 111     PT/INR:   Recent Labs     08/17/20  0642   PROTIME 12.0   INR 0.99     APTT:   Recent Labs     08/17/20  0642   APTT 30.5     BNP:  No results for input(s): BNP in the last 72 hours.       Assessment:  Patient Active Problem List    Diagnosis Date Noted    Femoral artery occlusion, right ([address] 08/13/2020    Acute encephalopathy 08/13/2020    Hemiparesis of left nondominant side as late effect of cerebral infarction ([address]     Dysphagia due to recent stroke     Dysarthria due to acute stroke ([address]     Gait disturbance     Simple chronic bronchitis (HCC)     Urinary tract infection associated with indwelling urethral catheter ([address]     Acute cystitis without hematuria     Acute cerebrovascular accident (CVA) ([address] 04/10/2020    Dizziness 04/08/2020       Irma Urias MD 8/19/2020 8:59 AM

## 2020-08-19 NOTE — OP NOTE
Operative Note      Patient: Omer Danielle  YOB: 1935  MRN: [id number]    Date of Procedure: 8/19/20    Pre-Op Diagnosis: PAD  Post-Op Diagnosis: Same    Estimated Blood Loss (mL): Minimal    Complications: None    Electronically signed by Dafne Hoover MD on 8/19/2020 at 12:30 PM     DICTATED --[id number]  ABDOMINAL AORTA PATENT    RIGHT   COMMON PATENT  EXTERNAL 90% STENOSIS   INTERNAL PATENT  CFA 90% STENOSIS  PROFUNDA PATENT  % OCCLUDED  POPLITEAL 100% OCCLUDED  AT AND PAT PATENT    LEFT   COMMON PATENT  EXTERNAL 80% STENOSIS  INTERNAL PATENT  CFA PATENT  % OCCLUDED  PROFUNDA PATENT  POP PATENT  AT AND PT PATENT    PLAN--CONSERVATIVE TREATMENT  ASA/PLETAL AND XARELTO  NOT GOOD [employer] FOR BYPASS ON RIGHT SIDE  MEDICAL TREATMENT

## 2020-08-19 NOTE — CARE COORDINATION
CM into see pt to continue with discharge planning. Covid in NEG 8/17. Pt is alert and oriented this morning. Pt knows he is in the hosp, pt thought Elliott was president and then corrected self to [place]. Pt knows he lives in [facility]. CM collaborated with nursing. Pt is to have angiogram of leg today. Pt will need PT/OT for discharge recommendations when appropriate. CM will follow.

## 2020-08-19 NOTE — PROGRESS NOTES
[id number] [facility] OF SPEECH/LANGUAGE PATHOLOGY  DAILY PROGRESS NOTE  Coy Doss  8/19/2020  [id number]  Blue toes [R23.0]  Femoral artery occlusion, right ([address] [I70.201]  Altered mental status, unspecified altered mental status type [R41.82]  No Known Allergies      Pt was seen this date for dysphagia treatment. IMPRESSION AND RECOMMENDATIONS:   Coy Doss was seen for a bedside swallowing treatment and diet tolerance monitoring. Pt was alert and cooperative throughout assessment. He was positioned upright in bed and accepted PO trials of puree, soft solids and thin liquids by cup/straw sips were given. Prolonged mastication (pt is edentulous and does not have dentures at this time) with adequate oral clearance was observed with trials of soft solids. Pharyngeal swallow appeared intact characterized by timely swallow initiation and adequate laryngeal elevation. Clear vocal quality and 0 overt s/s of aspiration were observed with all PO trials given. Recommend continue dysphagia 2 diet/thin liquids (pt reports he prefers soft solid diet). General aspiration precautions. No further acute ST needs identified at this time as pt's oropharyngeal swallow appears to be at baseline. GOALS (current status in bold):  Short-term Goals  Timeframe for Short-term Goals: length of admission  Goal 1: Pt will tolerate dysphagia II diet/thin liquids with adequate oral manipulation/clearance and no s/s aspiration. Goal met  Goal 2: Pt/caregivers will indicate understanding of all recommendations.  Goal met                            EDUCATION: recommendations/POC    PAIN RATING (0-10 Scale): denies   Time in/Time out: SLP Individual Minutes  Time In: 1400  Time Out: 1430  Minutes: 30    Visit number: [address], [place], [facility], 8/19/2020

## 2020-08-19 NOTE — PROGRESS NOTES
Pt alert and oriented. Pt vitals stable. Puncture site of L groin, dressing clean, dry and intact. Pt has no complaints at this time.

## 2020-08-20 PROBLEM — E43 SEVERE MALNUTRITION (HCC): Chronic | Status: ACTIVE | Noted: 2020-08-20

## 2020-08-20 LAB
ACTIVATED CLOTTING TIME, LOW RANGE: 146 SEC
ACTIVATED CLOTTING TIME, LOW RANGE: 230 SEC
ACTIVATED CLOTTING TIME, LOW RANGE: 246 SEC
ACTIVATED CLOTTING TIME, LOW RANGE: 273 SEC
ACTIVATED CLOTTING TIME, LOW RANGE: 301 SEC
ACTIVATED CLOTTING TIME, LOW RANGE: 390 SEC
ANION GAP SERPL CALCULATED.3IONS-SCNC: 6 MMOL/L (ref 4–16)
APTT: 39.5 SECONDS (ref 25.1–37.1)
BUN BLDV-MCNC: 13 MG/DL (ref 6–23)
CALCIUM SERPL-MCNC: 8.9 MG/DL (ref 8.3–10.6)
CHLORIDE BLD-SCNC: 103 MMOL/L (ref 99–110)
CO2: 28 MMOL/L (ref 21–32)
CREAT SERPL-MCNC: 0.7 MG/DL (ref 0.9–1.3)
GFR AFRICAN AMERICAN: >60 ML/MIN/1.73M2
GFR NON-AFRICAN AMERICAN: >60 ML/MIN/1.73M2
GLUCOSE BLD-MCNC: 85 MG/DL (ref 70–99)
HCT VFR BLD CALC: 43.9 % (ref 42–52)
HEMOGLOBIN: 13.8 GM/DL (ref 13.5–18)
MCH RBC QN AUTO: 28.6 PG (ref 27–31)
MCHC RBC AUTO-ENTMCNC: 31.4 % (ref 32–36)
MCV RBC AUTO: 90.9 FL (ref 78–100)
PDW BLD-RTO: 15 % (ref 11.7–14.9)
PLATELET # BLD: 344 K/CU MM (ref 140–440)
PMV BLD AUTO: 11 FL (ref 7.5–11.1)
POTASSIUM SERPL-SCNC: 4.7 MMOL/L (ref 3.5–5.1)
RBC # BLD: 4.83 M/CU MM (ref 4.6–6.2)
SODIUM BLD-SCNC: 137 MMOL/L (ref 135–145)
WBC # BLD: 7.2 K/CU MM (ref 4–10.5)

## 2020-08-20 PROCEDURE — 047K3ZZ DILATION OF RIGHT FEMORAL ARTERY, PERCUTANEOUS APPROACH: ICD-10-PCS | Performed by: INTERNAL MEDICINE

## 2020-08-20 PROCEDURE — 047P3ZZ DILATION OF RIGHT ANTERIOR TIBIAL ARTERY, PERCUTANEOUS APPROACH: ICD-10-PCS | Performed by: INTERNAL MEDICINE

## 2020-08-20 PROCEDURE — 6360000002 HC RX W HCPCS: Performed by: INTERNAL MEDICINE

## 2020-08-20 PROCEDURE — C1725 CATH, TRANSLUMIN NON-LASER: HCPCS

## 2020-08-20 PROCEDURE — 37228 HC TIB PER TERRITORY PLASTY: CPT

## 2020-08-20 PROCEDURE — C9113 INJ PANTOPRAZOLE SODIUM, VIA: HCPCS | Performed by: INTERNAL MEDICINE

## 2020-08-20 PROCEDURE — 2580000003 HC RX 258: Performed by: INTERNAL MEDICINE

## 2020-08-20 PROCEDURE — 6370000000 HC RX 637 (ALT 250 FOR IP): Performed by: INTERNAL MEDICINE

## 2020-08-20 PROCEDURE — 6360000004 HC RX CONTRAST MEDICATION

## 2020-08-20 PROCEDURE — 047H3EZ DILATION OF RIGHT EXTERNAL ILIAC ARTERY WITH TWO INTRALUMINAL DEVICES, PERCUTANEOUS APPROACH: ICD-10-PCS | Performed by: INTERNAL MEDICINE

## 2020-08-20 PROCEDURE — 85347 COAGULATION TIME ACTIVATED: CPT

## 2020-08-20 PROCEDURE — C1887 CATHETER, GUIDING: HCPCS

## 2020-08-20 PROCEDURE — 94761 N-INVAS EAR/PLS OXIMETRY MLT: CPT

## 2020-08-20 PROCEDURE — 6360000002 HC RX W HCPCS

## 2020-08-20 PROCEDURE — 85730 THROMBOPLASTIN TIME PARTIAL: CPT

## 2020-08-20 PROCEDURE — 2500000003 HC RX 250 WO HCPCS: Performed by: INTERNAL MEDICINE

## 2020-08-20 PROCEDURE — 80048 BASIC METABOLIC PNL TOTAL CA: CPT

## 2020-08-20 PROCEDURE — 75710 ARTERY X-RAYS ARM/LEG: CPT

## 2020-08-20 PROCEDURE — 85027 COMPLETE CBC AUTOMATED: CPT

## 2020-08-20 PROCEDURE — 6370000000 HC RX 637 (ALT 250 FOR IP): Performed by: FAMILY MEDICINE

## 2020-08-20 PROCEDURE — 37221 HC ILIAC TERRITORY PLASTY STENT: CPT

## 2020-08-20 PROCEDURE — C1876 STENT, NON-COA/NON-COV W/DEL: HCPCS

## 2020-08-20 PROCEDURE — 36415 COLL VENOUS BLD VENIPUNCTURE: CPT

## 2020-08-20 PROCEDURE — 2140000000 HC CCU INTERMEDIATE R&B

## 2020-08-20 PROCEDURE — 047M3ZZ DILATION OF RIGHT POPLITEAL ARTERY, PERCUTANEOUS APPROACH: ICD-10-PCS | Performed by: INTERNAL MEDICINE

## 2020-08-20 PROCEDURE — 2709999900 HC NON-CHARGEABLE SUPPLY

## 2020-08-20 PROCEDURE — 37224 HC FEM POP TERRITORY PLASTY: CPT

## 2020-08-20 PROCEDURE — 6370000000 HC RX 637 (ALT 250 FOR IP)

## 2020-08-20 PROCEDURE — C1894 INTRO/SHEATH, NON-LASER: HCPCS

## 2020-08-20 PROCEDURE — 75774 ARTERY X-RAY EACH VESSEL: CPT

## 2020-08-20 PROCEDURE — C1769 GUIDE WIRE: HCPCS

## 2020-08-20 RX ORDER — MORPHINE SULFATE 2 MG/ML
1 INJECTION, SOLUTION INTRAMUSCULAR; INTRAVENOUS
Status: ACTIVE | OUTPATIENT
Start: 2020-08-20 | End: 2020-08-20

## 2020-08-20 RX ORDER — ACETAMINOPHEN 325 MG/1
650 TABLET ORAL EVERY 4 HOURS PRN
Status: DISCONTINUED | OUTPATIENT
Start: 2020-08-20 | End: 2020-08-24

## 2020-08-20 RX ORDER — SODIUM CHLORIDE 0.9 % (FLUSH) 0.9 %
10 SYRINGE (ML) INJECTION EVERY 12 HOURS SCHEDULED
Status: DISCONTINUED | OUTPATIENT
Start: 2020-08-20 | End: 2020-08-24

## 2020-08-20 RX ORDER — CLOPIDOGREL BISULFATE 75 MG/1
75 TABLET ORAL DAILY
Status: DISCONTINUED | OUTPATIENT
Start: 2020-08-21 | End: 2020-08-22

## 2020-08-20 RX ORDER — HEPARIN SODIUM 10000 [USP'U]/100ML
600 INJECTION, SOLUTION INTRAVENOUS CONTINUOUS
Status: DISCONTINUED | OUTPATIENT
Start: 2020-08-20 | End: 2020-08-22

## 2020-08-20 RX ORDER — GABAPENTIN 100 MG/1
100 CAPSULE ORAL 3 TIMES DAILY
Status: DISCONTINUED | OUTPATIENT
Start: 2020-08-20 | End: 2020-08-24

## 2020-08-20 RX ORDER — SODIUM CHLORIDE 0.9 % (FLUSH) 0.9 %
10 SYRINGE (ML) INJECTION PRN
Status: DISCONTINUED | OUTPATIENT
Start: 2020-08-20 | End: 2020-08-24

## 2020-08-20 RX ORDER — SODIUM CHLORIDE 9 MG/ML
INJECTION, SOLUTION INTRAVENOUS CONTINUOUS
Status: DISCONTINUED | OUTPATIENT
Start: 2020-08-20 | End: 2020-08-22

## 2020-08-20 RX ORDER — SODIUM CHLORIDE 9 MG/ML
INJECTION, SOLUTION INTRAVENOUS CONTINUOUS
Status: DISCONTINUED | OUTPATIENT
Start: 2020-08-20 | End: 2020-08-20

## 2020-08-20 RX ORDER — PROTAMINE SULFATE 10 MG/ML
10 INJECTION, SOLUTION INTRAVENOUS ONCE
Status: COMPLETED | OUTPATIENT
Start: 2020-08-20 | End: 2020-08-20

## 2020-08-20 RX ORDER — ATROPINE SULFATE 0.4 MG/ML
0.5 AMPUL (ML) INJECTION
Status: ACTIVE | OUTPATIENT
Start: 2020-08-20 | End: 2020-08-20

## 2020-08-20 RX ORDER — ASPIRIN 81 MG/1
81 TABLET, CHEWABLE ORAL DAILY
Status: DISCONTINUED | OUTPATIENT
Start: 2020-08-21 | End: 2020-08-24

## 2020-08-20 RX ADMIN — GABAPENTIN 100 MG: 100 CAPSULE ORAL at 20:54

## 2020-08-20 RX ADMIN — PANTOPRAZOLE SODIUM 40 MG: 40 INJECTION, POWDER, FOR SOLUTION INTRAVENOUS at 05:25

## 2020-08-20 RX ADMIN — SODIUM CHLORIDE, PRESERVATIVE FREE 10 ML: 5 INJECTION INTRAVENOUS at 09:10

## 2020-08-20 RX ADMIN — HYDROMORPHONE HYDROCHLORIDE 0.5 MG: 1 INJECTION, SOLUTION INTRAMUSCULAR; INTRAVENOUS; SUBCUTANEOUS at 06:42

## 2020-08-20 RX ADMIN — CILOSTAZOL 50 MG: 50 TABLET ORAL at 09:10

## 2020-08-20 RX ADMIN — FINASTERIDE 5 MG: 5 TABLET, FILM COATED ORAL at 09:10

## 2020-08-20 RX ADMIN — SODIUM CHLORIDE, PRESERVATIVE FREE 10 ML: 5 INJECTION INTRAVENOUS at 09:13

## 2020-08-20 RX ADMIN — PROTAMINE SULFATE 10 MG: 10 INJECTION, SOLUTION INTRAVENOUS at 19:22

## 2020-08-20 RX ADMIN — CILOSTAZOL 50 MG: 50 TABLET ORAL at 20:54

## 2020-08-20 RX ADMIN — LISINOPRIL 5 MG: 5 TABLET ORAL at 09:10

## 2020-08-20 RX ADMIN — SODIUM CHLORIDE, PRESERVATIVE FREE 10 ML: 5 INJECTION INTRAVENOUS at 09:14

## 2020-08-20 RX ADMIN — CIPROFLOXACIN HYDROCHLORIDE 500 MG: 500 TABLET, FILM COATED ORAL at 20:54

## 2020-08-20 RX ADMIN — SODIUM CHLORIDE, PRESERVATIVE FREE 10 ML: 5 INJECTION INTRAVENOUS at 22:12

## 2020-08-20 RX ADMIN — SODIUM CHLORIDE: 9 INJECTION, SOLUTION INTRAVENOUS at 22:12

## 2020-08-20 RX ADMIN — TAMSULOSIN HYDROCHLORIDE 0.4 MG: 0.4 CAPSULE ORAL at 20:54

## 2020-08-20 RX ADMIN — HEPARIN SODIUM 600 UNITS/HR: 10000 INJECTION, SOLUTION INTRAVENOUS at 21:20

## 2020-08-20 RX ADMIN — ASPIRIN 81 MG: 81 TABLET, COATED ORAL at 09:10

## 2020-08-20 RX ADMIN — HYDROMORPHONE HYDROCHLORIDE 0.5 MG: 1 INJECTION, SOLUTION INTRAMUSCULAR; INTRAVENOUS; SUBCUTANEOUS at 13:53

## 2020-08-20 RX ADMIN — GABAPENTIN 100 MG: 100 CAPSULE ORAL at 11:06

## 2020-08-20 RX ADMIN — ATORVASTATIN CALCIUM 20 MG: 20 TABLET, FILM COATED ORAL at 20:54

## 2020-08-20 RX ADMIN — METOPROLOL SUCCINATE 25 MG: 25 TABLET, EXTENDED RELEASE ORAL at 09:10

## 2020-08-20 RX ADMIN — CIPROFLOXACIN HYDROCHLORIDE 500 MG: 500 TABLET, FILM COATED ORAL at 09:10

## 2020-08-20 ASSESSMENT — PAIN SCALES - GENERAL
PAINLEVEL_OUTOF10: 8
PAINLEVEL_OUTOF10: 0
PAINLEVEL_OUTOF10: 10

## 2020-08-20 NOTE — PROGRESS NOTES
Daily Progress Note     awake alert  significant right foot paint today  Heart rate is stable and BP stable  Sinus rate   Severe right foot pain  Right foot is cooler  Will plan to intervene on right leg today due to ongoing ischemic pain -keep on AC and antiplatelets  Possible right arm and right foot access      KAYLEY-4/20   Summary   Left ventricular systolic function is low normal.   Ejection fraction is visually estimated at 45%.   No evidence of thrombus within the left atrial appendage.   Negative bubble study; no ASD or PFO noted.   Lambl's excrescence noted on the aortic valve.   Mild to moderate aortic regurgitation.   Slight prolapse of the anterior mitral valve leaflet.   Calcified chordae tendineae near the mitral valve leaflet tips.   No evidence of any pericardial effusion.     PAST MEDICAL HISTORY:  COPD, prostatitis, and CVA.     PAST SURGICAL HISTORY:  Hernia repair.     SOCIAL HISTORY:  The patient smokes half pack a day.  Does not drink.     FAMILY HISTORY:  Noncontributory.     ALLERGIES:  No known drug allergies.     HOME MEDICATIONS:  The patient was on aspirin 81 mg daily, Lipitor 80 mg  at bedtime, lisinopril 5 mg daily, Proscar, Flomax, and Plavix 75 mg  daily.       Objective:   /61   Pulse 79   Temp 98 °F (36.7 °C) (Oral)   Resp 16   Ht 5' 7.5\" (1.715 m)   Wt 101 lb 4 oz (45.9 kg)   SpO2 91%   BMI 15.62 kg/m²       Intake/Output Summary (Last 24 hours) at 8/20/2020 1045  Last data filed at 8/20/2020 0546  Gross per 24 hour   Intake --   Output 1550 ml   Net -1550 ml       Medications:   Scheduled Meds:   gabapentin  100 mg Oral TID    cilostazol  50 mg Oral BID    rivaroxaban  10 mg Oral Daily    sodium chloride flush  10 mL Intravenous 2 times per day    ciprofloxacin  500 mg Oral 2 times per day    atorvastatin  20 mg Oral Nightly    metoprolol succinate  25 mg Oral Daily    aspirin  81 mg Oral Daily    finasteride  5 mg Oral Daily    lisinopril  5 mg Oral Daily  tamsulosin  0.4 mg Oral Daily    sodium chloride flush  10 mL Intravenous 2 times per day    pantoprazole  40 mg Intravenous Daily    nicotine  1 patch Transdermal Daily    sodium chloride flush  10 mL Intravenous 2 times per day      Infusions:     PRN Meds:  HYDROmorphone **OR** HYDROmorphone, sodium chloride flush, acetaminophen, sodium chloride flush, [DISCONTINUED] acetaminophen **OR** acetaminophen, polyethylene glycol, promethazine **OR** ondansetron, sodium chloride flush       Physical Exam:  Vitals:    08/20/20 1004   BP: 126/61   Pulse: 79   Resp: 16   Temp: 98 °F (36.7 °C)   SpO2: 91%        General: awake alert   Chest: Nontender  Cardiac: sinus   Lungs:Clear to auscultation and percussion. Abdomen: Soft, NT, ND, +BS  Extremities: right foot cooler   Vascular:  Equal 2+ peripheral pulses. Lab Data:  CBC:   Recent Labs     08/19/20  0527 08/20/20  0517   WBC 8.1 7.2   HGB 13.0* 13.8   HCT 40.3* 43.9   MCV 90.6 90.9    344     BMP:   Recent Labs     08/20/20  0517      K 4.7      CO2 28   BUN 13   CREATININE 0.7*     LIVER PROFILE: No results for input(s): AST, ALT, LIPASE, BILIDIR, BILITOT, ALKPHOS in the last 72 hours. Invalid input(s): AMYLASE,  ALB  PT/INR: No results for input(s): PROTIME, INR in the last 72 hours. APTT: No results for input(s): APTT in the last 72 hours. BNP:  No results for input(s): BNP in the last 72 hours.       Assessment:  Patient Active Problem List    Diagnosis Date Noted    Severe malnutrition ([address] 08/20/2020    Femoral artery occlusion, right ([address] 08/13/2020    Acute encephalopathy 08/13/2020    Hemiparesis of left nondominant side as late effect of cerebral infarction ([address]     Dysphagia due to recent stroke     Dysarthria due to acute stroke ([address]     Gait disturbance     Simple chronic bronchitis (HCC)     Urinary tract infection associated with indwelling urethral catheter (HCC)     Acute cystitis without hematuria

## 2020-08-20 NOTE — PROGRESS NOTES
[facility] [address] ([address] [phone][facility] Pt still in cath lab with a transfer order.

## 2020-08-20 NOTE — PROGRESS NOTES
Daily    nicotine  1 patch Transdermal Daily    sodium chloride flush  10 mL Intravenous 2 times per day      Infusions:   PRN Meds: HYDROmorphone, 0.25 mg, Q3H PRN    Or  HYDROmorphone, 0.5 mg, Q3H PRN  sodium chloride flush, 10 mL, PRN  acetaminophen, 650 mg, Q4H PRN  sodium chloride flush, 10 mL, PRN  acetaminophen, 650 mg, Q6H PRN  polyethylene glycol, 17 g, Daily PRN  promethazine, 12.5 mg, Q6H PRN    Or  ondansetron, 4 mg, Q6H PRN  sodium chloride flush, 10 mL, PRN      Subjective:   States toes hurt    Objective: Intake/Output Summary (Last 24 hours) at 8/20/2020 0955  Last data filed at 8/20/2020 0546  Gross per 24 hour   Intake --   Output 1550 ml   Net -1550 ml      Vitals:   Vitals:    08/19/20 2045   BP: (!) 186/74   Pulse: 72   Resp: 16   Temp: 98 °F (36.7 °C)   SpO2: 97%     Physical Exam:   Gen:  awake, alert, no apparent distress  Head/Eyes:  Normocephalic atraumatic, EOMI   NECK:   symmetrical, trachea midline  LUNGS: Normal Effort   CARDIOVASCULAR:  Normal rate  ABDOMEN:  non distended  MUSCULOSKELETAL:  ROM WNL  NEUROLOGIC: Alert and Oriented,  Cranial nerves II-XII are grossly intact.    SKIN:  Right toes tender to palpation, purple, cool to touch      Data:       CBC   Recent Labs     08/19/20  0527 08/20/20  0517   WBC 8.1 7.2   HGB 13.0* 13.8   HCT 40.3* 43.9    344      BMP   Recent Labs     08/20/20  0517      K 4.7      CO2 28   BUN 13   CREATININE 0.7*         Electronically signed by Kim Alejandro MD on 8/20/2020 at 9:55 AM

## 2020-08-20 NOTE — DISCHARGE INSTR - COC
Continuity of Care Form    Patient Name: Steven Rodgers   :  1935  MRN:  [id number]    Admit date:  2020  Discharge date:  20    Code Status Order: Full Code   Advance Directives:   [facility] Documentation     Date/Time Healthcare Directive Type of Healthcare Directive Copy in [facility] [address] Agent's Name Healthcare Agent's Phone Number    20 1037  No, patient does not have an advance directive for healthcare treatment -- -- -- -- --          Admitting Physician:  Michael Minaya MD  PCP: No primary care provider on file. Discharging Nurse: Andrey Paget RN  [address] Unit/Room#: 4105/4105-A  Discharging Unit Phone Number: [phone]    Emergency Contact:   Extended Emergency Contact Information  [facility] Phone: [phone]  Mobile Phone: [phone]  Relation: Niece/Nephew  Secondary Emergency Contact: [address] [facility] [place] [address] Phone: [phone]  Mobile Phone: [phone]  Relation: Niece/Nephew  Preferred language: English   needed? No    Past Surgical History:  Past Surgical History:   Procedure Laterality Date    HERNIA REPAIR         Immunization History: There is no immunization history on file for this patient.     Active Problems:  Patient Active Problem List   Diagnosis Code    Dizziness R42    Acute cerebrovascular accident (CVA) ([facility] [address] I63.9    Hemiparesis of left nondominant side as late effect of cerebral infarction ([address] I69.354    Dysphagia due to recent stroke I69.391    Dysarthria due to acute stroke ([address] I63.9, R47.1    Gait disturbance R26.9    Simple chronic bronchitis (HCC) J41.0    Urinary tract infection associated with indwelling urethral catheter (HCC) T83.511A, N39.0    Acute cystitis without hematuria N30.00    Femoral artery occlusion, 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    Acute encephalopathy G93.40       Isolation/Infection:   Isolation          No Isolation Patient Infection Status     Infection Onset Added Last Indicated Last Indicated By Review Planned Expiration Resolved Resolved By    None active    Resolved    COVID-19 Rule Out 08/17/20 08/17/20 08/17/20 Covid-19 Ambulatory (Ordered)   08/18/20 Rule-Out Test Resulted          Nurse Assessment:  Last Vital Signs: BP (!) 186/74   Pulse 72   Temp 98 °F (36.7 °C) (Oral)   Resp 16   Ht 5' 7.5\" (1.715 m)   Wt 101 lb 4 oz (45.9 kg)   SpO2 97%   BMI 15.62 kg/m²     Last documented pain score (0-10 scale): Pain Level: 10  Last Weight:   Wt Readings from Last 1 Encounters:   08/18/20 101 lb 4 oz (45.9 kg)     Mental Status:  disoriented    IV Access:  - None    Nursing Mobility/ADLs:  Walking   Assisted  Transfer  Assisted  Bathing  Assisted  Dressing  Assisted  Toileting  Assisted  Feeding  Independent  Med Admin  Assisted  Med Delivery   whole    Wound Care Documentation and Therapy:  Wound 04/20/20 Coccyx Proximal friction shear red blanchable (Active)   Number of days: 121       Wound 08/17/20 Arm Lower;Right;Posterior dime size skin tare (Active)   Wound Skin Tear 08/17/20 1044   Dressing Status Clean;Dry; Intact 08/19/20 1900   Dressing Changed Changed/New 08/17/20 1044   Wound Cleansed Rinsed/Irrigated with saline 08/17/20 1044   Dressing Change Due 08/20/20 08/19/20 0014   Number of days: 2        Elimination:  Continence:   · Bowel:  Yes  · Bladder: Yes  Urinary Catheter: None   Colostomy/Ileostomy/Ileal Conduit: No       Date of Last BM: 8/25/20    Intake/Output Summary (Last 24 hours) at 8/20/2020 0759  Last data filed at 8/20/2020 0546  Gross per 24 hour   Intake --   Output 1550 ml   Net -1550 ml     I/O last 3 completed shifts:  In: -   Out: 6059 [Urine:1550]    Safety Concerns:     History of Falls (last 30 days) and At Risk for Falls    Impairments/Disabilities:      None        Patient's personal belongings (please select all that are sent with patient):  Jase    RN SIGNATURE:  Electronically signed by Jayla Barker RN on 8/27/20 at 6:07 PM EDT    CASE MANAGEMENT/SOCIAL WORK SECTION    Inpatient Status Date: ***    Readmission Risk Assessment Score:  Readmission Risk              Risk of Unplanned Readmission:        20           Discharging to Facility/ Agency   · Name:   · Address:  · Phone:  · Fax:    Dialysis Facility (if applicable)   · Name:  · Address:  · Dialysis Schedule:  · Phone:  · Fax:    / signature: {Esignature:[id number]}    PHYSICIAN SECTION    Nutrition Therapy:  Current Nutrition Therapy:   - Oral Diet:  Cardiac    Routes of Feeding: Oral  Liquids: No Restrictions  Daily Fluid Restriction: no  Last Modified Barium Swallow with Video (Video Swallowing Test): not done    Treatments at the Time of Hospital Discharge:   Respiratory Treatments: prn  Oxygen Therapy:  is not on home oxygen therapy. Ventilator:  - No ventilator support    Rehab Therapies: Physical Therapy, Occupational Therapy and Speech/Language Therapy  Weight Bearing Status/Restrictions: No weight bearing restirctions  Other Medical Equipment (for information only, NOT a DME order):  {EQUIPMENT:[id number]}  Other Treatments: none    Prognosis: Good    Condition at Discharge: Stable    Rehab Potential (if transferring to Rehab): Good    Recommended Labs or Other Treatments After Discharge: monitor his worsening dementia. Is a DNRCCA here and I would recommend continuation of that code status. Consider including hospice conversations incase his health declines. Physician Certification: I certify the above information and transfer of Steven Rodgers  is necessary for the continuing treatment of the diagnosis listed and that he requires [place] for greater 30 days.      Update Admission H&P: No change in H&P    PHYSICIAN SIGNATURE:  Electronically signed by Danilo Sanchez DO on 8/27/20 at 2:34 PM EDT

## 2020-08-20 NOTE — PROGRESS NOTES
Comprehensive Nutrition Assessment    Type and Reason for Visit:  Initial(los 7)    Nutrition Recommendations/Plan:   · Continue current diet  · Begin standard and frozen oral nutrition supplements, between meals  · Please record all po intake    Nutrition Assessment:  Pt admitted with PVD and acute metabolic encephalopathy exacerbated 2/2 UTI likely. H/O CVA on dysphagia diet per SLP. Fairly adequate intake here, consuming 76% to all of majority of meals with assistance. Pt has lost additional wt since last seen by this RD, and now meets criteria for severe malnutrition, will update problem list.    Malnutrition Assessment:  Malnutrition Status:  Severe malnutrition    Context:  Chronic Illness     Findings of the 6 clinical characteristics of malnutrition:  Energy Intake:  Mild decrease in energy intake  Weight Loss:   Mild weight loss   Body Fat Loss:  Severe body fat loss Orbital   Muscle Mass Loss:  Severe muscle mass loss Temples (temporalis)  Fluid Accumulation:  No significant fluid accumulation Extremities   Strength:   Not measured    Estimated Daily Nutrient Needs:  Energy (kcal):  9833-3785 (30-35 kcal/kg)   Protein (g):  55-64 (1.2-1.4 g/kg)   Fluid (ml/day):  2563-9854 (1 ml/kcal)     Wounds:  Skin Tears       Current Nutrition Therapies:    DIET CARDIAC;  Dysphagia Minced and Moist    Anthropometric Measures:  · Height: 5' 7.5\" (171.5 cm)  · Current Body Weight: 101 lb 4 oz (45.9 kg)   · Admission Body Weight: 103 lb 6.3 oz (46.9 kg)    · Usual Body Weight: 109 lb 8 oz (49.7 kg)(4/10/20)     · Ideal Body Weight: 151 lbs; % Ideal Body Weight 67.1 %   · BMI: 15.6  · BMI Categories: Underweight (BMI less than 22) age over 72       Nutrition Diagnosis:   · Severe malnutrition, In context of chronic illness related to pain, inadequate protein-energy intake as evidenced by BMI, weight loss, severe loss of subcutaneous fat, severe muscle loss    Nutrition Interventions:   Food and/or Nutrient Delivery:  Continue Current Diet, Start Oral Nutrition Supplement  Nutrition Education/Counseling:  No recommendation at this time   Coordination of Nutrition Care:  Continued Inpatient Monitoring, Feeding Assistance/Environment Change, Speech Therapy    Goals:  Patient will consume at least 50-75% at three meals each day to prevent additional weight loss during stay       Nutrition Monitoring and Evaluation:   Food/Nutrient Intake Outcomes:  Food and Nutrient Intake, Supplement Intake  Physical Signs/Symptoms Outcomes:  Biochemical Data, Chewing or Swallowing, Meal Time Behavior, Nutrition Focused Physical Findings, Skin, Weight     Discharge Planning:    Continue Oral Nutrition Supplement, Continue current diet     Electronically signed by Doug Morales RD, LD on 8/20/20 at 9:59 AM EDT    Contact: [phone]

## 2020-08-20 NOTE — PROCEDURES
[address] [place] [address], [address] [place] [address]                            CARDIAC CATHETERIZATION    PATIENT NAME: Oscar Franco                    :        1935  MED REC NO:   [id number]                          ROOM:  ACCOUNT NO:   [de-identified]                           ADMIT DATE: 2020  PROVIDER:     Susy Farrell MD    DATE OF PROCEDURE:  2020    PROCEDURE:  Peripheral angiogram.    This is an 58-year-old male patient brought to the cath lab today. Informed consent was obtained from the patient. The patient was prepped  and draped in a sterile fashion. The patient was injected with 20 mL of  2% lidocaine in the left femoral artery region. Using a micropuncture  needle, the left femoral artery was canalized and a 4-Croat[place] sheath was  placed in the left femoral artery. The entire procedure was done using  guidewire. The sheath was flushed in between the procedures. Pelvic angiogram was performed. Pelvic angiogram shows distal abdominal  aorta is patent and right common iliac artery is patent, right external  iliac artery has 80% stenosis present and right iliac artery has mild  disease present. The right common femoral artery is occluded 100%. Left common iliac artery is patent. Left external iliac artery has 80%  stenosis present. The catheter was placed into the pelvis and runoff was performed  bilaterally and it shows that the bilateral common femoral arteries are  patent with significant disease. Bilateral profundas are patent. Right  SFA is occluded. Left SFA is occluded. Both of them fills collateral  circulations distally, especially the left side fills into popliteal  artery. Left popliteal artery is patent. Right popliteal artery has  diffuse disease present also. There is a 2-vessel runoff noted in the  left leg, AT and PT. On the right side, AT and PT are filling the foot  also.   There is a 2-vessel runoff present bilaterally to both feet. IMPRESSION:  1. Distal abdominal aorta is patent. 2.  Right common iliac artery is patent and right external iliac artery  has 80% stenosis noted. Right common femoral artery has an 80% stenosis  noted. Right SFA is occluded. Right SFA does not fill distally also. Right popliteal artery also is diffusely diseased. It very faintly  fills with the collateral circulation, but right AT and PT filling the  foot. Left common iliac artery is patent. Left external iliac artery  has 80% stenosis noted. Left profunda is patent. Left SFA is 100%  occluded and left SFA fills all the way into the popliteal artery. Left  popliteal artery is patent and there is a 2-vessel runoff noted below  the knee in the left foot. 3.  The patient has significant severe vascular disease noted, in-flow  disease present. Outflow below the knee is patent. I think at this time, bypass will be a best option, but due to his  comorbid conditions and his overall medical condition, I would just  recommend antiplatelets and anticoagulants. We will place the patient  on aspirin, Pletal and Xarelto at this time. I think that is probably  would be long-term treatment. I think if the patient's situation  improves, maybe we will consider doing the intervention endovascularly  in the future of the right leg. The patient tolerated the procedure  well. No complication noted.         Monie Yan MD    D: 08/19/2020 12:33:45       T: 08/19/2020 13:54:41     [id number]  Job#: [id number]     Doc#: [id number]    CC:

## 2020-08-20 NOTE — PROGRESS NOTES
Received pt from cath lab. Initially drowsy, then awake. Alert and oriented. Denies pain unless right foot is touched. Pt has Right brachial  arterial sheath. Site bruised without bleeding or hematoma. Pt also has right pedal dressing that is dry and intact. No bleeding or hematoma. Right foot remains cool , dusky, and tender. BLE pulses verified with doppler. See post cath flow-sheets. No distress noted.  Pt has bruising BUE and small scabbed areas right 4th and 5th toe, otherwise no skin breakdown as verified by this nurse and Alvaro Karimi RN

## 2020-08-20 NOTE — PROGRESS NOTES
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 But now stating that his leg hurts all the way to his thigh. Called hospitalist came to evaluate and is placing pain med order.   Theresa Cavazos called back and stated to continue to monitor and give pain meds accordingly

## 2020-08-21 LAB
ANION GAP SERPL CALCULATED.3IONS-SCNC: 7 MMOL/L (ref 4–16)
APTT: 46.6 SECONDS (ref 25.1–37.1)
APTT: 50.4 SECONDS (ref 25.1–37.1)
APTT: 55.1 SECONDS (ref 25.1–37.1)
BUN BLDV-MCNC: 19 MG/DL (ref 6–23)
CALCIUM SERPL-MCNC: 8.1 MG/DL (ref 8.3–10.6)
CHLORIDE BLD-SCNC: 99 MMOL/L (ref 99–110)
CO2: 25 MMOL/L (ref 21–32)
CREAT SERPL-MCNC: 0.7 MG/DL (ref 0.9–1.3)
GFR AFRICAN AMERICAN: >60 ML/MIN/1.73M2
GFR NON-AFRICAN AMERICAN: >60 ML/MIN/1.73M2
GLUCOSE BLD-MCNC: 95 MG/DL (ref 70–99)
HCT VFR BLD CALC: 37 % (ref 42–52)
HEMOGLOBIN: 12.1 GM/DL (ref 13.5–18)
INR BLD: 1.19 INDEX
INR BLD: 1.2 INDEX
INR BLD: 1.25 INDEX
MCH RBC QN AUTO: 29.3 PG (ref 27–31)
MCHC RBC AUTO-ENTMCNC: 32.7 % (ref 32–36)
MCV RBC AUTO: 89.6 FL (ref 78–100)
PDW BLD-RTO: 15 % (ref 11.7–14.9)
PLATELET # BLD: 298 K/CU MM (ref 140–440)
PMV BLD AUTO: 10.4 FL (ref 7.5–11.1)
POTASSIUM SERPL-SCNC: 4.2 MMOL/L (ref 3.5–5.1)
PROTHROMBIN TIME: 14.4 SECONDS (ref 11.7–14.5)
PROTHROMBIN TIME: 14.5 SECONDS (ref 11.7–14.5)
PROTHROMBIN TIME: 15.1 SECONDS (ref 11.7–14.5)
RBC # BLD: 4.13 M/CU MM (ref 4.6–6.2)
SODIUM BLD-SCNC: 131 MMOL/L (ref 135–145)
WBC # BLD: 8.4 K/CU MM (ref 4–10.5)

## 2020-08-21 PROCEDURE — 6360000002 HC RX W HCPCS: Performed by: INTERNAL MEDICINE

## 2020-08-21 PROCEDURE — 6370000000 HC RX 637 (ALT 250 FOR IP): Performed by: INTERNAL MEDICINE

## 2020-08-21 PROCEDURE — 2140000000 HC CCU INTERMEDIATE R&B

## 2020-08-21 PROCEDURE — 36415 COLL VENOUS BLD VENIPUNCTURE: CPT

## 2020-08-21 PROCEDURE — 6370000000 HC RX 637 (ALT 250 FOR IP)

## 2020-08-21 PROCEDURE — 85610 PROTHROMBIN TIME: CPT

## 2020-08-21 PROCEDURE — 97116 GAIT TRAINING THERAPY: CPT

## 2020-08-21 PROCEDURE — 97530 THERAPEUTIC ACTIVITIES: CPT

## 2020-08-21 PROCEDURE — 97535 SELF CARE MNGMENT TRAINING: CPT

## 2020-08-21 PROCEDURE — 2580000003 HC RX 258: Performed by: INTERNAL MEDICINE

## 2020-08-21 PROCEDURE — 80048 BASIC METABOLIC PNL TOTAL CA: CPT

## 2020-08-21 PROCEDURE — 94761 N-INVAS EAR/PLS OXIMETRY MLT: CPT

## 2020-08-21 PROCEDURE — 85027 COMPLETE CBC AUTOMATED: CPT

## 2020-08-21 PROCEDURE — C9113 INJ PANTOPRAZOLE SODIUM, VIA: HCPCS | Performed by: INTERNAL MEDICINE

## 2020-08-21 PROCEDURE — 97166 OT EVAL MOD COMPLEX 45 MIN: CPT

## 2020-08-21 PROCEDURE — 97162 PT EVAL MOD COMPLEX 30 MIN: CPT

## 2020-08-21 PROCEDURE — 85730 THROMBOPLASTIN TIME PARTIAL: CPT

## 2020-08-21 RX ADMIN — SODIUM CHLORIDE, PRESERVATIVE FREE 10 ML: 5 INJECTION INTRAVENOUS at 06:41

## 2020-08-21 RX ADMIN — CIPROFLOXACIN HYDROCHLORIDE 500 MG: 500 TABLET, FILM COATED ORAL at 11:39

## 2020-08-21 RX ADMIN — TAMSULOSIN HYDROCHLORIDE 0.4 MG: 0.4 CAPSULE ORAL at 20:38

## 2020-08-21 RX ADMIN — PANTOPRAZOLE SODIUM 40 MG: 40 INJECTION, POWDER, FOR SOLUTION INTRAVENOUS at 06:40

## 2020-08-21 RX ADMIN — LISINOPRIL 5 MG: 5 TABLET ORAL at 11:32

## 2020-08-21 RX ADMIN — CIPROFLOXACIN HYDROCHLORIDE 500 MG: 500 TABLET, FILM COATED ORAL at 20:38

## 2020-08-21 RX ADMIN — CILOSTAZOL 50 MG: 50 TABLET ORAL at 20:38

## 2020-08-21 RX ADMIN — SODIUM CHLORIDE, PRESERVATIVE FREE 10 ML: 5 INJECTION INTRAVENOUS at 20:44

## 2020-08-21 RX ADMIN — FINASTERIDE 5 MG: 5 TABLET, FILM COATED ORAL at 11:31

## 2020-08-21 RX ADMIN — SODIUM CHLORIDE, PRESERVATIVE FREE 10 ML: 5 INJECTION INTRAVENOUS at 20:38

## 2020-08-21 RX ADMIN — CLOPIDOGREL BISULFATE 75 MG: 75 TABLET ORAL at 11:31

## 2020-08-21 RX ADMIN — GABAPENTIN 100 MG: 100 CAPSULE ORAL at 20:37

## 2020-08-21 RX ADMIN — CILOSTAZOL 50 MG: 50 TABLET ORAL at 11:32

## 2020-08-21 RX ADMIN — GABAPENTIN 100 MG: 100 CAPSULE ORAL at 16:41

## 2020-08-21 RX ADMIN — GABAPENTIN 100 MG: 100 CAPSULE ORAL at 11:31

## 2020-08-21 RX ADMIN — ATORVASTATIN CALCIUM 20 MG: 20 TABLET, FILM COATED ORAL at 20:38

## 2020-08-21 RX ADMIN — ASPIRIN 81 MG CHEWABLE TABLET 81 MG: 81 TABLET CHEWABLE at 11:32

## 2020-08-21 RX ADMIN — METOPROLOL SUCCINATE 25 MG: 25 TABLET, EXTENDED RELEASE ORAL at 11:31

## 2020-08-21 NOTE — PLAN OF CARE
Problem: Falls - Risk of:  Goal: Will remain free from falls  Description: Will remain free from falls  8/20/2020 2334 by Carlita Hurley RN  Outcome: Ongoing  8/20/2020 1917 by Kiersten Peacock RN  Outcome: Ongoing  8/20/2020 1337 by Henry Apple RN  Outcome: Ongoing  Goal: Absence of physical injury  Description: Absence of physical injury  8/20/2020 2334 by Carlita Hurley RN  Outcome: Ongoing  8/20/2020 1917 by Kiersten Peacock RN  Outcome: Ongoing  8/20/2020 1337 by Henry Apple RN  Outcome: Ongoing     Problem: Skin Integrity:  Goal: Will show no infection signs and symptoms  Description: Will show no infection signs and symptoms  8/20/2020 2334 by Carlita Hurley RN  Outcome: Ongoing  8/20/2020 1917 by Kiersten Peacock RN  Outcome: Ongoing  8/20/2020 1337 by Henry Apple RN  Outcome: Ongoing  Goal: Absence of new skin breakdown  Description: Absence of new skin breakdown  8/20/2020 2334 by Carlita Hurley RN  Outcome: Ongoing  8/20/2020 1917 by Kiersten Peacock RN  Outcome: Ongoing  8/20/2020 1337 by Henry Apple RN  Outcome: Ongoing     Problem: Coping:  Goal: Ability to remain calm will improve  Description: Ability to remain calm will improve  8/20/2020 2334 by Carlita Hurley RN  Outcome: Ongoing  8/20/2020 1917 by Kiersten Peacock RN  Outcome: Ongoing  8/20/2020 1337 by Henry Apple RN  Outcome: Ongoing     Problem: Safety:  Goal: Ability to remain free from injury will improve  Description: Ability to remain free from injury will improve  8/20/2020 2334 by Carlita Hurley RN  Outcome: Ongoing  8/20/2020 1917 by Kiersten Peacock RN  Outcome: Ongoing  8/20/2020 1337 by Henry Apple RN  Outcome: Ongoing     Problem: Self-Care:  Goal: Ability to participate in self-care as condition permits will improve  Description: Ability to participate in self-care as condition permits will improve  8/20/2020 2334 by Carlita Hurley RN  Outcome: Ongoing  8/20/2020 1917 by Kiersten Peacock RN  Outcome: Ongoing  8/20/2020 1337 by Mykel Morales RN  Outcome: Ongoing     Problem: Restraint Use - Nonviolent/Non-Self-Destructive Behavior:  Goal: Absence of restraint indications  Description: Absence of restraint indications  8/20/2020 2334 by Aarti Schulte RN  Outcome: Ongoing  8/20/2020 1917 by Aubrey Morejon RN  Outcome: Ongoing  8/20/2020 1337 by Mykel Morales RN  Outcome: Ongoing  Goal: Absence of restraint-related injury  Description: Absence of restraint-related injury  8/20/2020 2334 by Aarti Schulte RN  Outcome: Ongoing  8/20/2020 1917 by Aubrey Morejon RN  Outcome: Ongoing  8/20/2020 1337 by Mykel Morales RN  Outcome: Ongoing

## 2020-08-21 NOTE — CONSULTS
[address], 1935, 3103/3103-A, 8/21/2020    Discharge Recommendation: [facility]      History:  [place]:  The primary encounter diagnosis was Altered mental status, unspecified altered mental status type. Diagnoses of Femoral artery occlusion, right (HCC) and Blue toes were also pertinent to this visit. Subjective:  Patient states: Agreeable to therapy. Pain: Pt denied pain this date  Communication with other providers: PT, RN, LSW  Restrictions: General Precautions, Fall Risk    Home Setup/Prior level of function:    Lives With: Alone  Type of Home: Apartment  Home Layout: One level (lives on 7th floor of 9 floors)  Home Access: Elevator  Bathroom Shower/Tub: Tub/Shower unit, Shower chair with back  Bathroom Toilet: [id number] [place] [address] 83: Grab bars in shower  Bathroom Accessibility: Walker accessible  Home Equipment: Cane(Uses cane for fxl mobility), walker (4WW, RW, standard)  Receives Help From: Friend(s) (a girl within the apartment building assists with IADLs)  ADL Assistance: Independent  Homemaking Assistance: Needs assistance  Homemaking Responsibilities: Yes  Ambulation Assistance: Independent  Transfer Assistance: Independent  Active : No  Patient's  Info: Hired help or friends to drive to [employer]  Education: Bachelors degree  Occupation: Retired  Type of occupation: (Pt reported getting his degree in history, working in the education field, 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 He appeared very knowledgable about music and music theory. )  IADL Comments: Neighbors/friends assist with IADLs. (+) med mgmt  Additional Comments: Pt sleeps on flat bed at baseline  **Taken from eval on 4/11/2020. Updated this date 8/21/2020. **    Examination:  · Observation: Supine from EOB with RW). · Ambulation: Eddie (ambulated HH distance to bathroom for toileting with RW, ambulated approx 100ft in hallway with [facility] for upright posture and during turns, slow pace throughout). AM-PAC 6 click short form for inpatient daily activity:   How much help from another person does the patient currently need. .. Unable  Dep A Lot  Max A A Lot   Mod A A Little  Min A A Little   CGA  SBA None   Mod I  Indep  Sup   1. Putting on and taking off regular lower body clothing? [] 1    [] 2   [] 2   [x] 3   [] 3   [] 4      2. Bathing (including washing, rinsing, drying)? [] 1   [] 2   [] 2 [x] 3 [] 3 [] 4   3. Toileting, which includes using toilet, bedpan, or urinal? [] 1    [] 2   [] 2   [x] 3   [] 3   [] 4     4. Putting on and taking off regular upper body clothing? [] 1   [] 2   [] 2   [] 3   [x] 3    [] 4      5. Taking care of personal grooming such as brushing teeth? [] 1   [] 2    [] 2 [] 3    [x] 3   [] 4      6. Eating meals? [] 1   [] 2   [] 2   [] 3   [] 3   [x] 4      Raw Score:  19     [24=0% impaired(CH), 23=1-19%(CI), 20-22=20-39%(CJ), 15-19=40-59%(CK), 10-14=60-79%(CL), 7-9=80-99%(CM), 6=100%(CN)]     Treatment:  Self Care Training:   Cues were given for safety, sequence, UE/LE placement, visual cues, and balance. Activities performed today included dressing, toileting, hand hygiene, and grooming. Therapeutic Activity Training:   Therapeutic activity training was instructed today. Cues were given for safety, sequence, UE/LE placement, awareness, and balance. Activities performed today included bed mobility training, sup-sit, sit-stand, ambulation.      Safety Measures: Gait belt used, Left in chair, Alarm in place    Assessment:  Assessment  Performance deficits / Impairments: Decreased functional mobility , Decreased balance, Decreased high-level IADLs, Decreased ADL status, Decreased strength, Decreased posture  Treatment Diagnosis: Femoral Artery Occlusion R  Prognosis: Good  Decision Making: Medium Complexity  REQUIRES OT FOLLOW UP: Yes  Discharge Recommendations: [address]     Pt is an 81year old M admitted for femoral artery occlusion R. Pt reports that prior to admission he was independent in ADLs, required assist for IADLs, and was Pablo for mobility. This date, pt demo decreased cognition, decrease ADL status and transfer ability/ balance despite utilization of RW. Pt is currently functioning below baseline and would benefit from skilled OT services at SNF. Plan:  Plan  Times per week: 2+  Times per day: Daily      Goals:  1. Pt will complete all aspects of bed mobility for EOB/OOB ADLs with supervision. 2. Pt will complete UB/LB bathing with SBA. 3. Pt will complete all aspects of LB dressing with SBA. 4. Pt will complete all functional transfers to and from bed, chair, toilet, shower chair with SBA and RW.   5. Pt will ambulate HH distance to bathroom for toileting with SBA and RW. 6. Pt will complete all aspects of toileting task with SBA. 7. Pt will complete oral hygiene/grooming routine in standing at sink with SBA demo good dynamic standing balance for approx 8 minutes. 8. Pt will complete ther ex/ther act with focus on UB strengthening. Time:   Time in: 1006  Time out: 1045  Timed treatment minutes: 25  Total time: 39      Electronically signed by:       SHEMAR Robins/L, [address] [place] [id number]

## 2020-08-21 NOTE — PROGRESS NOTES
Hospitalist Progress Note      Name:  Berny Davidson /Age/Sex: 1935  (81 y.o. male)   MRN & CSN:  [id number] & [id number] Admission Date/Time: 2020 11:07 AM   Location:  [address] [place] [address]- PCP: No primary care provider on file. Berny Davidson is a 81 y.o.  male  who presents with Altered Mental Status      Assessment and Plan:   PAD  - NPO for CFA Atherectomy today  - s/p peripheral angio , med mx recommended with severe disease and poor bypass candidate  - s/p angio : right external iliac stent placed  - heparin gtt  - CTA runoff noted  - heparin gtt off  - asa, statin, pletal, xarelto  - pain mx prn, add neurontin  - NV checks  - consult cardio       Acute Metabolic encephalopathy exacerbated 2/2 UTI likely, also likely has underlying dementia   - resolved and stable now  - CT head: non acute, old infarct noted   - start IV Rocephin, changed to oral cipro  - check urine cx +providencia rettgeri  - SLP done'  - avoid sedative meds     HTN  - continue home meds           consult palliative care for code status, goals and family support.  DNR CCA           - COPD  - Tobacco abuse - on Nicotine patch, tobacco cessation education.  - CVA   - gait disturbance  - Prostatitis            Diet Diet NPO Effective Now   Code Status Full Code     Medications:   Medications:    gabapentin  100 mg Oral TID    sodium chloride flush  10 mL Intravenous 2 times per day    aspirin  81 mg Oral Daily    clopidogrel  75 mg Oral Daily    cilostazol  50 mg Oral BID    rivaroxaban  10 mg Oral Daily    sodium chloride flush  10 mL Intravenous 2 times per day    ciprofloxacin  500 mg Oral 2 times per day    atorvastatin  20 mg Oral Nightly    metoprolol succinate  25 mg Oral Daily    finasteride  5 mg Oral Daily    lisinopril  5 mg Oral Daily    tamsulosin  0.4 mg Oral Daily    sodium chloride flush  10 mL Intravenous 2 times per day    pantoprazole  40 mg Intravenous Daily    nicotine  1 patch Transdermal Daily    sodium chloride flush  10 mL Intravenous 2 times per day      Infusions:    sodium chloride 50 mL/hr at 08/20/20 2212    heparin (Porcine) 600 Units/hr (08/20/20 2120)     PRN Meds: HYDROmorphone, 0.25 mg, Q3H PRN    Or  HYDROmorphone, 0.5 mg, Q3H PRN  sodium chloride flush, 10 mL, PRN  acetaminophen, 650 mg, Q4H PRN  sodium chloride flush, 10 mL, PRN  sodium chloride flush, 10 mL, PRN  acetaminophen, 650 mg, Q6H PRN  polyethylene glycol, 17 g, Daily PRN  promethazine, 12.5 mg, Q6H PRN    Or  ondansetron, 4 mg, Q6H PRN  sodium chloride flush, 10 mL, PRN      Subjective:     Doing better, toes less pain he states  Objective: Intake/Output Summary (Last 24 hours) at 8/21/2020 1116  Last data filed at 8/21/2020 0842  Gross per 24 hour   Intake 480 ml   Output 120 ml   Net 360 ml      Vitals:   Vitals:    08/21/20 0837   BP: (!) 160/70   Pulse: 85   Resp: 20   Temp: 98.2 °F (36.8 °C)   SpO2: 94%     Physical Exam:   Gen:  awake, alert, no apparent distress  Head/Eyes:  Normocephalic atraumatic, EOMI   NECK:   symmetrical, trachea midline  LUNGS: Normal Effort   CARDIOVASCULAR:  Normal rate  ABDOMEN:  non distended  MUSCULOSKELETAL:  ROM limited  NEUROLOGIC: Alert and Oriented,  Cranial nerves II-XII are grossly intact.    SKIN: left toes less tender      Data:       CBC   Recent Labs     08/19/20  0527 08/20/20  0517 08/21/20  0306   WBC 8.1 7.2 8.4   HGB 13.0* 13.8 12.1*   HCT 40.3* 43.9 37.0*    344 298      BMP   Recent Labs     08/20/20  0517 08/21/20  0306    131*   K 4.7 4.2    99   CO2 28 25   BUN 13 19   CREATININE 0.7* 0.7*         Electronically signed by Ameena Muhammad MD on 8/21/2020 at 11:16 AM

## 2020-08-21 NOTE — PROGRESS NOTES
Clicks Inpatient Mobility:  AM-PAC Inpatient Mobility Raw Score : 18    Safety: patient left in chair with alarm,call light within reach,  gait belt used. Assessment:  Decreased functional mobility ; Decreased safe awareness; Decreased endurance; Decreased balance; Decreased coordination; Decreased ADL status; Decreased strength; Decreased cognition; Decreased sensation; Decreased posture  Pt is an 81year old male admitted with AMS and PAD with right femoral artery occlusion. Hx of CVA. Recommend subacute rehab once medically stable. At baseline he lives alone and is Bre with gross mobility and ADLs. He is currently min-modA, functioning below his typical baseline. He would benefit from continued therapy to address his current deficits, dec potential fall risk,and restore function. Complexity: Moderate  Prognosis: Good, no significant barriers to participation at this time.    Plan Times per week: 3+/week  Discharge Recommendations: Subacute/Skilled Nursing Facility  Equipment: continue to assess at next level of care     Goals:  Short term goals  Time Frame for Short term goals: 1 week  Short term goal 1: Pt will perform sit><supine Bre  Short term goal 2: Pt will transfer to all surfaces SBA  Short term goal 3: Pt will ambulate 150ft with LRAD SBA  Short term goal 4: Pt will perform light dynamic standing activity without UE support SBA x 3 minutes       Treatment plan:  Bed mobility, transfers, balance, gait, TA, TX  Recommendations for NURSING mobility: ambulate to bathroom with RW     Time:   Time in: 1005  Time out: 1045  Timed treatment minutes:25  Total time:40    Electronically signed by:    Steffi Woodward [id number]  8/21/2020, 1:32 PM

## 2020-08-21 NOTE — CARE COORDINATION
PT/OT ordered and requested via WB to help determine a safe d/c plan. Neida Arteaga COVID neg on 08/17. CM to f/u after PT/OT recommendations.   TE

## 2020-08-21 NOTE — PROGRESS NOTES
Daily Progress Note     patient is awake alert   Doing ok  S/p PTA of right leg, external iliac and SFA and pop and AT  CFA --need surgery calcified  Discussed with Stephanie plan was for surgery today, but he was given breakfast by therapy  Not sure when he will have surgery  Keep on heparin for now  Severe PAD leg warmer  Hold oral AC for now while on heparin infusion    KAYLEY-4/20   Summary   Left ventricular systolic function is low normal.   Ejection fraction is visually estimated at 45%.   No evidence of thrombus within the left atrial appendage.   Negative bubble study; no ASD or PFO noted.   Lambl's excrescence noted on the aortic valve.   Mild to moderate aortic regurgitation.   Slight prolapse of the anterior mitral valve leaflet.   Calcified chordae tendineae near the mitral valve leaflet tips.   No evidence of any pericardial effusion.     PAST MEDICAL HISTORY:  COPD, prostatitis, and CVA.     PAST SURGICAL HISTORY:  Hernia repair.     SOCIAL HISTORY:  The patient smokes half pack a day.  Does not drink.     FAMILY HISTORY:  Noncontributory.     ALLERGIES:  No known drug allergies.     HOME MEDICATIONS:  The patient was on aspirin 81 mg daily, Lipitor 80 mg  at bedtime, lisinopril 5 mg daily, Proscar, Flomax, and Plavix 75 mg  daily.     Objective:   /62   Pulse 97   Temp 98.3 °F (36.8 °C) (Oral)   Resp 19   Ht 5' 7.5\" (1.715 m)   Wt 110 lb 8 oz (50.1 kg)   SpO2 92%   BMI 17.05 kg/m²       Intake/Output Summary (Last 24 hours) at 8/21/2020 1242  Last data filed at 8/21/2020 1137  Gross per 24 hour   Intake 480 ml   Output 240 ml   Net 240 ml       Medications:   Scheduled Meds:   gabapentin  100 mg Oral TID    sodium chloride flush  10 mL Intravenous 2 times per day    aspirin  81 mg Oral Daily    clopidogrel  75 mg Oral Daily    cilostazol  50 mg Oral BID    rivaroxaban  10 mg Oral Daily    sodium chloride flush  10 mL Intravenous 2 times per day    ciprofloxacin  500 mg Oral 2 Acute encephalopathy 08/13/2020    Hemiparesis of left nondominant side as late effect of cerebral infarction ([facility] [facility]     Dysphagia due to recent stroke     Dysarthria due to acute stroke ([address]     Gait disturbance     Simple chronic bronchitis (HCC)     Urinary tract infection associated with indwelling urethral catheter ([facility] [address] [facility]     Acute cystitis without hematuria     Acute cerebrovascular accident (CVA) ([facility] [address] 04/10/2020    Dizziness 04/08/2020       Emerson Cristobal MD 8/21/2020 12:42 PM

## 2020-08-21 NOTE — CARE COORDINATION
informed CM that pt will need rehab. CM met with pt at this time to discuss rehab. Pt states that he wants to go to ARU. Referral made to [facility] via confidential VM. SNF list and Medicare rating list given to pt for back up plan. Pt having surgery today. COVID NEG 08/17.  TE

## 2020-08-21 NOTE — PROGRESS NOTES
Transport here to take pt down to surgery. Upon entering the room pt was up in chair and majority breakfast was eaten. Therapy had got pt up into chair and gave him his breakfast tray. When this nurse asked pt why he ate breakfast.Pt stated he didn't know what was going on. Called Dr. Ally Esteban to notify him. Surgery cancelled for today. Perfect served Dr.N. Galvez to inform him.

## 2020-08-21 NOTE — CONSULTS
Department of Cardiovascular & Thoracic Surgery   Consult Note    Reason for Consult: Severe complex peripheral vascular disease status post intervention per Dr. Rios Records    Requesting Physician: Dr. Rios Records    Date of Consult: 8/21/20      History Obtained From:  Patient, EMR Dr. Delia Wagner:  Patient seen at the request of Blanca Escamilla emergently status post percutaneous intervention per Dr. Michael More persistent ischemia the right lower extremity complex calcific peripheral vascular disease  Presently complaining of numbness of the feet he does not usually use a foot that much    Past Medical History:        Diagnosis Date    COPD (chronic obstructive pulmonary disease) ([facility]     Prostatitis      Past Surgical History:        Procedure Laterality Date    HERNIA REPAIR       Current Medications:   Current Facility-Administered Medications: HYDROmorphone (DILAUDID) injection 0.25 mg, 0.25 mg, Intravenous, Q3H PRN **OR** HYDROmorphone (DILAUDID) injection 0.5 mg, 0.5 mg, Intravenous, Q3H PRN  gabapentin (NEURONTIN) capsule 100 mg, 100 mg, Oral, TID  0.9 % sodium chloride infusion, , Intravenous, Continuous  sodium chloride flush 0.9 % injection 10 mL, 10 mL, Intravenous, 2 times per day  sodium chloride flush 0.9 % injection 10 mL, 10 mL, Intravenous, PRN  acetaminophen (TYLENOL) tablet 650 mg, 650 mg, Oral, Q4H PRN  aspirin chewable tablet 81 mg, 81 mg, Oral, Daily  clopidogrel (PLAVIX) tablet 75 mg, 75 mg, Oral, Daily  heparin 25,000 unit in sodium chloride 0.45% 250 mL infusion, 600 Units/hr, Intravenous, Continuous  cilostazol (PLETAL) tablet 50 mg, 50 mg, Oral, BID  rivaroxaban (XARELTO) tablet 10 mg, 10 mg, Oral, Daily  sodium chloride flush 0.9 % injection 10 mL, 10 mL, Intravenous, 2 times per day  sodium chloride flush 0.9 % injection 10 mL, 10 mL, Intravenous, PRN  ciprofloxacin (CIPRO) tablet 500 mg, 500 mg, Oral, 2 times per day  atorvastatin (LIPITOR) tablet 20 mg, 20 mg, Oral, Nightly  metoprolol succinate (TOPROL XL) extended release tablet 25 mg, 25 mg, Oral, Daily  finasteride (PROSCAR) tablet 5 mg, 5 mg, Oral, Daily  lisinopril (PRINIVIL;ZESTRIL) tablet 5 mg, 5 mg, Oral, Daily  tamsulosin (FLOMAX) capsule 0.4 mg, 0.4 mg, Oral, Daily  sodium chloride flush 0.9 % injection 10 mL, 10 mL, Intravenous, 2 times per day  sodium chloride flush 0.9 % injection 10 mL, 10 mL, Intravenous, PRN  [DISCONTINUED] acetaminophen (TYLENOL) tablet 650 mg, 650 mg, Oral, Q6H PRN **OR** acetaminophen (TYLENOL) suppository 650 mg, 650 mg, Rectal, Q6H PRN  polyethylene glycol (GLYCOLAX) packet 17 g, 17 g, Oral, Daily PRN  promethazine (PHENERGAN) tablet 12.5 mg, 12.5 mg, Oral, Q6H PRN **OR** ondansetron (ZOFRAN) injection 4 mg, 4 mg, Intravenous, Q6H PRN  pantoprazole (PROTONIX) injection 40 mg, 40 mg, Intravenous, Daily  nicotine (NICODERM CQ) 21 MG/24HR 1 patch, 1 patch, Transdermal, Daily  sodium chloride flush 0.9 % injection 10 mL, 10 mL, Intravenous, 2 times per day  sodium chloride flush 0.9 % injection 10 mL, 10 mL, Intravenous, PRN  Allergies:  No Known Allergies    Social History:   Social History     Socioeconomic History    Marital status:      Spouse name: Not on file    Number of children: Not on file    Years of education: Not on file    Highest education level: Not on file   Occupational History    Not on file   Social Needs    Financial resource strain: Not on file    Food insecurity     Worry: Not on file     Inability: Not on file    Transportation needs     Medical: Not on file     Non-medical: Not on file   Tobacco Use    Smoking status: Current Every Day Smoker     Packs/day: 0.50     Types: Cigarettes   Substance and Sexual Activity    Alcohol use:  Yes    Drug use: Not Currently    Sexual activity: Not Currently   Lifestyle    Physical activity     Days per week: Not on file     Minutes per session: Not on file    Stress: Not on file   Relationships    Social connections     Talks on phone: Not on file     Gets together: Not on file     Attends [employer] service: Not on file     Active member of club or organization: Not on file     Attends meetings of clubs or organizations: Not on file     Relationship status: Not on file    Intimate partner violence     Fear of current or ex partner: Not on file     Emotionally abused: Not on file     Physically abused: Not on file     Forced sexual activity: Not on file   Other Topics Concern    Not on file   Social History Narrative    Not on file       Family History:        Problem Relation Age of Onset    No Known Problems Mother     No Known Problems Father        REVIEW OF SYSTEMS:  Constitutional: - fatigue, - fever, - chills, - night sweats  Eyes: - vision loss  Cardiovascular: -  chest pain, - palpitations, - leg swelling, -+leg pain   Respiratory: - cough, - shortness of breath, - wheezing   GI: - nausea, - vomiting, - abdominal pain, - constipation, - diarrhea   : - dysuria   MSK: - joint pain, - muscle pain  Integument: - rash, - skin color change   Heme: - easy bruising or bleeding  Neurologic: - headache, - weakness, - dizziness, - paresthesias       EXAM:  Constitutional: Blood pressure 134/62, pulse 97, temperature 98.3 °F (36.8 °C), temperature source Oral, resp. rate 19, height 5' 7.5\" (1.715 m), weight 110 lb 8 oz (50.1 kg), SpO2 92 %. No apparent distress, appears stated age and cooperative. Neurologic: follows commands, participates in discussion complains of pain in the right foot and unable to move the foot   lungs: Good respiratory effort.  Clear to auscultation,     CV: Regular rate/ rhythm   , no peripheral edema, feet warm and well perfused  GI: Soft, non-tender in all four quadrants, non-distended, + bowel sounds, liver and spleen no palpable masses  : bladder nondistended   MSK: no obvious deformity   Skin: warm, pink and dry except right foot  Most of the foot is cool to touch there is capillary refill punctate ulcers in the toes most of the toes appear nonsalvageable      DATA:  Previous CTA peripheral Doppler and peripheral intervention per Dr. Kirill Abdalla from yesterday reviewed in detail with Dr. Bere Swift  The extensive aortoiliac occlusive disease femoropopliteal radial and distal occlusion with heavy calcification especially in the right groin  Right iliac artery appears to be open with intervention and stent however terminates in a heavily calcific common femoral artery which is totally occluded  Percutaneously the the one-vessel runoff below the knee has been opened however the inflow does not seem to be enough to keep it open  IMPRESSION  Patient Active Problem List   Diagnosis    Dizziness    Acute cerebrovascular accident (CVA) ([address]    Hemiparesis of left nondominant side as late effect of cerebral infarction ([address]    Dysphagia due to recent stroke    Dysarthria due to acute stroke ([address]    Gait disturbance    Simple chronic bronchitis ([address]    Urinary tract infection associated with indwelling urethral catheter ([address]    Acute cystitis without hematuria    Femoral artery occlusion, right ([address]    Acute encephalopathy    Severe malnutrition ([address]     Severe PVD with intervention as stated above      RECOMMENDATIONS:  Per review and discussions of alternative options and treatment decision made to explore the right groin and and November and endarterectomy of the common femoral artery in the in the hope that they will establish inflow to the right foot and save the foot  Patient and Dr. Alejandra Richardson understand the right foot salvage is going to be difficult  Surgical time requested

## 2020-08-21 NOTE — CARE COORDINATION
Received potential referral for ARU. Will review patients clinicals and await PT/OT notes.   Thank you for the referral.

## 2020-08-22 ENCOUNTER — APPOINTMENT (OUTPATIENT)
Dept: GENERAL RADIOLOGY | Age: 85
DRG: 252 | End: 2020-08-22
Payer: MEDICARE

## 2020-08-22 ENCOUNTER — APPOINTMENT (OUTPATIENT)
Dept: CT IMAGING | Age: 85
DRG: 252 | End: 2020-08-22
Payer: MEDICARE

## 2020-08-22 ENCOUNTER — APPOINTMENT (OUTPATIENT)
Dept: MRI IMAGING | Age: 85
DRG: 252 | End: 2020-08-22
Payer: MEDICARE

## 2020-08-22 LAB
APTT: 44.2 SECONDS (ref 25.1–37.1)
APTT: 53.3 SECONDS (ref 25.1–37.1)
ERYTHROCYTE SEDIMENTATION RATE: 24 MM/HR (ref 0–20)
INR BLD: 1.16 INDEX
INR BLD: 1.23 INDEX
PROTHROMBIN TIME: 14.1 SECONDS (ref 11.7–14.5)
PROTHROMBIN TIME: 14.9 SECONDS (ref 11.7–14.5)

## 2020-08-22 PROCEDURE — 85610 PROTHROMBIN TIME: CPT

## 2020-08-22 PROCEDURE — 2500000003 HC RX 250 WO HCPCS: Performed by: INTERNAL MEDICINE

## 2020-08-22 PROCEDURE — 85652 RBC SED RATE AUTOMATED: CPT

## 2020-08-22 PROCEDURE — 6370000000 HC RX 637 (ALT 250 FOR IP): Performed by: INTERNAL MEDICINE

## 2020-08-22 PROCEDURE — 85730 THROMBOPLASTIN TIME PARTIAL: CPT

## 2020-08-22 PROCEDURE — 6360000002 HC RX W HCPCS: Performed by: FAMILY MEDICINE

## 2020-08-22 PROCEDURE — 73630 X-RAY EXAM OF FOOT: CPT

## 2020-08-22 PROCEDURE — 70450 CT HEAD/BRAIN W/O DYE: CPT

## 2020-08-22 PROCEDURE — 73020 X-RAY EXAM OF SHOULDER: CPT

## 2020-08-22 PROCEDURE — C9113 INJ PANTOPRAZOLE SODIUM, VIA: HCPCS | Performed by: INTERNAL MEDICINE

## 2020-08-22 PROCEDURE — 2580000003 HC RX 258: Performed by: INTERNAL MEDICINE

## 2020-08-22 PROCEDURE — 36415 COLL VENOUS BLD VENIPUNCTURE: CPT

## 2020-08-22 PROCEDURE — 94761 N-INVAS EAR/PLS OXIMETRY MLT: CPT

## 2020-08-22 PROCEDURE — 2140000000 HC CCU INTERMEDIATE R&B

## 2020-08-22 PROCEDURE — 93005 ELECTROCARDIOGRAM TRACING: CPT | Performed by: INTERNAL MEDICINE

## 2020-08-22 PROCEDURE — 99221 1ST HOSP IP/OBS SF/LOW 40: CPT | Performed by: SURGERY

## 2020-08-22 PROCEDURE — 6360000002 HC RX W HCPCS: Performed by: INTERNAL MEDICINE

## 2020-08-22 PROCEDURE — 2580000003 HC RX 258: Performed by: FAMILY MEDICINE

## 2020-08-22 RX ORDER — HEPARIN SODIUM 10000 [USP'U]/100ML
700 INJECTION, SOLUTION INTRAVENOUS CONTINUOUS
Status: DISCONTINUED | OUTPATIENT
Start: 2020-08-22 | End: 2020-08-24

## 2020-08-22 RX ORDER — SODIUM CHLORIDE 9 MG/ML
INJECTION, SOLUTION INTRAVENOUS CONTINUOUS
Status: DISCONTINUED | OUTPATIENT
Start: 2020-08-22 | End: 2020-08-22

## 2020-08-22 RX ADMIN — TAMSULOSIN HYDROCHLORIDE 0.4 MG: 0.4 CAPSULE ORAL at 21:00

## 2020-08-22 RX ADMIN — GABAPENTIN 100 MG: 100 CAPSULE ORAL at 21:01

## 2020-08-22 RX ADMIN — VANCOMYCIN HYDROCHLORIDE 750 MG: 5 INJECTION, POWDER, LYOPHILIZED, FOR SOLUTION INTRAVENOUS at 12:20

## 2020-08-22 RX ADMIN — PANTOPRAZOLE SODIUM 40 MG: 40 INJECTION, POWDER, FOR SOLUTION INTRAVENOUS at 05:37

## 2020-08-22 RX ADMIN — HEPARIN SODIUM 600 UNITS/HR: 10000 INJECTION, SOLUTION INTRAVENOUS at 18:19

## 2020-08-22 RX ADMIN — SODIUM CHLORIDE, PRESERVATIVE FREE 10 ML: 5 INJECTION INTRAVENOUS at 21:02

## 2020-08-22 RX ADMIN — CLOPIDOGREL BISULFATE 75 MG: 75 TABLET ORAL at 10:12

## 2020-08-22 RX ADMIN — LISINOPRIL 5 MG: 5 TABLET ORAL at 10:13

## 2020-08-22 RX ADMIN — ATORVASTATIN CALCIUM 20 MG: 20 TABLET, FILM COATED ORAL at 21:00

## 2020-08-22 RX ADMIN — FINASTERIDE 5 MG: 5 TABLET, FILM COATED ORAL at 10:13

## 2020-08-22 RX ADMIN — CILOSTAZOL 50 MG: 50 TABLET ORAL at 21:00

## 2020-08-22 RX ADMIN — CILOSTAZOL 50 MG: 50 TABLET ORAL at 10:12

## 2020-08-22 RX ADMIN — ASPIRIN 81 MG CHEWABLE TABLET 81 MG: 81 TABLET CHEWABLE at 10:12

## 2020-08-22 RX ADMIN — GABAPENTIN 100 MG: 100 CAPSULE ORAL at 16:54

## 2020-08-22 RX ADMIN — METOPROLOL SUCCINATE 25 MG: 25 TABLET, EXTENDED RELEASE ORAL at 10:12

## 2020-08-22 RX ADMIN — CIPROFLOXACIN HYDROCHLORIDE 500 MG: 500 TABLET, FILM COATED ORAL at 21:00

## 2020-08-22 RX ADMIN — SODIUM CHLORIDE, PRESERVATIVE FREE 10 ML: 5 INJECTION INTRAVENOUS at 05:37

## 2020-08-22 RX ADMIN — GABAPENTIN 100 MG: 100 CAPSULE ORAL at 10:12

## 2020-08-22 RX ADMIN — CIPROFLOXACIN HYDROCHLORIDE 500 MG: 500 TABLET, FILM COATED ORAL at 10:12

## 2020-08-22 ASSESSMENT — PAIN SCALES - GENERAL
PAINLEVEL_OUTOF10: 0

## 2020-08-22 NOTE — PLAN OF CARE
Problem: Falls - Risk of:  Goal: Will remain free from falls  Description: Will remain free from falls  Outcome: Ongoing  Goal: Absence of physical injury  Description: Absence of physical injury  Outcome: Ongoing     Problem: Skin Integrity:  Goal: Will show no infection signs and symptoms  Description: Will show no infection signs and symptoms  Outcome: Ongoing  Goal: Absence of new skin breakdown  Description: Absence of new skin breakdown  Outcome: Ongoing     Problem: Coping:  Goal: Ability to remain calm will improve  Description: Ability to remain calm will improve  Outcome: Ongoing     Problem: Safety:  Goal: Ability to remain free from injury will improve  Description: Ability to remain free from injury will improve  Outcome: Ongoing     Problem: Self-Care:  Goal: Ability to participate in self-care as condition permits will improve  Description: Ability to participate in self-care as condition permits will improve  Outcome: Ongoing     Problem: Restraint Use - Nonviolent/Non-Self-Destructive Behavior:  Goal: Absence of restraint indications  Description: Absence of restraint indications  Outcome: Ongoing  Goal: Absence of restraint-related injury  Description: Absence of restraint-related injury  Outcome: Ongoing

## 2020-08-22 NOTE — PROGRESS NOTES
PROFILE: No results for input(s): AST, ALT, LIPASE, BILIDIR, BILITOT, ALKPHOS in the last 72 hours. Invalid input(s): AMYLASE,  ALB  PT/INR:   Recent Labs     08/21/20 2024 08/22/20 0216 08/22/20  0941   PROTIME 14.4 14.9* 14.1   INR 1.19 1.23 1.16     APTT:   Recent Labs     08/21/20 2024 08/22/20 0216 08/22/20  0941   APTT 46.6* 53.3* 44.2*     BNP:  No results for input(s): BNP in the last 72 hours.       Assessment:  Patient Active Problem List    Diagnosis Date Noted    Severe malnutrition ([address] 08/20/2020    Femoral artery occlusion, right ([address] 08/13/2020    Acute encephalopathy 08/13/2020    Hemiparesis of left nondominant side as late effect of cerebral infarction ([address]     Dysphagia due to recent stroke     Dysarthria due to acute stroke ([address]     Gait disturbance     Simple chronic bronchitis (HCC)     Urinary tract infection associated with indwelling urethral catheter ([address]     Acute cystitis without hematuria     Acute cerebrovascular accident (CVA) ([address] 04/10/2020    Dizziness 04/08/2020       Electronically signed by Isa Mata PA-C on 8/22/2020 at 10:29 AM

## 2020-08-22 NOTE — PROGRESS NOTES
Per Dr. Nilsa adair NS running at 100 mL/hr 1 hour before pt is to go down for MRI. Once pt has returned allow fluids to run for another 9 hours.   For a total of 10 hrs total.  Myles Haas RN

## 2020-08-22 NOTE — PROGRESS NOTES
Hospitalist Progress Note      Name:  James Soto /Age/Sex: 1935  (81 y.o. male)   MRN & CSN:  [id number] & [id number] Admission Date/Time: 2020 11:07 AM   Location:  [facility]- PCP: No primary care provider on file. James Soto is a 81 y.o.  male  who presents with Altered Mental Status      Assessment and Plan:   PAD  - femoral endarterectomy re-scheduled for Monday as pt was given food yesterday morning  - s/p peripheral angio , med mx recommended with severe disease and poor bypass candidate  - s/p angio : right external iliac stent placed  - heparin gtt  - CTA runoff noted  - heparin gtt off  - asa, statin, pletal  - xarelto on hold  - pain mx prn, added neurontin  - NV checks  - check MRI of foot today to assess bone structure and if any osteo, start empiric IV Vanc  - consult cardio        Acute Metabolic encephalopathy exacerbated 2/2 UTI likely, also likely has underlying dementia   - resolved and stable now  - CT head: non acute, old infarct noted   - start IV Rocephin, changed to oral cipro  - check urine cx +providencia rettgeri  - SLP done'  - avoid sedative meds     HTN  - continue home meds           consult palliative care for code status, goals and family support. DNR CCA           - COPD  - Tobacco abuse - on Nicotine patch, tobacco cessation education.  - CVA   - gait disturbance  - Prostatitis            Diet DIET CARB CONTROL;  Dysphagia Minced and Moist   Code Status Full Code     Medications:   Medications:    gabapentin  100 mg Oral TID    sodium chloride flush  10 mL Intravenous 2 times per day    aspirin  81 mg Oral Daily    clopidogrel  75 mg Oral Daily    cilostazol  50 mg Oral BID    [Held by provider] rivaroxaban  10 mg Oral Daily    sodium chloride flush  10 mL Intravenous 2 times per day    ciprofloxacin  500 mg Oral 2 times per day    atorvastatin  20 mg Oral Nightly    metoprolol succinate  25 mg Oral Daily    finasteride  5 mg Oral Daily    lisinopril  5 mg Oral Daily    tamsulosin  0.4 mg Oral Daily    sodium chloride flush  10 mL Intravenous 2 times per day    pantoprazole  40 mg Intravenous Daily    nicotine  1 patch Transdermal Daily    sodium chloride flush  10 mL Intravenous 2 times per day      Infusions:    sodium chloride 50 mL/hr at 08/20/20 2212    heparin (Porcine) 600 Units/hr (08/20/20 2120)     PRN Meds: HYDROmorphone, 0.25 mg, Q3H PRN    Or  HYDROmorphone, 0.5 mg, Q3H PRN  sodium chloride flush, 10 mL, PRN  acetaminophen, 650 mg, Q4H PRN  sodium chloride flush, 10 mL, PRN  sodium chloride flush, 10 mL, PRN  acetaminophen, 650 mg, Q6H PRN  polyethylene glycol, 17 g, Daily PRN  promethazine, 12.5 mg, Q6H PRN    Or  ondansetron, 4 mg, Q6H PRN  sodium chloride flush, 10 mL, PRN      Subjective:   No distress    Objective: Intake/Output Summary (Last 24 hours) at 8/22/2020 0959  Last data filed at 8/21/2020 2041  Gross per 24 hour   Intake --   Output 760 ml   Net -760 ml      Vitals:   Vitals:    08/22/20 0900   BP:    Pulse:    Resp: 19   Temp:    SpO2: 93%     Physical Exam:   Gen:  awake, alert, no apparent distress  Head/Eyes:  Normocephalic atraumatic, EOMI   NECK:   symmetrical, trachea midline  LUNGS: Normal Effort   CARDIOVASCULAR:  Normal rate  ABDOMEN:  non distended  MUSCULOSKELETAL:  ROM WNL  NEUROLOGIC: Alert and Oriented,  Cranial nerves II-XII are grossly intact.    SKIN:  no bruising or bleeding, normal skin color,  no redness      Data:       CBC   Recent Labs     08/20/20  0517 08/21/20  0306   WBC 7.2 8.4   HGB 13.8 12.1*   HCT 43.9 37.0*    298      BMP   Recent Labs     08/20/20  0517 08/21/20  0306    131*   K 4.7 4.2    99   CO2 28 25   BUN 13 19   CREATININE 0.7* 0.7*         Electronically signed by Brissa Magaña MD on 8/22/2020 at 9:59 AM

## 2020-08-22 NOTE — PROGRESS NOTES
PATIENT NAME: Tyson Rodriguez     Reason for Visit: PAD    TODAY'S DATE: 08/22/20    SUBJECTIVE:    Pt somewhat confused, but comfortable. OBJECTIVE:   VITALS:    Vitals:    08/22/20 1200   BP: 133/60   Pulse: 94   Resp: 18   Temp: 97.9 °F (36.6 °C)   SpO2: 96%     INTAKE/OUTPUT:    Date 08/22/20 0000 - 08/22/20 2359   Shift 3921-39205 7822-9396 4744-8104 24 Hour Total   INTAKE   Shift Total(mL/kg)       OUTPUT   Urine(mL/kg/hr)  200  200   Shift Total(mL/kg)  200(4)  200(4)   Weight (kg) 49.4 49.4 49.4 49.4      Patient Vitals for the past 96 hrs (Last 3 readings):   Weight   08/22/20 0430 108 lb 14.4 oz (49.4 kg)   08/21/20 0407 110 lb 8 oz (50.1 kg)   08/21/20 0005 110 lb 12.8 oz (50.3 kg)       EXAM:  Blood pressure 133/60, pulse 94, temperature 97.9 °F (36.6 °C), temperature source Oral, resp. rate 18, height 5' 7.5\" (1.715 m), weight 108 lb 14.4 oz (49.4 kg), SpO2 96 %. General appearance: No apparent distress, appears stated age and cooperative. Skin: unremarkable  HEENT Normocephalic, atraumatic without obvious deformity. Neck: Supple, Trachea midline   Lungs: Good respiratory effort. Clear to auscultation, bilaterally  Heart: Regular rate/ rhythm   Abdomen: Soft, non-tender or non-distended   Extremities: edema warm well perfused dopplerable DP, PT, Peroneal, toes discolored  Neurologic: Alert but confused  Mental status: normal affect      Data:  CBC:   Recent Labs     08/20/20  0517 08/21/20  0306   WBC 7.2 8.4   HGB 13.8 12.1*   HCT 43.9 37.0*    298     BMP:    Recent Labs     08/20/20  0517 08/21/20  0306    131*   K 4.7 4.2    99   CO2 28 25   BUN 13 19   CREATININE 0.7* 0.7*   GLUCOSE 85 95     Hepatic: No results for input(s): AST, ALT, ALB, BILITOT, ALKPHOS in the last 72 hours. Mag:    No results for input(s): MG in the last 72 hours. Phos:   No results for input(s): PHOS in the last 72 hours.    INR:   Recent Labs     08/21/20 2024 08/22/20  0216 08/22/20  0941

## 2020-08-22 NOTE — PROGRESS NOTES
Contacted patient emergency contact and he tells me he has no clue about his medical history// unable to do MRI until can screen

## 2020-08-22 NOTE — CONSULTS
Department of General Surgery   Surgical Service Dr. Jerry Padilla   Consult Note    Date of Consult: 8/22/20    Reason for Consult:  Ischemic toes  Requesting Physician:  Dr. Haley Candelario:  PAD, ischemic right foot/toes    History Obtained From:  electronic medical record    HISTORY OF PRESENT ILLNESS:    The patient is a 81 y.o. male who presented with numbness of the right foot. He is confused and a poor historian. I spoke with Dr. Tori Oakes who performed angiogram and was able to balloon open the right SFA, popliteal and AT as well as stent the right external iliac. The common femoral was occluded and there are plans for endarterectomy by Dr. Ina St on Monday.     Past Medical History:    Past Medical History:   Diagnosis Date    COPD (chronic obstructive pulmonary disease) ([facility] [address]     Prostatitis        Past Surgical History:    Past Surgical History:   Procedure Laterality Date    HERNIA REPAIR         Current Medications:   Current Facility-Administered Medications   Medication Dose Route Frequency Provider Last Rate Last Dose    [START ON 8/23/2020] vancomycin (VANCOCIN) 750 mg in dextrose 5 % 250 mL IVPB  750 mg Intravenous Q24H Mandeep Srivastava MD        gabapentin (NEURONTIN) capsule 100 mg  100 mg Oral TID Tori Oakes MD   100 mg at 08/22/20 1012    sodium chloride flush 0.9 % injection 10 mL  10 mL Intravenous 2 times per day Tori Oakes MD   10 mL at 08/21/20 2038    sodium chloride flush 0.9 % injection 10 mL  10 mL Intravenous PRN Tori Oakes MD   10 mL at 08/22/20 0537    acetaminophen (TYLENOL) tablet 650 mg  650 mg Oral Q4H PRN Tori Oakes MD        aspirin chewable tablet 81 mg  81 mg Oral Daily Susan Galvez MD   81 mg at 08/22/20 1012    clopidogrel (PLAVIX) tablet 75 mg  75 mg Oral Daily Susan Galvez MD   75 mg at 08/22/20 1012    cilostazol (PLETAL) tablet 50 mg  50 mg Oral BID Tori Oakes MD   50 mg at 08/22/20 1012    rivaroxaban (XARELTO) tablet 10 mg  10 mg Oral Daily Nubia Sanders MD   Stopped at 08/20/20 1908    sodium chloride flush 0.9 % injection 10 mL  10 mL Intravenous 2 times per day Nubia Sanders MD   10 mL at 08/20/20 0910    sodium chloride flush 0.9 % injection 10 mL  10 mL Intravenous PRN Nubia Sanders MD        ciprofloxacin (CIPRO) tablet 500 mg  500 mg Oral 2 times per day Nubia Sanders MD   500 mg at 08/22/20 1012    atorvastatin (LIPITOR) tablet 20 mg  20 mg Oral Nightly Nubia Sanders MD   20 mg at 08/21/20 2038    metoprolol succinate (TOPROL XL) extended release tablet 25 mg  25 mg Oral Daily Nubia Sanders MD   25 mg at 08/22/20 1012    finasteride (PROSCAR) tablet 5 mg  5 mg Oral Daily Nubia Sanders MD   5 mg at 08/22/20 1013    lisinopril (PRINIVIL;ZESTRIL) tablet 5 mg  5 mg Oral Daily Nubia Sanders MD   5 mg at 08/22/20 1013    tamsulosin (FLOMAX) capsule 0.4 mg  0.4 mg Oral Daily Nubia Sanders MD   0.4 mg at 08/21/20 2038    sodium chloride flush 0.9 % injection 10 mL  10 mL Intravenous 2 times per day Nubia Sanders MD   10 mL at 08/21/20 2044    sodium chloride flush 0.9 % injection 10 mL  10 mL Intravenous PRN Nubia Sanders MD        polyethylene glycol (GLYCOLAX) packet 17 g  17 g Oral Daily PRN Nubia Sanders MD        promethazine (PHENERGAN) tablet 12.5 mg  12.5 mg Oral Q6H PRN Nubia Sanders MD   12.5 mg at 08/18/20 1954    Or    ondansetron (ZOFRAN) injection 4 mg  4 mg Intravenous Q6H PRN Nubia Sanders MD        pantoprazole (PROTONIX) injection 40 mg  40 mg Intravenous Daily Nubia Sanders MD   40 mg at 08/22/20 0537    nicotine (NICODERM CQ) 21 MG/24HR 1 patch  1 patch Transdermal Daily Nubia Sanders MD   1 patch at 08/21/20 1133    sodium chloride flush 0.9 % injection 10 mL  10 mL Intravenous 2 times per day Nubia Sanders MD   10 mL at 08/20/20 0966    sodium chloride flush 0.9 % injection 10 mL  10 mL Intravenous PRN Nubia Sanders MD   10 mL at 08/18/20 1615       Allergies:  Patient has no known allergies. Social History:   Social History     Socioeconomic History    Marital status:      Spouse name: None    Number of children: None    Years of education: None    Highest education level: None   Occupational History    None   Social Needs    Financial resource strain: None    Food insecurity     Worry: None     Inability: None    Transportation needs     Medical: None     Non-medical: None   Tobacco Use    Smoking status: Current Every Day Smoker     Packs/day: 0.50     Types: Cigarettes   Substance and Sexual Activity    Alcohol use:  Yes    Drug use: Not Currently    Sexual activity: Not Currently   Lifestyle    Physical activity     Days per week: None     Minutes per session: None    Stress: None   Relationships    Social connections     Talks on phone: None     Gets together: None     Attends Synagogue service: None     Active member of club or organization: None     Attends meetings of clubs or organizations: None     Relationship status: None    Intimate partner violence     Fear of current or ex partner: None     Emotionally abused: None     Physically abused: None     Forced sexual activity: None   Other Topics Concern    None   Social History Narrative    None       Family History:   Family History   Problem Relation Age of Onset    No Known Problems Mother     No Known Problems Father        REVIEW OFSYSTEMS:    Unable to obtain as patient is pleasantly confused      PHYSICAL EXAM:  Vitals:    08/22/20 0430 08/22/20 0900 08/22/20 0956 08/22/20 1200   BP: (!) 147/72  (!) 149/86 133/60   Pulse: 87   94   Resp: 16 19  18   Temp: 97.9 °F (36.6 °C)  97.7 °F (36.5 °C) 97.9 °F (36.6 °C)   TempSrc: Oral  Oral Oral   SpO2: 92% 93% 94% 96%   Weight: 108 lb 14.4 oz (49.4 kg)      Height:           Physical Exam  General: awake, alert, in no acute distress  HEENT: mucous membranes moist  Respiratory: normal effort, no wheezes appreciated  CV:  regular rate and rhythm  Abdomen:

## 2020-08-23 ENCOUNTER — APPOINTMENT (OUTPATIENT)
Dept: MRI IMAGING | Age: 85
DRG: 252 | End: 2020-08-23
Payer: MEDICARE

## 2020-08-23 LAB
ANION GAP SERPL CALCULATED.3IONS-SCNC: 8 MMOL/L (ref 4–16)
APTT: 44.1 SECONDS (ref 25.1–37.1)
APTT: 46.7 SECONDS (ref 25.1–37.1)
APTT: 47.7 SECONDS (ref 25.1–37.1)
BUN BLDV-MCNC: 14 MG/DL (ref 6–23)
CALCIUM SERPL-MCNC: 8.3 MG/DL (ref 8.3–10.6)
CHLORIDE BLD-SCNC: 99 MMOL/L (ref 99–110)
CO2: 25 MMOL/L (ref 21–32)
CREAT SERPL-MCNC: 0.7 MG/DL (ref 0.9–1.3)
EKG ATRIAL RATE: 92 BPM
EKG DIAGNOSIS: NORMAL
EKG P AXIS: 66 DEGREES
EKG P-R INTERVAL: 114 MS
EKG Q-T INTERVAL: 350 MS
EKG QRS DURATION: 94 MS
EKG QTC CALCULATION (BAZETT): 432 MS
EKG R AXIS: 58 DEGREES
EKG T AXIS: 2 DEGREES
EKG VENTRICULAR RATE: 92 BPM
GFR AFRICAN AMERICAN: >60 ML/MIN/1.73M2
GFR NON-AFRICAN AMERICAN: >60 ML/MIN/1.73M2
GLUCOSE BLD-MCNC: 94 MG/DL (ref 70–99)
HCT VFR BLD CALC: 33.4 % (ref 42–52)
HEMOGLOBIN: 11 GM/DL (ref 13.5–18)
INR BLD: 1.11 INDEX
INR BLD: 1.11 INDEX
INR BLD: 1.16 INDEX
MCH RBC QN AUTO: 29.1 PG (ref 27–31)
MCHC RBC AUTO-ENTMCNC: 32.9 % (ref 32–36)
MCV RBC AUTO: 88.4 FL (ref 78–100)
PDW BLD-RTO: 15.4 % (ref 11.7–14.9)
PLATELET # BLD: 296 K/CU MM (ref 140–440)
PMV BLD AUTO: 11 FL (ref 7.5–11.1)
POTASSIUM SERPL-SCNC: 3.8 MMOL/L (ref 3.5–5.1)
PROTHROMBIN TIME: 13.4 SECONDS (ref 11.7–14.5)
PROTHROMBIN TIME: 13.4 SECONDS (ref 11.7–14.5)
PROTHROMBIN TIME: 14 SECONDS (ref 11.7–14.5)
RBC # BLD: 3.78 M/CU MM (ref 4.6–6.2)
SODIUM BLD-SCNC: 132 MMOL/L (ref 135–145)
WBC # BLD: 8.2 K/CU MM (ref 4–10.5)

## 2020-08-23 PROCEDURE — 2140000000 HC CCU INTERMEDIATE R&B

## 2020-08-23 PROCEDURE — 85027 COMPLETE CBC AUTOMATED: CPT

## 2020-08-23 PROCEDURE — 99232 SBSQ HOSP IP/OBS MODERATE 35: CPT | Performed by: SURGERY

## 2020-08-23 PROCEDURE — 85610 PROTHROMBIN TIME: CPT

## 2020-08-23 PROCEDURE — 6370000000 HC RX 637 (ALT 250 FOR IP): Performed by: INTERNAL MEDICINE

## 2020-08-23 PROCEDURE — 80048 BASIC METABOLIC PNL TOTAL CA: CPT

## 2020-08-23 PROCEDURE — 6360000002 HC RX W HCPCS: Performed by: FAMILY MEDICINE

## 2020-08-23 PROCEDURE — 6370000000 HC RX 637 (ALT 250 FOR IP): Performed by: PHYSICIAN ASSISTANT

## 2020-08-23 PROCEDURE — 85730 THROMBOPLASTIN TIME PARTIAL: CPT

## 2020-08-23 PROCEDURE — 2580000003 HC RX 258: Performed by: FAMILY MEDICINE

## 2020-08-23 PROCEDURE — 6360000002 HC RX W HCPCS: Performed by: INTERNAL MEDICINE

## 2020-08-23 PROCEDURE — 93010 ELECTROCARDIOGRAM REPORT: CPT | Performed by: INTERNAL MEDICINE

## 2020-08-23 PROCEDURE — 36415 COLL VENOUS BLD VENIPUNCTURE: CPT

## 2020-08-23 PROCEDURE — C9113 INJ PANTOPRAZOLE SODIUM, VIA: HCPCS | Performed by: INTERNAL MEDICINE

## 2020-08-23 PROCEDURE — 94761 N-INVAS EAR/PLS OXIMETRY MLT: CPT

## 2020-08-23 PROCEDURE — 2580000003 HC RX 258: Performed by: INTERNAL MEDICINE

## 2020-08-23 RX ADMIN — TAMSULOSIN HYDROCHLORIDE 0.4 MG: 0.4 CAPSULE ORAL at 20:26

## 2020-08-23 RX ADMIN — SODIUM CHLORIDE, PRESERVATIVE FREE 10 ML: 5 INJECTION INTRAVENOUS at 20:27

## 2020-08-23 RX ADMIN — PANTOPRAZOLE SODIUM 40 MG: 40 INJECTION, POWDER, FOR SOLUTION INTRAVENOUS at 06:18

## 2020-08-23 RX ADMIN — GABAPENTIN 100 MG: 100 CAPSULE ORAL at 09:11

## 2020-08-23 RX ADMIN — SODIUM CHLORIDE, PRESERVATIVE FREE 10 ML: 5 INJECTION INTRAVENOUS at 09:11

## 2020-08-23 RX ADMIN — VANCOMYCIN HYDROCHLORIDE 750 MG: 5 INJECTION, POWDER, LYOPHILIZED, FOR SOLUTION INTRAVENOUS at 12:18

## 2020-08-23 RX ADMIN — SODIUM CHLORIDE, PRESERVATIVE FREE 10 ML: 5 INJECTION INTRAVENOUS at 20:26

## 2020-08-23 RX ADMIN — CILOSTAZOL 50 MG: 50 TABLET ORAL at 20:26

## 2020-08-23 RX ADMIN — CILOSTAZOL 50 MG: 50 TABLET ORAL at 09:11

## 2020-08-23 RX ADMIN — ASPIRIN 81 MG CHEWABLE TABLET 81 MG: 81 TABLET CHEWABLE at 09:11

## 2020-08-23 RX ADMIN — GABAPENTIN 100 MG: 100 CAPSULE ORAL at 17:06

## 2020-08-23 RX ADMIN — FINASTERIDE 5 MG: 5 TABLET, FILM COATED ORAL at 09:11

## 2020-08-23 RX ADMIN — GABAPENTIN 100 MG: 100 CAPSULE ORAL at 20:26

## 2020-08-23 RX ADMIN — NITROGLYCERIN 1 INCH: 20 OINTMENT TOPICAL at 17:05

## 2020-08-23 RX ADMIN — LISINOPRIL 5 MG: 5 TABLET ORAL at 09:11

## 2020-08-23 RX ADMIN — ATORVASTATIN CALCIUM 20 MG: 20 TABLET, FILM COATED ORAL at 20:26

## 2020-08-23 RX ADMIN — METOPROLOL SUCCINATE 25 MG: 25 TABLET, EXTENDED RELEASE ORAL at 09:11

## 2020-08-23 ASSESSMENT — PAIN SCALES - GENERAL
PAINLEVEL_OUTOF10: 0

## 2020-08-23 NOTE — PROGRESS NOTES
Scheduled Meds:   vancomycin  750 mg Intravenous Q24H    gabapentin  100 mg Oral TID    sodium chloride flush  10 mL Intravenous 2 times per day    aspirin  81 mg Oral Daily    cilostazol  50 mg Oral BID    sodium chloride flush  10 mL Intravenous 2 times per day    ciprofloxacin  500 mg Oral 2 times per day    atorvastatin  20 mg Oral Nightly    metoprolol succinate  25 mg Oral Daily    finasteride  5 mg Oral Daily    lisinopril  5 mg Oral Daily    tamsulosin  0.4 mg Oral Daily    sodium chloride flush  10 mL Intravenous 2 times per day    pantoprazole  40 mg Intravenous Daily    nicotine  1 patch Transdermal Daily    sodium chloride flush  10 mL Intravenous 2 times per day      Infusions:   heparin (Porcine) 700 Units/hr (08/23/20 0339)      PRN Meds:  sodium chloride flush, acetaminophen, sodium chloride flush, sodium chloride flush, polyethylene glycol, promethazine **OR** ondansetron, sodium chloride flush       Physical Exam:  Vitals:    08/23/20 0609   BP: (!) 122/54   Pulse:    Resp:    Temp: 98.5 °F (36.9 °C)   SpO2: 95%        General: AAO, NAD  Chest: Nontender  Cardiac: First and Second Heart Sounds are Normal, No Murmurs or Gallops noted  Lungs:Clear to auscultation and percussion. Abdomen: Soft, NT, ND, +BS  Extremities: No clubbing, no edema  Vascular:  Equal 2+ peripheral pulses. Lab Data:  CBC:   Recent Labs     08/21/20  0306 08/23/20 0513   WBC 8.4 8.2   HGB 12.1* 11.0*   HCT 37.0* 33.4*   MCV 89.6 88.4    296     BMP:   Recent Labs     08/21/20  0306 08/23/20  0513   * 132*   K 4.2 3.8   CL 99 99   CO2 25 25   BUN 19 14   CREATININE 0.7* 0.7*     LIVER PROFILE: No results for input(s): AST, ALT, LIPASE, BILIDIR, BILITOT, ALKPHOS in the last 72 hours. Invalid input(s):   AMYLASE,  ALB  PT/INR:   Recent Labs     08/22/20  0941 08/23/20  0031 08/23/20  0513   PROTIME 14.1 14.0 13.4   INR 1.16 1.16 1.11     APTT:   Recent Labs     08/22/20  0941 08/23/20  0031 08/23/20  0513   APTT 44.2* 44.1* 46.7*     BNP:  No results for input(s): BNP in the last 72 hours.       Assessment:  Patient Active Problem List    Diagnosis Date Noted    Severe malnutrition ([address] 08/20/2020    Femoral artery occlusion, right ([address] 08/13/2020    Acute encephalopathy 08/13/2020    Hemiparesis of left nondominant side as late effect of cerebral infarction ([address]     Dysphagia due to recent stroke     Dysarthria due to acute stroke ([address]     Gait disturbance     Simple chronic bronchitis (HCC)     Urinary tract infection associated with indwelling urethral catheter ([address]     Acute cystitis without hematuria     Acute cerebrovascular accident (CVA) ([address] 04/10/2020    Dizziness 04/08/2020       Electronically signed by Tano Delvalle PA-C on 8/23/2020 at 8:57 AM

## 2020-08-23 NOTE — PROGRESS NOTES
GENERAL SURGERY PROGRESS NOTE    Rayo Bell is a 81 y.o. male with PAD and ischemia of the right foot. Subjective:  Doing ok today. Reports pain in the right foot. Denies other complaints. Objective:    Vitals: VITALS:  /61   Pulse 78   Temp 98.6 °F (37 °C) (Oral)   Resp 16   Ht 5' 7.5\" (1.715 m)   Wt 108 lb 1.6 oz (49 kg)   SpO2 90%   BMI 16.68 kg/m²     I/O: 08/22 0701 - 08/23 0700  In: -   Out: 350 [Urine:350]    Labs/Imaging Results:   Lab Results   Component Value Date     08/23/2020    K 3.8 08/23/2020    CL 99 08/23/2020    CO2 25 08/23/2020    BUN 14 08/23/2020    CREATININE 0.7 08/23/2020    GLUCOSE 94 08/23/2020    CALCIUM 8.3 08/23/2020      Lab Results   Component Value Date    WBC 8.2 08/23/2020    HGB 11.0 (L) 08/23/2020    HCT 33.4 (L) 08/23/2020    MCV 88.4 08/23/2020     08/23/2020       IV Fluids: heparin (Porcine) Last Rate: 700 Units/hr (08/23/20 0339)    Scheduled Meds:   vancomycin, 750 mg, Intravenous, Q24H    gabapentin, 100 mg, Oral, TID    sodium chloride flush, 10 mL, Intravenous, 2 times per day    aspirin, 81 mg, Oral, Daily    cilostazol, 50 mg, Oral, BID    sodium chloride flush, 10 mL, Intravenous, 2 times per day    atorvastatin, 20 mg, Oral, Nightly    metoprolol succinate, 25 mg, Oral, Daily    finasteride, 5 mg, Oral, Daily    lisinopril, 5 mg, Oral, Daily    tamsulosin, 0.4 mg, Oral, Daily    sodium chloride flush, 10 mL, Intravenous, 2 times per day    pantoprazole, 40 mg, Intravenous, Daily    nicotine, 1 patch, Transdermal, Daily    sodium chloride flush, 10 mL, Intravenous, 2 times per day    Physical Exam:  General: A&O x 3, no distress. HEENT: Anicteric sclerae, MMM. Extremities: right foot cool to the touch, toes no the right are mildly blue tinged with poor cap refill, 4th and 5th digits with a small amount of tissue loss/ulceration, no blanching erythema or drainage.         Assessment and Plan:  81

## 2020-08-23 NOTE — PROGRESS NOTES
[address] [facility]  consulted by Dr. Dione Galicia for monitoring and adjustment. Indication for treatment: Foot infection, r/o OM   Goal trough: 10-15 mcg/mL     Pertinent Laboratory Values:   Temp Readings from Last 3 Encounters:   08/23/20 98.7 °F (37.1 °C) (Oral)   04/20/20 97.8 °F (36.6 °C) (Oral)   04/10/20 97.6 °F (36.4 °C) (Oral)     Recent Labs     08/21/20  0306 08/23/20  0513   WBC 8.4 8.2     Recent Labs     08/21/20  0306 08/23/20  0513   BUN 19 14   CREATININE 0.7* 0.7*     CrCl cannot be calculated (Unknown ideal weight.). Intake/Output Summary (Last 24 hours) at 8/23/2020 1316  Last data filed at 8/22/2020 2015  Gross per 24 hour   Intake --   Output 150 ml   Net -150 ml       Pertinent Cultures:  Date    Source    Results  8/14   Urine    Providencia           Vancomycin level:   TROUGH:  No results for input(s): VANCOTROUGH in the last 72 hours. RANDOM:  No results for input(s): VANCORANDOM in the last 72 hours. Assessment:  · WBC and temperature: WNL, afebrile  · SCr, BUN, and urine output: stable   · Day(s) of therapy: 2   · Vancomycin level: to be collected    Plan:  · Vancomycin 750 mg q24h (15 mg/kg)   · Pharmacy will continue to monitor patient and adjust therapy as indicated    Nadia 8/25 @6369    Thank you for the consult.   Aly Leung RPh  8/23/2020 1:16 PM

## 2020-08-23 NOTE — PROGRESS NOTES
Brought pt down for MRI foot// he started rolling and twisting on table /he said his toes hurt too bad.  I tried to make him more comfortable/but he continued to roll and move//not safe to be moving like that//sent back to room

## 2020-08-23 NOTE — PROGRESS NOTES
Intravenous Daily    nicotine  1 patch Transdermal Daily    sodium chloride flush  10 mL Intravenous 2 times per day      Infusions:    heparin (Porcine) 700 Units/hr (08/23/20 0339)     PRN Meds: sodium chloride flush, 10 mL, PRN  acetaminophen, 650 mg, Q4H PRN  sodium chloride flush, 10 mL, PRN  sodium chloride flush, 10 mL, PRN  polyethylene glycol, 17 g, Daily PRN  promethazine, 12.5 mg, Q6H PRN    Or  ondansetron, 4 mg, Q6H PRN  sodium chloride flush, 10 mL, PRN      Subjective:   No distress. Alert this am    Objective: Intake/Output Summary (Last 24 hours) at 8/23/2020 1050  Last data filed at 8/22/2020 2015  Gross per 24 hour   Intake --   Output 150 ml   Net -150 ml      Vitals:   Vitals:    08/23/20 0915   BP:    Pulse: 78   Resp:    Temp:    SpO2:      Physical Exam:   Gen:  awake, alert, no apparent distress  Head/Eyes:  Normocephalic atraumatic, EOMI   NECK:   symmetrical, trachea midline  LUNGS: Normal Effort   CARDIOVASCULAR:  Normal rate  ABDOMEN:  non distended  MUSCULOSKELETAL:  ROM WNL  NEUROLOGIC: Alert and Oriented,  Cranial nerves II-XII are grossly intact. SKIN:  Right foot mild erythema, tender 5th and great toe.       Data:       CBC   Recent Labs     08/21/20  0306 08/23/20  0513   WBC 8.4 8.2   HGB 12.1* 11.0*   HCT 37.0* 33.4*    296      BMP   Recent Labs     08/21/20  0306 08/23/20  0513   * 132*   K 4.2 3.8   CL 99 99   CO2 25 25   BUN 19 14   CREATININE 0.7* 0.7*         Electronically signed by Hilton Kussmaul, MD on 8/23/2020 at 10:50 AM

## 2020-08-24 ENCOUNTER — APPOINTMENT (OUTPATIENT)
Dept: MRI IMAGING | Age: 85
DRG: 252 | End: 2020-08-24
Payer: MEDICARE

## 2020-08-24 ENCOUNTER — ANESTHESIA (OUTPATIENT)
Dept: OPERATING ROOM | Age: 85
DRG: 252 | End: 2020-08-24
Payer: MEDICARE

## 2020-08-24 ENCOUNTER — ANESTHESIA EVENT (OUTPATIENT)
Dept: OPERATING ROOM | Age: 85
DRG: 252 | End: 2020-08-24
Payer: MEDICARE

## 2020-08-24 VITALS
DIASTOLIC BLOOD PRESSURE: 52 MMHG | SYSTOLIC BLOOD PRESSURE: 115 MMHG | OXYGEN SATURATION: 100 % | TEMPERATURE: 97 F | RESPIRATION RATE: 4 BRPM

## 2020-08-24 LAB
ANION GAP SERPL CALCULATED.3IONS-SCNC: 6 MMOL/L (ref 4–16)
APTT: 34.5 SECONDS (ref 25.1–37.1)
APTT: 51.1 SECONDS (ref 25.1–37.1)
BUN BLDV-MCNC: 20 MG/DL (ref 6–23)
CALCIUM SERPL-MCNC: 8.1 MG/DL (ref 8.3–10.6)
CHLORIDE BLD-SCNC: 94 MMOL/L (ref 99–110)
CO2: 28 MMOL/L (ref 21–32)
CREAT SERPL-MCNC: 1.1 MG/DL (ref 0.9–1.3)
GFR AFRICAN AMERICAN: >60 ML/MIN/1.73M2
GFR NON-AFRICAN AMERICAN: >60 ML/MIN/1.73M2
GLUCOSE BLD-MCNC: 108 MG/DL (ref 70–99)
HCT VFR BLD CALC: 31.4 % (ref 42–52)
HEMOGLOBIN: 10.2 GM/DL (ref 13.5–18)
INR BLD: 1.02 INDEX
INR BLD: 1.06 INDEX
MCH RBC QN AUTO: 29.2 PG (ref 27–31)
MCHC RBC AUTO-ENTMCNC: 32.5 % (ref 32–36)
MCV RBC AUTO: 90 FL (ref 78–100)
PDW BLD-RTO: 15.5 % (ref 11.7–14.9)
PLATELET # BLD: 301 K/CU MM (ref 140–440)
PMV BLD AUTO: 10.9 FL (ref 7.5–11.1)
POTASSIUM SERPL-SCNC: 4.5 MMOL/L (ref 3.5–5.1)
PROTHROMBIN TIME: 12.3 SECONDS (ref 11.7–14.5)
PROTHROMBIN TIME: 12.8 SECONDS (ref 11.7–14.5)
RBC # BLD: 3.49 M/CU MM (ref 4.6–6.2)
SODIUM BLD-SCNC: 128 MMOL/L (ref 135–145)
WBC # BLD: 8.5 K/CU MM (ref 4–10.5)

## 2020-08-24 PROCEDURE — 7100000001 HC PACU RECOVERY - ADDTL 15 MIN: Performed by: SURGERY

## 2020-08-24 PROCEDURE — 6370000000 HC RX 637 (ALT 250 FOR IP): Performed by: INTERNAL MEDICINE

## 2020-08-24 PROCEDURE — 3700000001 HC ADD 15 MINUTES (ANESTHESIA): Performed by: SURGERY

## 2020-08-24 PROCEDURE — 6360000002 HC RX W HCPCS: Performed by: SURGERY

## 2020-08-24 PROCEDURE — 97116 GAIT TRAINING THERAPY: CPT

## 2020-08-24 PROCEDURE — 99231 SBSQ HOSP IP/OBS SF/LOW 25: CPT | Performed by: SURGERY

## 2020-08-24 PROCEDURE — 94761 N-INVAS EAR/PLS OXIMETRY MLT: CPT

## 2020-08-24 PROCEDURE — 97535 SELF CARE MNGMENT TRAINING: CPT

## 2020-08-24 PROCEDURE — 2580000003 HC RX 258: Performed by: ANESTHESIOLOGY

## 2020-08-24 PROCEDURE — 04CK0ZZ EXTIRPATION OF MATTER FROM RIGHT FEMORAL ARTERY, OPEN APPROACH: ICD-10-PCS | Performed by: SURGERY

## 2020-08-24 PROCEDURE — 85027 COMPLETE CBC AUTOMATED: CPT

## 2020-08-24 PROCEDURE — 2580000003 HC RX 258: Performed by: SURGERY

## 2020-08-24 PROCEDURE — 6370000000 HC RX 637 (ALT 250 FOR IP)

## 2020-08-24 PROCEDURE — 7100000000 HC PACU RECOVERY - FIRST 15 MIN: Performed by: SURGERY

## 2020-08-24 PROCEDURE — 97530 THERAPEUTIC ACTIVITIES: CPT

## 2020-08-24 PROCEDURE — 6370000000 HC RX 637 (ALT 250 FOR IP): Performed by: SURGERY

## 2020-08-24 PROCEDURE — 2580000003 HC RX 258: Performed by: INTERNAL MEDICINE

## 2020-08-24 PROCEDURE — 2500000003 HC RX 250 WO HCPCS: Performed by: NURSE ANESTHETIST, CERTIFIED REGISTERED

## 2020-08-24 PROCEDURE — 36415 COLL VENOUS BLD VENIPUNCTURE: CPT

## 2020-08-24 PROCEDURE — 04UK0JZ SUPPLEMENT RIGHT FEMORAL ARTERY WITH SYNTHETIC SUBSTITUTE, OPEN APPROACH: ICD-10-PCS | Performed by: SURGERY

## 2020-08-24 PROCEDURE — 80048 BASIC METABOLIC PNL TOTAL CA: CPT

## 2020-08-24 PROCEDURE — 2580000003 HC RX 258: Performed by: NURSE ANESTHETIST, CERTIFIED REGISTERED

## 2020-08-24 PROCEDURE — 85730 THROMBOPLASTIN TIME PARTIAL: CPT

## 2020-08-24 PROCEDURE — 3700000000 HC ANESTHESIA ATTENDED CARE: Performed by: SURGERY

## 2020-08-24 PROCEDURE — C1768 GRAFT, VASCULAR: HCPCS | Performed by: SURGERY

## 2020-08-24 PROCEDURE — 2000000000 HC ICU R&B

## 2020-08-24 PROCEDURE — 3600000016 HC SURGERY LEVEL 6 ADDTL 15MIN: Performed by: SURGERY

## 2020-08-24 PROCEDURE — 85610 PROTHROMBIN TIME: CPT

## 2020-08-24 PROCEDURE — 3600000006 HC SURGERY LEVEL 6 BASE: Performed by: SURGERY

## 2020-08-24 PROCEDURE — 2709999900 HC NON-CHARGEABLE SUPPLY: Performed by: SURGERY

## 2020-08-24 PROCEDURE — 6360000002 HC RX W HCPCS: Performed by: NURSE ANESTHETIST, CERTIFIED REGISTERED

## 2020-08-24 DEVICE — GRAFT VASC W1XL10CM THK6 PLY SYNTH CROSSLINKED ELAS FOR VASC: Type: IMPLANTABLE DEVICE | Status: FUNCTIONAL

## 2020-08-24 RX ORDER — SODIUM CHLORIDE 0.9 % (FLUSH) 0.9 %
10 SYRINGE (ML) INJECTION EVERY 12 HOURS SCHEDULED
Status: DISCONTINUED | OUTPATIENT
Start: 2020-08-24 | End: 2020-08-28 | Stop reason: HOSPADM

## 2020-08-24 RX ORDER — MORPHINE SULFATE 2 MG/ML
1 INJECTION, SOLUTION INTRAMUSCULAR; INTRAVENOUS
Status: DISCONTINUED | OUTPATIENT
Start: 2020-08-24 | End: 2020-08-25

## 2020-08-24 RX ORDER — PROTAMINE SULFATE 10 MG/ML
INJECTION, SOLUTION INTRAVENOUS PRN
Status: DISCONTINUED | OUTPATIENT
Start: 2020-08-24 | End: 2020-08-24 | Stop reason: SDUPTHER

## 2020-08-24 RX ORDER — SODIUM CHLORIDE 450 MG/100ML
INJECTION, SOLUTION INTRAVENOUS CONTINUOUS
Status: DISCONTINUED | OUTPATIENT
Start: 2020-08-24 | End: 2020-08-25

## 2020-08-24 RX ORDER — ONDANSETRON 2 MG/ML
INJECTION INTRAMUSCULAR; INTRAVENOUS PRN
Status: DISCONTINUED | OUTPATIENT
Start: 2020-08-24 | End: 2020-08-24 | Stop reason: SDUPTHER

## 2020-08-24 RX ORDER — ASPIRIN 81 MG/1
81 TABLET ORAL DAILY
Status: DISCONTINUED | OUTPATIENT
Start: 2020-08-24 | End: 2020-08-28 | Stop reason: HOSPADM

## 2020-08-24 RX ORDER — ROCURONIUM BROMIDE 10 MG/ML
INJECTION, SOLUTION INTRAVENOUS PRN
Status: DISCONTINUED | OUTPATIENT
Start: 2020-08-24 | End: 2020-08-24 | Stop reason: SDUPTHER

## 2020-08-24 RX ORDER — ONDANSETRON 2 MG/ML
4 INJECTION INTRAMUSCULAR; INTRAVENOUS
Status: DISCONTINUED | OUTPATIENT
Start: 2020-08-24 | End: 2020-08-24 | Stop reason: HOSPADM

## 2020-08-24 RX ORDER — FENTANYL CITRATE 50 UG/ML
25 INJECTION, SOLUTION INTRAMUSCULAR; INTRAVENOUS EVERY 5 MIN PRN
Status: DISCONTINUED | OUTPATIENT
Start: 2020-08-24 | End: 2020-08-24 | Stop reason: HOSPADM

## 2020-08-24 RX ORDER — HYDROCODONE BITARTRATE AND ACETAMINOPHEN 5; 325 MG/1; MG/1
1 TABLET ORAL EVERY 4 HOURS PRN
Status: DISCONTINUED | OUTPATIENT
Start: 2020-08-24 | End: 2020-08-28 | Stop reason: HOSPADM

## 2020-08-24 RX ORDER — FENTANYL CITRATE 50 UG/ML
50 INJECTION, SOLUTION INTRAMUSCULAR; INTRAVENOUS EVERY 5 MIN PRN
Status: DISCONTINUED | OUTPATIENT
Start: 2020-08-24 | End: 2020-08-24 | Stop reason: HOSPADM

## 2020-08-24 RX ORDER — ACETAMINOPHEN 325 MG/1
650 TABLET ORAL EVERY 4 HOURS PRN
Status: DISCONTINUED | OUTPATIENT
Start: 2020-08-24 | End: 2020-08-28 | Stop reason: HOSPADM

## 2020-08-24 RX ORDER — SODIUM CHLORIDE 0.9 % (FLUSH) 0.9 %
10 SYRINGE (ML) INJECTION PRN
Status: DISCONTINUED | OUTPATIENT
Start: 2020-08-24 | End: 2020-08-28 | Stop reason: HOSPADM

## 2020-08-24 RX ORDER — SODIUM CHLORIDE 9 MG/ML
INJECTION, SOLUTION INTRAVENOUS CONTINUOUS
Status: DISCONTINUED | OUTPATIENT
Start: 2020-08-24 | End: 2020-08-24 | Stop reason: SDUPTHER

## 2020-08-24 RX ORDER — HEPARIN SODIUM 1000 [USP'U]/ML
INJECTION, SOLUTION INTRAVENOUS; SUBCUTANEOUS PRN
Status: DISCONTINUED | OUTPATIENT
Start: 2020-08-24 | End: 2020-08-24 | Stop reason: SDUPTHER

## 2020-08-24 RX ORDER — HYDRALAZINE HYDROCHLORIDE 20 MG/ML
5 INJECTION INTRAMUSCULAR; INTRAVENOUS EVERY 10 MIN PRN
Status: DISCONTINUED | OUTPATIENT
Start: 2020-08-24 | End: 2020-08-24 | Stop reason: HOSPADM

## 2020-08-24 RX ORDER — SODIUM CHLORIDE, SODIUM LACTATE, POTASSIUM CHLORIDE, CALCIUM CHLORIDE 600; 310; 30; 20 MG/100ML; MG/100ML; MG/100ML; MG/100ML
INJECTION, SOLUTION INTRAVENOUS ONCE
Status: COMPLETED | OUTPATIENT
Start: 2020-08-24 | End: 2020-08-24

## 2020-08-24 RX ORDER — PROPOFOL 10 MG/ML
INJECTION, EMULSION INTRAVENOUS PRN
Status: DISCONTINUED | OUTPATIENT
Start: 2020-08-24 | End: 2020-08-24 | Stop reason: SDUPTHER

## 2020-08-24 RX ORDER — CEFAZOLIN SODIUM 2 G/100ML
2 INJECTION, SOLUTION INTRAVENOUS
Status: DISCONTINUED | OUTPATIENT
Start: 2020-08-24 | End: 2020-08-24 | Stop reason: HOSPADM

## 2020-08-24 RX ORDER — LABETALOL HYDROCHLORIDE 5 MG/ML
5 INJECTION, SOLUTION INTRAVENOUS EVERY 10 MIN PRN
Status: DISCONTINUED | OUTPATIENT
Start: 2020-08-24 | End: 2020-08-24 | Stop reason: HOSPADM

## 2020-08-24 RX ORDER — EPHEDRINE SULFATE 50 MG/ML
INJECTION INTRAVENOUS PRN
Status: DISCONTINUED | OUTPATIENT
Start: 2020-08-24 | End: 2020-08-24 | Stop reason: SDUPTHER

## 2020-08-24 RX ORDER — SODIUM CHLORIDE, SODIUM LACTATE, POTASSIUM CHLORIDE, CALCIUM CHLORIDE 600; 310; 30; 20 MG/100ML; MG/100ML; MG/100ML; MG/100ML
INJECTION, SOLUTION INTRAVENOUS CONTINUOUS PRN
Status: DISCONTINUED | OUTPATIENT
Start: 2020-08-24 | End: 2020-08-24 | Stop reason: SDUPTHER

## 2020-08-24 RX ORDER — LIDOCAINE HYDROCHLORIDE 20 MG/ML
INJECTION, SOLUTION INFILTRATION; PERINEURAL PRN
Status: DISCONTINUED | OUTPATIENT
Start: 2020-08-24 | End: 2020-08-24 | Stop reason: SDUPTHER

## 2020-08-24 RX ORDER — FENTANYL CITRATE 50 UG/ML
INJECTION, SOLUTION INTRAMUSCULAR; INTRAVENOUS PRN
Status: DISCONTINUED | OUTPATIENT
Start: 2020-08-24 | End: 2020-08-24 | Stop reason: SDUPTHER

## 2020-08-24 RX ADMIN — PHENYLEPHRINE HYDROCHLORIDE 300 MCG: 10 INJECTION INTRAVENOUS at 17:03

## 2020-08-24 RX ADMIN — LIDOCAINE HYDROCHLORIDE 60 MG: 20 INJECTION, SOLUTION INFILTRATION; PERINEURAL at 16:55

## 2020-08-24 RX ADMIN — HYDROCODONE BITARTRATE AND ACETAMINOPHEN 1 TABLET: 5; 325 TABLET ORAL at 21:48

## 2020-08-24 RX ADMIN — GABAPENTIN 100 MG: 100 CAPSULE ORAL at 09:13

## 2020-08-24 RX ADMIN — FENTANYL CITRATE 50 MCG: 50 INJECTION INTRAMUSCULAR; INTRAVENOUS at 16:55

## 2020-08-24 RX ADMIN — ASPIRIN 81 MG CHEWABLE TABLET 81 MG: 81 TABLET CHEWABLE at 09:13

## 2020-08-24 RX ADMIN — CILOSTAZOL 50 MG: 50 TABLET ORAL at 09:13

## 2020-08-24 RX ADMIN — ROCURONIUM BROMIDE 50 MG: 10 INJECTION INTRAVENOUS at 16:55

## 2020-08-24 RX ADMIN — SODIUM CHLORIDE: 9 INJECTION, SOLUTION INTRAVENOUS at 07:11

## 2020-08-24 RX ADMIN — EPHEDRINE SULFATE 5 MG: 50 INJECTION INTRAVENOUS at 18:49

## 2020-08-24 RX ADMIN — EPHEDRINE SULFATE 5 MG: 50 INJECTION INTRAVENOUS at 17:07

## 2020-08-24 RX ADMIN — PROTAMINE SULFATE 25 MG: 10 INJECTION, SOLUTION INTRAVENOUS at 18:43

## 2020-08-24 RX ADMIN — EPHEDRINE SULFATE 5 MG: 50 INJECTION INTRAVENOUS at 18:46

## 2020-08-24 RX ADMIN — FENTANYL CITRATE 50 MCG: 50 INJECTION INTRAMUSCULAR; INTRAVENOUS at 17:10

## 2020-08-24 RX ADMIN — FINASTERIDE 5 MG: 5 TABLET, FILM COATED ORAL at 09:13

## 2020-08-24 RX ADMIN — SUGAMMADEX 200 MG: 100 INJECTION, SOLUTION INTRAVENOUS at 19:00

## 2020-08-24 RX ADMIN — SODIUM CHLORIDE: 4.5 INJECTION, SOLUTION INTRAVENOUS at 20:15

## 2020-08-24 RX ADMIN — HEPARIN SODIUM 5000 UNITS: 1000 INJECTION, SOLUTION INTRAVENOUS; SUBCUTANEOUS at 17:13

## 2020-08-24 RX ADMIN — EPHEDRINE SULFATE 5 MG: 50 INJECTION INTRAVENOUS at 17:10

## 2020-08-24 RX ADMIN — FENTANYL CITRATE 50 MCG: 50 INJECTION INTRAMUSCULAR; INTRAVENOUS at 18:42

## 2020-08-24 RX ADMIN — CEFAZOLIN SODIUM 2 G: 2 INJECTION, SOLUTION INTRAVENOUS at 17:04

## 2020-08-24 RX ADMIN — SODIUM CHLORIDE, POTASSIUM CHLORIDE, SODIUM LACTATE AND CALCIUM CHLORIDE: 600; 310; 30; 20 INJECTION, SOLUTION INTRAVENOUS at 18:29

## 2020-08-24 RX ADMIN — EPHEDRINE SULFATE 5 MG: 50 INJECTION INTRAVENOUS at 17:17

## 2020-08-24 RX ADMIN — SODIUM CHLORIDE, POTASSIUM CHLORIDE, SODIUM LACTATE AND CALCIUM CHLORIDE: 600; 310; 30; 20 INJECTION, SOLUTION INTRAVENOUS at 16:49

## 2020-08-24 RX ADMIN — SODIUM CHLORIDE, POTASSIUM CHLORIDE, SODIUM LACTATE AND CALCIUM CHLORIDE: 600; 310; 30; 20 INJECTION, SOLUTION INTRAVENOUS at 16:01

## 2020-08-24 RX ADMIN — PROPOFOL 20 MG: 10 INJECTION, EMULSION INTRAVENOUS at 19:00

## 2020-08-24 RX ADMIN — PHENYLEPHRINE HYDROCHLORIDE 200 MCG: 10 INJECTION INTRAVENOUS at 16:59

## 2020-08-24 RX ADMIN — ONDANSETRON 4 MG: 2 INJECTION INTRAMUSCULAR; INTRAVENOUS at 18:46

## 2020-08-24 RX ADMIN — SODIUM CHLORIDE, PRESERVATIVE FREE 10 ML: 5 INJECTION INTRAVENOUS at 21:48

## 2020-08-24 RX ADMIN — PROPOFOL 80 MG: 10 INJECTION, EMULSION INTRAVENOUS at 16:55

## 2020-08-24 RX ADMIN — ASPIRIN 81 MG: 81 TABLET, COATED ORAL at 21:48

## 2020-08-24 ASSESSMENT — PAIN SCALES - GENERAL
PAINLEVEL_OUTOF10: 0
PAINLEVEL_OUTOF10: 0
PAINLEVEL_OUTOF10: 3

## 2020-08-24 ASSESSMENT — PULMONARY FUNCTION TESTS
PIF_VALUE: 1
PIF_VALUE: 13
PIF_VALUE: 13
PIF_VALUE: 15
PIF_VALUE: 13
PIF_VALUE: 6
PIF_VALUE: 13
PIF_VALUE: 7
PIF_VALUE: 1
PIF_VALUE: 13
PIF_VALUE: 7
PIF_VALUE: 13
PIF_VALUE: 14
PIF_VALUE: 13
PIF_VALUE: 14
PIF_VALUE: 13
PIF_VALUE: 14
PIF_VALUE: 13
PIF_VALUE: 13
PIF_VALUE: 7
PIF_VALUE: 14
PIF_VALUE: 2
PIF_VALUE: 13
PIF_VALUE: 0
PIF_VALUE: 13
PIF_VALUE: 13
PIF_VALUE: 6
PIF_VALUE: 13
PIF_VALUE: 14
PIF_VALUE: 13
PIF_VALUE: 13
PIF_VALUE: 14
PIF_VALUE: 13
PIF_VALUE: 13
PIF_VALUE: 11
PIF_VALUE: 13
PIF_VALUE: 14
PIF_VALUE: 13
PIF_VALUE: 13
PIF_VALUE: 3
PIF_VALUE: 13
PIF_VALUE: 14
PIF_VALUE: 13
PIF_VALUE: 14
PIF_VALUE: 13
PIF_VALUE: 1
PIF_VALUE: 13
PIF_VALUE: 14
PIF_VALUE: 13
PIF_VALUE: 14
PIF_VALUE: 15
PIF_VALUE: 13
PIF_VALUE: 14
PIF_VALUE: 1
PIF_VALUE: 13
PIF_VALUE: 14
PIF_VALUE: 14
PIF_VALUE: 7
PIF_VALUE: 13
PIF_VALUE: 6
PIF_VALUE: 13
PIF_VALUE: 13
PIF_VALUE: 1
PIF_VALUE: 13
PIF_VALUE: 14
PIF_VALUE: 14
PIF_VALUE: 13
PIF_VALUE: 13
PIF_VALUE: 15
PIF_VALUE: 14
PIF_VALUE: 13
PIF_VALUE: 13
PIF_VALUE: 1
PIF_VALUE: 1
PIF_VALUE: 15
PIF_VALUE: 8
PIF_VALUE: 13
PIF_VALUE: 7
PIF_VALUE: 14
PIF_VALUE: 13
PIF_VALUE: 14
PIF_VALUE: 13

## 2020-08-24 ASSESSMENT — LIFESTYLE VARIABLES: SMOKING_STATUS: 1

## 2020-08-24 NOTE — CARE COORDINATION
[facility] notified CM that they will not be able to accept pt. CM notified pt. Pt is agreeable to go to a SNF. His first choice is [facility] and second choice is [facility]. Referral made to [facility]. Clinicals faxed to her for review. Pt has [facility] and will require a pre-cert which usually can be obtained within one day. Pt was COVID neg on 8/17.   TE

## 2020-08-24 NOTE — ANESTHESIA POSTPROCEDURE EVALUATION
Department of Anesthesiology  Postprocedure Note    Patient: Jacqueline Diana  MRN: [id number]  YOB: 1935  Date of evaluation: 8/24/2020  Time:  7:17 PM     Procedure Summary     Date:  08/24/20 Room / Location:  [place] [address] / [facility]    Anesthesia Start:  1649 Anesthesia Stop:  1916    Procedure:  FEMORAL ENDARTERECTOMY (Right ) Diagnosis:  (.)    Surgeon:  Amadou Spears MD Responsible Provider:  MADY Palma CRNA    Anesthesia Type:  general ASA Status:  3          Anesthesia Type: general    Vanessa Phase I:      Vanessa Phase II:      Last vitals: Reviewed and per EMR flowsheets.        Anesthesia Post Evaluation    Patient location during evaluation: PACU  Patient participation: waiting for patient participation  Level of consciousness: sleepy but conscious  Airway patency: patent  Nausea & Vomiting: no vomiting and no nausea  Complications: no  Cardiovascular status: blood pressure returned to baseline and hemodynamically stable  Respiratory status: acceptable, nonlabored ventilation, spontaneous ventilation and nasal cannula  Hydration status: stable

## 2020-08-24 NOTE — CARE COORDINATION
Reviewed patients clinicals and PT/OT notes. Patient was recently on ARU in April. Discussed patient with therapy and CM about previous stay. Per therapy and CM patient would benefit from plan for long term care. Patient d/c to a SNF a d/c from ARU last stay. Discussed [facility].

## 2020-08-24 NOTE — ANESTHESIA PRE PROCEDURE
Department of Anesthesiology  Preprocedure Note       Name:  Rashmi Yancey   Age:  81 y.o.  :  1935                                          MRN:  [id number]         Date:  2020      Surgeon: Nikole Lopez):  Sammie Powers MD    Procedure: Procedure(s): FEMORAL ENDARTERECTOMY    Medications prior to admission:   Prior to Admission medications    Medication Sig Start Date End Date Taking?  Authorizing Provider   aspirin 81 MG EC tablet Take 1 tablet by mouth daily 20  Yes FINA Cantu MD   atorvastatin (LIPITOR) 80 MG tablet Take 1 tablet by mouth nightly 20  Yes FINA Cantu MD   lisinopril (PRINIVIL;ZESTRIL) 5 MG tablet Take 1 tablet by mouth daily 20  Yes FINA Cantu MD   finasteride (PROSCAR) 5 MG tablet Take 1 tablet by mouth daily 20  Yes Elle Teran MD   [facility]) 0.4 MG capsule Take 1 capsule by mouth daily 20  Yes Elle Teran MD   clopidogrel (PLAVIX) 75 MG tablet Take 1 tablet by mouth daily 20  Yes Elle Teran MD       Current medications:    Current Facility-Administered Medications   Medication Dose Route Frequency Provider Last Rate Last Dose    0.9 % sodium chloride infusion   Intravenous Continuous Adrien Das MD 75 mL/hr at 20 0711      nitroglycerin (NITRO-BID) 2 % ointment 1 inch  1 inch Topical BID Nata Sol PA-C   1 inch at 20 1705    vancomycin (VANCOCIN) 750 mg in dextrose 5 % 250 mL IVPB  750 mg Intravenous Q24H Rachel Dela Cruz MD   Stopped at 20 1318    heparin 25,000 unit in sodium chloride 0.45% 250 mL infusion  700 Units/hr Intravenous Continuous Adrien Das MD   Stopped at 20 1597    gabapentin (NEURONTIN) capsule 100 mg  100 mg Oral TID Adrien Das MD   100 mg at 20 0913    sodium chloride flush 0.9 % injection 10 mL  10 mL Intravenous 2 times per day Adrien Das MD   10 mL at 20 202    sodium chloride flush 0.9 % injection 10 mL  10 mL Intravenous PRN Susy Farrell MD   10 mL at 08/22/20 0537    acetaminophen (TYLENOL) tablet 650 mg  650 mg Oral Q4H PRN Susy Farrell MD        aspirin chewable tablet 81 mg  81 mg Oral Daily Susy Farrell MD   81 mg at 08/24/20 0913    cilostazol (PLETAL) tablet 50 mg  50 mg Oral BID Susy Farrell MD   50 mg at 08/24/20 0913    sodium chloride flush 0.9 % injection 10 mL  10 mL Intravenous 2 times per day Susy Farrell MD   10 mL at 08/23/20 2027    sodium chloride flush 0.9 % injection 10 mL  10 mL Intravenous PRN Susy Farrell MD        atorvastatin (LIPITOR) tablet 20 mg  20 mg Oral Nightly Susy Farrell MD   20 mg at 08/23/20 2026    metoprolol succinate (TOPROL XL) extended release tablet 25 mg  25 mg Oral Daily Susy Farrell MD   Stopped at 08/24/20 0913    finasteride (PROSCAR) tablet 5 mg  5 mg Oral Daily Susy Farrell MD   5 mg at 08/24/20 0913    [Held by provider] lisinopril (PRINIVIL;ZESTRIL) tablet 5 mg  5 mg Oral Daily Susy Farrell MD   5 mg at 08/23/20 0911    tamsulosin (FLOMAX) capsule 0.4 mg  0.4 mg Oral Daily Susy Farrell MD   0.4 mg at 08/23/20 2026    sodium chloride flush 0.9 % injection 10 mL  10 mL Intravenous 2 times per day Susy Farrell MD   10 mL at 08/22/20 2102    sodium chloride flush 0.9 % injection 10 mL  10 mL Intravenous PRN Susy Farrell MD        polyethylene glycol (GLYCOLAX) packet 17 g  17 g Oral Daily PRN Susy Farrell MD        promethazine (PHENERGAN) tablet 12.5 mg  12.5 mg Oral Q6H PRN Susy Farrell MD   12.5 mg at 08/18/20 1954    Or    ondansetron (ZOFRAN) injection 4 mg  4 mg Intravenous Q6H PRN Susy Farrell MD        pantoprazole (PROTONIX) injection 40 mg  40 mg Intravenous Daily Susy Farrell MD   Stopped at 08/24/20 0629    nicotine (NICODERM CQ) 21 MG/24HR 1 patch  1 patch Transdermal Daily Susy Farrell MD   1 patch at 08/21/20 1133    sodium chloride flush 0.9 % injection 10 mL  10 mL Intravenous 2 times per day Susy Farrell MD   10 mL at 08/20/20 0913    sodium chloride flush 0.9 % injection 10 mL  10 mL Intravenous PRN Susy Farrell MD   10 mL at 08/18/20 5101       Allergies:  No Known Allergies    Problem List:    Patient Active Problem List   Diagnosis Code    Dizziness R42    Acute cerebrovascular accident (CVA) ([facility] [address] I63.9    Hemiparesis of left nondominant side as late effect of cerebral infarction ([facility] [address] I69.354    Dysphagia due to recent stroke I69.391    Dysarthria due to acute stroke ([facility] [address] I63.9, R47.1    Gait disturbance R26.9    Simple chronic bronchitis (HCC) J41.0    Urinary tract infection associated with indwelling urethral catheter ([facility] [address] T83.511A, N39.0    Acute cystitis without hematuria N30.00    Femoral artery occlusion, right ([facility] [address] I70.201    Acute encephalopathy G93.40    Severe malnutrition ([facility] [address] E43       Past Medical History:        Diagnosis Date    COPD (chronic obstructive pulmonary disease) ([facility] [address]     Prostatitis        Past Surgical History:        Procedure Laterality Date    HERNIA REPAIR         Social History:    Social History     Tobacco Use    Smoking status: Current Every Day Smoker     Packs/day: 0.50     Types: Cigarettes   Substance Use Topics    Alcohol use:  Yes                                Ready to quit: Not Answered  Counseling given: Not Answered      Vital Signs (Current):   Vitals:    08/24/20 0500 08/24/20 0602 08/24/20 0702 08/24/20 0900   BP: (!) 104/46 (!) 104/46 (!) 88/52 (!) 117/54   Pulse: 57  63 62   Resp:  18 18 21   Temp:  36.9 °C (98.4 °F)  36.6 °C (97.8 °F)   TempSrc:  Oral  Oral   SpO2:   (!) 89% 94%   Weight:  109 lb 5 oz (49.6 kg)     Height:                                                  BP Readings from Last 3 Encounters:   08/24/20 (!) 117/54   04/20/20 127/69   04/10/20 (!) 138/59       NPO Status:                                                                                 BMI:   Wt Readings from Last 3 Encounters:   08/24/20 109 lb 5 oz (49.6 kg)   04/10/20 109 lb 8 oz (49.7 kg)   04/08/20 98 lb 3.2 oz (44.5 kg)     Body mass index is 16.87 kg/m². CBC:   Lab Results   Component Value Date    WBC 8.5 08/24/2020    RBC 3.49 08/24/2020    HGB 10.2 08/24/2020    HCT 31.4 08/24/2020    MCV 90.0 08/24/2020    RDW 15.5 08/24/2020     08/24/2020       CMP:   Lab Results   Component Value Date     08/24/2020    K 4.5 08/24/2020    CL 94 08/24/2020    CO2 28 08/24/2020    BUN 20 08/24/2020    CREATININE 1.1 08/24/2020    GFRAA >60 08/24/2020    LABGLOM >60 08/24/2020    GLUCOSE 108 08/24/2020    PROT 6.3 08/17/2020    CALCIUM 8.1 08/24/2020    BILITOT 0.6 08/17/2020    ALKPHOS 111 08/17/2020    AST 30 08/17/2020    ALT 27 08/17/2020       POC Tests: No results for input(s): POCGLU, POCNA, POCK, POCCL, POCBUN, POCHEMO, POCHCT in the last 72 hours.     Coags:   Lab Results   Component Value Date    PROTIME 12.3 08/24/2020    INR 1.02 08/24/2020    APTT 34.5 08/24/2020       HCG (If Applicable): No results found for: PREGTESTUR, PREGSERUM, HCG, HCGQUANT     ABGs: No results found for: PHART, PO2ART, HWE8WND, CEZ0XZT, BEART, T3WTAEAK     Type & Screen (If Applicable):  No results found for: LABABO, LABRH    Drug/Infectious Status (If Applicable):  No results found for: HIV, HEPCAB    COVID-19 Screening (If Applicable):   Lab Results   Component Value Date    COVID19 NOT DETECTED 08/17/2020         Anesthesia Evaluation  Patient summary reviewed and Nursing notes reviewed  Airway: Mallampati: IV  TM distance: >3 FB   Neck ROM: limited  Mouth opening: < 3 FB Dental:    (+) edentulous      Pulmonary: breath sounds clear to auscultation  (+) COPD:  current smoker                          ROS comment: Current smoker and etoh    Cardiovascular:    (+) hyperlipidemia      ECG reviewed  Rhythm: regular    Echocardiogram reviewed         Beta Blocker:  Not on Beta Blocker      ROS comment: Normal sinus rhythm   Possible Left atrial enlargement   Left ventricular hypertrophy Possible Inferior infarct (cited on or before 13-AUG-2020)   Abnormal ECG   When compared with ECG of 13-AUG-2020 11:49,   T wave inversion now evident in Inferior leads   Nonspecific T wave abnormality, improved in Lateral leads   Confirmed by Marlo Boast MD, Mariaelena Jefferson ([id number]) on 8/23/2020 3:27:39 PM   Testing Performed By     Summary   Left ventricular systolic function is low normal.   Ejection fraction is visually estimated at 45%. No evidence of thrombus within the left atrial appendage. Negative bubble study; no ASD or PFO noted. Lambl's excrescence noted on the aortic valve. Mild to moderate aortic regurgitation. Slight prolapse of the anterior mitral valve leaflet. Calcified chordae tendineae near the mitral valve leaflet tips. No evidence of any pericardial effusion. Signature      ------------------------------------------------------------------   Electronically signed by Cindy Oscar MD (Interpreting   physician) on 04/10/2020 at 10:03 AM     Neuro/Psych:   (+) CVA: residual symptoms,             GI/Hepatic/Renal:            ROS comment: Hyponatremia. Endo/Other:    (+) blood dyscrasia: anticoagulation therapy:., .          Pt had no PAT visit       Abdominal:           Vascular:   + PVD, aortic or cerebral, DVT, .        ROS comment: Right femoral artery occlusion . Anesthesia Plan      general     ASA 3       Induction: intravenous. arterial line  MIPS: Postoperative opioids intended and Prophylactic antiemetics administered. Anesthetic plan and risks discussed with patient. Use of blood products discussed with patient whom consented to blood products. Plan discussed with CRNA.     Attending anesthesiologist reviewed and agrees with Pre Eval content            MADY Faulkner - CRNA   8/24/2020

## 2020-08-24 NOTE — PROGRESS NOTES
1913: Patient arrived to PACU from OR. Monitors applied, alarms on. VS stable, wdl. Small amount of drainage noted on surgical dressing, boarder outlined in marker. Pulses done with doppler. Report obtained from [facility] and [facility]. 1930: Patient arouses easily, then rests again. VS stable, breathing unlabored. 1948: A-line removed, patient tolerated well.   2002: Attempted to call report for room 2101, no answer on unit. 2008: [facility].   2017: Patient transferred to room 2101.   2020: Stevie Morgan RN at bedside.

## 2020-08-24 NOTE — OP NOTE
Operative Note      Patient: Berny Davidson  YOB: 1935  MRN: [id number]    Date of Procedure: 8/24/2020    Pre-Op Diagnosis: . Ischemia of the right lower extremity status post intervention status post femoropopliteal bypass surgery extensive calcific occlusion of the common femoral artery and its bifurcation  Post-Op Diagnosis: Same   As noted preoperatively extensive calcific complete occlusion of the common femoral artery proximal to the bifurcation the calcific disease extending to the superficial and deep femoral arteries and its branches       Procedure(s):  Reexploration of the right groin  Right common fEMORAL profunda and superficial femoral artery ENDARTERECTOMY  Closure with core matrix patch    Surgeon(s):  Jaison Poole MD    Assistant:   Bucky Ugarte MD        Anesthesia: General    Estimated Blood Loss (mL): 854     Complications: None    Specimens:   ID Type Source Tests Collected by Time Destination   A : plaque of the right femerol artery  Specimen Groin SURGICAL PATHOLOGY Jaison Poole MD 8/24/2020 0878        Implants:  Implant Name Type Inv.  Item Serial No.  Lot No. LRB No. Used Action   EDUAR-PATCH VASC ECM [employer] [id number] Vascular/Graft/Patch/Filter EDUAR-PATCH VASC ECM [employer] 1X10CM  [employer] [id number] Right 1 Implanted         Drains:   [REMOVED] Urethral Catheter 16 fr (Removed)         Detailed Description of Procedure:   Patient brought operating room after extensive preop discussions and deliberations with the patient and Dr. Katherine Tobar the cardiologist  Timeout performed per checklist  Patient prepared and draped explained the right lower extremity and and exposed right groin  Previous right femoral incision was reopened and using the same scar  Incision scattered skin subtenons tissues proximal to common femoral artery right behind the inguinal ligament dissected out which is the upper end of the heavy calcific disease there was no pulse in the common femoral artery    Common femoral artery dissected distally to the bifurcation and all the major branches superficial and deep femoral arteries dissected beyond the second branches  After heparinization cancer placed proximally and distally arteriotomy was made in the superficial femoral artery which appeared to the soft test in its origin and carried into the heavy plaque all the way to the proximal common femoral artery  Extensive heavy calcific plaque was removed from the entire area endarterectomy  From just at the level inguinal ligament and into the superficial femoral artery and into the deep femoral artery branches until large portion of the calcific plaque was retrieved and good backbleeding   The end of the plaque in the supra femoral artery was tacked with interrupted Prolene sutures   After thoroughly cleansing the area of all the loose plaques of the intima elected to close arteriotomy using a core matrix patch, this patch is secured with a continuous Prolene suture starting at the distal end and continue all around from the apex multiple distal sutures were required to require l to gain adequate hemostasis   After complete hemostasis secured antegrade blood flow established  And additional hemostatic sutures were placed along the  Areas of oozing on the suture line  After confirming complete hemostasis wound thoroughly to get antibiotic solution several times reconfirming the hemostasis the primary incision was then closed in layers  Patient had excellent distal pulses beyond the area of surgery in the groin as well as the popliteal artery and posterior tibial artery    Patient told procedure well but optimistic at the limit tissue loss in the right foot.     ectronically signed by Christopher Sykes MD on 8/24/2020 at 7:12 PM

## 2020-08-24 NOTE — PROGRESS NOTES
GENERAL SURGERY PROGRESS NOTE    Daryle Nick is a 81 y.o. male with PAD and ischemia of the right foot. Subjective:  Doing ok today. Reports improving pain in right foot. Plan for endarterectomy today. Objective:    Vitals: VITALS:  BP (!) 117/54   Pulse 62   Temp 97.8 °F (36.6 °C) (Oral)   Resp 21   Ht 5' 7.5\" (1.715 m)   Wt 109 lb 5 oz (49.6 kg)   SpO2 94%   BMI 16.87 kg/m²     I/O: 08/23 0701 - 08/24 0700  In: 20 [I.V.:20]  Out: 200 [Urine:200]    Labs/Imaging Results:   Lab Results   Component Value Date     08/24/2020    K 4.5 08/24/2020    CL 94 08/24/2020    CO2 28 08/24/2020    BUN 20 08/24/2020    CREATININE 1.1 08/24/2020    GLUCOSE 108 08/24/2020    CALCIUM 8.1 08/24/2020      Lab Results   Component Value Date    WBC 8.5 08/24/2020    HGB 10.2 (L) 08/24/2020    HCT 31.4 (L) 08/24/2020    MCV 90.0 08/24/2020     08/24/2020       IV Fluids:   sodium chloride Last Rate: 75 mL/hr at 08/24/20 0711    heparin (Porcine) Last Rate: Stopped (08/24/20 5302)    Scheduled Meds: nitroglycerin, 1 inch, Topical, BID    gabapentin, 100 mg, Oral, TID    sodium chloride flush, 10 mL, Intravenous, 2 times per day    aspirin, 81 mg, Oral, Daily    cilostazol, 50 mg, Oral, BID    sodium chloride flush, 10 mL, Intravenous, 2 times per day    atorvastatin, 20 mg, Oral, Nightly    metoprolol succinate, 25 mg, Oral, Daily    finasteride, 5 mg, Oral, Daily    [Held by provider] lisinopril, 5 mg, Oral, Daily    tamsulosin, 0.4 mg, Oral, Daily    sodium chloride flush, 10 mL, Intravenous, 2 times per day    pantoprazole, 40 mg, Intravenous, Daily    nicotine, 1 patch, Transdermal, Daily    sodium chloride flush, 10 mL, Intravenous, 2 times per day    Physical Exam:  General: A&O x 3, no distress. HEENT: Anicteric sclerae, MMM. Extremities: toes no the right are less blue tinged today.  poor cap refill, 4th and 5th digits with a small amount of tissue loss/ulceration, no blanching erythema or drainage. Assessment and Plan:  81 y.o. male with PAD s/p angiogram with intervention and partial revascularization of the right leg. Plan for endaterectomy. No evidence of active infection on the right foot.     Patient Active Problem List:     Dizziness     Acute cerebrovascular accident (CVA) ([facility] [address]     Hemiparesis of left nondominant side as late effect of cerebral infarction ([facility] [address]     Dysphagia due to recent stroke     Dysarthria due to acute stroke [facility])     Gait disturbance     Simple chronic bronchitis (HCC)     Urinary tract infection associated with indwelling urethral catheter (HCC)     Acute cystitis without hematuria     Femoral artery occlusion, right (HCC)     Acute encephalopathy     Severe malnutrition ([facility] [address]      - will continue to allow the toes to demarcate, I would not recommend intervention on the toes until after revascularization as long as no evidence of infection  - will continue to follow  - continue local wound cares    Yuliana Woodard MD  8/24/2020  11:38 AM

## 2020-08-24 NOTE — PROGRESS NOTES
Occupational Therapy    Occupational Therapy Treatment Note  Name: Daryle Nick MRN: [id number] :   1935   Date:  2020   Admission Date: 2020 Room:  [address] [place] [address]   Restrictions/Precautions:    Fall risk    Communication with other providers:   Cleared for treatment by Denise Nava RN. Subjective:  Patient states:  \"I have a procedure today\" Nursing reports probably in early afternoon. Pain:   Location, Type, Intensity (0/10 to 10/10):  2/10 R foot. Objective:    Observation:  Pt alert and oriented. Treatment, including education:  Transfers  Supine to sit :Min A for trunk  Sit to supine :SBA  Scooting :SBA  Sit to stand :CGA for unsteadiness at RW with pt keeping R foot off floor. Stand to sit :CGA for control      Self Care Training:   Cues were given for safety, sequence, UE/LE placement, visual cues, and balance. Activities performed today included dressing, toileting, hand hygiene, and grooming. Toileting  Min A while standing with RW, therapist held urinal in place for pt to urinate due to pt using B UEs for support on RW for stand balance. Grooming  Sup to wash face and comb hair. Bathing   Mod A for B feet and brunilda area/buttocks pt required assistance. UB Dress  Donned gown. LB Dress  Dep to don sock on L foot, R foot had bulky bandage. Therapeutic Exercise:  Cues were given for technique, safety, recruitment, and rationale. Cues were verbal and/or tactile. For BUE strengthening for ADL & functional mobility Indep pt performed BUE strengthening HEP using Medium strength theraband  X 3 exer for R/L UE x 15 reps c minimal rest breaks. Therapeutic Activity Training:   Therapeutic activity training was instructed today. Cues were given for safety, sequence, UE/LE placement, awareness, and balance. Activities performed today included bed mobility training, sup-sit, sit-stand, SPT.  Pt stood x 4 min at RW with CGA while completing dynamic stand task while reaching outside base of support to facilitate increased balance for ADL tasks and transfers. Safety Measures: Gait belt used, Left in bed, Pull/Bed Alarm activated and call light left in reach          Assessment / Impression:        Patient's tolerance of treatment: Good   Adverse Reaction: None  Significant change in status and impact:  None  Barriers to improvement:  Pain, R foot pain    Plan for Next Session:    Continue with POC.     Time in:  1033  Time out: 1113  Timed treatment minutes:40  Total treatment time:  40  Electronically signed by:    Kulwinder Vill[address]    8/24/2020, 10:41 AM    Previously filed values:

## 2020-08-24 NOTE — PLAN OF CARE
Problem: Falls - Risk of:  Goal: Will remain free from falls  Description: Will remain free from falls  8/23/2020 2206 by Paul Huerta RN  Outcome: Ongoing  8/23/2020 1048 by Halie Lorenz RN  Outcome: Ongoing  Goal: Absence of physical injury  Description: Absence of physical injury  8/23/2020 2206 by Paul Huerta RN  Outcome: Ongoing  8/23/2020 1048 by Halie Lorenz RN  Outcome: Ongoing     Problem: Skin Integrity:  Goal: Will show no infection signs and symptoms  Description: Will show no infection signs and symptoms  8/23/2020 2206 by Paul Huerta RN  Outcome: Ongoing  8/23/2020 1048 by Halie Lorenz RN  Outcome: Ongoing  Goal: Absence of new skin breakdown  Description: Absence of new skin breakdown  8/23/2020 2206 by Paul Huerta RN  Outcome: Ongoing  8/23/2020 1048 by Halie Lorenz RN  Outcome: Ongoing     Problem: Coping:  Goal: Ability to remain calm will improve  Description: Ability to remain calm will improve  8/23/2020 2206 by Paul Huerta RN  Outcome: Ongoing  8/23/2020 1048 by Halie Lorenz RN  Outcome: Ongoing     Problem: Safety:  Goal: Ability to remain free from injury will improve  Description: Ability to remain free from injury will improve  8/23/2020 2206 by Paul Huerta RN  Outcome: Ongoing  8/23/2020 1048 by Halie Lorenz RN  Outcome: Ongoing     Problem: Self-Care:  Goal: Ability to participate in self-care as condition permits will improve  Description: Ability to participate in self-care as condition permits will improve  8/23/2020 2206 by Paul Huerta RN  Outcome: Ongoing  8/23/2020 1048 by Halie Lorenz RN  Outcome: Ongoing     Problem: Restraint Use - Nonviolent/Non-Self-Destructive Behavior:  Goal: Absence of restraint indications  Description: Absence of restraint indications  8/23/2020 2206 by Paul Huerta RN  Outcome: Ongoing  8/23/2020 1048 by Halie Lorenz RN  Outcome: Ongoing  Goal: Absence of restraint-related injury  Description: Absence of restraint-related injury  8/23/2020 2206 by Tamy Peck RN  Outcome: Ongoing  8/23/2020 1048 by Donold Boxer, RN  Outcome: Ongoing
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
## 2020-08-24 NOTE — PROGRESS NOTES
Hospitalist Progress Note      Name:  Omer Danielle /Age/Sex: 1935  (81 y.o. male)   MRN & CSN:  [id number] & [id number] Admission Date/Time: 2020 11:07 AM   Location:  [address] [place] [address]- PCP: No primary care provider on file. Omer Danielle is a 81 y.o.  male  who presents with Altered Mental Status      Assessment and Plan:   PAD  - femoral endarterectomy today  - s/p peripheral angio , med mx recommended with severe disease and poor bypass candidate  - s/p angio : right external iliac stent placed  - heparin gtt holding now for procedure today  - CTA runoff noted  - asa, statin, pletal  - xarelto and plavix on hold  - pain mx prn, added neurontin  - NV checks  - check MRI of foot: unable to do as pt uncooperative  - consult cardio  - d/c abx, d/w Surgery as is not infectious etiology. HypoNa  - IVF NS  - monitor        Acute Metabolic encephalopathy exacerbated 2/2 UTI likely, also likely has underlying dementia   - resolved and stable now  - CT head: non acute, old infarct noted   - start IV Rocephin, oral cipro completed  - check urine cx +providencia rettgeri  - SLP done'  - avoid sedative meds     HTN  - continue home meds           consult palliative care for code status, goals and family support.  DNR CCA           - COPD  - Tobacco abuse - on Nicotine patch, tobacco cessation education.  - CVA   - gait disturbance  - Prostatitis            Diet Diet NPO Effective Now   Code Status DNR-CCA     Medications:   Medications:    nitroglycerin  1 inch Topical BID    gabapentin  100 mg Oral TID    sodium chloride flush  10 mL Intravenous 2 times per day    aspirin  81 mg Oral Daily    cilostazol  50 mg Oral BID    sodium chloride flush  10 mL Intravenous 2 times per day    atorvastatin  20 mg Oral Nightly    metoprolol succinate  25 mg Oral Daily    finasteride  5 mg Oral Daily    [Held by provider] lisinopril  5 mg Oral Daily    tamsulosin  0.4 mg Oral Daily    sodium chloride flush  10 mL Intravenous 2 times per day    pantoprazole  40 mg Intravenous Daily    nicotine  1 patch Transdermal Daily    sodium chloride flush  10 mL Intravenous 2 times per day      Infusions:    sodium chloride 75 mL/hr at 08/24/20 0711    heparin (Porcine) Stopped (08/24/20 0605)     PRN Meds: sodium chloride flush, 10 mL, PRN  acetaminophen, 650 mg, Q4H PRN  sodium chloride flush, 10 mL, PRN  sodium chloride flush, 10 mL, PRN  polyethylene glycol, 17 g, Daily PRN  promethazine, 12.5 mg, Q6H PRN    Or  ondansetron, 4 mg, Q6H PRN  sodium chloride flush, 10 mL, PRN      Subjective:     Doing ok, no distress  Objective: Intake/Output Summary (Last 24 hours) at 8/24/2020 0937  Last data filed at 8/23/2020 2027  Gross per 24 hour   Intake 20 ml   Output 200 ml   Net -180 ml      Vitals:   Vitals:    08/24/20 0900   BP: (!) 117/54   Pulse: 62   Resp: 21   Temp: 97.8 °F (36.6 °C)   SpO2: 94%     Physical Exam:   Gen:  awake, alert, no apparent distress  Head/Eyes:  Normocephalic atraumatic, EOMI   NECK:   symmetrical, trachea midline  LUNGS: Normal Effort   CARDIOVASCULAR:  Normal rate  ABDOMEN:  non distended  MUSCULOSKELETAL:  ROM WNL  NEUROLOGIC: Alert and Oriented,  Cranial nerves II-XII are grossly intact.    SKIN:  no bruising or bleeding, normal skin color,  no redness      Data:       CBC   Recent Labs     08/23/20  0513 08/24/20  0001   WBC 8.2 8.5   HGB 11.0* 10.2*   HCT 33.4* 31.4*    301      BMP   Recent Labs     08/23/20  0513 08/24/20  0001   * 128*   K 3.8 4.5   CL 99 94*   CO2 25 28   BUN 14 20   CREATININE 0.7* 1.1         Electronically signed by Valery Rock MD on 8/24/2020 at 9:37 AM

## 2020-08-24 NOTE — PROGRESS NOTES
Daily Progress Note    patient is awake doing ok  Heart rate Is stable  Right leg stable, warm to touch  For right CFA endarterectomy today  Supportive care     KAYLEY-4/20   Summary   Left ventricular systolic function is low normal.   Ejection fraction is visually estimated at 45%.   No evidence of thrombus within the left atrial appendage.   Negative bubble study; no ASD or PFO noted.   Lambl's excrescence noted on the aortic valve.   Mild to moderate aortic regurgitation.   Slight prolapse of the anterior mitral valve leaflet.   Calcified chordae tendineae near the mitral valve leaflet tips.   No evidence of any pericardial effusion.     PAST MEDICAL HISTORY:  COPD, prostatitis, and CVA.     PAST SURGICAL HISTORY:  Hernia repair.     SOCIAL HISTORY:  The patient smokes half pack a day.  Does not drink.     FAMILY HISTORY:  Noncontributory.     ALLERGIES:  No known drug allergies.     HOME MEDICATIONS:  The patient was on aspirin 81 mg daily, Lipitor 80 mg  at bedtime, lisinopril 5 mg daily, Proscar, Flomax, and Plavix 75 mg  daily.       Objective:   BP (!) 117/54   Pulse 62   Temp 97.8 °F (36.6 °C) (Oral)   Resp 21   Ht 5' 7.5\" (1.715 m)   Wt 109 lb 5 oz (49.6 kg)   SpO2 94%   BMI 16.87 kg/m²       Intake/Output Summary (Last 24 hours) at 8/24/2020 1029  Last data filed at 8/23/2020 2027  Gross per 24 hour   Intake 20 ml   Output 200 ml   Net -180 ml       Medications:   Scheduled Meds:   nitroglycerin  1 inch Topical BID    gabapentin  100 mg Oral TID    sodium chloride flush  10 mL Intravenous 2 times per day    aspirin  81 mg Oral Daily    cilostazol  50 mg Oral BID    sodium chloride flush  10 mL Intravenous 2 times per day    atorvastatin  20 mg Oral Nightly    metoprolol succinate  25 mg Oral Daily    finasteride  5 mg Oral Daily    [Held by provider] lisinopril  5 mg Oral Daily    tamsulosin  0.4 mg Oral Daily    sodium chloride flush  10 mL Intravenous 2 times per day    pantoprazole

## 2020-08-24 NOTE — PLAN OF CARE
Problem: Falls - Risk of:  Goal: Will remain free from falls  Description: Will remain free from falls  8/24/2020 1152 by Connie Dawson RN  Outcome: Ongoing  8/23/2020 2206 by Devon King RN  Outcome: Ongoing  Goal: Absence of physical injury  Description: Absence of physical injury  8/24/2020 1152 by Connie Dawson RN  Outcome: Ongoing  8/23/2020 2206 by Devon King RN  Outcome: Ongoing     Problem: Skin Integrity:  Goal: Will show no infection signs and symptoms  Description: Will show no infection signs and symptoms  8/24/2020 1152 by Connie Dawson RN  Outcome: Ongoing  8/23/2020 2206 by Devon King RN  Outcome: Ongoing  Goal: Absence of new skin breakdown  Description: Absence of new skin breakdown  8/24/2020 1152 by Connie Dawson RN  Outcome: Ongoing  8/23/2020 2206 by Devon King RN  Outcome: Ongoing     Problem: Coping:  Goal: Ability to remain calm will improve  Description: Ability to remain calm will improve  8/24/2020 1152 by Connie Dawson RN  Outcome: Ongoing  8/23/2020 2206 by Devon King RN  Outcome: Ongoing     Problem: Safety:  Goal: Ability to remain free from injury will improve  Description: Ability to remain free from injury will improve  8/24/2020 1152 by Connie Dawson RN  Outcome: Ongoing  8/23/2020 2206 by Devon King RN  Outcome: Ongoing     Problem: Self-Care:  Goal: Ability to participate in self-care as condition permits will improve  Description: Ability to participate in self-care as condition permits will improve  8/24/2020 1152 by Connie Dawson RN  Outcome: Ongoing  8/23/2020 2206 by Devon King RN  Outcome: Ongoing     Problem: Restraint Use - Nonviolent/Non-Self-Destructive Behavior:  Goal: Absence of restraint indications  Description: Absence of restraint indications  8/24/2020 1152 by Connie Dawson RN  Outcome: Ongoing  8/23/2020 2206 by Devon King RN  Outcome: Ongoing  Goal: Absence of restraint-related injury  Description: Absence of restraint-related injury  8/24/2020 1152 by Myles Haas RN  Outcome: Ongoing  8/23/2020 2206 by Zak Parsons RN  Outcome: Ongoing

## 2020-08-24 NOTE — PROGRESS NOTES
Physical Therapy    Physical Therapy Treatment Note  Name: Evern Goodell MRN: [id number] :   1935   Date:  2020   Admission Date: 2020 Room:  [address] [place] [address]   Restrictions/Precautions:        general precautions, falls  Subjective:  Patient states: Israel Smalls want me to go somewhere instead of home\"   Pain:   Location, Type, Intensity (0/10 to 10/10):  4/10 right foot/toes   Objective:    Observation:    Supine in bed. Agreeable and cooperative to work with therapy. Treatment, including education/measures:  Supine to sit: SBA with HOB elevated ~45deg and use of bed rail. Inc effort and time. Sit to stand: Sarahi for steadying and fwd weight shift. Small amount of lift assist on first attempt but improved on second attempt not needing any lift. VCs/demonstrations for set up and overall sequencing to improve mechanical efficiency and power as well as dec full WB to right foot due to pain. Stand to sit: CGA to EOB, Sarahi to recliner for greater eccentric control. VCs for sequencing UEs to reach back to seat surface to control sit. Step pivot: CGA for safety with Rw (2x). VCs for sequencing full pivot turn prior to sitting. Ambulation: ~8ft + 150ft with RW CGA for safety. Slow pace with dec bilat step length and dec toe off right LE. Fwd posture with downward gaze. Able to correct posture when cued but did not sustain. Adequate time for rest after longer ambulation bout. Standing balance: Sarahi progressing to CGA at RW. Posterior lean needing minimal tactile cues for fwd weight shift over CARINA   Sitting balance: SBA at EOB static and light dynamic without UE support   Educated pt on POC, role of PT, DME use,discharge recommendations. VCs for sequencing ,weight shift, posture, UE/LE placement to inc safety and indep with mobility.    Assessment / Impression:    Patient's tolerance of treatment:  Good   Adverse Reaction: no  Significant change in status and impact:  no  Barriers to improvement: Activity tolerance, strength, balance, safety awareness, pain   Plan for Next Session:    Activity tolerance, strength, balance, gait, transfers, bed mobility   Time in:  1309  Time out:  1334  Timed treatment minutes:  25  Total treatment time:  25    Previously filed items:     Short term goals  Time Frame for Short term goals: 1 week  Short term goal 1: Pt will perform sit><supine Bre  Short term goal 2: Pt will transfer to all surfaces SBA  Short term goal 3: Pt will ambulate 150ft with LRAD SBA  Short term goal 4: Pt will perform light dynamic standing activity without UE support SBA x 3 minutes       Electronically signed by:    Steffi Woodward, [id number]  8/24/2020, 3:09 PM

## 2020-08-25 LAB
ANION GAP SERPL CALCULATED.3IONS-SCNC: 6 MMOL/L (ref 4–16)
BASOPHILS ABSOLUTE: 0 K/CU MM
BASOPHILS RELATIVE PERCENT: 0.4 % (ref 0–1)
BUN BLDV-MCNC: 17 MG/DL (ref 6–23)
CALCIUM SERPL-MCNC: 7.9 MG/DL (ref 8.3–10.6)
CHLORIDE BLD-SCNC: 97 MMOL/L (ref 99–110)
CO2: 27 MMOL/L (ref 21–32)
CREAT SERPL-MCNC: 0.8 MG/DL (ref 0.9–1.3)
DIFFERENTIAL TYPE: ABNORMAL
EOSINOPHILS ABSOLUTE: 0.1 K/CU MM
EOSINOPHILS RELATIVE PERCENT: 1.3 % (ref 0–3)
GFR AFRICAN AMERICAN: >60 ML/MIN/1.73M2
GFR NON-AFRICAN AMERICAN: >60 ML/MIN/1.73M2
GLUCOSE BLD-MCNC: 95 MG/DL (ref 70–99)
HCT VFR BLD CALC: 30 % (ref 42–52)
HEMOGLOBIN: 9.8 GM/DL (ref 13.5–18)
IMMATURE NEUTROPHIL %: 0.4 % (ref 0–0.43)
LYMPHOCYTES ABSOLUTE: 0.7 K/CU MM
LYMPHOCYTES RELATIVE PERCENT: 8.9 % (ref 24–44)
MCH RBC QN AUTO: 29.4 PG (ref 27–31)
MCHC RBC AUTO-ENTMCNC: 32.7 % (ref 32–36)
MCV RBC AUTO: 90.1 FL (ref 78–100)
MONOCYTES ABSOLUTE: 0.9 K/CU MM
MONOCYTES RELATIVE PERCENT: 12.1 % (ref 0–4)
NUCLEATED RBC %: 0 %
PDW BLD-RTO: 15.4 % (ref 11.7–14.9)
PLATELET # BLD: 263 K/CU MM (ref 140–440)
PMV BLD AUTO: 10.7 FL (ref 7.5–11.1)
POTASSIUM SERPL-SCNC: 4.3 MMOL/L (ref 3.5–5.1)
RBC # BLD: 3.33 M/CU MM (ref 4.6–6.2)
SARS-COV-2, NAAT: NOT DETECTED
SEGMENTED NEUTROPHILS ABSOLUTE COUNT: 5.9 K/CU MM
SEGMENTED NEUTROPHILS RELATIVE PERCENT: 76.9 % (ref 36–66)
SODIUM BLD-SCNC: 130 MMOL/L (ref 135–145)
SOURCE: NORMAL
TOTAL IMMATURE NEUTOROPHIL: 0.03 K/CU MM
TOTAL NUCLEATED RBC: 0 K/CU MM
WBC # BLD: 7.7 K/CU MM (ref 4–10.5)

## 2020-08-25 PROCEDURE — 99211 OFF/OP EST MAY X REQ PHY/QHP: CPT

## 2020-08-25 PROCEDURE — 6370000000 HC RX 637 (ALT 250 FOR IP): Performed by: INTERNAL MEDICINE

## 2020-08-25 PROCEDURE — 85730 THROMBOPLASTIN TIME PARTIAL: CPT

## 2020-08-25 PROCEDURE — 97530 THERAPEUTIC ACTIVITIES: CPT

## 2020-08-25 PROCEDURE — 6360000002 HC RX W HCPCS: Performed by: SURGERY

## 2020-08-25 PROCEDURE — 85610 PROTHROMBIN TIME: CPT

## 2020-08-25 PROCEDURE — 6370000000 HC RX 637 (ALT 250 FOR IP): Performed by: SURGERY

## 2020-08-25 PROCEDURE — 2000000000 HC ICU R&B

## 2020-08-25 PROCEDURE — U0002 COVID-19 LAB TEST NON-CDC: HCPCS

## 2020-08-25 PROCEDURE — 80048 BASIC METABOLIC PNL TOTAL CA: CPT

## 2020-08-25 PROCEDURE — 97116 GAIT TRAINING THERAPY: CPT

## 2020-08-25 PROCEDURE — 85025 COMPLETE CBC W/AUTO DIFF WBC: CPT

## 2020-08-25 PROCEDURE — 97110 THERAPEUTIC EXERCISES: CPT

## 2020-08-25 PROCEDURE — 2580000003 HC RX 258: Performed by: SURGERY

## 2020-08-25 PROCEDURE — 99231 SBSQ HOSP IP/OBS SF/LOW 25: CPT | Performed by: SURGERY

## 2020-08-25 PROCEDURE — 36415 COLL VENOUS BLD VENIPUNCTURE: CPT

## 2020-08-25 PROCEDURE — 94761 N-INVAS EAR/PLS OXIMETRY MLT: CPT

## 2020-08-25 RX ORDER — ATORVASTATIN CALCIUM 20 MG/1
20 TABLET, FILM COATED ORAL NIGHTLY
Status: DISCONTINUED | OUTPATIENT
Start: 2020-08-25 | End: 2020-08-28 | Stop reason: HOSPADM

## 2020-08-25 RX ORDER — LISINOPRIL 5 MG/1
5 TABLET ORAL DAILY
Status: DISCONTINUED | OUTPATIENT
Start: 2020-08-26 | End: 2020-08-28 | Stop reason: HOSPADM

## 2020-08-25 RX ORDER — METOPROLOL SUCCINATE 25 MG/1
25 TABLET, EXTENDED RELEASE ORAL DAILY
Status: DISCONTINUED | OUTPATIENT
Start: 2020-08-25 | End: 2020-08-28 | Stop reason: HOSPADM

## 2020-08-25 RX ADMIN — NITROGLYCERIN 1 INCH: 20 OINTMENT TOPICAL at 10:02

## 2020-08-25 RX ADMIN — SODIUM CHLORIDE: 4.5 INJECTION, SOLUTION INTRAVENOUS at 09:42

## 2020-08-25 RX ADMIN — METOPROLOL SUCCINATE 25 MG: 25 TABLET, EXTENDED RELEASE ORAL at 13:18

## 2020-08-25 RX ADMIN — HYDROCODONE BITARTRATE AND ACETAMINOPHEN 1 TABLET: 5; 325 TABLET ORAL at 01:43

## 2020-08-25 RX ADMIN — CEFAZOLIN SODIUM 1 G: 1 INJECTION, SOLUTION INTRAVENOUS at 01:42

## 2020-08-25 RX ADMIN — CEFAZOLIN SODIUM 1 G: 1 INJECTION, SOLUTION INTRAVENOUS at 09:03

## 2020-08-25 RX ADMIN — NITROGLYCERIN 1 INCH: 20 OINTMENT TOPICAL at 22:09

## 2020-08-25 RX ADMIN — ATORVASTATIN CALCIUM 20 MG: 20 TABLET, FILM COATED ORAL at 22:09

## 2020-08-25 RX ADMIN — SODIUM CHLORIDE, PRESERVATIVE FREE 10 ML: 5 INJECTION INTRAVENOUS at 09:06

## 2020-08-25 RX ADMIN — ASPIRIN 81 MG: 81 TABLET, COATED ORAL at 09:41

## 2020-08-25 RX ADMIN — SODIUM CHLORIDE, PRESERVATIVE FREE 10 ML: 5 INJECTION INTRAVENOUS at 22:09

## 2020-08-25 ASSESSMENT — PAIN SCALES - GENERAL
PAINLEVEL_OUTOF10: 0
PAINLEVEL_OUTOF10: 0
PAINLEVEL_OUTOF10: 3
PAINLEVEL_OUTOF10: 0

## 2020-08-25 NOTE — PLAN OF CARE
Problem: Falls - Risk of:  Goal: Will remain free from falls  Description: Will remain free from falls  8/24/2020 2214 by Ilya Moctezuma RN  Outcome: Ongoing  8/24/2020 1152 by Cleopatra Crigler, RN  Outcome: Ongoing  Goal: Absence of physical injury  Description: Absence of physical injury  8/24/2020 2214 by Ilya Moctezuma RN  Outcome: Ongoing  8/24/2020 1152 by Cleopatra Crigler, RN  Outcome: Ongoing

## 2020-08-25 NOTE — CARE COORDINATION
LUISA received from [facility] that they will be able to accept pt after they obtain a pre-cert. She informed CM that they will need updated progress notes and updated PT/OT notes faxed. Pt was transferred to ICU yesterday after his surgery. This CM notified [facility] of pt's new CM.   TE

## 2020-08-25 NOTE — PROGRESS NOTES
PATIENT NAME: Benjamín Alvarado    TODAY'S DATE: 08/25/20    SUBJECTIVE:    Pt is POD # 1 s/p R femoral endarterectomy. Pt feeling well. His pain is controlled. He has not been out of bed. OBJECTIVE:   VITALS:    Vitals:    08/25/20 0702   BP: 131/72   Pulse: 69   Resp: 14   Temp:    SpO2: 96%     INTAKE/OUTPUT:    Date 08/25/20 0000 - 08/25/20 2359   Shift 8724-3226 2649-4721 5526-5827 24 Hour Total   INTAKE   I.V.(mL/kg/hr) 711(1.7)   711   Shift Total(mL/kg) 711(13.6)   711(13.6)   OUTPUT   Shift Total(mL/kg)       Weight (kg) 52.4 52.4 52.4 52.4      Patient Vitals for the past 96 hrs (Last 3 readings):   Weight   08/25/20 0646 115 lb 8.3 oz (52.4 kg)   08/24/20 0602 109 lb 5 oz (49.6 kg)   08/23/20 0609 108 lb 1.6 oz (49 kg)       EXAM:  Blood pressure 131/72, pulse 69, temperature 97.8 °F (36.6 °C), temperature source Oral, resp. rate 14, height 5' 7.5\" (1.715 m), weight 115 lb 8.3 oz (52.4 kg), SpO2 96 %. General appearance: No apparent distress, appears stated age and cooperative. Skin: unremarkable  HEENT Normocephalic, atraumatic without obvious deformity. Neck: Supple, Trachea midline   Lungs: Good respiratory effort. Clear to auscultation, bilaterally  Heart: Regular rate/ rhythm inc c/d/i  Abdomen: Soft, non-tender or non-distended   Extremities: min edema warm well perfused + doppler pulses, no hematoma R groin  Neurologic: Alert, grossly intact  Mental status: normal affect      Data:  CBC:   Recent Labs     08/23/20  0513 08/24/20  0001 08/25/20  0307   WBC 8.2 8.5 7.7   HGB 11.0* 10.2* 9.8*   HCT 33.4* 31.4* 30.0*    301 263     BMP:    Recent Labs     08/23/20  0513 08/24/20  0001 08/25/20  0307   * 128* 130*   K 3.8 4.5 4.3   CL 99 94* 97*   CO2 25 28 27   BUN 14 20 17   CREATININE 0.7* 1.1 0.8*   GLUCOSE 94 108* 95     Hepatic: No results for input(s): AST, ALT, ALB, BILITOT, ALKPHOS in the last 72 hours. Mag:    No results for input(s): MG in the last 72 hours.    Phos:

## 2020-08-25 NOTE — CARE COORDINATION
Berlin contacted Ban in admissions at [facility] and she advised that precert has not be initiated yet for this pt. BERLIN advised Admissions that PT/OT notes are less than 48 hrs and discharge anticipated possibly tomorrow per hospitalist and request that precert be initiated today.

## 2020-08-25 NOTE — PROGRESS NOTES
Hospitalist Progress Note      Name:  Rayo Bell /Age/Sex: 1935  (81 y.o. male)   MRN & CSN:  [id number] & [id number] Admission Date/Time: 2020 11:07 AM   Location:  -A PCP: No primary care provider on file. Hospital Day: 13    Assessment and Plan:   Rayo Bell is a 81 y.o.  male  who presents with:    · Progressively worsening PAD, R worse than L.   · Peripheral angio  then right external iliac stent placed on   · Femoral endarterectomy   · Planning for discharge to SNF on xarelto, plavix, asa, statin, pletal  · Acute Metabolic encephalopathy exacerbated 2/2 UTI likely, also likely has underlying dementia. CT head negative for acute findings. UCx +providencia rettgerti, iv rocephin then oral cipro. Treatment completed. · Not oriented however is at his baseline. · Working on discharge to [facility]. · Would recommend discussing changing code status to DNRCCA/CC  · HTN  · COPD  · chronic Tobacco abuse - on Nicotine patch, tobacco cessation education. · CVA, vascular dementia  · gait disturbance  · Prostatitis    Diet DIET DYSPHAGIA MINCED AND MOIST; Dysphagia Minced and Moist   DVT Prophylaxis [] Lovenox, []  Heparin, [] SCDs, []No VTE prophylaxis, patient ambulating   GI Prophylaxis [] PPI, [] H2 Blocker, [] No GI prophylaxis, patient is receiving diet/Tube Feeds   Code Status Full Code   Disposition Patient requires continued admission due to pending placement   MDM [] Low, [x] Moderate,[]  High  Patient's risk as above due to            [x] One or more chronic illnesses with mild exacerbation progression       Subjective:     Pt S&E. He is alert but not oriented. This appears to be his baseline. He denies cp sob  Admits to pain in right foot    CM working on dc to [facility]    10-14 point ROS reviewed negative, unless as noted above    Objective:        Intake/Output Summary (Last 24 hours) at 2020  Last data filed at 2020 1902  Gross per 24 hour   Intake 2309.75 ml   Output 2400 ml   Net -90.25 ml      Vitals:   Vitals:    08/25/20 1905   BP:    Pulse: 91   Resp: 21   Temp:    SpO2: 92%     Physical Exam:    GEN Awake male, laying in bed in no apparent distress. Appears given age. EYES Pupils are equally round. No scleral discharge. +glasses  HENT Atraumatic and symmetric head  NECK No apparent thyromegaly  RESP Symmetric chest movement while on room air. CARDIO/VASC Peripheral pulses equal bilaterally and palpable. No peripheral edema. GI Abdomen is not distended. Rectal exam deferred. Soft, ND   Goodman catheter is not present. HEME/LYMPH No petechiae or ecchymoses. MSK Spontaneous movement of BL upper extremities, movement in BL toes   SKIN Normal coloration, warm, dry. NEURO Cranial nerves appear grossly intact  PSYCH Awake, alert. Oriented to self only.     Medications:   Medications:    nitroglycerin  1 inch Topical Q12H    atorvastatin  20 mg Oral Nightly    metoprolol succinate  25 mg Oral Daily    sodium chloride flush  10 mL Intravenous 2 times per day    aspirin  81 mg Oral Daily      Infusions:   PRN Meds: sodium chloride flush, 10 mL, PRN  acetaminophen, 650 mg, Q4H PRN  HYDROcodone 5 mg - acetaminophen, 1 tablet, Q4H PRN  morphine, 1 mg, Q3H PRN          Electronically signed by Fernando Sigala DO on 8/25/2020 at 7:33 PM

## 2020-08-25 NOTE — CONSULTS
kg)   SpO2 93%   BMI 17.83 kg/m²   Kenneth Risk Score: Kenneth Scale Score: 15    LABS    CBC:   Lab Results   Component Value Date    WBC 7.7 08/25/2020    RBC 3.33 08/25/2020    HGB 9.8 08/25/2020    HCT 30.0 08/25/2020    MCV 90.1 08/25/2020    MCH 29.4 08/25/2020    MCHC 32.7 08/25/2020    RDW 15.4 08/25/2020     08/25/2020    MPV 10.7 08/25/2020     CMP:    Lab Results   Component Value Date     08/25/2020    K 4.3 08/25/2020    CL 97 08/25/2020    CO2 27 08/25/2020    BUN 17 08/25/2020    CREATININE 0.8 08/25/2020    GFRAA >60 08/25/2020    LABGLOM >60 08/25/2020    GLUCOSE 95 08/25/2020    PROT 6.3 08/17/2020    LABALBU 4.0 08/17/2020    CALCIUM 7.9 08/25/2020    BILITOT 0.6 08/17/2020    ALKPHOS 111 08/17/2020    AST 30 08/17/2020    ALT 27 08/17/2020     Albumin:    Lab Results   Component Value Date    LABALBU 4.0 08/17/2020     PT/INR:    Lab Results   Component Value Date    PROTIME 12.3 08/24/2020    INR 1.02 08/24/2020     HgBA1c:    Lab Results   Component Value Date    LABA1C 5.7 04/09/2020         Assessment:     Patient Active Problem List   Diagnosis    Dizziness    Acute cerebrovascular accident ([facility]A) [facility] [address]    Hemiparesis of left nondominant side as late effect of cerebral infarction ([address]    Dysphagia due to recent stroke    Dysarthria due to acute stroke ([address]    Gait disturbance    Simple chronic bronchitis (HCC)    Urinary tract infection associated with indwelling urethral catheter (HCC)    Acute cystitis without hematuria    Femoral artery occlusion, right (HCC)    Acute encephalopathy    Severe malnutrition (HCC)       Measurements:  Wound 04/20/20 Coccyx Proximal friction shear red blanchable (Active)   Number of days: 127       Wound 08/17/20 Arm Lower;Right;Posterior dime size skin tare (Active)   Wound Skin Tear 08/25/20 0930   Dressing Status Clean;Dry; Intact 08/25/20 1200   Dressing Changed Changed/New 08/25/20 0930   Dressing/Treatment Gauze dressing/ dressing sponge 08/23/20 0915   Wound Cleansed Rinsed/Irrigated with saline 08/25/20 0930   Dressing Change Due 08/20/20 08/20/20 0918   Wound Length (cm) 0.4 cm 08/25/20 0930   Wound Width (cm) 0.3 cm 08/25/20 0930   Wound Depth (cm) 0.1 cm 08/25/20 0930   Wound Surface Area (cm^2) 0.12 cm^2 08/25/20 0930   Wound Volume (cm^3) 0.01 cm^3 08/25/20 0930   Distance Tunneling (cm) 0 cm 08/25/20 0930   Tunneling Position ___ O'Clock 0 08/25/20 0930   Undermining Starts ___ O'Clock 0 08/25/20 0930   Undermining Ends___ O'Clock 0 08/25/20 0930   Undermining Maxium Distance (cm) 0 08/25/20 0930   Wound Assessment Other (Comment) 08/25/20 1200   Drainage Amount Small 08/25/20 0930   Drainage Description Serosanguinous 08/25/20 0930   Odor None 08/25/20 0930   Margins Defined edges 08/25/20 0930   Non-staged Wound Description Partial thickness 08/25/20 0930   [place]%Wound Bed 100 08/25/20 0930   Red%Wound Bed 0 08/25/20 0930   Yellow%Wound Bed 0 08/25/20 0930   Black%Wound Bed 0 08/25/20 0930   Purple%Wound Bed 0 08/25/20 0930   Other%Wound Bed 0 08/25/20 0930   Number of days: 8       Wound 08/23/20 Toe (Comment  which one) Right great toe  (Active)   Wound Other 08/25/20 0930   Dressing Status Dry; Intact; Clean 08/25/20 1200   Dressing Changed Changed/New 08/25/20 0900   Dressing/Treatment Dry dressing 08/25/20 1200   Wound Cleansed Not Cleansed 08/25/20 0930   Wound Length (cm) 2.2 cm 08/25/20 0930   Wound Width (cm) 1 cm 08/25/20 0930   Wound Depth (cm) 0 cm 08/25/20 0930   Wound Surface Area (cm^2) 2.2 cm^2 08/25/20 0930   Wound Volume (cm^3) 0 cm^3 08/25/20 0930   Distance Tunneling (cm) 0 cm 08/25/20 0930   Tunneling Position ___ O'Clock 0 08/25/20 0930   Undermining Starts ___ O'Clock 0 08/25/20 0930   Undermining Ends___ O'Clock 0 08/25/20 0930   Undermining Maxium Distance (cm) 0 08/25/20 0930   Wound Assessment Other (Comment) 08/25/20 1200   Drainage Amount None 08/25/20 0930   Odor None 08/25/20 0930   Margins Attached edges 08/25/20 0930   Chela-wound Assessment Pink 08/25/20 0930   Non-staged Wound Description Not applicable 16/43/34 2826   [place]%Wound Bed 0 08/25/20 0930   Red%Wound Bed 0 08/25/20 0930   Yellow%Wound Bed 0 08/25/20 0930   Black%Wound Bed 0 08/25/20 0930   Purple%Wound Bed 100 08/25/20 0930   Other%Wound Bed 0 08/25/20 0930   Number of days: 2       Wound 08/25/20 Toe (Comment  which one) Right 4th toe (Active)   Wound Arterial 08/25/20 0930   Dressing Status Changed 08/25/20 0930   Dressing Changed Changed/New 08/25/20 0930   Wound Cleansed Rinsed/Irrigated with saline 08/25/20 0930   Wound Length (cm) 0.5 cm 08/25/20 0930   Wound Width (cm) 0.6 cm 08/25/20 0930   Wound Depth (cm) 0.1 cm 08/25/20 0930   Wound Surface Area (cm^2) 0.3 cm^2 08/25/20 0930   Wound Volume (cm^3) 0.03 cm^3 08/25/20 0930   Distance Tunneling (cm) 0 cm 08/25/20 0930   Tunneling Position ___ O'Clock 0 08/25/20 0930   Undermining Starts ___ O'Clock 0 08/25/20 0930   Undermining Ends___ O'Clock 0 08/25/20 0930   Undermining Maxium Distance (cm) 0 08/25/20 0930   Wound Assessment Black 08/25/20 0930   Drainage Amount Small 08/25/20 0930   Drainage Description Serosanguinous 08/25/20 0930   Odor None 08/25/20 0930   Margins Defined edges 08/25/20 0930   Chela-wound Assessment Pale 08/25/20 0930   Non-staged Wound Description Full thickness 08/25/20 0930   [place]%Wound Bed 0 08/25/20 0930   Red%Wound Bed 0 08/25/20 0930   Yellow%Wound Bed 0 08/25/20 0930   Black%Wound Bed 100 08/25/20 0930   Purple%Wound Bed 0 08/25/20 0930   Other%Wound Bed 0 08/25/20 0930   Number of days: 0       Wound 08/25/20 Toe (Comment  which one) Anterior;Right 5th toe (Active)   Wound Arterial 08/25/20 0930   Dressing Status Changed 08/25/20 0930   Dressing Changed Changed/New 08/25/20 0930   Wound Cleansed Rinsed/Irrigated with saline 08/25/20 0930   Wound Length (cm) 1.5 cm 08/25/20 0930   Wound Width (cm) 1.5 cm 08/25/20 0930   Wound Depth (cm) 0.1 cm Pressure Redistribution  [] Fluid Immersion  [] Bariatric  [] Total Pressure Relief  [] Other:     Discharge Plan:  Placement for patient upon discharge:  tbd  Hospice Care:no  Patient appropriate for Outpatient [address]:  [facility]    Patient/Caregiver Teaching:  Level of patient/caregiver understanding able to:    cares explained as given. Electronically signed by Divya Velasquez.  MIGUEL Henao, on 8/25/2020 at 12:40 PM

## 2020-08-25 NOTE — PROGRESS NOTES
Physical Therapy    Physical Therapy Treatment Note  Name: Humberto Gutiérrez MRN: [id number] :   1935   Date:  2020   Admission Date: 2020 Room:  [address] [place], [place] [address]A   Restrictions/Precautions:        fall risk  Communication with other providers:  Mikey Goldberg RN states pt is ok to see for therapy  Subjective:  Patient states:  He is not sure he can walk today  Pain:   Location, Type, Intensity (0/10 to 10/10):  2/10 R groin,   Objective:    Observation:  Pt was resting in the bed  Treatment, including education/measures:  Supine Exercises: Ankle pumps x 10  Quad sets x 10  Glute sets x 10  Heel slides x 10  Hip abd x 10  Therapeutic Exercise:  Therapeutic exercises were instructed today. Cues were given for technique, safety, recruitment, and rationale. Cues were verbal and/or tactile. Transfers  Supine to sit :Min a and VC's for tech with extra time d/t pain  Scooting :SBA  Rolling :SBA  Sit to stand :min A of 1 and VC's for hand placements extra time d/t pain  Stand to sit :min A of 1 and VC's for hand placements  Gait:  Pt amb with RW for 100 ft with CGA to min assist, pt has a very antalgic gait  Pt needed VC's for pathway, posture, PLB  Gait Comments: with VC's' and TC's for B LE placement, walker placement and sequence throughout ambulation; with VC's and TC's to maintain upright posture in order to avoid COM shifting outside of [place]; with VC's for PLB throughout ambulation  Safety  Patient left safely in the chair, with call light/phone in reach . Gait belt was used for transfers and gait.   Assessment / Impression:     Patient's tolerance of treatment:  Fair, d/t pain  Adverse Reaction: none  Significant change in status and impact:  none  Barriers to improvement:  Pain, antalgic gait, weakness  Plan for Next Session:    Will cont to work towards pt's goals per his tolerance  Time in:  1300  Time out:  1353  Timed treatment minutes:  53  Total treatment time:  48  Previously filed items:   Short term goals  Time Frame for Short term goals: 1 week  Short term goal 1: Pt will perform sit><supine Bre  Short term goal 2: Pt will transfer to all surfaces SBA  Short term goal 3: Pt will ambulate 150ft with LRAD SBA  Short term goal 4: Pt will perform light dynamic standing activity without UE support SBA x 3 minutes   Electronically signed by:     Eileen Givens PTA  8/25/2020, 1:46 PM

## 2020-08-25 NOTE — PROGRESS NOTES
GENERAL SURGERY PROGRESS NOTE    Layne Wylie is a 81 y.o. male with PAD and ischemia of the right foot. Subjective:  Doing ok s/p endarterectomy yesterday. Objective:    Vitals: VITALS:  BP (!) 143/70   Pulse 89   Temp 97.7 °F (36.5 °C) (Oral)   Resp 17   Ht 5' 7.5\" (1.715 m)   Wt 115 lb 8.3 oz (52.4 kg)   SpO2 93%   BMI 17.83 kg/m²     I/O: 08/24 0701 - 08/25 0700  In: 8458 [I.V.:2511]  Out: 300     Labs/Imaging Results:   Lab Results   Component Value Date     08/25/2020    K 4.3 08/25/2020    CL 97 08/25/2020    CO2 27 08/25/2020    BUN 17 08/25/2020    CREATININE 0.8 08/25/2020    GLUCOSE 95 08/25/2020    CALCIUM 7.9 08/25/2020      Lab Results   Component Value Date    WBC 7.7 08/25/2020    HGB 9.8 (L) 08/25/2020    HCT 30.0 (L) 08/25/2020    MCV 90.1 08/25/2020     08/25/2020       IV Fluids: sodium chloride Last Rate: 75 mL/hr at 08/25/20 0942    Scheduled Meds:   nitroglycerin, 1 inch, Topical, Q12H    atorvastatin, 20 mg, Oral, Nightly    sodium chloride flush, 10 mL, Intravenous, 2 times per day    aspirin, 81 mg, Oral, Daily    Physical Exam:  General: A&O x 3, no distress. HEENT: Anicteric sclerae, MMM. Extremities: toes no the right are mildly blue tinged today. improved cap refill, 4th and 5th digits with a small amount of tissue loss/ulceration, no blanching erythema or drainage. Assessment and Plan:  81 y.o. male with PAD s/p angiogram with intervention and partial revascularization of the right leg. Plan for endaterectomy. No evidence of active infection on the right foot.     Patient Active Problem List:     Dizziness     Acute cerebrovascular accident (CVA) ([address]     Hemiparesis of left nondominant side as late effect of cerebral infarction ([address]     Dysphagia due to recent stroke     Dysarthria due to acute stroke [facility])     Gait disturbance     Simple chronic bronchitis (HCC)     Urinary tract infection associated with indwelling urethral catheter ([facility]     Acute cystitis without hematuria     Femoral artery occlusion, right (HCC)     Acute encephalopathy     Severe malnutrition ([facility]      - will continue to follow and assess the toes intermittently while [facility] is admitted  - no plan for operative intervention on the toes unless infection develops or the amount of tissue loss becomes well demarcated    Norberto Huff MD  8/25/2020  10:30 AM

## 2020-08-26 LAB
ANION GAP SERPL CALCULATED.3IONS-SCNC: 6 MMOL/L (ref 4–16)
BASOPHILS ABSOLUTE: 0 K/CU MM
BASOPHILS RELATIVE PERCENT: 0.5 % (ref 0–1)
BUN BLDV-MCNC: 9 MG/DL (ref 6–23)
CALCIUM SERPL-MCNC: 8.1 MG/DL (ref 8.3–10.6)
CHLORIDE BLD-SCNC: 96 MMOL/L (ref 99–110)
CO2: 29 MMOL/L (ref 21–32)
CREAT SERPL-MCNC: 0.6 MG/DL (ref 0.9–1.3)
DIFFERENTIAL TYPE: ABNORMAL
EOSINOPHILS ABSOLUTE: 0.1 K/CU MM
EOSINOPHILS RELATIVE PERCENT: 0.6 % (ref 0–3)
GFR AFRICAN AMERICAN: >60 ML/MIN/1.73M2
GFR NON-AFRICAN AMERICAN: >60 ML/MIN/1.73M2
GLUCOSE BLD-MCNC: 87 MG/DL (ref 70–99)
HCT VFR BLD CALC: 30.6 % (ref 42–52)
HEMOGLOBIN: 9.9 GM/DL (ref 13.5–18)
IMMATURE NEUTROPHIL %: 0.2 % (ref 0–0.43)
LYMPHOCYTES ABSOLUTE: 0.7 K/CU MM
LYMPHOCYTES RELATIVE PERCENT: 8.3 % (ref 24–44)
MCH RBC QN AUTO: 28.9 PG (ref 27–31)
MCHC RBC AUTO-ENTMCNC: 32.4 % (ref 32–36)
MCV RBC AUTO: 89.2 FL (ref 78–100)
MONOCYTES ABSOLUTE: 1.1 K/CU MM
MONOCYTES RELATIVE PERCENT: 13 % (ref 0–4)
NUCLEATED RBC %: 0 %
PDW BLD-RTO: 15.2 % (ref 11.7–14.9)
PLATELET # BLD: 339 K/CU MM (ref 140–440)
PMV BLD AUTO: 10.7 FL (ref 7.5–11.1)
POTASSIUM SERPL-SCNC: 4.6 MMOL/L (ref 3.5–5.1)
RBC # BLD: 3.43 M/CU MM (ref 4.6–6.2)
SEGMENTED NEUTROPHILS ABSOLUTE COUNT: 6.4 K/CU MM
SEGMENTED NEUTROPHILS RELATIVE PERCENT: 77.4 % (ref 36–66)
SODIUM BLD-SCNC: 131 MMOL/L (ref 135–145)
TOTAL IMMATURE NEUTOROPHIL: 0.02 K/CU MM
TOTAL NUCLEATED RBC: 0 K/CU MM
WBC # BLD: 8.3 K/CU MM (ref 4–10.5)

## 2020-08-26 PROCEDURE — 6370000000 HC RX 637 (ALT 250 FOR IP): Performed by: INTERNAL MEDICINE

## 2020-08-26 PROCEDURE — 80048 BASIC METABOLIC PNL TOTAL CA: CPT

## 2020-08-26 PROCEDURE — 99231 SBSQ HOSP IP/OBS SF/LOW 25: CPT | Performed by: SURGERY

## 2020-08-26 PROCEDURE — 6370000000 HC RX 637 (ALT 250 FOR IP): Performed by: PHYSICIAN ASSISTANT

## 2020-08-26 PROCEDURE — 2580000003 HC RX 258: Performed by: SURGERY

## 2020-08-26 PROCEDURE — 97116 GAIT TRAINING THERAPY: CPT

## 2020-08-26 PROCEDURE — 6370000000 HC RX 637 (ALT 250 FOR IP): Performed by: SURGERY

## 2020-08-26 PROCEDURE — 36415 COLL VENOUS BLD VENIPUNCTURE: CPT

## 2020-08-26 PROCEDURE — 85025 COMPLETE CBC W/AUTO DIFF WBC: CPT

## 2020-08-26 PROCEDURE — 1200000000 HC SEMI PRIVATE

## 2020-08-26 RX ORDER — CLOPIDOGREL BISULFATE 75 MG/1
75 TABLET ORAL DAILY
Status: DISCONTINUED | OUTPATIENT
Start: 2020-08-26 | End: 2020-08-28 | Stop reason: HOSPADM

## 2020-08-26 RX ADMIN — LISINOPRIL 5 MG: 5 TABLET ORAL at 09:39

## 2020-08-26 RX ADMIN — CLOPIDOGREL BISULFATE 75 MG: 75 TABLET ORAL at 09:39

## 2020-08-26 RX ADMIN — ATORVASTATIN CALCIUM 20 MG: 20 TABLET, FILM COATED ORAL at 20:14

## 2020-08-26 RX ADMIN — HYDROCODONE BITARTRATE AND ACETAMINOPHEN 1 TABLET: 5; 325 TABLET ORAL at 02:07

## 2020-08-26 RX ADMIN — SODIUM CHLORIDE, PRESERVATIVE FREE 10 ML: 5 INJECTION INTRAVENOUS at 20:14

## 2020-08-26 RX ADMIN — NITROGLYCERIN 1 INCH: 20 OINTMENT TOPICAL at 09:40

## 2020-08-26 RX ADMIN — ACETAMINOPHEN 325 MG: 325 TABLET ORAL at 02:07

## 2020-08-26 RX ADMIN — SODIUM CHLORIDE, PRESERVATIVE FREE 10 ML: 5 INJECTION INTRAVENOUS at 09:40

## 2020-08-26 RX ADMIN — METOPROLOL SUCCINATE 25 MG: 25 TABLET, EXTENDED RELEASE ORAL at 09:39

## 2020-08-26 RX ADMIN — ASPIRIN 81 MG: 81 TABLET, COATED ORAL at 09:39

## 2020-08-26 RX ADMIN — NITROGLYCERIN 1 INCH: 20 OINTMENT TOPICAL at 20:14

## 2020-08-26 ASSESSMENT — PAIN DESCRIPTION - PROGRESSION: CLINICAL_PROGRESSION: NOT CHANGED

## 2020-08-26 ASSESSMENT — PAIN SCALES - GENERAL
PAINLEVEL_OUTOF10: 0
PAINLEVEL_OUTOF10: 3
PAINLEVEL_OUTOF10: 0
PAINLEVEL_OUTOF10: 0

## 2020-08-26 ASSESSMENT — PAIN DESCRIPTION - ORIENTATION: ORIENTATION: RIGHT

## 2020-08-26 ASSESSMENT — PAIN DESCRIPTION - LOCATION: LOCATION: LEG

## 2020-08-26 ASSESSMENT — PAIN DESCRIPTION - ONSET: ONSET: ON-GOING

## 2020-08-26 ASSESSMENT — PAIN DESCRIPTION - DESCRIPTORS: DESCRIPTORS: ACHING;DISCOMFORT

## 2020-08-26 ASSESSMENT — PAIN DESCRIPTION - FREQUENCY: FREQUENCY: CONTINUOUS

## 2020-08-26 ASSESSMENT — PAIN DESCRIPTION - PAIN TYPE: TYPE: ACUTE PAIN

## 2020-08-26 ASSESSMENT — PAIN - FUNCTIONAL ASSESSMENT: PAIN_FUNCTIONAL_ASSESSMENT: ACTIVITIES ARE NOT PREVENTED

## 2020-08-26 NOTE — PROGRESS NOTES
Daily Progress Note  Doing better  Has some confusion at baseline  Heart rate and BP stable  Severe PAD s/p right external iliac stent and right CFA enderectomy and right SFA and popliteal PTA  Foot is stable  Keep on current meds  Ok to d/c to [facility]  Keep current meds, added plavix with ASA      Pt. Awake, mildly confused but doing ok  HR stable, NSR, BP stable  Denies CP, SOB, Rt. Foot still painful     PAD    Pt. Noted to have Rt. LE PAD on Art. Duplex and CTA with runoff    Discoloration to Rt. Toes- appears slightly worse today    S/p Rt. Iliac stent and balloon PTA to Rt. Popliteal and AT    S/p endarterectomy    Foot looks stable today    Warm, still using nitro    Will add plavix today     AMS    UTI- on ABx for this    Also has hx of CVA 4/20    Per primary     Will cont.  To follow    Angiogram-8/20/20  RIGHT EXTERNAL ILIAC  90% TO 0% BMS-8X57 AND 8X27MM STENT  RCFA-100% CALCIFIED  R SFA-100% BALLOON PTA 5.0 BALLOON  R POPLITEAL 100% -BALLOON PTA 5.0  RIGHT AT PATENT  RIGHT PT PATENT     RIGHT BRACHIAL APPROACH  RIGHT FOOT APPROACH     NO COMPLICATIONS  ASA AND PLAVIX AND HEPARIN  WILL PLACE ON HEPARIN DRIP AFTER SHEATH IS REMOVED 2 HOURS POST SHEATH REMOVAL     WILL CONSIDER RIGHT CFA ATHRECTOMY -SURGICAL --WILL DISCUSS WITH TEAM TOMORROW  NO COMPLICATIONS     WNZ-7/85   Summary   Left ventricular systolic function is low normal.   Ejection fraction is visually estimated at 45%.   No evidence of thrombus within the left atrial appendage.   Negative bubble study; no ASD or PFO noted.   Lambl's excrescence noted on the aortic valve.   Mild to moderate aortic regurgitation.   Slight prolapse of the anterior mitral valve leaflet.   Calcified chordae tendineae near the mitral valve leaflet tips.   No evidence of any pericardial effusion.     PAST MEDICAL HISTORY:  COPD, prostatitis, and CVA.     PAST SURGICAL HISTORY:  Hernia repair.     SOCIAL HISTORY:  The patient smokes half pack a day.  Does not drink.     FAMILY HISTORY:  Noncontributory.     ALLERGIES:  No known drug allergies.     HOME MEDICATIONS:  The patient was on aspirin 81 mg daily, Lipitor 80 mg  at bedtime, lisinopril 5 mg daily, Proscar, Flomax, and Plavix 75 mg  daily. Objective:   BP (!) 117/105   Pulse 86   Temp 97.8 °F (36.6 °C) (Oral)   Resp 20   Ht 5' 7.5\" (1.715 m)   Wt 116 lb 2.9 oz (52.7 kg)   SpO2 (!) 84%   BMI 17.93 kg/m²       Intake/Output Summary (Last 24 hours) at 8/26/2020 0420  Last data filed at 8/26/2020 0600  Gross per 24 hour   Intake 1398.75 ml   Output 3500 ml   Net -2101.25 ml       Medications:   Scheduled Meds:   nitroglycerin  1 inch Topical Q12H    atorvastatin  20 mg Oral Nightly    metoprolol succinate  25 mg Oral Daily    lisinopril  5 mg Oral Daily    sodium chloride flush  10 mL Intravenous 2 times per day    aspirin  81 mg Oral Daily      Infusions:     PRN Meds:  sodium chloride flush, acetaminophen, HYDROcodone 5 mg - acetaminophen       Physical Exam:  Vitals:    08/26/20 0902   BP: (!) 117/105   Pulse: 86   Resp: 20   Temp:    SpO2: (!) 84%        General: AAO, NAD  Chest: Nontender  Cardiac: First and Second Heart Sounds are Normal, No Murmurs or Gallops noted  Lungs:Clear to auscultation and percussion. Abdomen: Soft, NT, ND, +BS  Extremities: No clubbing, no edema  Vascular:  Equal 2+ peripheral pulses. Lab Data:  CBC:   Recent Labs     08/24/20  0001 08/25/20  0307 08/26/20  0113   WBC 8.5 7.7 8.3   HGB 10.2* 9.8* 9.9*   HCT 31.4* 30.0* 30.6*   MCV 90.0 90.1 89.2    263 339     BMP:   Recent Labs     08/24/20  0001 08/25/20  0307 08/26/20  0113   * 130* 131*   K 4.5 4.3 4.6   CL 94* 97* 96*   CO2 28 27 29   BUN 20 17 9   CREATININE 1.1 0.8* 0.6*     LIVER PROFILE: No results for input(s): AST, ALT, LIPASE, BILIDIR, BILITOT, ALKPHOS in the last 72 hours. Invalid input(s):   AMYLASE,  ALB  PT/INR:   Recent Labs     08/23/20  1610 08/24/20  0001 08/24/20  0810 PROTIME 13.4 12.8 12.3   INR 1.11 1.06 1.02     APTT:   Recent Labs     08/23/20  1610 08/24/20  0001 08/24/20  0810   APTT 47.7* 51.1* 34.5     BNP:  No results for input(s): BNP in the last 72 hours.       Assessment:  Patient Active Problem List    Diagnosis Date Noted    Severe malnutrition ([address] 08/20/2020    Femoral artery occlusion, right ([address] 08/13/2020    Acute encephalopathy 08/13/2020    Hemiparesis of left nondominant side as late effect of cerebral infarction ([address]     Dysphagia due to recent stroke     Dysarthria due to acute stroke ([address]     Gait disturbance     Simple chronic bronchitis (HCC)     Urinary tract infection associated with indwelling urethral catheter ([address]     Acute cystitis without hematuria     Acute cerebrovascular accident (CVA) ([address] 04/10/2020    Dizziness 04/08/2020       Electronically signed by Sky Barriga PA-C on 8/26/2020 at 9:14 AM

## 2020-08-26 NOTE — FLOWSHEET NOTE
Patient watching TV, Assessment done-see flowsheet, urinal propped and noted half full. Emptied and spilled some on his pad. Pad changed and repositioned patient up in bed. Emotional support given.  Movie turned on per request.

## 2020-08-26 NOTE — PROGRESS NOTES
ml   Net -1341.25 ml      Vitals:   Vitals:    08/26/20 1502   BP: (!) 115/53   Pulse: 78   Resp: 15   Temp:    SpO2: 100%     Physical Exam:    GEN Awake male, laying in bed in no apparent distress. Appears given age. EYES Pupils are equally round. No scleral discharge  HENT Atraumatic and symmetric head. +glasses  NECK No apparent thyromegaly  RESP Symmetric chest movement while on room air. CARDIO/VASC Peripheral pulses equal bilaterally and palpable. No peripheral edema. GI Abdomen is not distended. Rectal exam deferred.  Goodman catheter is not present. HEME/LYMPH No petechiae or ecchymoses. MSK Spontaneous movement of BL upper extremities  SKIN Normal coloration, warm, dry. NEURO Cranial nerves appear grossly intact  PSYCH Awake, alert. At baseline - oriented to self only.     Medications:   Medications:    clopidogrel  75 mg Oral Daily    nitroglycerin  1 inch Topical Q12H    atorvastatin  20 mg Oral Nightly    metoprolol succinate  25 mg Oral Daily    lisinopril  5 mg Oral Daily    sodium chloride flush  10 mL Intravenous 2 times per day    aspirin  81 mg Oral Daily      Infusions:   PRN Meds: sodium chloride flush, 10 mL, PRN  acetaminophen, 650 mg, Q4H PRN  HYDROcodone 5 mg - acetaminophen, 1 tablet, Q4H PRN          Electronically signed by Maryse Carlos DO on 8/26/2020 at 4:20 PM

## 2020-08-26 NOTE — FLOWSHEET NOTE
Patient confused attempting to climb out of bed, urinated on floor. Believes that he is at a [facility] facility.  placed at bedside for patients safety.

## 2020-08-26 NOTE — PROGRESS NOTES
GENERAL SURGERY PROGRESS NOTE    Rashmi Yancey is a 81 y.o. male with PAD and ischemia of the right foot. Subjective:  Doing s/p endarterectomy yesterday. Remains confused. Toes much pinker. Objective:    Vitals: VITALS:  /81   Pulse 97   Temp 97.8 °F (36.6 °C) (Oral)   Resp 18   Ht 5' 7.5\" (1.715 m)   Wt 116 lb 2.9 oz (52.7 kg)   SpO2 97%   BMI 17.93 kg/m²     I/O: 08/25 0701 - 08/26 0700  In: 1398.8 [P.O.:400; I.V.:998.8]  Out: 3500 [Urine:3500]    Labs/Imaging Results:   Lab Results   Component Value Date     08/26/2020    K 4.6 08/26/2020    CL 96 08/26/2020    CO2 29 08/26/2020    BUN 9 08/26/2020    CREATININE 0.6 08/26/2020    GLUCOSE 87 08/26/2020    CALCIUM 8.1 08/26/2020      Lab Results   Component Value Date    WBC 8.3 08/26/2020    HGB 9.9 (L) 08/26/2020    HCT 30.6 (L) 08/26/2020    MCV 89.2 08/26/2020     08/26/2020       IV Fluids:     Scheduled Meds:   clopidogrel, 75 mg, Oral, Daily    nitroglycerin, 1 inch, Topical, Q12H    atorvastatin, 20 mg, Oral, Nightly    metoprolol succinate, 25 mg, Oral, Daily    lisinopril, 5 mg, Oral, Daily    sodium chloride flush, 10 mL, Intravenous, 2 times per day    aspirin, 81 mg, Oral, Daily    Physical Exam:  General: A&O x 3, no distress. HEENT: Anicteric sclerae, MMM. Extremities: toes no the right are pink with improved cap refill, 4th and 5th digits with a small amount of tissue loss/ulceration, no blanching erythema or drainage. Assessment and Plan:  81 y.o. male with PAD s/p angiogram with intervention and endarterectomy for revascularization of the right leg. No evidence of active infection on the right foot.     Patient Active Problem List:     Dizziness     Acute cerebrovascular accident (CVA) ([address]     Hemiparesis of left nondominant side as late effect of cerebral infarction ([facility]     Dysphagia due to recent stroke     Dysarthria due to acute stroke [facility])     Gait disturbance Simple chronic bronchitis (HCC)     Urinary tract infection associated with indwelling urethral catheter (HCC)     Acute cystitis without hematuria     Femoral artery occlusion, right (HCC)     Acute encephalopathy     Severe malnutrition ([facility]      - will continue to follow and assess the toes intermittently while [facility] Fat[facility] is admitted  - no plan for operative intervention on the toes unless infection develops or the amount of tissue loss becomes well demarcated    Tae Campos MD  8/26/2020  10:49 AM

## 2020-08-26 NOTE — PROGRESS NOTES
PATIENT NAME: James Soto    TODAY'S DATE: 08/26/20    SUBJECTIVE:    Pt is POD # 2 s/p R femoral endarterectomy. Pt with minimal complaints. His R foot is a little sore. OBJECTIVE:   VITALS:    Vitals:    08/26/20 1102   BP: 131/61   Pulse: 76   Resp: 18   Temp:    SpO2: 99%     INTAKE/OUTPUT:    Date 08/26/20 0000 - 08/26/20 2359   Shift 6473-0752 6403-9831 6954-4508 24 Hour Total   INTAKE   P.O.  360  360   Shift Total(mL/kg)  360(6.8)  360(6.8)   OUTPUT   Urine(mL/kg/hr) 400(0.9)   400   Shift Total(mL/kg) 400(7.6)   400(7.6)   Weight (kg) 52.7 52.7 52.7 52.7      Patient Vitals for the past 96 hrs (Last 3 readings):   Weight   08/26/20 0600 116 lb 2.9 oz (52.7 kg)   08/25/20 1159 115 lb 8.3 oz (52.4 kg)   08/25/20 1035 115 lb 8.3 oz (52.4 kg)       EXAM:  Blood pressure 131/61, pulse 76, temperature 97.8 °F (36.6 °C), temperature source Oral, resp. rate 18, height 5' 7.5\" (1.715 m), weight 116 lb 2.9 oz (52.7 kg), SpO2 99 %. General appearance: No apparent distress, appears stated age and cooperative. Skin: unremarkable  HEENT Normocephalic, atraumatic without obvious deformity. Neck: Supple, Trachea midline   Lungs: Good respiratory effort. Clear to auscultation, bilaterally  Heart: Regular rate/ rhythm inc c/d/i  Abdomen: Soft, non-tender or non-distended   Extremities: min edema warm well perfused + doppler pulses, no hematoma R groin  Neurologic: Alert, grossly intact  Mental status: normal affect      Data:  CBC:   Recent Labs     08/24/20  0001 08/25/20  0307 08/26/20  0113   WBC 8.5 7.7 8.3   HGB 10.2* 9.8* 9.9*   HCT 31.4* 30.0* 30.6*    263 339     BMP:    Recent Labs     08/24/20  0001 08/25/20  0307 08/26/20  0113   * 130* 131*   K 4.5 4.3 4.6   CL 94* 97* 96*   CO2 28 27 29   BUN 20 17 9   CREATININE 1.1 0.8* 0.6*   GLUCOSE 108* 95 87     Hepatic: No results for input(s): AST, ALT, ALB, BILITOT, ALKPHOS in the last 72 hours.   Mag:    No results for input(s): MG in the last 72 hours. Phos:   No results for input(s): PHOS in the last 72 hours. INR:   Recent Labs     08/23/20  1610 08/24/20  0001 08/24/20  0810   INR 1.11 1.06 1.02       Radiology Review:       ASSESSMENT AND PLAN:    Patient Active Problem List   Diagnosis    Dizziness    Acute cerebrovascular accident (CVA) ([address]    Hemiparesis of left nondominant side as late effect of cerebral infarction ([address]    Dysphagia due to recent stroke    Dysarthria due to acute stroke ([address]    Gait disturbance    Simple chronic bronchitis (HCC)    Urinary tract infection associated with indwelling urethral catheter ([address]    Acute cystitis without hematuria    Femoral artery occlusion, right (HCC)    Acute encephalopathy    Severe malnutrition (HCC)       S/P right femoral endarterectomy    Vascular status intact. R groin incision C/D/I. Okay to ambulate. Stable for DC to SNF from CT surgery standpoint. F/u 2 weeks.      Arcelia Wall PA-C

## 2020-08-26 NOTE — CARE COORDINATION
Cm called and spoke with [facility] who advises that precert remains pending. White board note left for PT/OT requesting updated Notes in the a.m should they be needed by insurance.

## 2020-08-26 NOTE — PROGRESS NOTES
Physical Therapy    Physical Therapy Treatment Note  Name: Rebeca Rushing MRN: [id number] :   1935   Date:  2020   Ad[facility] Date: 2020 [facility]   Restrictions/Precautions:        general precautions, falls, portable tele, pulse ox, BP cuff, chair alarm   Communication with other providers:  RN, Teri Mitchell   Subjective:  Patient states: \"They told me I can forget skiing on this foot ever again\"   Pain:   Location, Type, Intensity (0/10 to 10/10):  7/10 right foot   Objective:    Observation:    Supine in bed. Disoriented to time and place. Dec command following, safety awareness, insight, sequencing throughout session. Re-directed often. Treatment, including education/measures:  Supine to sit: SBA/CGA with HOB elevated ~45deg. Very poor strategies and overall sequencing. Lack of attention needing near constant cues for transitioning to EOB. Sit to stand: Sarahi for steadying and fwd weight shift with small amount of lift assist. Posterior during transition. VCs/demonstrations for set up and overall sequencing to improve mechanical efficiency of UEs. Transitioned to stand with little to no WB through right foot 2* pain. Stand to sit: Sarahi for eccentric control to . VCs for sequencing UEs to reach back to seat surface to control sit. Step pivot: CGA for safety with Rw. VCs for sequencing full pivot turn prior to sitting. Ambulation: ~100ft with RW CGA for safety. Slow pace with dec bilat step length, lack of control with each step, and dec toe off right LE. Fwd posture with downward gaze. Small LOB posterior on turns. Able to correct fwd posture when cued but did not sustain for more than 5 seconds at a time. Educated pt on POC, role of PT, use of DME. Cues provided for sequencing, posture, weight shift, UE placement to inc safety and indep with mobility.    Assessment / Impression:    Patient's tolerance of treatment:  Good    Adverse Reaction: no  Significant change in status and impact:  no  Barriers to improvement:  Activity tolerance, strength, balance, cognition, pain   Plan for Next Session:    Activity tolerance, strength, balance, gait, transfers, bed mobility.    Time in:  1415  Time out:  1434  Timed treatment minutes:  19  Total treatment time:  19    Previously filed items:     Short term goals  Time Frame for Short term goals: 1 week  Short term goal 1: Pt will perform sit><supine Bre  Short term goal 2: Pt will transfer to all surfaces SBA  Short term goal 3: Pt will ambulate 150ft with LRAD SBA  Short term goal 4: Pt will perform light dynamic standing activity without UE support SBA x 3 minutes       Electronically signed by:    Governor Ann [id number]  8/26/2020, 2:56 PM

## 2020-08-26 NOTE — FLOWSHEET NOTE
HS meds given and patient took well. Movie completed and patient wanting to sleep. Lights off, blind pulled, covered patient for comfort.

## 2020-08-26 NOTE — FLOWSHEET NOTE
Went into the room [facility] was placing oxygen on the patient and stated that his sao2 dropped to 85%. 2L/NC placed. Reassessed patient and he is disoriented x4. Thinks he is in the [facility] down on the corner of [facility]. And not knowing the month, date, or time and stated \"Well these are the repercussions for putting me to bed hungry\", offered patient pudding, applesauce, juice, ice cream and he refused all of them stating \"I want a beef steak sandwich\" informed patient that the cafeteria is closed and will not reopen until breakfast and informed him of the time. Emotional support given.

## 2020-08-26 NOTE — PLAN OF CARE
Problem: Falls - Risk of:  Goal: Will remain free from falls  Description: Will remain free from falls  8/25/2020 2012 by Virgen Sykes RN  Outcome: Ongoing  8/25/2020 1209 by Layne Mora RN  Outcome: Ongoing  Goal: Absence of physical injury  Description: Absence of physical injury  8/25/2020 2012 by Virgen Sykes RN  Outcome: Ongoing  8/25/2020 1209 by Layne Mora RN  Outcome: Ongoing

## 2020-08-26 NOTE — PROGRESS NOTES
Education/Counseling:  No recommendation at this time   Coordination of Nutrition Care:  Continued Inpatient Monitoring    Goals:  pt will consume greater than 75% of meals and supplements       Nutrition Monitoring and Evaluation:   Food/Nutrient Intake Outcomes:  Supplement Intake, Food and Nutrient Intake  Physical Signs/Symptoms Outcomes:  Weight, Skin, Biochemical Data     Discharge Planning:     Too soon to determine     Electronically signed by Karen Haynes MS, RD, LD on 8/26/20 at 2:43 PM EDT    Contact: [phone]

## 2020-08-26 NOTE — FLOWSHEET NOTE
Patient states that he is not going to believe any of us that keep telling him that he is in the hospital. Even after putting his glasses back on him, turning on the light and showing him the monitor and equipment in the room

## 2020-08-26 NOTE — CARE COORDINATION
CM contacted [facility] admissions and precert remains pending. Updated PT/OT notes and covid results faxed to [address] [place] [address].

## 2020-08-26 NOTE — FLOWSHEET NOTE
Patient c/o discomfort on right side. Medicated for pain 3/10.  Patient still remains disoriented to place and time

## 2020-08-27 VITALS
HEART RATE: 80 BPM | BODY MASS INDEX: 17.61 KG/M2 | WEIGHT: 116.18 LBS | RESPIRATION RATE: 16 BRPM | HEIGHT: 68 IN | DIASTOLIC BLOOD PRESSURE: 65 MMHG | SYSTOLIC BLOOD PRESSURE: 134 MMHG | TEMPERATURE: 98.6 F | OXYGEN SATURATION: 94 %

## 2020-08-27 PROCEDURE — 6370000000 HC RX 637 (ALT 250 FOR IP): Performed by: PHYSICIAN ASSISTANT

## 2020-08-27 PROCEDURE — 2580000003 HC RX 258: Performed by: SURGERY

## 2020-08-27 PROCEDURE — 94150 VITAL CAPACITY TEST: CPT

## 2020-08-27 PROCEDURE — 6370000000 HC RX 637 (ALT 250 FOR IP): Performed by: INTERNAL MEDICINE

## 2020-08-27 PROCEDURE — 97116 GAIT TRAINING THERAPY: CPT

## 2020-08-27 PROCEDURE — 97530 THERAPEUTIC ACTIVITIES: CPT

## 2020-08-27 PROCEDURE — 94761 N-INVAS EAR/PLS OXIMETRY MLT: CPT

## 2020-08-27 PROCEDURE — 6370000000 HC RX 637 (ALT 250 FOR IP): Performed by: SURGERY

## 2020-08-27 RX ORDER — METOPROLOL SUCCINATE 25 MG/1
25 TABLET, EXTENDED RELEASE ORAL DAILY
Qty: 30 TABLET | Refills: 0 | Status: SHIPPED | OUTPATIENT
Start: 2020-08-28

## 2020-08-27 RX ADMIN — LISINOPRIL 5 MG: 5 TABLET ORAL at 09:29

## 2020-08-27 RX ADMIN — ASPIRIN 81 MG: 81 TABLET, COATED ORAL at 09:30

## 2020-08-27 RX ADMIN — CLOPIDOGREL BISULFATE 75 MG: 75 TABLET ORAL at 09:30

## 2020-08-27 RX ADMIN — SODIUM CHLORIDE, PRESERVATIVE FREE 10 ML: 5 INJECTION INTRAVENOUS at 09:30

## 2020-08-27 RX ADMIN — NITROGLYCERIN 1 INCH: 20 OINTMENT TOPICAL at 09:29

## 2020-08-27 RX ADMIN — METOPROLOL SUCCINATE 25 MG: 25 TABLET, EXTENDED RELEASE ORAL at 09:29

## 2020-08-27 ASSESSMENT — PAIN SCALES - GENERAL
PAINLEVEL_OUTOF10: 0

## 2020-08-27 NOTE — PROGRESS NOTES
Daily Progress Note    Confused but doing ok  Keep on current med  Ok to d/c to [facility]  PAD s/p iliac stent and PTA and endarterctomy of right CFA   F/u in office in few weeks    Pt. Awake, confused but doing ok  HR stable, BP stable, off tele  Denies CP, SOB, Rt. Foot feels better per pt.     PAD    Pt. Noted to have Rt. LE PAD on Art. Duplex and CTA with runoff    Discoloration to Rt. Toes- appears slightly worse today    S/p Rt. Iliac stent and balloon PTA to Rt.  Popliteal and AT    S/p endarterectomy    Foot stable, vascular status intact    On ASA, Plavix and statin     Stable from cardiovascular standpoint-ok to D/C when ok with primary  If D/C f/u at office in 1-2 weeks     Angiogram-8/20/20  RIGHT EXTERNAL ILIAC  90% TO 0% BMS-8X57 AND 8X27MM STENT  RCFA-100% CALCIFIED  R SFA-100% BALLOON PTA 5.0 BALLOON  R POPLITEAL 100% -BALLOON PTA 5.0  RIGHT AT PATENT  RIGHT PT PATENT     RIGHT BRACHIAL APPROACH  RIGHT FOOT APPROACH     NO COMPLICATIONS  ASA AND PLAVIX AND HEPARIN  WILL PLACE ON HEPARIN DRIP AFTER SHEATH IS REMOVED 2 HOURS POST SHEATH REMOVAL     WILL CONSIDER RIGHT CFA ATHRECTOMY -SURGICAL --WILL DISCUSS WITH TEAM TOMORROW  NO COMPLICATIONS     [facility]-1/73   Summary   Left ventricular systolic function is low normal.   Ejection fraction is visually estimated at 45%.   No evidence of thrombus within the left atrial appendage.   Negative bubble study; no ASD or PFO noted.   Lambl's excrescence noted on the aortic valve.   Mild to moderate aortic regurgitation.   Slight prolapse of the anterior mitral valve leaflet.   Calcified chordae tendineae near the mitral valve leaflet tips.   No evidence of any pericardial effusion.     PAST MEDICAL HISTORY:  COPD, prostatitis, and CVA.     PAST SURGICAL HISTORY:  Hernia repair.     SOCIAL HISTORY:  The patient smokes half pack a day.  Does not drink.     FAMILY HISTORY:  Noncontributory.     ALLERGIES:  No known drug allergies.     HOME MEDICATIONS:  The patient was on aspirin 81 mg daily, Lipitor 80 mg  at bedtime, lisinopril 5 mg daily, Proscar, Flomax, and Plavix 75 mg  daily. Objective:   BP (!) 157/74   Pulse 74   Temp 98.4 °F (36.9 °C) (Oral)   Resp 16   Ht 5' 7.5\" (1.715 m)   Wt 116 lb 2.9 oz (52.7 kg)   SpO2 94%   BMI 17.93 kg/m²       Intake/Output Summary (Last 24 hours) at 8/27/2020 0845  Last data filed at 8/27/2020 0035  Gross per 24 hour   Intake 360 ml   Output 150 ml   Net 210 ml       Medications:   Scheduled Meds:   clopidogrel  75 mg Oral Daily    nitroglycerin  1 inch Topical Q12H    atorvastatin  20 mg Oral Nightly    metoprolol succinate  25 mg Oral Daily    lisinopril  5 mg Oral Daily    sodium chloride flush  10 mL Intravenous 2 times per day    aspirin  81 mg Oral Daily      Infusions:     PRN Meds:  sodium chloride flush, acetaminophen, HYDROcodone 5 mg - acetaminophen       Physical Exam:  Vitals:    08/27/20 0515   BP: (!) 157/74   Pulse: 74   Resp: 16   Temp: 98.4 °F (36.9 °C)   SpO2: 94%        General: AAO, NAD  Chest: Nontender  Cardiac: First and Second Heart Sounds are Normal, No Murmurs or Gallops noted  Lungs:Clear to auscultation and percussion. Abdomen: Soft, NT, ND, +BS  Extremities: No clubbing, no edema  Vascular:  Equal 2+ peripheral pulses. Lab Data:  CBC:   Recent Labs     08/25/20  0307 08/26/20  0113   WBC 7.7 8.3   HGB 9.8* 9.9*   HCT 30.0* 30.6*   MCV 90.1 89.2    339     BMP:   Recent Labs     08/25/20  0307 08/26/20  0113   * 131*   K 4.3 4.6   CL 97* 96*   CO2 27 29   BUN 17 9   CREATININE 0.8* 0.6*     LIVER PROFILE: No results for input(s): AST, ALT, LIPASE, BILIDIR, BILITOT, ALKPHOS in the last 72 hours. Invalid input(s): AMYLASE,  ALB  PT/INR: No results for input(s): PROTIME, INR in the last 72 hours. APTT: No results for input(s): APTT in the last 72 hours. BNP:  No results for input(s): BNP in the last 72 hours.       Assessment:  Patient Active Problem List    Diagnosis Date Noted    Severe malnutrition ([address] 08/20/2020    Femoral artery occlusion, right ([address] 08/13/2020    Acute encephalopathy 08/13/2020    Hemiparesis of left nondominant side as late effect of cerebral infarction ([address]     Dysphagia due to recent stroke     Dysarthria due to acute stroke ([address]     Gait disturbance     Simple chronic bronchitis (HCC)     Urinary tract infection associated with indwelling urethral catheter ([address]     Acute cystitis without hematuria     Acute cerebrovascular accident (CVA) ([address] 04/10/2020    Dizziness 04/08/2020       Electronically signed by Ilana Montana PA-C on 8/27/2020 at 8:45 AM

## 2020-08-27 NOTE — PROGRESS NOTES
Physical Therapy    Physical Therapy Treatment Note  Name: Layne Wylie MRN: [id number] :   1935   Date:  2020   Admission Date: 2020 Room:  [address] [place] [address]   Restrictions/Precautions:        general precautions, falls, , 4 bed rails   Communication with other providers:  RN   Subjective:  Patient states:  \"I need my jeans\"   Pain:   Location, Type, Intensity (0/10 to 10/10): Denies   Objective:    Observation:    Supine in bed. Cooperative with therapy though needed redirecting often. Disoriented to situation, time,and place. Treatment, including education/measures:  Supine to sit: SBA with HOB flat   Sit to supine: SBA with HOB flat   Sit to stand: CGA for safety from EOB (2x) and toilet (1x)   Stand to sit: CGA for safety to toilet and EOB  Step pivot: CGA for safety with RW (2x). Cues and assistance sequencing full pivot turn with AD prior to initiating sit   Ambulation: 10ft x 2+ 120ft with RW CGA for safety. Multiple cues for directional changes with some assistance required with RW. Fair pace, fwd posture and downward gaze, unsteady with deviations. Moderate VCs for hand positioning on RW.      Assessment / Impression:    Patient's tolerance of treatment:  Good    Adverse Reaction: no  Significant change in status and impact:  no  Barriers to improvement:  Cognition, strength, balance, pain safety awareness, activity tolerance   Plan for Next Session:    Activity tolerance, strength, balance, gait, transfers   Time in:  1436  Time out:  1500  Timed treatment minutes:  24  Total treatment time:  24    Previously filed items:     Short term goals  Time Frame for Short term goals: 1 week  Short term goal 1: Pt will perform sit><supine Bre  Short term goal 2: Pt will transfer to all surfaces SBA  Short term goal 3: Pt will ambulate 150ft with LRAD SBA  Short term goal 4: Pt will perform light dynamic standing activity without UE support SBA x 3 minutes       Electronically signed by: Holli Richardson, [id number]  8/27/2020, 4:14 PM

## 2020-08-27 NOTE — CARE COORDINATION
MIRACLE Wilson at [facility] this am to follow up on precert- states she will update me once received. Received call back from [facility] that pt is approved for discharge. Updated Dr. Spero Kawasaki prepared. Pt can discharge from CM standpoint.

## 2020-08-27 NOTE — PROGRESS NOTES
PATIENT NAME: Coy Doss    TODAY'S DATE: 08/27/20    SUBJECTIVE:    Pt is POD # 3 s/p R femoral endarterectomy. Pt is confused but pleasant. He denies pain. OBJECTIVE:   VITALS:    Vitals:    08/27/20 0515   BP: (!) 157/74   Pulse: 74   Resp: 16   Temp: 98.4 °F (36.9 °C)   SpO2: 94%     INTAKE/OUTPUT:    Date 08/27/20 0000 - 08/27/20 2359   Shift 9706-9236 6736-5997 2752-6454 24 Hour Total   INTAKE   Shift Total(mL/kg)       OUTPUT   Urine(mL/kg/hr) 150(0.4)   150   Shift Total(mL/kg) 150(2.8)   150(2.8)   Weight (kg) 52.7 52.7 52.7 52.7      Patient Vitals for the past 96 hrs (Last 3 readings):   Weight   08/26/20 0600 116 lb 2.9 oz (52.7 kg)   08/25/20 1159 115 lb 8.3 oz (52.4 kg)   08/25/20 1035 115 lb 8.3 oz (52.4 kg)       EXAM:  Blood pressure (!) 157/74, pulse 74, temperature 98.4 °F (36.9 °C), temperature source Oral, resp. rate 16, height 5' 7.5\" (1.715 m), weight 116 lb 2.9 oz (52.7 kg), SpO2 94 %. General appearance: No apparent distress, appears stated age and cooperative. Skin: unremarkable  HEENT Normocephalic, atraumatic without obvious deformity. Neck: Supple, Trachea midline   Lungs: Good respiratory effort. Clear to auscultation, bilaterally  Heart: Regular rate/ rhythm inc c/d/i  Abdomen: Soft, non-tender or non-distended   Extremities: min edema warm well perfused + doppler pulses groin incision C/D/I  Neurologic: confused  Mental status: normal affect      Data:  CBC:   Recent Labs     08/25/20  0307 08/26/20  0113   WBC 7.7 8.3   HGB 9.8* 9.9*   HCT 30.0* 30.6*    339     BMP:    Recent Labs     08/25/20  0307 08/26/20  0113   * 131*   K 4.3 4.6   CL 97* 96*   CO2 27 29   BUN 17 9   CREATININE 0.8* 0.6*   GLUCOSE 95 87     Hepatic: No results for input(s): AST, ALT, ALB, BILITOT, ALKPHOS in the last 72 hours. Mag:    No results for input(s): MG in the last 72 hours. Phos:   No results for input(s): PHOS in the last 72 hours.    INR:   No results for input(s): INR

## 2020-08-27 NOTE — DISCHARGE SUMMARY
Discharge Summary    Name:  Tyson Rodriguez /Age/Sex: 1935  (81 y.o. male)   MRN & CSN:  [id number] & [id number] Admission Date/Time: 2020 11:07 AM   Attending:  Christy Mondragon DO Discharging Physician: Christy Mondragon DO     HPI and Hospital Course:   Tyson Rodriguez is a 81 y.o.  male  who presents acute encephalopathy, found to have:    · Progressively worsening PAD, R worse than L.   § Peripheral angio  then right external iliac stent placed on   § Femoral endarterectomy   § Planning for discharge to SNF on xarelto, plavix, asa, statin, pletal  · Acute Metabolic encephalopathy exacerbated 2/2 UTI likely, also likely has underlying dementia. CT head negative for acute findings. UCx +providencia rettgerti, iv rocephin then oral cipro. Treatment completed. § Not oriented however is at his baseline. § Working on discharge to [facility] and discharged on 20  § Would recommend discussing changing code status to [address]  · HTN  · COPD  · chronic Tobacco abuse - on Nicotine patch, tobacco cessation education. · CVA, vascular dementia  · gait disturbance  · Prostatitis       The patient expressed appropriate understanding of and agreement with the discharge recommendations, medications, and plan.      Consults this admission:  IP CONSULT TO [address] [place] [address] TO HOSPITALIST  IP CONSULT TO SOCIAL WORK  IP CONSULT TO VASCULAR SURGERY  IP CONSULT TO CASE MANAGEMENT  IP CONSULT TO CARDIOLOGY  IP CONSULT TO PALLIATIVE CARE  IP CONSULT TO GENERAL SURGERY    Discharge Instruction:   Follow up appointments: cardio  Primary care physician:  within 2 weeks    Diet:  General/cardiac/ADA/as tolerated  Activity: {discharge activity: as tolerated  Disposition: Discharged to:   [x]Home, [][facility], []SNF, []Acute Rehab, []Hospice   Condition on discharge: Stable    Discharge Medications:      Leigha Desai, [address] Medication Instructions OQA:[id number]    Printed on: 08/27/20 1434   Medication Information                      aspirin 81 MG EC tablet  Take 1 tablet by mouth daily             atorvastatin (LIPITOR) 80 MG tablet  Take 1 tablet by mouth nightly             clopidogrel (PLAVIX) 75 MG tablet  Take 1 tablet by mouth daily             finasteride (PROSCAR) 5 MG tablet  Take 1 tablet by mouth daily             lisinopril (PRINIVIL;ZESTRIL) 5 MG tablet  Take 1 tablet by mouth daily             metoprolol succinate (TOPROL XL) 25 MG extended release tablet  Take 1 tablet by mouth daily             tamsulosin (FLOMAX) 0.4 MG capsule  Take 1 capsule by mouth daily                 Objective Findings at Discharge:   /65   Pulse 80   Temp 98.6 °F (37 °C) (Oral)   Resp 16   Ht 5' 7.5\" (1.715 m)   Wt 116 lb 2.9 oz (52.7 kg)   SpO2 94%   BMI 17.93 kg/m²            PHYSICAL EXAM   GEN Awake male, laying in bed in no apparent distress. Appears given age. EYES Pupils are equally round. No scleral discharge  HENT Atraumatic and symmetric head  NECK No apparent thyromegaly  RESP Symmetric chest movement while on room air. CARDIO/VASC Peripheral pulses equal bilaterally and palpable. No peripheral edema. GI Abdomen is not distended. Rectal exam deferred.  Goodman catheter is not present. HEME/LYMPH No petechiae or ecchymoses. MSK Spontaneous movement of BL upper extremities  SKIN Normal coloration, warm, dry. NEURO Cranial nerves appear grossly intact  PSYCH Awake, alert. Not oriented but appears to be at baseline. BMP/CBC  Recent Labs     08/25/20  0307 08/26/20  0113   * 131*   K 4.3 4.6   CL 97* 96*   CO2 27 29   BUN 17 9   CREATININE 0.8* 0.6*   WBC 7.7 8.3   HCT 30.0* 30.6*    339     SIGNIFICANT IMAGING AND LABS:  Impression:   ct head       No acute intracranial abnormality. Migraine geographic change.       Discharge Time of 36 minutes    Electronically signed by Christelle Encinas DO on 8/27/2020 at 2:34 PM

## 2020-08-28 LAB
EKG ATRIAL RATE: 86 BPM
EKG DIAGNOSIS: NORMAL
EKG P AXIS: 78 DEGREES
EKG P-R INTERVAL: 122 MS
EKG Q-T INTERVAL: 378 MS
EKG QRS DURATION: 90 MS
EKG QTC CALCULATION (BAZETT): 452 MS
EKG R AXIS: 68 DEGREES
EKG T AXIS: 79 DEGREES
EKG VENTRICULAR RATE: 86 BPM

## 2020-09-01 ENCOUNTER — HOSPITAL ENCOUNTER (OUTPATIENT)
Age: 85
Setting detail: SPECIMEN
Discharge: HOME | End: 2020-09-01
Payer: MEDICARE

## 2020-09-01 LAB
ALBUMIN SERPL-MCNC: 3.1 GM/DL (ref 3.4–5)
ALP BLD-CCNC: 78 IU/L (ref 40–128)
ALT SERPL-CCNC: 26 U/L (ref 10–40)
ANION GAP SERPL CALCULATED.3IONS-SCNC: 10 MMOL/L (ref 4–16)
AST SERPL-CCNC: 23 IU/L (ref 15–37)
BILIRUB SERPL-MCNC: 0.3 MG/DL (ref 0–1)
BUN BLDV-MCNC: 23 MG/DL (ref 6–23)
CALCIUM SERPL-MCNC: 8.2 MG/DL (ref 8.3–10.6)
CHLORIDE BLD-SCNC: 102 MMOL/L (ref 99–110)
CO2: 24 MMOL/L (ref 21–32)
CREAT SERPL-MCNC: 0.7 MG/DL (ref 0.9–1.3)
GFR AFRICAN AMERICAN: >60 ML/MIN/1.73M2
GFR NON-AFRICAN AMERICAN: >60 ML/MIN/1.73M2
GLUCOSE BLD-MCNC: 76 MG/DL (ref 70–99)
HCT VFR BLD CALC: 32.4 % (ref 42–52)
HEMOGLOBIN: 10 GM/DL (ref 13.5–18)
MCH RBC QN AUTO: 28.7 PG (ref 27–31)
MCHC RBC AUTO-ENTMCNC: 30.9 % (ref 32–36)
MCV RBC AUTO: 93.1 FL (ref 78–100)
PDW BLD-RTO: 16.3 % (ref 11.7–14.9)
PLATELET # BLD: 418 K/CU MM (ref 140–440)
PMV BLD AUTO: 10.5 FL (ref 7.5–11.1)
POTASSIUM SERPL-SCNC: 4.9 MMOL/L (ref 3.5–5.1)
RBC # BLD: 3.48 M/CU MM (ref 4.6–6.2)
SODIUM BLD-SCNC: 136 MMOL/L (ref 135–145)
TOTAL PROTEIN: 4.9 GM/DL (ref 6.4–8.2)
WBC # BLD: 6.7 K/CU MM (ref 4–10.5)

## 2020-09-01 PROCEDURE — 80053 COMPREHEN METABOLIC PANEL: CPT

## 2020-09-01 PROCEDURE — 36415 COLL VENOUS BLD VENIPUNCTURE: CPT

## 2020-09-01 PROCEDURE — 85027 COMPLETE CBC AUTOMATED: CPT

## 2020-09-03 ENCOUNTER — HOSPITAL ENCOUNTER (OUTPATIENT)
Age: 85
Setting detail: SPECIMEN
Discharge: HOME | End: 2020-09-03

## 2020-09-03 LAB
FERRITIN: 67 NG/ML (ref 30–400)
FOLATE: 10.7 NG/ML (ref 3.1–17.5)
IRON: 35 UG/DL (ref 59–158)
PCT TRANSFERRIN: 17 % (ref 10–44)
TOTAL IRON BINDING CAPACITY: 202 UG/DL (ref 250–450)
TSH HIGH SENSITIVITY: 3.21 UIU/ML (ref 0.27–4.2)
UNSATURATED IRON BINDING CAPACITY: 167 UG/DL (ref 110–370)
VITAMIN D 25-HYDROXY: 28.4 NG/ML

## 2020-09-03 PROCEDURE — 82728 ASSAY OF FERRITIN: CPT

## 2020-09-03 PROCEDURE — 82607 VITAMIN B-12: CPT

## 2020-09-03 PROCEDURE — 82746 ASSAY OF FOLIC ACID SERUM: CPT

## 2020-09-03 PROCEDURE — 83550 IRON BINDING TEST: CPT

## 2020-09-03 PROCEDURE — 36415 COLL VENOUS BLD VENIPUNCTURE: CPT

## 2020-09-03 PROCEDURE — 82306 VITAMIN D 25 HYDROXY: CPT

## 2020-09-03 PROCEDURE — 84443 ASSAY THYROID STIM HORMONE: CPT

## 2020-09-03 PROCEDURE — 83540 ASSAY OF IRON: CPT

## 2020-09-04 LAB — VITAMIN B-12: 379 PG/ML (ref 180–914)

## 2020-10-27 ENCOUNTER — NURSE TRIAGE (OUTPATIENT)
Dept: OTHER | Facility: CLINIC | Age: 85
End: 2020-10-27

## 2020-10-27 NOTE — TELEPHONE ENCOUNTER
Reason for Disposition   MODERATE pain (e.g., interferes with normal activities, limping) and present > 3 days    Answer Assessment - Initial Assessment Questions  1. ONSET: \"When did the pain start? \"       Two months ago  2. LOCATION: \"Where is the pain located? \"   (e.g., around nail, entire toe, at foot joint)       Right foot, toes   3. PAIN: \"How bad is the pain? \"    (Scale 1-10; or mild, moderate, severe)    -  MILD (1-3): doesn't interfere with normal activities     -  MODERATE (4-7): interferes with normal activities (e.g., work or school) or awakens from sleep, limping     -  SEVERE (8-10): excruciating pain, unable to do any normal activities, unable to walk      moderate  4. APPEARANCE: \"What does the toe look like? \" (e.g., redness, swelling, bruising, pallor)      Redness and swelling  5. CAUSE: \"What do you think is causing the toe pain? \"      Not sure  6. OTHER SYMPTOMS: \"Do you have any other symptoms? \" (e.g., leg pain, rash, fever, numbness)      No other symptoms; caller had a stroke two months ago and has limited vision and memory issues  7. PREGNANCY: \"Is there any chance you are pregnant? \" \"When was your last menstrual period? \"      n/a    Protocols used: TOE PAIN-ADULT-[place]    POD 1 [place]  [place]  Swelling and pain to right foot  Redness  Stroke two months ago   Limited vision and memory due to stroke    Caller triage, care advice provided, caller verbalized understanding, transferred to [facility] for scheduling. Please do not reply to the triage nurse through this encounter. Any subsequent communication should be directly with the patient.

## 2020-12-28 ENCOUNTER — APPOINTMENT (OUTPATIENT)
Dept: GENERAL RADIOLOGY | Age: 85
DRG: 280 | End: 2020-12-28
Payer: MEDICARE

## 2020-12-28 ENCOUNTER — HOSPITAL ENCOUNTER (INPATIENT)
Age: 85
LOS: 3 days | Discharge: NURSING, REHAB OR LONG-TERM CARE | DRG: 280 | End: 2020-12-31
Attending: EMERGENCY MEDICINE | Admitting: INTERNAL MEDICINE
Payer: MEDICARE

## 2020-12-28 ENCOUNTER — APPOINTMENT (OUTPATIENT)
Dept: CT IMAGING | Age: 85
DRG: 280 | End: 2020-12-28
Payer: MEDICARE

## 2020-12-28 DIAGNOSIS — R77.8 ELEVATED TROPONIN: ICD-10-CM

## 2020-12-28 DIAGNOSIS — R41.82 ALTERED MENTAL STATUS, UNSPECIFIED ALTERED MENTAL STATUS TYPE: Primary | ICD-10-CM

## 2020-12-28 PROBLEM — I21.4 NSTEMI (NON-ST ELEVATED MYOCARDIAL INFARCTION) (HCC): Status: ACTIVE | Noted: 2020-12-28

## 2020-12-28 LAB
ALBUMIN SERPL-MCNC: 3.3 GM/DL (ref 3.4–5)
ALBUMIN SERPL-MCNC: NORMAL GM/DL (ref 3.4–4.8)
ALCOHOL SCREEN SERUM: <0.01 %WT/VOL
ALCOHOL SCREEN SERUM: NORMAL %WT/VOL
ALP BLD-CCNC: 119 IU/L (ref 40–129)
ALP BLD-CCNC: NORMAL IU/L (ref 25–100)
ALT SERPL-CCNC: 32 U/L (ref 10–40)
ALT SERPL-CCNC: NORMAL U/L (ref 10–40)
AMMONIA: 39 UMOL/L (ref 16–60)
AMMONIA: NORMAL UMOL/L (ref 16–60)
AMPHETAMINES: NEGATIVE
ANION GAP SERPL CALCULATED.3IONS-SCNC: 9 MMOL/L (ref 4–16)
ANION GAP SERPL CALCULATED.3IONS-SCNC: NORMAL MMOL/L (ref 4–16)
APTT: 34.7 SECONDS (ref 25.1–37.1)
AST SERPL-CCNC: 34 IU/L (ref 15–37)
AST SERPL-CCNC: NORMAL IU/L (ref 8–33)
BARBITURATE SCREEN URINE: NEGATIVE
BASE EXCESS MIXED: 2.4 (ref 0–1.2)
BASOPHILS ABSOLUTE: 0 K/CU MM
BASOPHILS ABSOLUTE: 0 K/CU MM
BASOPHILS RELATIVE PERCENT: 0.4 % (ref 0–1)
BASOPHILS RELATIVE PERCENT: 0.5 % (ref 0–1)
BENZODIAZEPINE SCREEN, URINE: NEGATIVE
BILIRUB SERPL-MCNC: 0.6 MG/DL (ref 0–1)
BILIRUB SERPL-MCNC: NORMAL MG/DL (ref 0.3–1.2)
BUN BLDV-MCNC: 20 MG/DL (ref 6–23)
BUN BLDV-MCNC: NORMAL MG/DL (ref 6–23)
CALCIUM SERPL-MCNC: 8.5 MG/DL (ref 8.3–10.6)
CALCIUM SERPL-MCNC: NORMAL MG/DL (ref 8.3–10.6)
CANNABINOID SCREEN URINE: NEGATIVE
CHLORIDE BLD-SCNC: 97 MMOL/L (ref 99–110)
CHLORIDE BLD-SCNC: NORMAL MMOL/L (ref 99–110)
CO2: 25 MMOL/L (ref 21–32)
CO2: NORMAL MMOL/L (ref 21–32)
COCAINE METABOLITE: NEGATIVE
CREAT SERPL-MCNC: 0.8 MG/DL (ref 0.9–1.3)
CREAT SERPL-MCNC: NORMAL MG/DL (ref 0.9–1.3)
DIFFERENTIAL TYPE: ABNORMAL
DIFFERENTIAL TYPE: ABNORMAL
EOSINOPHILS ABSOLUTE: 0 K/CU MM
EOSINOPHILS ABSOLUTE: 0.1 K/CU MM
EOSINOPHILS RELATIVE PERCENT: 0.4 % (ref 0–3)
EOSINOPHILS RELATIVE PERCENT: 0.7 % (ref 0–3)
GFR AFRICAN AMERICAN: >60 ML/MIN/1.73M2
GFR AFRICAN AMERICAN: NORMAL ML/MIN/1.73M2
GFR NON-AFRICAN AMERICAN: >60 ML/MIN/1.73M2
GFR NON-AFRICAN AMERICAN: NORMAL ML/MIN/1.73M2
GLUCOSE BLD-MCNC: 98 MG/DL (ref 70–99)
GLUCOSE BLD-MCNC: NORMAL MG/DL (ref 70–99)
HCO3 VENOUS: 27.2 MMOL/L (ref 19–25)
HCT VFR BLD CALC: 39.5 % (ref 42–52)
HCT VFR BLD CALC: ABNORMAL % (ref 42–52)
HEMOGLOBIN: 11.7 GM/DL (ref 13.5–18)
HEMOGLOBIN: ABNORMAL GM/DL (ref 13.5–18)
IMMATURE NEUTROPHIL %: 0.3 % (ref 0–0.43)
IMMATURE NEUTROPHIL %: 0.4 % (ref 0–0.43)
INR BLD: 1.16 INDEX
LYMPHOCYTES ABSOLUTE: 0.8 K/CU MM
LYMPHOCYTES ABSOLUTE: 0.9 K/CU MM
LYMPHOCYTES RELATIVE PERCENT: 10.4 % (ref 24–44)
LYMPHOCYTES RELATIVE PERCENT: 11.5 % (ref 24–44)
MCH RBC QN AUTO: 25.8 PG (ref 27–31)
MCH RBC QN AUTO: ABNORMAL PG (ref 27–31)
MCHC RBC AUTO-ENTMCNC: 29.6 % (ref 32–36)
MCHC RBC AUTO-ENTMCNC: ABNORMAL % (ref 32–36)
MCV RBC AUTO: 87 FL (ref 78–100)
MCV RBC AUTO: ABNORMAL FL (ref 78–100)
MONOCYTES ABSOLUTE: 0.8 K/CU MM
MONOCYTES ABSOLUTE: 0.8 K/CU MM
MONOCYTES RELATIVE PERCENT: 10.7 % (ref 0–4)
MONOCYTES RELATIVE PERCENT: 11.1 % (ref 0–4)
NUCLEATED RBC %: 0 %
NUCLEATED RBC %: 0 %
O2 SAT, VEN: 93.4 % (ref 50–70)
OPIATES, URINE: NEGATIVE
OXYCODONE: NEGATIVE
PCO2, VEN: 42 MMHG (ref 38–52)
PDW BLD-RTO: 16.6 % (ref 11.7–14.9)
PDW BLD-RTO: ABNORMAL % (ref 11.7–14.9)
PH VENOUS: 7.42 (ref 7.32–7.42)
PHENCYCLIDINE, URINE: NEGATIVE
PLATELET # BLD: 191 K/CU MM (ref 140–440)
PLATELET # BLD: ABNORMAL K/CU MM (ref 140–440)
PMV BLD AUTO: 11.7 FL (ref 7.5–11.1)
PMV BLD AUTO: ABNORMAL FL (ref 7.5–11.1)
PO2, VEN: 261 MMHG (ref 28–48)
POTASSIUM SERPL-SCNC: 4 MMOL/L (ref 3.5–5.1)
POTASSIUM SERPL-SCNC: NORMAL MMOL/L (ref 3.5–5.1)
PROTHROMBIN TIME: 14 SECONDS (ref 11.7–14.5)
RBC # BLD: 4.54 M/CU MM (ref 4.6–6.2)
RBC # BLD: ABNORMAL M/CU MM (ref 4.6–6.2)
SEGMENTED NEUTROPHILS ABSOLUTE COUNT: 5.7 K/CU MM
SEGMENTED NEUTROPHILS ABSOLUTE COUNT: 5.7 K/CU MM
SEGMENTED NEUTROPHILS RELATIVE PERCENT: 75.8 % (ref 36–66)
SEGMENTED NEUTROPHILS RELATIVE PERCENT: ABNORMAL % (ref 36–66)
SODIUM BLD-SCNC: 131 MMOL/L (ref 135–145)
SODIUM BLD-SCNC: NORMAL MMOL/L (ref 136–145)
TOTAL IMMATURE NEUTOROPHIL: 0.02 K/CU MM
TOTAL IMMATURE NEUTOROPHIL: 0.03 K/CU MM
TOTAL NUCLEATED RBC: 0 K/CU MM
TOTAL NUCLEATED RBC: 0 K/CU MM
TOTAL PROTEIN: 6 GM/DL (ref 6.4–8.2)
TOTAL PROTEIN: NORMAL GM/DL (ref 6.4–8.3)
TROPONIN T: 0.23 NG/ML
TROPONIN T: NORMAL NG/ML
WBC # BLD: 7.6 K/CU MM (ref 4–10.5)
WBC # BLD: ABNORMAL K/CU MM (ref 4–10.5)

## 2020-12-28 PROCEDURE — 71045 X-RAY EXAM CHEST 1 VIEW: CPT

## 2020-12-28 PROCEDURE — 99285 EMERGENCY DEPT VISIT HI MDM: CPT

## 2020-12-28 PROCEDURE — 84484 ASSAY OF TROPONIN QUANT: CPT

## 2020-12-28 PROCEDURE — 70450 CT HEAD/BRAIN W/O DYE: CPT

## 2020-12-28 PROCEDURE — 93005 ELECTROCARDIOGRAM TRACING: CPT | Performed by: EMERGENCY MEDICINE

## 2020-12-28 PROCEDURE — G0480 DRUG TEST DEF 1-7 CLASSES: HCPCS

## 2020-12-28 PROCEDURE — 85610 PROTHROMBIN TIME: CPT

## 2020-12-28 PROCEDURE — 2000000000 HC ICU R&B

## 2020-12-28 PROCEDURE — 83880 ASSAY OF NATRIURETIC PEPTIDE: CPT

## 2020-12-28 PROCEDURE — 80307 DRUG TEST PRSMV CHEM ANLYZR: CPT

## 2020-12-28 PROCEDURE — 81001 URINALYSIS AUTO W/SCOPE: CPT

## 2020-12-28 PROCEDURE — 85025 COMPLETE CBC W/AUTO DIFF WBC: CPT

## 2020-12-28 PROCEDURE — 85730 THROMBOPLASTIN TIME PARTIAL: CPT

## 2020-12-28 PROCEDURE — 82805 BLOOD GASES W/O2 SATURATION: CPT

## 2020-12-28 PROCEDURE — 82140 ASSAY OF AMMONIA: CPT

## 2020-12-28 PROCEDURE — 83735 ASSAY OF MAGNESIUM: CPT

## 2020-12-28 PROCEDURE — 80053 COMPREHEN METABOLIC PANEL: CPT

## 2020-12-28 RX ORDER — CLOPIDOGREL BISULFATE 75 MG/1
300 TABLET ORAL ONCE
Status: DISCONTINUED | OUTPATIENT
Start: 2020-12-28 | End: 2020-12-28

## 2020-12-28 RX ORDER — ASPIRIN 81 MG/1
324 TABLET, CHEWABLE ORAL ONCE
Status: DISCONTINUED | OUTPATIENT
Start: 2020-12-28 | End: 2020-12-29

## 2020-12-28 RX ORDER — CLOPIDOGREL BISULFATE 75 MG/1
300 TABLET ORAL ONCE
Status: DISCONTINUED | OUTPATIENT
Start: 2020-12-28 | End: 2020-12-29

## 2020-12-28 RX ORDER — ASPIRIN 81 MG/1
324 TABLET, CHEWABLE ORAL ONCE
Status: DISCONTINUED | OUTPATIENT
Start: 2020-12-28 | End: 2020-12-28

## 2020-12-28 NOTE — ED PROVIDER NOTES
EMERGENCY DEPARTMENT ENCOUNTER    Patient: Lou Graham  MRN: [id number]  : 1935  Date of Evaluation: 2020  ED Provider:  Thanh Hopson    CHIEF COMPLAINT  Chief Complaint   Patient presents with    Altered Mental Status     confusion and strange behavior since this am, left aparmtent was found on 2nd floor instead of 7th was found wandering the area. HPI  Lou Graham is a 81 y.o. male who presents with altered mental status and confusion. He allegedly lives alone in an apartment on the seventh floor and was found wandering on the second floor without his walker. His apartment door was also found to be wide open. He was confused as to where he was or why he was there on the second floor. Here in the emergency department, the patient is able to tell me his name and he knows where he is but just not entirely know why he is here. He states that he was having some \"alterations of the brain today\" and has been feeling confused and unsteady on his feet. He denies any focal weakness. Denies pain anywhere. Denies any other associated symptoms or complaints or concerns. Paramedics report blood sugar was around 110.     REVIEW OF SYSTEMS    Constitutional: negative for fever, chills  Neurological: negative for HA, focal weakness, loss of sensation  Ophthalmic: negative for vision change  ENT: negative for congestion, rhinorrhea  Cardiovascular: negative for chest pain  Respiratory: negative for SOB, cough  GI: negative for abdominal pain, nausea, vomiting, diarrhea, constipation  : negative for dysuria, hematuria  Musculoskeletal: negative for myalgias, decreased ROM, joint swelling  Dermatological: negative for rash, wounds  Heme: Negative for bleeding, bruising      PAST MEDICAL HISTORY  Past Medical History:   Diagnosis Date    [facility]     Prostatitis        CURRENT MEDICATIONS  [unfilled]    ALLERGIES  No Known Allergies SURGICAL HISTORY  Past Surgical History:   Procedure Laterality Date    FEMORAL ENDARTERECTOMY Right 8/24/2020    FEMORAL ENDARTERECTOMY performed by Ivon Moctezuma MD at [address]  Family History   Problem Relation Age of Onset    No Known Problems Mother     No Known Problems Father        SOCIAL HISTORY  Social History     Socioeconomic History    Marital status:      Spouse name: None    Number of children: None    Years of education: None    Highest education level: None   Occupational History    None   Social Needs    Financial resource strain: None    Food insecurity     Worry: None     Inability: None    Transportation needs     Medical: None     Non-medical: None   Tobacco Use    Smoking status: Current Every Day Smoker     Packs/day: 0.50     Types: Cigarettes   Substance and Sexual Activity    Alcohol use: Yes    Drug use: Not Currently    Sexual activity: Not Currently   Lifestyle    Physical activity     Days per week: None     Minutes per session: None    Stress: None   Relationships    Social connections     Talks on phone: None     Gets together: None     Attends Yazidi service: None     Active member of club or organization: None     Attends meetings of clubs or organizations: None     Relationship status: None    Intimate partner violence     Fear of current or ex partner: None     Emotionally abused: None     Physically abused: None     Forced sexual activity: None   Other Topics Concern    None   Social History Narrative    None         **Past medical, family and social histories, and nursing notes reviewed and verified by me**      PHYSICAL EXAM  VITAL SIGNS:   ED Triage Vitals   Enc Vitals Group      BP       Pulse       Resp       Temp       Temp src       SpO2       Weight       Height       Head Circumference       Peak Flow       Pain Score       Pain Loc       Pain Edu? Excl.  in [address]? Vitals during ED course were reviewed and are as charted. Constitutional: Minimal distress, Non-toxic appearance  Eyes: Conjunctiva normal, No discharge, PERRL, EOMI  HENT: Normocephalic, Atraumatic, bilateral external ears normal, posterior oropharynx is nonerythematous and without exudate, uvula is midline, no trismus, no \"hot potato voice\" or dysphonia, oropharynx moist  Neck: Supple, no stridor, no grossly visible or palpable masses  Cardiovascular: Regular rate and rhythm, No murmurs, No rubs, No gallops  Pulmonary/Chest: Normal breath sounds, No respiratory distress or accessory muscle use, No wheezing, crackles or rhonchi. Abdomen: Soft, nondistended and nonrigid, No tenderness or peritoneal signs, No masses, normal bowel sounds  Back: No midline point tenderness, No paraspinous muscle tenderness. No CVA tenderness  Extremities: No gross deformities, no edema, no tenderness  Neurologic: Cranial nerves II through XII grossly intact as tested normal motor function, Normal sensory function, No focal deficits  Skin: Warm, Dry, No erythema, No rash, No cyanosis, No mottling  Lymphatic: No lymphadenopathy in the following location(s): cervical  Psychiatric: Alert and oriented to person and place but not to time, affect normal          EKG Interpretation    Interpreted by emergency department physician    Rhythm: normal sinus   Rate: normal  Axis: normal  Ectopy: none  Conduction: normal  ST Segments: elevation in  v2, v3, v4 and v5  T Waves: non specific changes  Q Waves: II, III and aVf    Clinical Impression: Normal sinus rhythm possible left atrial enlargement with some likely left ventricular hypertrophy resulting in some repolarization abnormalities in the anterior precordial leads. Nonspecific T wave abnormalities in the inferior leads.         RADIOLOGY/PROCEDURES/LABS/MEDICATIONS ADMINISTERED:    I have reviewed and interpreted all of the currently available lab results from this visit (if applicable): pH, Waldo 7.42 7.32 - 7.42    pCO2, Waldo 42 38 - 52 mmHG    pO2, Waldo 261 (H) 28 - 48 mmHG    Base Exc, Mixed 2.4 (H) 0 - 1.2    HCO3, Venous 27.2 (H) 19 - 25 MMOL/L    O2 Sat, Waldo 93.4 (H) 50 - 70 %   Ammonia Level   Result Value Ref Range    Ammonia WRONG PATIENT 16 - 60 UMOL/L   Drug screen multi urine   Result Value Ref Range    Cannabinoid Scrn, Ur NEGATIVE NEGATIVE    Amphetamines NEGATIVE NEGATIVE    Cocaine Metabolite NEGATIVE NEGATIVE    Benzodiazepine Screen, Urine NEGATIVE NEGATIVE    Barbiturate Screen, Ur NEGATIVE NEGATIVE    Opiates, Urine NEGATIVE NEGATIVE    Phencyclidine, Urine NEGATIVE NEGATIVE    Oxycodone NEGATIVE NEGATIVE   Ethanol   Result Value Ref Range    Alcohol Scrn WRONG PATIENT <0.01 %WT/VOL   Protime/INR & PTT   Result Value Ref Range    Protime 14.0 11.7 - 14.5 SECONDS    INR 1.16 INDEX    aPTT 34.7 25.1 - 37.1 SECONDS   Ethanol   Result Value Ref Range    Alcohol Scrn <0.01 <0.01 %WT/VOL   CBC Auto Differential   Result Value Ref Range    WBC 7.6 4.0 - 10.5 K/CU MM    RBC 4.54 (L) 4.6 - 6.2 M/CU MM    Hemoglobin 11.7 (L) 13.5 - 18.0 GM/DL    Hematocrit 39.5 (L) 42 - 52 %    MCV 87.0 78 - 100 FL    MCH 25.8 (L) 27 - 31 PG    MCHC 29.6 (L) 32.0 - 36.0 %    RDW 16.6 (H) 11.7 - 14.9 %    Platelets 896 736 - 311 K/CU MM    MPV 11.7 (H) 7.5 - 11.1 FL    Differential Type AUTOMATED DIFFERENTIAL     Segs Relative 75.8 (H) 36 - 66 %    Lymphocytes % 11.5 (L) 24 - 44 %    Monocytes % 11.1 (H) 0 - 4 %    Eosinophils % 0.7 0 - 3 %    Basophils % 0.5 0 - 1 %    Segs Absolute 5.7 K/CU MM    Lymphocytes Absolute 0.9 K/CU MM    Monocytes Absolute 0.8 K/CU MM    Eosinophils Absolute 0.1 K/CU MM    Basophils Absolute 0.0 K/CU MM    Nucleated RBC % 0.0 %    Total Nucleated RBC 0.0 K/CU MM    Total Immature Neutrophil 0.03 K/CU MM    Immature Neutrophil % 0.4 0 - 0.43 %   Comprehensive Metabolic Panel w/ Reflex to MG   Result Value Ref Range    Sodium 131 (L) 135 - 145 MMOL/L Potassium 4.0 3.5 - 5.1 MMOL/L    Chloride 97 (L) 99 - 110 mMol/L    CO2 25 21 - 32 MMOL/L    BUN 20 6 - 23 MG/DL    CREATININE 0.8 (L) 0.9 - 1.3 MG/DL    Glucose 98 70 - 99 MG/DL    Calcium 8.5 8.3 - 10.6 MG/DL    Alb 3.3 (L) 3.4 - 5.0 GM/DL    Total Protein 6.0 (L) 6.4 - 8.2 GM/DL    Total Bilirubin 0.6 0.0 - 1.0 MG/DL    ALT 32 10 - 40 U/L    AST 34 15 - 37 IU/L    Alkaline Phosphatase 119 40 - 129 IU/L    GFR Non-African American >60 >60 mL/min/1.73m2    GFR African American >60 >60 mL/min/1.73m2    Anion Gap 9 4 - 16   Troponin   Result Value Ref Range    Troponin T 0.225 (HH) <0.01 NG/ML   Ammonia   Result Value Ref Range    Ammonia 39 16 - 60 UMOL/L   EKG 12 Lead   Result Value Ref Range    Ventricular Rate 79 BPM    Atrial Rate 79 BPM    P-R Interval 116 ms    QRS Duration 100 ms    Q-T Interval 412 ms    QTc Calculation (Bazett) 472 ms    P Axis 78 degrees    R Axis 79 degrees    T Axis -6 degrees    Diagnosis       Normal sinus rhythm  Possible Left atrial enlargement  Anterior infarct , age undetermined  T wave abnormality, consider inferior ischemia  Abnormal ECG  When compared with ECG of 22-AUG-2020 08:16,  Anterior infarct is now present  Borderline criteria for Inferior infarct are no longer present  Non-specific change in ST segment in Inferior leads  Nonspecific T wave abnormality, improved in Lateral leads            ABNORMAL LABS:  Labs Reviewed   CBC WITH AUTO DIFFERENTIAL - Abnormal; Notable for the following components:       Result Value    Lymphocytes % 10.4 (*)     Monocytes % 10.7 (*)     All other components within normal limits   BLOOD GAS, VENOUS - Abnormal; Notable for the following components:    pO2, Waldo 261 (*)     Base Exc, Mixed 2.4 (*)     HCO3, Venous 27.2 (*)     O2 Sat, Waldo 93.4 (*)     All other components within normal limits   CBC WITH AUTO DIFFERENTIAL - Abnormal; Notable for the following components:    RBC 4.54 (*)     Hemoglobin 11.7 (*)     Hematocrit 39.5 (*) MCH 25.8 (*)     MCHC 29.6 (*)     RDW 16.6 (*)     MPV 11.7 (*)     Segs Relative 75.8 (*)     Lymphocytes % 11.5 (*)     Monocytes % 11.1 (*)     All other components within normal limits   COMPREHENSIVE METABOLIC PANEL W/ REFLEX TO MG FOR LOW K - Abnormal; Notable for the following components:    Sodium 131 (*)     Chloride 97 (*)     CREATININE 0.8 (*)     Alb 3.3 (*)     Total Protein 6.0 (*)     All other components within normal limits   TROPONIN - Abnormal; Notable for the following components:    Troponin T 0.225 (*)     All other components within normal limits   COMPREHENSIVE METABOLIC PANEL W/ REFLEX TO MG FOR LOW K   TROPONIN   AMMONIA   URINE DRUG SCREEN   ETHANOL   PROTIME/INR & PTT   ETHANOL   AMMONIA   URINALYSIS   TROPONIN         IMAGING STUDIES ORDERED:  STRAIGHT CATH  CT HEAD WO CONTRAST  XR CHEST PORTABLE  IP CONSULT TO CARDIOLOGY  IP CONSULT TO HOSPITALIST  PATIENT STATUS (FROM ED OR OR/PROCEDURAL)  VITAL SIGNS  BEDREST  DAILY WEIGHTS  TELEMETRY MONITORING  NOTIFY PHYSICIAN (SPECIFY)  REASON FOR NO MECHANICAL VTE PROPHYLAXIS  DNR COMFORT CARE - ARREST  DIET CARDIAC    I have personally viewed the imaging studies. The radiologist interpretation is:   XR CHEST PORTABLE   Final Result   Congestion and mild interstitial edema suggestive of early CHF. Redemonstration of cardiomegaly and COPD. CT HEAD WO CONTRAST   Final Result   No acute intracranial abnormality.                MEDICATIONS ADMINISTERED:  Medications   aspirin chewable tablet 324 mg (has no administration in time range)   clopidogrel (PLAVIX) tablet 300 mg (has no administration in time range)   enoxaparin (LOVENOX) injection 50 mg (has no administration in time range)         COURSE & MEDICAL DECISION MAKING  Last vitals: BP (!) 172/90   Pulse 81   Temp 97.8 °F (36.6 °C) (Oral)   Resp 16   Wt 120 lb (54.4 kg)   SpO2 97%   BMI 18.52 kg/m² 62-year-old male with altered mental status found wandering in his apartment complex. He does have known history of dementia. Is unclear if this is an acute worsening of his dementia or not. He is alert and oriented to person and place but not to time. Denies any symptoms or complaints. No focal neurological deficits. CT scan of the head was obtained is unremarkable. Is noted to have mild elevation in his troponin. The cause and significance of this is not entirely clear. He denies chest pain. He does have some ST segment elevations in the anterior precordial leads which I suspect are likely secondary to his left ventricular hypertrophy. I did call and discussed case with Dr. Mitchel Sharpe of cardiology who reviewed the EKG and also feels is likely secondary to his LVH. He has requested that I give the patient aspirin, Plavix and Lovenox. I have ordered these for him. Additional workup and treatment in the ED as documented above. Pt will be admitted for further evaluation and treatment. I discussed the case with the hospitalist, who agreed to admit the patient. Plan discussed with pt and/or family who expressed understanding and agreement with plan. Admitted in stable condition. Clinical Impression:  1. Altered mental status, unspecified altered mental status type    2. Elevated troponin        Disposition referral (if applicable):  No follow-up provider specified. Disposition medications (if applicable):  New Prescriptions    No medications on file       ED Provider Disposition Time  DISPOSITION            Electronically signed by: Crystal Atkins M.D., 12/28/2020 10:00 PM      This dictation was created with voice recognition software. While attempts have been made to review the dictation as it is transcribed, on occasion the spoken word can be misinterpreted by the technology leading to omissions or inappropriate words, phrases or sentences.         Ban Jay MD  12/28/20 [id number]

## 2020-12-29 LAB
ANION GAP SERPL CALCULATED.3IONS-SCNC: 10 MMOL/L (ref 4–16)
BACTERIA: NEGATIVE /HPF
BILIRUBIN URINE: NEGATIVE MG/DL
BLOOD, URINE: NEGATIVE
BUN BLDV-MCNC: 23 MG/DL (ref 6–23)
CALCIUM SERPL-MCNC: 8.3 MG/DL (ref 8.3–10.6)
CHLORIDE BLD-SCNC: 102 MMOL/L (ref 99–110)
CLARITY: CLEAR
CO2: 24 MMOL/L (ref 21–32)
COLOR: ABNORMAL
CREAT SERPL-MCNC: 0.8 MG/DL (ref 0.9–1.3)
EKG ATRIAL RATE: 72 BPM
EKG ATRIAL RATE: 79 BPM
EKG DIAGNOSIS: NORMAL
EKG DIAGNOSIS: NORMAL
EKG P AXIS: 74 DEGREES
EKG P AXIS: 78 DEGREES
EKG P-R INTERVAL: 116 MS
EKG P-R INTERVAL: 120 MS
EKG Q-T INTERVAL: 412 MS
EKG Q-T INTERVAL: 438 MS
EKG QRS DURATION: 100 MS
EKG QRS DURATION: 98 MS
EKG QTC CALCULATION (BAZETT): 472 MS
EKG QTC CALCULATION (BAZETT): 479 MS
EKG R AXIS: 75 DEGREES
EKG R AXIS: 79 DEGREES
EKG T AXIS: -6 DEGREES
EKG T AXIS: 110 DEGREES
EKG VENTRICULAR RATE: 72 BPM
EKG VENTRICULAR RATE: 79 BPM
GFR AFRICAN AMERICAN: >60 ML/MIN/1.73M2
GFR NON-AFRICAN AMERICAN: >60 ML/MIN/1.73M2
GLUCOSE BLD-MCNC: 92 MG/DL (ref 70–99)
GLUCOSE, URINE: NEGATIVE MG/DL
HCT VFR BLD CALC: 37.6 % (ref 42–52)
HEMOGLOBIN: 11.7 GM/DL (ref 13.5–18)
HYALINE CASTS: 10 /LPF
KETONES, URINE: ABNORMAL MG/DL
LEUKOCYTE ESTERASE, URINE: NEGATIVE
LV EF: 30 %
LVEF MODALITY: NORMAL
MCH RBC QN AUTO: 25.9 PG (ref 27–31)
MCHC RBC AUTO-ENTMCNC: 31.1 % (ref 32–36)
MCV RBC AUTO: 83.4 FL (ref 78–100)
MUCUS: ABNORMAL HPF
NITRITE URINE, QUANTITATIVE: NEGATIVE
PDW BLD-RTO: 16.5 % (ref 11.7–14.9)
PH, URINE: 5 (ref 5–8)
PLATELET # BLD: 177 K/CU MM (ref 140–440)
PMV BLD AUTO: 10.8 FL (ref 7.5–11.1)
POTASSIUM SERPL-SCNC: 4.1 MMOL/L (ref 3.5–5.1)
PROTEIN UA: 100 MG/DL
RBC # BLD: 4.51 M/CU MM (ref 4.6–6.2)
RBC URINE: ABNORMAL /HPF (ref 0–3)
SODIUM BLD-SCNC: 136 MMOL/L (ref 135–145)
SPECIFIC GRAVITY UA: 1.02 (ref 1–1.03)
SQUAMOUS EPITHELIAL: <1 /HPF
TRICHOMONAS: ABNORMAL /HPF
TROPONIN T: 0.37 NG/ML
UROBILINOGEN, URINE: 2 MG/DL (ref 0.2–1)
WBC # BLD: 6.6 K/CU MM (ref 4–10.5)
WBC UA: 1 /HPF (ref 0–2)

## 2020-12-29 PROCEDURE — 81001 URINALYSIS AUTO W/SCOPE: CPT

## 2020-12-29 PROCEDURE — 97166 OT EVAL MOD COMPLEX 45 MIN: CPT

## 2020-12-29 PROCEDURE — 93010 ELECTROCARDIOGRAM REPORT: CPT | Performed by: INTERNAL MEDICINE

## 2020-12-29 PROCEDURE — 97162 PT EVAL MOD COMPLEX 30 MIN: CPT

## 2020-12-29 PROCEDURE — 2000000000 HC ICU R&B

## 2020-12-29 PROCEDURE — 6360000002 HC RX W HCPCS: Performed by: INTERNAL MEDICINE

## 2020-12-29 PROCEDURE — 6370000000 HC RX 637 (ALT 250 FOR IP): Performed by: INTERNAL MEDICINE

## 2020-12-29 PROCEDURE — 80048 BASIC METABOLIC PNL TOTAL CA: CPT

## 2020-12-29 PROCEDURE — 97116 GAIT TRAINING THERAPY: CPT

## 2020-12-29 PROCEDURE — 2580000003 HC RX 258: Performed by: INTERNAL MEDICINE

## 2020-12-29 PROCEDURE — 93306 TTE W/DOPPLER COMPLETE: CPT

## 2020-12-29 PROCEDURE — 93005 ELECTROCARDIOGRAM TRACING: CPT | Performed by: INTERNAL MEDICINE

## 2020-12-29 PROCEDURE — 84484 ASSAY OF TROPONIN QUANT: CPT

## 2020-12-29 PROCEDURE — 97530 THERAPEUTIC ACTIVITIES: CPT

## 2020-12-29 PROCEDURE — 85027 COMPLETE CBC AUTOMATED: CPT

## 2020-12-29 PROCEDURE — 94761 N-INVAS EAR/PLS OXIMETRY MLT: CPT

## 2020-12-29 RX ORDER — FINASTERIDE 5 MG/1
5 TABLET, FILM COATED ORAL DAILY
Status: DISCONTINUED | OUTPATIENT
Start: 2020-12-29 | End: 2020-12-31 | Stop reason: HOSPADM

## 2020-12-29 RX ORDER — POLYETHYLENE GLYCOL 3350 17 G/17G
17 POWDER, FOR SOLUTION ORAL DAILY PRN
Status: DISCONTINUED | OUTPATIENT
Start: 2020-12-29 | End: 2020-12-31 | Stop reason: HOSPADM

## 2020-12-29 RX ORDER — METOPROLOL SUCCINATE 25 MG/1
25 TABLET, EXTENDED RELEASE ORAL DAILY
Status: DISCONTINUED | OUTPATIENT
Start: 2020-12-29 | End: 2020-12-31 | Stop reason: HOSPADM

## 2020-12-29 RX ORDER — PROMETHAZINE HYDROCHLORIDE 25 MG/1
12.5 TABLET ORAL EVERY 6 HOURS PRN
Status: DISCONTINUED | OUTPATIENT
Start: 2020-12-29 | End: 2020-12-31 | Stop reason: HOSPADM

## 2020-12-29 RX ORDER — RANOLAZINE 500 MG/1
500 TABLET, EXTENDED RELEASE ORAL 2 TIMES DAILY
Status: DISCONTINUED | OUTPATIENT
Start: 2020-12-29 | End: 2020-12-31 | Stop reason: HOSPADM

## 2020-12-29 RX ORDER — ACETAMINOPHEN 650 MG/1
650 SUPPOSITORY RECTAL EVERY 6 HOURS PRN
Status: DISCONTINUED | OUTPATIENT
Start: 2020-12-29 | End: 2020-12-31 | Stop reason: HOSPADM

## 2020-12-29 RX ORDER — ONDANSETRON 2 MG/ML
4 INJECTION INTRAMUSCULAR; INTRAVENOUS EVERY 6 HOURS PRN
Status: DISCONTINUED | OUTPATIENT
Start: 2020-12-29 | End: 2020-12-31 | Stop reason: HOSPADM

## 2020-12-29 RX ORDER — TAMSULOSIN HYDROCHLORIDE 0.4 MG/1
0.4 CAPSULE ORAL DAILY
Status: DISCONTINUED | OUTPATIENT
Start: 2020-12-29 | End: 2020-12-31 | Stop reason: HOSPADM

## 2020-12-29 RX ORDER — ACETAMINOPHEN 325 MG/1
650 TABLET ORAL EVERY 6 HOURS PRN
Status: DISCONTINUED | OUTPATIENT
Start: 2020-12-29 | End: 2020-12-31 | Stop reason: HOSPADM

## 2020-12-29 RX ORDER — ATORVASTATIN CALCIUM 40 MG/1
40 TABLET, FILM COATED ORAL NIGHTLY
Status: DISCONTINUED | OUTPATIENT
Start: 2020-12-29 | End: 2020-12-31 | Stop reason: HOSPADM

## 2020-12-29 RX ORDER — LISINOPRIL 10 MG/1
10 TABLET ORAL DAILY
Status: DISCONTINUED | OUTPATIENT
Start: 2020-12-30 | End: 2020-12-31 | Stop reason: HOSPADM

## 2020-12-29 RX ORDER — SODIUM CHLORIDE 0.9 % (FLUSH) 0.9 %
10 SYRINGE (ML) INJECTION PRN
Status: DISCONTINUED | OUTPATIENT
Start: 2020-12-29 | End: 2020-12-31 | Stop reason: HOSPADM

## 2020-12-29 RX ORDER — CLOPIDOGREL BISULFATE 75 MG/1
75 TABLET ORAL DAILY
Status: DISCONTINUED | OUTPATIENT
Start: 2020-12-29 | End: 2020-12-31 | Stop reason: HOSPADM

## 2020-12-29 RX ORDER — ASPIRIN 81 MG/1
81 TABLET, CHEWABLE ORAL DAILY
Status: DISCONTINUED | OUTPATIENT
Start: 2020-12-29 | End: 2020-12-31 | Stop reason: HOSPADM

## 2020-12-29 RX ORDER — SODIUM CHLORIDE 0.9 % (FLUSH) 0.9 %
10 SYRINGE (ML) INJECTION EVERY 12 HOURS SCHEDULED
Status: DISCONTINUED | OUTPATIENT
Start: 2020-12-29 | End: 2020-12-31 | Stop reason: HOSPADM

## 2020-12-29 RX ORDER — LISINOPRIL 5 MG/1
5 TABLET ORAL DAILY
Status: DISCONTINUED | OUTPATIENT
Start: 2020-12-29 | End: 2020-12-29

## 2020-12-29 RX ADMIN — TAMSULOSIN HYDROCHLORIDE 0.4 MG: 0.4 CAPSULE ORAL at 09:53

## 2020-12-29 RX ADMIN — CLOPIDOGREL BISULFATE 75 MG: 75 TABLET ORAL at 09:53

## 2020-12-29 RX ADMIN — ASPIRIN 81 MG CHEWABLE TABLET 81 MG: 81 TABLET CHEWABLE at 09:53

## 2020-12-29 RX ADMIN — ACETAMINOPHEN 650 MG: 325 TABLET ORAL at 21:13

## 2020-12-29 RX ADMIN — SODIUM CHLORIDE, PRESERVATIVE FREE 10 ML: 5 INJECTION INTRAVENOUS at 21:13

## 2020-12-29 RX ADMIN — RANOLAZINE 500 MG: 500 TABLET, FILM COATED, EXTENDED RELEASE ORAL at 09:53

## 2020-12-29 RX ADMIN — METOPROLOL SUCCINATE 25 MG: 25 TABLET, EXTENDED RELEASE ORAL at 09:53

## 2020-12-29 RX ADMIN — ATORVASTATIN CALCIUM 40 MG: 40 TABLET, FILM COATED ORAL at 21:13

## 2020-12-29 RX ADMIN — FINASTERIDE 5 MG: 5 TABLET, FILM COATED ORAL at 09:53

## 2020-12-29 RX ADMIN — LISINOPRIL 5 MG: 5 TABLET ORAL at 09:53

## 2020-12-29 RX ADMIN — ENOXAPARIN SODIUM 70 MG: 80 INJECTION SUBCUTANEOUS at 09:53

## 2020-12-29 RX ADMIN — RANOLAZINE 500 MG: 500 TABLET, FILM COATED, EXTENDED RELEASE ORAL at 21:13

## 2020-12-29 ASSESSMENT — PAIN SCALES - GENERAL
PAINLEVEL_OUTOF10: 0

## 2020-12-29 ASSESSMENT — PAIN DESCRIPTION - ORIENTATION: ORIENTATION: LOWER

## 2020-12-29 ASSESSMENT — PAIN DESCRIPTION - PROGRESSION
CLINICAL_PROGRESSION: GRADUALLY IMPROVING

## 2020-12-29 ASSESSMENT — PAIN DESCRIPTION - ONSET: ONSET: ON-GOING

## 2020-12-29 NOTE — CONSULTS
[address], 1935, 2106/2106-A, 12/29/2020    History  [place]:  The primary encounter diagnosis was Altered mental status, unspecified altered mental status type. A diagnosis of Elevated troponin was also pertinent to this visit. Patient  has a past medical history of CAD (coronary artery disease), COPD (chronic obstructive pulmonary disease) ([address] Dementia ([address] and Prostatitis. Patient  has a past surgical history that includes hernia repair and Femoral Endarterectomy (Right, 8/24/2020). Subjective:  Patient states:  Agreeable to therapy, \"why am I here? \". Pain:  Denies. Communication with other providers:  Handoff to RN, co-eval with OT.    Restrictions: Fall risk    Home Setup/Prior level of function    Lives With: Alone  Type of Home: Apartment  Home Layout: One level (lives on 7th floor of 9 floors)  Home Access: Elevator  Bathroom Shower/Tub: Tub/Shower unit, Shower chair with back  Bathroom Toilet: Standard  Bathroom Equipment: Grab bars in shower  Bathroom Accessibility: Walker accessible  Home Equipment: Cane(Uses cane for fxl mobility), walker (4WW, RW, standard)  Receives Help From: Friend(s) (a girl within the apartment building assists with IADLs)  ADL Assistance: Independent  Homemaking Assistance: Needs assistance  Homemaking Responsibilities: Yes  Ambulation Assistance: Independent  Transfer Assistance: Independent  Active : No  Patient's  Info: Hired help or friends to drive to [place]  Education: Bachelors degree  Occupation: Retired  Type of occupation: (Pt reported getting his degree in history, working in the education field, 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 ) IADL Comments: Neighbors/friends assist with IADLs. (+) med mgmt  Additional Comments: Pt sleeps on flat bed at baseline  **Taken from eval on 8/21/2020. Updated 12/29/2020**    Examination of body systems (includes body structures/functions, activity/participation limitations):  · Observation:  Supine in bed upon arrival  · Vision:  WFL, glasses  · Hearing:  [place]  · Cardiopulmonary:  No O2 needs  · Cognition: impaired, appears to have baseline dementia, varied awareness of situation and decreased short term memory, see OT/SLP note for further evaluation. Musculoskeletal  · ROM R/L:  WFL. · Strength R/L:  4/5, min weakness in function and endurance. · Neuro:  WFL, no focal deficits    · Gait pattern: min instability with cane, improved with use of RW, reciprocal, step through    Mobility:  · Rolling L/R:  SBA  · Supine to sit:  SBA  · Transfers: CGA  · Sitting balance:  SBA. · Standing balance:  CGA. · Gait: CGA-min A    [facility] 6 Clicks Inpatient Mobility:  17/24    Treatment:  Sup to sit: SBA from flat bed, min cues for sequencing. Sitting balance: good    Transfers: STS from bed CGA, min A from chair, initially with use of quad cane, min instability in standing. Gait: ambulated x10ft with cane, CGA-min A for balance, implemented use of RW, cues for decreased AD excursion and upright posture, decreased overall endurance. Needs cues for environmental navigation. Safety: patient left in chair with chair alarm, call light within reach, RN notified, gait belt used. Assessment:  Patient is a 82 yo male who presents with AMS (d/t dementia) found wandering around apartment complex. Patient demonstrates impaired cognition and awareness of situation and environment, also demonstrates mobility impairment. Would benefit from skilled therapy, likely needs a long term solution to address cognitive changes dementia unit/LTC/assisted living.     Complexity: Moderate Prognosis: Good, no significant barriers to participation at this time. Plan Times per week: 2-3+/week, 1 week,      Equipment: may need RW. Goals:  Short term goals  Time Frame for Short term goals: 1 week  Short term goal 1: Patient will perform supine to sit mod I. Short term goal 2: Patient will perform STS mod I with RW. Short term goal 3: Patient will ambulate x100ft mod I with RW. Treatment plan:  Bed mobility, transfers, balance, gait, TA, TX. Recommendations for NURSING mobility: ambulate to BR with RW.     Time:   Time in: 1042  Time out: 1115  Timed treatment minutes: 23  Total time: 33    Electronically signed by:    Olga Herr, KAREL  12/29/2020, 1:36 PM

## 2020-12-29 NOTE — CONSULTS
[facility] --[id number]  NSTEMI conservative treatment  He has no chest pain

## 2020-12-29 NOTE — ED NOTES
Nurse to nurse report given to [address] [place] [address] for room 2106      Winnie Cole, [facility]  12/28/20 [id number]

## 2020-12-29 NOTE — CONSULTS
· Observation: Supine in bed upon arrival  · Vision: Wears glasses. · Hearing: [facility]  · Vitals: Stable vitals throughout session    Body Systems and functions:  · ROM: WFL   · Strength: 4-/5 BUE shoulder flexion. · Sensation: WFL  · Tone: Normal  · Coordination: WFL  · Perception: WNL    Activities of Daily Living (ADLs):  · Feeding: Pablo (setup and increased time). · Grooming: CGA (anticipate pt could complete in standing with assist for dynamic standing balance, as well as VC for sequencing throughout). · UB bathing: SBA (recommend pt complete in seated. Anticipate VC for sequencing throughout, increased time, and setup). · LB bathing: Eddie (recommend pt complete in seated with setup, increased time, and PRN assist for distal reaching, as well as assist to ensure task is performed thoroughly). · UB dressing: SBA (recommend pt complete in seated). · LB dressing: Eddie (recommend pt complete in seated with assist for distal reaching, as well as assist for sequencing through task). · Toileting: Eddie (anticipate assist for toilet transfers and while completing brunilda care/LB clothing manipulation in standing). Cognitive and Psychosocial Functioning:  · Overall cognitive status: Oriented to self. Disoriented to time and location. Reports the date as April 1999. Pt informed he is at [address] [place] [address] and pt asks what happened that brought him to the hospital. Per chart review, pt demo AMS upon admission, and continues to demo episodes of confusion. · Affect: Normal     Balance:   · Sitting: Supervision (pt sat EOB demo good dynamic sitting balance overall). · Standing: CGA-Eddie (stood with cane demo fair standing balance, which lightly improved with use of RW). Functional Mobility:  · Bed Mobility: SBA (pt performed supine to seated bed mobility with increased time and VC throughout for sequencing). · Transfers: CGA (STS from EOB). CGA (stand to sit to chair with VC for sequencing and control). · Ambulation: Eddie (pt ambulated approx 8ft with quad cane, demo slow pace throughout and decreased balance. Pt given RW and balance improved, however pt required max VC for upright posture, pt had tendency to look down while ambulating. Pt ambulated approx 40ft with RW). AM-PAC 6 click short form for inpatient daily activity:   How much help from another person does the patient currently need. .. Unable  Dep A Lot  Max A A Lot   Mod A A Little  Min A A Little   CGA  SBA None   Mod I  Indep  Sup   1. Putting on and taking off regular lower body clothing? [] 1    [] 2   [] 2   [x] 3   [] 3   [] 4      2. Bathing (including washing, rinsing, drying)? [] 1   [] 2   [] 2 [x] 3 [] 3 [] 4   3. Toileting, which includes using toilet, bedpan, or urinal? [] 1    [] 2   [] 2   [x] 3   [] 3   [] 4     4. Putting on and taking off regular upper body clothing? [] 1   [] 2   [] 2   [] 3   [x] 3    [] 4      5. Taking care of personal grooming such as brushing teeth? [] 1   [] 2    [] 2 [] 3    [x] 3   [] 4      6. Eating meals? [] 1   [] 2   [] 2   [] 3   [] 3   [x] 4      Raw Score:  19     [24=0% impaired(CH), 23=1-19%(CI), 20-22=20-39%(CJ), 15-19=40-59%(CK), 10-14=60-79%(CL), 7-9=80-99%(CM), 6=100%(CN)]     Treatment:  Therapeutic Activity Training:   Therapeutic activity training was instructed today. Cues were given for safety, sequence, UE/LE placement, awareness, and balance. Activities performed today included bed mobility training, sup-sit, sit-stand, ambulation.      Safety Measures: Gait belt used, Left in chair, Alarm in place    Assessment:  Assessment  Performance deficits / Impairments: Decreased functional mobility , Decreased strength, Decreased endurance, Decreased ADL status, Decreased high-level IADLs, Decreased cognition, Decreased balance  Treatment Diagnosis: NSTEMI  Prognosis: Good  Decision Making: Medium Complexity  REQUIRES OT FOLLOW UP: Yes Discharge Recommendations: Subacute/Skilled Nursing Facility    Pt is an 81year old M admitted for NSTEMI. Pt reports that prior to admission he was IND in ADLs, required assist for IADLs, and Pablo for functional mobility. This date, pt demo decreased strength, endurance, activity tolerance, and cognition impacting ADL status. Pt is currently functioning below baseline and would benefit from skilled OT services at SNF with transition to AL or dementia unit d/t decreased cognition and confusion. Plan:  Plan  Times per week: 2+  Times per day: Daily      Goals:  1. Pt will complete all aspects of bed mobility for EOB/OOB ADLs with supervision. 2. Pt will complete UB/LB bathing with CGA and AE as needed. 3. Pt will complete all aspects of LB dressing with CGA and AE as needed. 4. Pt will complete all functional transfers to and from bed, chair, toilet, shower chair with SBA and RW.  5. Pt will ambulate HH distance to bathroom for toileting with SBA and RW. 6. Pt will complete all aspects of toileting task with CGA. 7. Pt will complete oral hygiene/grooming routine in standing at sink with SBA demo good dynamic standing balance for approx 8 minutes. 8. Pt will complete ther ex/ther act with focus on UB strengthening. Time:   Time in: 1042  Time out: 1115  Timed treatment minutes: 23  Total time: 33      Electronically signed by:       SHEMAR Naqvi/L, [place], [place].[id number]

## 2020-12-29 NOTE — FLOWSHEET NOTE
Attempted to give pt po meds and subQ lovenox. Pt refused medication at this time. States \"I don't need to take anything right now. I just want to go to sleep. \" After attempting to explain meds to pt. He continued to refused to take at this time. States \"I might take them tomorrow. \"

## 2020-12-29 NOTE — H&P
HISTORY AND PHYSICAL  (Hospitalist, Internal Medicine)  IDENTIFYING INFORMATION   PATIENT:  Hector Freed  MRN:  [id number]  ADMIT DATE: 12/28/2020      CHIEF COMPLAINT   Confusion    HISTORY OF PRESENT ILLNESS   Hector Freed is a 81 y.o. male with coronary artery disease, peripheral arterial disease status post peripheral angiogram and stent placement-8/2020, COPD not on home oxygen as per EMR, CVA-4/2020 (moderate-sized elevated blood pressure in the right parietal lobe, small acute infarcts within both occipital lobes and left parietal lobe as well as right cerebellar hemisphere), chronic tobacco dependence was brought to ED by EMS after the patient was found to be confused. Patient currently is oriented x1,  Knows his name, date of birth, could not tell where he is, knew that he lives in a apartment complex, was able to recognize his friend by his name. Patient denied any complaints. I called patient's niece/nephew. I talked to his niece who said that the patient is her estranged uncle, she does not know much about him, not involved in his care. I then called patient's nephew and left him a message. Did not receive a call back. I called patient's friend (Mr Emmaline Boast [phone]) who left his number in the ED. Mr Kraig Ordonez has been taking care of Mr Anthony Gordon since the last 3-4 months. He reported that he found the patient wandering in the third floor when he actually resides on the seventh floor of the apartment complex. Patient was disoriented, reported to Kraig Ordonez that some people brought him some food, and told that Mr. Kraig Ordonez was not in the building and will not be available for the next three days. Mr. Kraig Ordonez thinks that the patient's dementia is getting worse. On my examination, Patient was noted to be lying comfortably in bed, currently denied any chest pain, shortness of breath,, fever, chills, cough. I also witnessed that patient got out of the bed in the ER and was able to ambulate without any assistance, steady on his feet. Initial vitals in ED-/90, HR 81, RR 16, temperature 97.8, saturating 97% on room air. Lab work significant for sodium 131, troponin 0 0.225, urine drug screen negative, alcohol less than 0.01, CBC within normal limits. VBG-pH 7.42, PCO2 42, PO2 261, HCO3 27.2. CT head with no acute process. EKG was concerning for ST elevations in the lateral leads. Dr. Genny Baker was consulted-recommended Lovenox 1 mg/kg, loading dose of Plavix 300 mg once, aspirin 325 mg. PAST MEDICAL HISTORY PAST SURGICAL HISTORY   coronary artery disease, peripheral arterial disease status post peripheral angiogram and stent placement-8/2020, COPD not on home oxygen as per EMR, CVA-4/2020, chronic tobacco dependence peripheral angiogram and stent placement, KAYLEY   FAMILY HISTORY SOCIAL HISTORY   Noncontributory as per patient's neighbor-Mr. Reid patient still smokes 4 pack/day, no alcohol or illicit drug use. MEDICATIONS ALLERGIES    As per previous discharge summary-aspirin 81 mg daily, atorvastatin 80 mg nightly, Plavix 75 mg daily, finasteride 5 mg daily, lisinopril 5 mg daily, metoprolol succinate 25 mg daily, tamsulosin 0.4 mg daily. No known drug allergies.        PAST MEDICAL, SURGICAL, FAMILY, and SOCIAL HISTORY         Past Medical History:   Diagnosis Date    COPD (chronic obstructive pulmonary disease) ([address]     Prostatitis      Past Surgical History:   Procedure Laterality Date    FEMORAL ENDARTERECTOMY Right 8/24/2020    FEMORAL ENDARTERECTOMY performed by Katerine Ha MD at [address] [facility] [address]       Family History   Problem Relation Age of Onset    No Known Problems Mother     No Known Problems Father      Family Hx of HTN Family Hx as reviewed above, otherwise non-contributory  Social History     Socioeconomic History    Marital status:      Spouse name: None    Number of children: None    Years of education: None    Highest education level: None   Occupational History    None   Social Needs    Financial resource strain: None    Food insecurity     Worry: None     Inability: None    Transportation needs     Medical: None     Non-medical: None   Tobacco Use    Smoking status: Current Every Day Smoker     Packs/day: 0.50     Types: Cigarettes   Substance and Sexual Activity    Alcohol use: Yes    Drug use: Not Currently    Sexual activity: Not Currently   Lifestyle    Physical activity     Days per week: None     Minutes per session: None    Stress: None   Relationships    Social connections     Talks on phone: None     Gets together: None     Attends Rastafari service: None     Active member of club or organization: None     Attends meetings of clubs or organizations: None     Relationship status: None    Intimate partner violence     Fear of current or ex partner: None     Emotionally abused: None     Physically abused: None     Forced sexual activity: None   Other Topics Concern    None   Social History Narrative    None       MEDICATIONS   Medications Prior to Admission  Not in a hospital admission.     Current Medications  Current Facility-Administered Medications   Medication Dose Route Frequency Provider Last Rate Last Admin    aspirin chewable tablet 324 mg  324 mg Oral Once Hansa Mulligan MD        clopidogrel (PLAVIX) tablet 300 mg  300 mg Oral Once Hansa Mulligan MD        enoxaparin (LOVENOX) injection 50 mg  1 mg/kg Subcutaneous Once Hansa Mulligan MD         Current Outpatient Medications   Medication Sig Dispense Refill    metoprolol succinate (TOPROL XL) 25 MG extended release tablet Take 1 tablet by mouth daily 30 tablet 0    aspirin 81 MG EC tablet Take 1 tablet by mouth daily 30 tablet 3 BUN Latest Ref Range: 6 - 23 MG/DL 20   Creatinine Latest Ref Range: 0.9 - 1.3 MG/DL 0.8 (L)   Anion Gap Latest Ref Range: 4 - 16  9   GFR Non- Latest Ref Range: >60 mL/min/1.73m2 >60   GFR African American Latest Ref Range: >60 mL/min/1.73m2 >60   Glucose Latest Ref Range: 70 - 99 MG/DL 98   Calcium Latest Ref Range: 8.3 - 10.6 MG/DL 8.5   Total Protein Latest Ref Range: 6.4 - 8.2 GM/DL 6.0 (L)   Troponin T Latest Ref Range: <0.01 NG/ML 0.225 (HH)   Albumin Latest Ref Range: 3.4 - 5.0 GM/DL 3.3 (L)   Alk Phos Latest Ref Range: 40 - 129 IU/L 119   ALT Latest Ref Range: 10 - 40 U/L 32   Ammonia Latest Ref Range: 16 - 60 UMOL/L 39   AST Latest Ref Range: 15 - 37 IU/L 34   Bilirubin Latest Ref Range: 0.0 - 1.0 MG/DL 0.6   Alcohol Scrn Latest Ref Range: <0.01 %WT/VOL <0.01   WBC Latest Ref Range: 4.0 - 10.5 K/CU MM 7.6   RBC Latest Ref Range: 4.6 - 6.2 M/CU MM 4.54 (L)   Hemoglobin Quant Latest Ref Range: 13.5 - 18.0 GM/DL 11.7 (L)   Hematocrit Latest Ref Range: 42 - 52 % 39.5 (L)   MCV Latest Ref Range: 78 - 100 FL 87.0   MCH Latest Ref Range: 27 - 31 PG 25.8 (L)   MCHC Latest Ref Range: 32.0 - 36.0 % 29.6 (L)   MPV Latest Ref Range: 7.5 - 11.1 FL 11.7 (H)   RDW Latest Ref Range: 11.7 - 14.9 % 16.6 (H)   Platelet Count Latest Ref Range: 140 - 440 K/CU    Lymphocyte % Latest Ref Range: 24 - 44 % 11.5 (L)   Monocytes % Latest Ref Range: 0 - 4 % 11.1 (H)   Eosinophils % Latest Ref Range: 0 - 3 % 0.7   Basophils % Latest Ref Range: 0 - 1 % 0.5   Lymphocytes Absolute Latest Units: K/CU MM 0.9   Monocytes Absolute Latest Units: K/CU MM 0.8   Eosinophils Absolute Latest Units: K/CU MM 0.1   Basophils Absolute Latest Units: K/CU MM 0.0   Differential Type Unknown AUTOMATED DIFFERENTIAL   Segs Relative Latest Ref Range: 36 - 66 % 75.8 (H)   Segs Absolute Latest Units: K/CU MM 5.7   Nucleated RBC % Latest Units: % 0.0   Immature Neutrophil % Latest Ref Range: 0 - 0.43 % 0.4 Total Immature Neutrophil Latest Units: K/CU MM 0.03   Total Nucleated RBC Latest Units: K/CU MM 0.0     Results for Shaina Paniagua (MRN [id number]) as of 12/29/2020 05:28   Ref. Range 12/28/2020 19:30   Amphetamines Latest Ref Range: NEGATIVE  NEGATIVE   Cocaine Metabolite Latest Ref Range: NEGATIVE  NEGATIVE   Barbiturate Screen, Ur Latest Ref Range: NEGATIVE  NEGATIVE   Benzodiazepine Screen, Urine Latest Ref Range: NEGATIVE  NEGATIVE   Cannabinoid Scrn, Ur Latest Ref Range: NEGATIVE  NEGATIVE   Opiates, Urine Latest Ref Range: NEGATIVE  NEGATIVE   Oxycodone Latest Ref Range: NEGATIVE  NEGATIVE   Phencyclidine, Urine Latest Ref Range: NEGATIVE  NEGATIVE     Recent Imaging    XR CHEST PORTABLE [[id number]] Collected: 12/28/20 1959      Order Status: Completed Updated: 12/28/20 2019     Narrative:       EXAMINATION:   ONE XRAY VIEW OF THE CHEST     12/28/2020 7:33 pm     COMPARISON:   August 13, 2020     HISTORY:   ORDERING SYSTEM PROVIDED HISTORY: altered mental status   TECHNOLOGIST PROVIDED HISTORY:   Reason for exam:->altered mental status   Reason for Exam: ams   Acuity: Unknown   Type of Exam: Unknown   Relevant Medical/Surgical History: copd     FINDINGS:   Redemonstration of hyperinflation with COPD.  Cardiomegaly. Pavel Lapping is   evidence of congestion and mild interstitial edema consistent with early CHF. No evidence of pleural effusion or pneumothorax.  No focal infiltrates are   noted.      Impression:       Congestion and mild interstitial edema suggestive of early CHF.    Redemonstration of cardiomegaly and COPD.      CT HEAD WO CONTRAST [[id number]] Collected: 12/28/20 1645     Order Status: Completed Updated: 12/28/20 1650     Narrative:       EXAMINATION:   CT OF THE HEAD WITHOUT CONTRAST  12/28/2020 4:31 pm     TECHNIQUE:   CT of the head was performed without the administration of intravenous   contrast. Dose modulation, iterative reconstruction, and/or weight based adjustment of the mA/kV was utilized to reduce the radiation dose to as low   as reasonably achievable. COMPARISON:   08/22/2020     HISTORY:   ORDERING SYSTEM PROVIDED HISTORY: altered mental status   TECHNOLOGIST PROVIDED HISTORY:   Reason for exam:->altered mental status   Has a \"code stroke\" or \"stroke alert\" been called? ->No   Reason for Exam: AMS     FINDINGS:   BRAIN/VENTRICLES: There is no acute intracranial hemorrhage, mass effect or   midline shift.  No abnormal extra-axial fluid collection.  The gray-white   differentiation is maintained without evidence of an acute infarct.  There is   no evidence of hydrocephalus. Similar right parietal and left occipital   encephalomalacia.  Moderate chronic microvascular ischemic change. ORBITS: The visualized portion of the orbits demonstrate no acute abnormality. SINUSES: The visualized paranasal sinuses and mastoid air cells demonstrate   no acute abnormality. SOFT TISSUES/SKULL:  No acute abnormality of the visualized skull or soft   tissues.      Impression:       No acute intracranial abnormality.          Relevant labs and imaging reviewed    ASSESSMENT AND PLAN     #.  NSTEMI:  -Troponin 0 0.225, abnormal EKG  -Dr. Kaylee Sandhu consulted from ED-recommended Lovenox 1 mg/kg, loading dose of Plavix 300 mg once, aspirin 325 mg.  -We will repeat troponin, repeat EKG  -Continue aspirin, Plavix, Lovenox  -Continue lisinopril, metoprolol succinate, statin    #. Metabolic encephalopathy:  -Patient is alert, oriented x1-2  -Patient afebrile, no leukocytosis, UA ordered in ER-not done, chest x-ray -suggesting CHF. -Patient had a recent stroke 4/2020  -Patient unable to take care of himself. Consult case management/ for safe discharge. -If patient's mentation does not improve-we will repeat MRI to evaluate for acute CVA. Patient currently does not have any neurological deficit, speech clear, no dysarthria or facial droop noted.

## 2020-12-29 NOTE — CARE COORDINATION
CM performed utilization review on patient documentation. Patient met criteria for inpatient admission through [facility] for [facility]. Per physician documentation: Marcell Benjamin is a 81 y.o. male who presents with altered mental status and confusion. He allegedly lives alone in an apartment on the seventh floor and was found wandering on the second floor without his walker. His apartment door was also found to be wide open. He was confused as to where he was or why he was there on the second floor.     Here in the emergency department, the patient is able to tell me his name and he knows where he is but just not entirely know why he is here. He states that he was having some \"alterations of the brain today\" and has been feeling confused and unsteady on his feet. He denies any focal weakness. Denies pain anywhere.     Denies any other associated symptoms or complaints or concerns.     Paramedics report blood sugar was around 110. \" Patient found to have troponin 0.225, currently pain free.

## 2020-12-29 NOTE — ED NOTES
Pt is refusing all medications and to wear tele monitor.  Pt is confused to situation and verbally combative with staff at this time      Woodfin Halsted, RN  12/28/20 2971

## 2020-12-29 NOTE — CONSULTS
Will dictate full note  Hx of dementia and hx of PAD  He has elevated trop will treat him medically for now  lovenox 1mg/kg once and plavix 300mg once and asa 325  Will decide based on hospital course cath or medical treatment      KAYLEY 4/2020   Summary   Left ventricular systolic function is low normal.   Ejection fraction is visually estimated at 45%. No evidence of thrombus within the left atrial appendage. Negative bubble study; no ASD or PFO noted. Lambl's excrescence noted on the aortic valve. Mild to moderate aortic regurgitation. Slight prolapse of the anterior mitral valve leaflet. Calcified chordae tendineae near the mitral valve leaflet tips. No evidence of any pericardial effusion. DICTATED --[id number]  RIGHT EXTERNAL ILIAC  90% TO 0% BMS-8X57 AND 8X27MM STENT  RCFA-100% CALCIFIED  R SFA-100% BALLOON PTA 5.0 BALLOON  R POPLITEAL 100% -BALLOON PTA 5.0  RIGHT AT PATENT  RIGHT PT PATENT    RCFA- endarterectomy done      RIGHT BRACHIAL APPROACH  RIGHT FOOT APPROACH    LEFT   COMMON PATENT  EXTERNAL 80% STENOSIS  INTERNAL PATENT  CFA PATENT  % OCCLUDED  PROFUNDA PATENT  POP PATENT  AT AND PT PATENT      PAST MEDICAL HISTORY:  History of having COPD, history of recent stroke,  history of prostatitis and urinary retention present.     PAST SURGICAL HISTORY:  Hernia surgery.

## 2020-12-29 NOTE — CONSULTS
Comprehensive Nutrition Assessment    Type and Reason for Visit:  Consult    Nutrition Recommendations/Plan:   Add standard ONS TID   Encourage po intake as able  Please assist with meals as needed  Please document po intake  Will closely monitor po intake, nutrition status, poc    Nutrition Assessment:  Pt admitted with NSTEMI, AMS, PMH: COPD, CAD, Dementia, pt currently on general/2 GM sodium/no caffeine diet, pt consuming 1-25% of 1 meal documented, visited pt at bedside, pt reports UBW ~115#, unsure of recent unintentional wt loss, missing teeth, pt unsure if he had breakfast this morning, pt was agreeable to ONS, NFPE completed, pt is at high nutrition risk    Malnutrition Assessment:  Malnutrition Status:  Severe malnutrition    Context:  Chronic Illness     Findings of the 6 clinical characteristics of malnutrition:  Energy Intake:  Mild decrease in energy intake (Comment)  Weight Loss:  7 - Greater than 7.5% over 3 months     Body Fat Loss:  7 - Severe body fat loss Triceps, Orbital   Muscle Mass Loss:  7 - Severe muscle mass loss Temples (temporalis), Hand (interosseous), Clavicles (pectoralis & deltoids), Calf (gastrocnemius)  Fluid Accumulation:  No significant fluid accumulation     Strength:  Not Performed    Estimated Daily Nutrient Needs:  Energy (kcal):  1243-2825(30-35 kcal/kg); Weight Used for Energy Requirements:  Current     Protein (g):  60-70(1.3-1.5 g/kg); Weight Used for Protein Requirements:  Current        Fluid (ml/day):  1600; Method Used for Fluid Requirements:  1 ml/kcal      Wounds:  None       Current Nutrition Therapies:    DIET GENERAL; Low Sodium (2 GM);  No Caffeine    Anthropometric Measures:  · Height: 5' 5.98\" (167.6 cm)  · Current Body Weight: 101 lb 6.6 oz (46 kg)   · Usual Body Weight: 116 lb 2.9 oz (52.7 kg)((8/13/20) 4/8/20-44.5 kg per chart review)     · Ideal Body Weight: 142 lbs; % Ideal Body Weight 71.4 %   · BMI: 16.4

## 2020-12-29 NOTE — PROGRESS NOTES
Hospitalist Progress Note      Name:  Siva Sullivan /Age/Sex: 1935  (81 y.o. male)   MRN & CSN:  [id number] & [id number] Admission Date/Time: 2020  4:10 PM   Location:  -A PCP: Michelle Moralez MD       Siva Sullivan is a 81 y.o.  male  who presents with Altered Mental Status (confusion and strange behavior since this am, left aparmtent was found on 2nd floor instead of 7th was found wandering the area. )      Assessment and Plan:   NSTEMI  -Troponins positive, abnormal EKG  - cardio consulted, recommended conservative mx  -Continue aspirin, Plavix, Lovenox, lisinopril, metoprolol succinate, statin  - echo pending     Metabolic encephalopathy:  -Patient is alert, oriented x2  - UDS negative  - UA pending  -Patient had a recent stroke 2020  -Patient unable to take care of himself. Consult case management/ for safe discharge. - consider MRI if pt not improved, no focal deficits at this time  - CT head non acute      Chronic:  coronary artery disease, peripheral arterial disease status post peripheral angiogram and stent placement  COPD   CVA   Dementia?   chronic tobacco dependence      DVT Prophylaxis: On Lovenox 1 mg/kg twice daily  GI Prophylaxis: Not indicated  Code Status: As per previous documentation-DNR CCA.                 Diet DIET GENERAL; Low Sodium (2 GM);  No Caffeine   Code Status DNR-CCA     Medications:   Medications:    sodium chloride flush  10 mL Intravenous 2 times per day    aspirin  81 mg Oral Daily    atorvastatin  40 mg Oral Nightly    clopidogrel  75 mg Oral Daily    finasteride  5 mg Oral Daily    lisinopril  5 mg Oral Daily    metoprolol succinate  25 mg Oral Daily    tamsulosin  0.4 mg Oral Daily    enoxaparin  1.5 mg/kg Subcutaneous Daily    ranolazine  500 mg Oral BID      Infusions:   PRN Meds:     sodium chloride flush, 10 mL, PRN      promethazine, 12.5 mg, Q6H PRN    Or      ondansetron, 4 mg, Q6H PRN   acetaminophen, 650 mg, Q6H PRN    Or      acetaminophen, 650 mg, Q6H PRN      polyethylene glycol, 17 g, Daily PRN      Subjective:   Awake and alert, states he feels tired    Objective:   No intake or output data in the 24 hours ending 12/29/20 0951   Vitals:   Vitals:    12/29/20 0500   BP: (!) 148/79   Pulse: 67   Resp: 28   Temp:    SpO2:      Physical Exam:   Gen:  awake, alert, no apparent distress  Head/Eyes:  Normocephalic atraumatic, EOMI   NECK:   symmetrical, trachea midline  LUNGS: Normal Effort   CARDIOVASCULAR:  Normal rate  ABDOMEN:  non distended  MUSCULOSKELETAL:  ROM limited  NEUROLOGIC: Alert and Oriented,  Cranial nerves II-XII are grossly intact.    SKIN:  no bruising or bleeding, normal skin color,  no redness      Data:       CBC   Recent Labs     12/28/20 1812 12/28/20 1928 12/29/20  0350   WBC WRONG PATIENT 7.6 6.6   HGB WRONG PATIENT 11.7* 11.7*   HCT WRONG PATIENT 39.5* 37.6*   PLT WRONG PATIENT 191 177      BMP   Recent Labs     12/28/20  1812 12/28/20 1928 12/29/20  0350   NA WRONG PATIENT 131* 136   K WRONG PATIENT 4.0 4.1   CL WRONG PATIENT 97* 102   CO2 WRONG PATIENT 25 24   BUN WRONG PATIENT 20 23   CREATININE WRONG PATIENT 0.8* 0.8*         Electronically signed by Nata Clifford MD on 12/29/2020 at 9:51 AM

## 2020-12-29 NOTE — CONSULTS
[address], [facility]                                  CONSULTATION    PATIENT NAME: Nahed Mcneal                    :        1935  MED REC NO:   [id number]                          ROOM:       2106  ACCOUNT NO:   [de-identified]                           ADMIT DATE: 2020  PROVIDER:     Tammy Holley MD    CONSULT DATE:  2020    INDICATION:  Elevated troponin. HISTORY OF PRESENT ILLNESS:  This is an 52-year-old male patient who  came to the hospital with generalized weakness kind of complaints  present and he was found to have elevated troponin present. The patient  denies any chest pain. He has had metastatic changes present. The  patient has confusion and strange behavior present.  _____ found on the  second floor just wandering around. The patient does not have a family  present. The patient is awake, alert and answering questions. The  patient denies any chest pain, no shortness of breath present. He  appears comfortable. The patient has history of peripheral vascular disease present. The  patient had KAYLEY done back in 2020 for stroke, EF was around 40% range  present. The patient had peripheral angiogram done, found to have  bilateral iliac disease present and underwent right external iliac stent  placement and right common femoral artery was occluded, at that time the  patient underwent surgery and had right common femoral artery  endarterectomy performed. Right SFA and popliteal arteries were also  ballooned at that time. Left external artery had 80% stenosis, which  was treated medically and SFA was occluded. PAST MEDICAL HISTORY:  Peripheral vascular disease status post right  iliac stent placement, history of COPD, hypertension, hyperlipidemia,  stroke, prostatitis, urinary retention present and history of peripheral  vascular disease was also present. PAST SURGICAL HISTORY:  Peripheral stent placement and hernia repair. SOCIAL HISTORY:  Possibly he is a smoker. MEDICATIONS AT HOME:  I am not sure what he is taking. He is on  aspirin, Lipitor, Plavix, _____, lisinopril, beta blockers. PHYSICAL EXAMINATION:  GENERAL:  The patient is awake, alert and answering questions. VITAL SIGNS:  Temperature is afebrile, pulse is 67, blood pressure  147/60. HEENT:  Head is normocephalic and atraumatic. Pupils are equal and  reactive. CHEST:  Equal expansion. LUNGS:  Clear to auscultation. No wheezing or rhonchi. HEART:  Regular rhythm. ABDOMEN:  Soft and nontender. Bowel sounds are present. No  hepatosplenomegaly or guarding appreciated. EXTREMITIES:  No cyanosis or clubbing noted. NEUROLOGIC:  Cranial nerves II through XII are grossly intact. EKG shows sinus rhythm with no acute ST elevation present. LABORATORY DATA:  His BUN is 23, creatinine 0.8. Troponins are elevated  at 0.369. LFTs are normal and CBC is within normal range. Platelet  count is normal.  PT/PTT is therapeutic. Chest x-ray has no acute process noted, heart failure present. CT of  the head has no acute process noted. IMPRESSION:  This is an 81-year-old male patient, he comes to the  hospital with having confusion present, found to have non-STEMI present. He was known to have peripheral vascular disease present, possibly he  also has coronary artery disease present. Because of dementia and he is  not taking his medications I would just at this time treat him more  medically, no invasive process present. It does not appear that he has  any immediate family present. From the notes, the patient's niece and  nephew, the patient is estranged uncle, she does not know much about him  and anyone who took care of him and I think he has a friend, his name is  Glendy Simons, [phone]. I think he is a caregiver or helper also.   Overall prognosis is guarded. We will do conservative treatment.         Katherine Mitchell MD    D: 12/29/2020 7:40:33       T: 12/29/2020 10:10:30     [email or web address]  Job#: [id number]     Doc#: [id number]    CC:

## 2020-12-30 LAB
ANION GAP SERPL CALCULATED.3IONS-SCNC: 7 MMOL/L (ref 4–16)
BUN BLDV-MCNC: 25 MG/DL (ref 6–23)
CALCIUM SERPL-MCNC: 8.4 MG/DL (ref 8.3–10.6)
CHLORIDE BLD-SCNC: 97 MMOL/L (ref 99–110)
CO2: 28 MMOL/L (ref 21–32)
CREAT SERPL-MCNC: 1 MG/DL (ref 0.9–1.3)
GFR AFRICAN AMERICAN: >60 ML/MIN/1.73M2
GFR NON-AFRICAN AMERICAN: >60 ML/MIN/1.73M2
GLUCOSE BLD-MCNC: 99 MG/DL (ref 70–99)
HCT VFR BLD CALC: 39.3 % (ref 42–52)
HEMOGLOBIN: 12 GM/DL (ref 13.5–18)
MCH RBC QN AUTO: 25.5 PG (ref 27–31)
MCHC RBC AUTO-ENTMCNC: 30.5 % (ref 32–36)
MCV RBC AUTO: 83.4 FL (ref 78–100)
PDW BLD-RTO: 16.6 % (ref 11.7–14.9)
PLATELET # BLD: 198 K/CU MM (ref 140–440)
PMV BLD AUTO: 10.9 FL (ref 7.5–11.1)
POTASSIUM SERPL-SCNC: 4 MMOL/L (ref 3.5–5.1)
PRO-BNP: 3027 PG/ML
RBC # BLD: 4.71 M/CU MM (ref 4.6–6.2)
SODIUM BLD-SCNC: 132 MMOL/L (ref 135–145)
TROPONIN T: 0.33 NG/ML
WBC # BLD: 6.8 K/CU MM (ref 4–10.5)

## 2020-12-30 PROCEDURE — 6360000002 HC RX W HCPCS: Performed by: INTERNAL MEDICINE

## 2020-12-30 PROCEDURE — 6370000000 HC RX 637 (ALT 250 FOR IP): Performed by: INTERNAL MEDICINE

## 2020-12-30 PROCEDURE — 80048 BASIC METABOLIC PNL TOTAL CA: CPT

## 2020-12-30 PROCEDURE — 2580000003 HC RX 258: Performed by: INTERNAL MEDICINE

## 2020-12-30 PROCEDURE — 2060000000 HC ICU INTERMEDIATE R&B

## 2020-12-30 PROCEDURE — 84484 ASSAY OF TROPONIN QUANT: CPT

## 2020-12-30 PROCEDURE — 85027 COMPLETE CBC AUTOMATED: CPT

## 2020-12-30 PROCEDURE — 83880 ASSAY OF NATRIURETIC PEPTIDE: CPT

## 2020-12-30 RX ORDER — FUROSEMIDE 40 MG/1
40 TABLET ORAL DAILY
Status: DISCONTINUED | OUTPATIENT
Start: 2020-12-30 | End: 2020-12-31 | Stop reason: HOSPADM

## 2020-12-30 RX ADMIN — TAMSULOSIN HYDROCHLORIDE 0.4 MG: 0.4 CAPSULE ORAL at 09:32

## 2020-12-30 RX ADMIN — ACETAMINOPHEN 650 MG: 325 TABLET ORAL at 22:02

## 2020-12-30 RX ADMIN — RANOLAZINE 500 MG: 500 TABLET, FILM COATED, EXTENDED RELEASE ORAL at 20:24

## 2020-12-30 RX ADMIN — CLOPIDOGREL BISULFATE 75 MG: 75 TABLET ORAL at 09:32

## 2020-12-30 RX ADMIN — SODIUM CHLORIDE, PRESERVATIVE FREE 10 ML: 5 INJECTION INTRAVENOUS at 09:33

## 2020-12-30 RX ADMIN — ENOXAPARIN SODIUM 70 MG: 80 INJECTION SUBCUTANEOUS at 09:32

## 2020-12-30 RX ADMIN — RANOLAZINE 500 MG: 500 TABLET, FILM COATED, EXTENDED RELEASE ORAL at 09:32

## 2020-12-30 RX ADMIN — ASPIRIN 81 MG CHEWABLE TABLET 81 MG: 81 TABLET CHEWABLE at 09:32

## 2020-12-30 RX ADMIN — SODIUM CHLORIDE, PRESERVATIVE FREE 10 ML: 5 INJECTION INTRAVENOUS at 20:24

## 2020-12-30 RX ADMIN — LISINOPRIL 10 MG: 10 TABLET ORAL at 09:32

## 2020-12-30 RX ADMIN — FUROSEMIDE 40 MG: 40 TABLET ORAL at 11:23

## 2020-12-30 RX ADMIN — METOPROLOL SUCCINATE 25 MG: 25 TABLET, EXTENDED RELEASE ORAL at 09:32

## 2020-12-30 RX ADMIN — ATORVASTATIN CALCIUM 40 MG: 40 TABLET, FILM COATED ORAL at 20:24

## 2020-12-30 RX ADMIN — FINASTERIDE 5 MG: 5 TABLET, FILM COATED ORAL at 09:32

## 2020-12-30 ASSESSMENT — PAIN SCALES - GENERAL
PAINLEVEL_OUTOF10: 0
PAINLEVEL_OUTOF10: 4
PAINLEVEL_OUTOF10: 0

## 2020-12-30 ASSESSMENT — PAIN DESCRIPTION - PROGRESSION
CLINICAL_PROGRESSION: GRADUALLY IMPROVING

## 2020-12-30 NOTE — PROGRESS NOTES
Pt pleasantly confused. Pt stated \"I dont want to live; im not contributing anything to society; I have a daughter but we dont speak so I have no one; I know that im not seeing reality, what I see isnt real (speaking about hallucinations); I dont know what im doing most of the time; I lost half my money and I dont know where it went so now I cant pay my bills; I dont know what to do; I want to die; I dont understand why im still here; why are you keeping me alive\"    Had a thorough conversation with pt and provided emotional support. Education and support provided. Pt unable to care for himself and is not in the right mind to be able to live on his own. Failure to thrive. Need /adult protective services involved. Order placed. Will continue to monitor pt.

## 2020-12-30 NOTE — CARE COORDINATION
Received VM from [facility]. Faxed requested materials. Carlota Russell RN    [phone]  Received call from Monica Alvarez at [facility]; starting precert today. Stated they could take patient when precert received. PATIENT NEEDS COVID TEST PRIOR TO DISCHARGE.  Carlota Russell RN

## 2020-12-30 NOTE — PROGRESS NOTES
Hospitalist Progress Note      Name:  Torri Gonzalez /Age/Sex: 1935  (81 y.o. male)   MRN & CSN:  [id number] & [id number] Admission Date/Time: 2020  4:10 PM   Location:  -A PCP: Rich Stanley MD       Torri Gonzalez is a 81 y.o.  male  who presents with Altered Mental Status (confusion and strange behavior since this am, left aparmtent was found on 2nd floor instead of 7th was found wandering the area. )      Assessment and Plan:   NSTEMI  -Troponins positive, abnormal EKG  - cardio consulted, recommended conservative mx  -Continue aspirin, Plavix, Lovenox, lisinopril, metoprolol succinate, statin  - echo: ef 30%, grade 1 DD     Metabolic encephalopathy:  -Patient is alert and oriented now  - UDS negative  - UA no signs of infection  -Patient had a recent stroke 2020  -Patient unable to take care of himself.  Consult case management/ for safe discharge, placement pending  - CT head non acute        Chronic:  coronary artery disease, peripheral arterial disease status post peripheral angiogram and stent placement  COPD   CVA   Dementia?   chronic tobacco dependence      DVT Prophylaxis: On Lovenox 1 mg/kg twice daily  GI Prophylaxis: Not indicated  Code Status: As per previous documentation-DNR CCA.                 Diet DIET GENERAL; Low Sodium (2 GM);  No Caffeine  Dietary Nutrition Supplements: Standard High Calorie Oral Supplement   Code Status DNR-CCA     Medications:   Medications:    sodium chloride flush  10 mL Intravenous 2 times per day    aspirin  81 mg Oral Daily    atorvastatin  40 mg Oral Nightly    clopidogrel  75 mg Oral Daily    finasteride  5 mg Oral Daily    metoprolol succinate  25 mg Oral Daily    tamsulosin  0.4 mg Oral Daily    enoxaparin  1.5 mg/kg Subcutaneous Daily    ranolazine  500 mg Oral BID    lisinopril  10 mg Oral Daily      Infusions:   PRN Meds:     sodium chloride flush, 10 mL, PRN      promethazine, 12.5 mg, Q6H PRN    Or   ondansetron, 4 mg, Q6H PRN      acetaminophen, 650 mg, Q6H PRN    Or      acetaminophen, 650 mg, Q6H PRN      polyethylene glycol, 17 g, Daily PRN      Subjective:   No CP or distress    Objective: Intake/Output Summary (Last 24 hours) at 12/30/2020 0926  Last data filed at 12/29/2020 2113  Gross per 24 hour   Intake 690 ml   Output 170 ml   Net 520 ml      Vitals:   Vitals:    12/30/20 0700   BP: (!) 146/71   Pulse: 62   Resp: 19   Temp:    SpO2:      Physical Exam:   Gen:  awake, alert, no apparent distress  Head/Eyes:  Normocephalic atraumatic, EOMI   NECK:   symmetrical, trachea midline  LUNGS: Normal Effort   CARDIOVASCULAR:  Normal rate  ABDOMEN:  non distended  MUSCULOSKELETAL:  ROM WNL  NEUROLOGIC: Alert and Oriented,  Cranial nerves II-XII are grossly intact.    SKIN:  no bruising or bleeding, normal skin color,  no redness      Data:       CBC   Recent Labs     12/28/20 1928 12/29/20  0350 12/30/20  0830   WBC 7.6 6.6 6.8   HGB 11.7* 11.7* 12.0*   HCT 39.5* 37.6* 39.3*    177 198      BMP   Recent Labs     12/28/20 1928 12/29/20  0350 12/30/20  0830   * 136 132*   K 4.0 4.1 4.0   CL 97* 102 97*   CO2 25 24 28   BUN 20 23 25*   CREATININE 0.8* 0.8* 1.0         Electronically signed by Jolly Akers MD on 12/30/2020 at 9:26 AM

## 2020-12-30 NOTE — PLAN OF CARE
Problem: Falls - Risk of:  Goal: Will remain free from falls  Description: Will remain free from falls  Outcome: Ongoing  Goal: Absence of physical injury  Description: Absence of physical injury  Outcome: Ongoing     Problem: Skin Integrity:  Goal: Will show no infection signs and symptoms  Description: Will show no infection signs and symptoms  Outcome: Ongoing  Goal: Absence of new skin breakdown  Description: Absence of new skin breakdown  Outcome: Ongoing     Problem:  Activity:  Goal: Ability to tolerate increased activity will improve  Description: Ability to tolerate increased activity will improve  Outcome: Ongoing     Problem: Cardiac:  Goal: Ability to achieve and maintain adequate cardiopulmonary perfusion will improve  Description: Ability to achieve and maintain adequate cardiopulmonary perfusion will improve  Outcome: Ongoing  Goal: Hemodynamic stability will improve  Description: Hemodynamic stability will improve  Outcome: Ongoing  Goal: Will show no evidence of cardiac arrhythmias  Description: Will show no evidence of cardiac arrhythmias  Outcome: Ongoing  Goal: Satisfaction with pain management regimen will improve  Description: Satisfaction with pain management regimen will improve  Outcome: Ongoing  Goal: Diagnostic test results will improve  Description: Diagnostic test results will improve  Outcome: Ongoing  Goal: Complications related to the disease process, condition or treatment will be avoided or minimized  Description: Complications related to the disease process, condition or treatment will be avoided or minimized  Outcome: Ongoing     Problem: Coping:  Goal: Level of anxiety will decrease  Description: Level of anxiety will decrease  Outcome: Ongoing  Goal: Ability to cope will improve  Description: Ability to cope will improve  Outcome: Ongoing  Goal: Understanding of sexual limitations or changes related to disease process or condition will improve Description: Understanding of sexual limitations or changes related to disease process or condition will improve  Outcome: Ongoing     Problem: Fluid Volume:  Goal: Risk for excess fluid volume will decrease  Description: Risk for excess fluid volume will decrease  Outcome: Ongoing     Problem: Health Behavior:  Goal: Identification of resources available to assist in meeting health care needs will improve  Description: Identification of resources available to assist in meeting health care needs will improve  Outcome: Ongoing     Problem: Nutritional:  Goal: Ability to identify appropriate dietary choices will improve  Description: Ability to identify appropriate dietary choices will improve  Outcome: Ongoing     Problem: Respiratory:  Goal: Levels of oxygenation will improve  Description: Levels of oxygenation will improve  Outcome: Ongoing     Problem: Safety:  Goal: Ability to remain free from injury will improve  Description: Ability to remain free from injury will improve  Outcome: Ongoing     Problem: ABCDS Injury Assessment  Goal: Absence of physical injury  Outcome: Ongoing

## 2020-12-30 NOTE — PROGRESS NOTES
Daily Progress Note    Awake alert doing ok  Heart rate and BP stable  He does not want cath  He has some confusion  NSTEMI-will treat medically for now  Hx of PAD  Hx of HfREf max medical treatment  Ok to move to 3N and increase activity   Conservative treatment       Patient awake and alert  Hx PVD s/p stent placement  NSTEMI - conservative treatment   Repeat troponin pending   Continue DAPT, statin, lisinopril, metoprolol   Echo 12/29/20 shows EF 30%, grade 1 DD      Echo 12/29/2020  Summary   Left ventricular systolic function is abnormal.   Ejection fraction is visually estimated at 30%. Septal hypokinesis and lateral akinesis. Grade I diastolic dysfunction. Severely dilated left atrium. Mild to moderate aortic regurgitation; PHT: 537 msec. No evidence of any pericardial effusion.    MAC noted       Objective:   BP (!) 146/71   Pulse 62   Temp 97.6 °F (36.4 °C) (Oral)   Resp 19   Ht 5' 5.98\" (1.676 m)   Wt 101 lb 8 oz (46 kg)   SpO2 96%   BMI 16.39 kg/m²       Intake/Output Summary (Last 24 hours) at 12/30/2020 0932  Last data filed at 12/29/2020 2113  Gross per 24 hour   Intake 690 ml   Output 170 ml   Net 520 ml       Medications:   Scheduled Meds:   sodium chloride flush  10 mL Intravenous 2 times per day    aspirin  81 mg Oral Daily    atorvastatin  40 mg Oral Nightly    clopidogrel  75 mg Oral Daily    finasteride  5 mg Oral Daily    metoprolol succinate  25 mg Oral Daily    tamsulosin  0.4 mg Oral Daily    enoxaparin  1.5 mg/kg Subcutaneous Daily    ranolazine  500 mg Oral BID    lisinopril  10 mg Oral Daily      Infusions:     PRN Meds:  sodium chloride flush, promethazine **OR** ondansetron, acetaminophen **OR** acetaminophen, polyethylene glycol       Physical Exam:  Vitals:    12/30/20 0700   BP: (!) 146/71   Pulse: 62   Resp: 19   Temp:    SpO2:         General: Alert, oriented, NAD  Chest: Nontender  Cardiac: regular rate and rhythm, s1, s2 auscultated Lungs:Clear to auscultation and percussion. Abdomen: Soft, NT, ND, +BS  Extremities: no cyanosis or edema noted   Vascular:  Equal 2+ peripheral pulses. Lab Data:  CBC:   Recent Labs     12/28/20 1928 12/29/20 0350 12/30/20  0830   WBC 7.6 6.6 6.8   HGB 11.7* 11.7* 12.0*   HCT 39.5* 37.6* 39.3*   MCV 87.0 83.4 83.4    177 198     BMP:   Recent Labs     12/28/20 1928 12/29/20  0350 12/30/20  0830   * 136 132*   K 4.0 4.1 4.0   CL 97* 102 97*   CO2 25 24 28   BUN 20 23 25*   CREATININE 0.8* 0.8* 1.0     LIVER PROFILE:   Recent Labs     12/28/20 1812 12/28/20 1928   AST WRONG PATIENT 34   ALT WRONG PATIENT 32   BILITOT WRONG PATIENT 0.6   ALKPHOS WRONG PATIENT 119     PT/INR:   Recent Labs     12/28/20 1948   PROTIME 14.0   INR 1.16     APTT:   Recent Labs     12/28/20 1948   APTT 34.7     BNP:  No results for input(s): BNP in the last 72 hours.       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

## 2020-12-31 VITALS
OXYGEN SATURATION: 94 % | SYSTOLIC BLOOD PRESSURE: 117 MMHG | HEIGHT: 66 IN | TEMPERATURE: 97.5 F | WEIGHT: 101.5 LBS | BODY MASS INDEX: 16.31 KG/M2 | DIASTOLIC BLOOD PRESSURE: 88 MMHG | HEART RATE: 72 BPM | RESPIRATION RATE: 21 BRPM

## 2020-12-31 LAB
ANION GAP SERPL CALCULATED.3IONS-SCNC: 8 MMOL/L (ref 4–16)
BASE EXCESS MIXED: 27.2 (ref 0–1.2)
BUN BLDV-MCNC: 29 MG/DL (ref 6–23)
CALCIUM SERPL-MCNC: 8.1 MG/DL (ref 8.3–10.6)
CARBON MONOXIDE, BLOOD: 2.5 % (ref 0–5)
CHLORIDE BLD-SCNC: 98 MMOL/L (ref 99–110)
CO2 CONTENT: 54.7 MMOL/L (ref 19–24)
CO2: 29 MMOL/L (ref 21–32)
COMMENT: ABNORMAL
CREAT SERPL-MCNC: 1.1 MG/DL (ref 0.9–1.3)
GFR AFRICAN AMERICAN: >60 ML/MIN/1.73M2
GFR NON-AFRICAN AMERICAN: >60 ML/MIN/1.73M2
GLUCOSE BLD-MCNC: 111 MG/DL (ref 70–99)
HCO3 ARTERIAL: 53 MMOL/L (ref 18–23)
HCT VFR BLD CALC: 38.8 % (ref 42–52)
HEMOGLOBIN: 12.2 GM/DL (ref 13.5–18)
MCH RBC QN AUTO: 26.1 PG (ref 27–31)
MCHC RBC AUTO-ENTMCNC: 31.4 % (ref 32–36)
MCV RBC AUTO: 82.9 FL (ref 78–100)
METHEMOGLOBIN ARTERIAL: 1 %
O2 SATURATION: 94.3 % (ref 96–97)
PCO2 ARTERIAL: 54 MMHG (ref 32–45)
PDW BLD-RTO: 16.8 % (ref 11.7–14.9)
PH BLOOD: 7.6 (ref 7.34–7.45)
PLATELET # BLD: 207 K/CU MM (ref 140–440)
PMV BLD AUTO: 11 FL (ref 7.5–11.1)
PO2 ARTERIAL: 76 MMHG (ref 75–100)
POTASSIUM SERPL-SCNC: 4 MMOL/L (ref 3.5–5.1)
PRO-BNP: 2347 PG/ML
RBC # BLD: 4.68 M/CU MM (ref 4.6–6.2)
SARS-COV-2, NAAT: NOT DETECTED
SODIUM BLD-SCNC: 135 MMOL/L (ref 135–145)
SOURCE: NORMAL
TROPONIN T: 0.4 NG/ML
WBC # BLD: 6.8 K/CU MM (ref 4–10.5)

## 2020-12-31 PROCEDURE — 2580000003 HC RX 258: Performed by: INTERNAL MEDICINE

## 2020-12-31 PROCEDURE — 80048 BASIC METABOLIC PNL TOTAL CA: CPT

## 2020-12-31 PROCEDURE — 6370000000 HC RX 637 (ALT 250 FOR IP): Performed by: NURSE PRACTITIONER

## 2020-12-31 PROCEDURE — 6370000000 HC RX 637 (ALT 250 FOR IP): Performed by: INTERNAL MEDICINE

## 2020-12-31 PROCEDURE — 84484 ASSAY OF TROPONIN QUANT: CPT

## 2020-12-31 PROCEDURE — 83880 ASSAY OF NATRIURETIC PEPTIDE: CPT

## 2020-12-31 PROCEDURE — 85027 COMPLETE CBC AUTOMATED: CPT

## 2020-12-31 PROCEDURE — 6360000002 HC RX W HCPCS: Performed by: INTERNAL MEDICINE

## 2020-12-31 PROCEDURE — 82803 BLOOD GASES ANY COMBINATION: CPT

## 2020-12-31 PROCEDURE — U0002 COVID-19 LAB TEST NON-CDC: HCPCS

## 2020-12-31 RX ORDER — RANOLAZINE 500 MG/1
500 TABLET, EXTENDED RELEASE ORAL 2 TIMES DAILY
Qty: 60 TABLET | Refills: 3 | DISCHARGE
Start: 2020-12-31

## 2020-12-31 RX ORDER — LANOLIN ALCOHOL/MO/W.PET/CERES
3 CREAM (GRAM) TOPICAL NIGHTLY PRN
Status: DISCONTINUED | OUTPATIENT
Start: 2020-12-31 | End: 2020-12-31 | Stop reason: HOSPADM

## 2020-12-31 RX ORDER — FUROSEMIDE 40 MG/1
40 TABLET ORAL DAILY
Qty: 60 TABLET | Refills: 3 | DISCHARGE
Start: 2021-01-01

## 2020-12-31 RX ADMIN — CLOPIDOGREL BISULFATE 75 MG: 75 TABLET ORAL at 08:08

## 2020-12-31 RX ADMIN — METOPROLOL SUCCINATE 25 MG: 25 TABLET, EXTENDED RELEASE ORAL at 08:08

## 2020-12-31 RX ADMIN — LISINOPRIL 10 MG: 10 TABLET ORAL at 08:09

## 2020-12-31 RX ADMIN — TAMSULOSIN HYDROCHLORIDE 0.4 MG: 0.4 CAPSULE ORAL at 08:08

## 2020-12-31 RX ADMIN — RANOLAZINE 500 MG: 500 TABLET, FILM COATED, EXTENDED RELEASE ORAL at 08:08

## 2020-12-31 RX ADMIN — Medication 3 MG: at 00:33

## 2020-12-31 RX ADMIN — FINASTERIDE 5 MG: 5 TABLET, FILM COATED ORAL at 08:08

## 2020-12-31 RX ADMIN — ASPIRIN 81 MG CHEWABLE TABLET 81 MG: 81 TABLET CHEWABLE at 08:08

## 2020-12-31 RX ADMIN — SODIUM CHLORIDE, PRESERVATIVE FREE 10 ML: 5 INJECTION INTRAVENOUS at 08:10

## 2020-12-31 RX ADMIN — ENOXAPARIN SODIUM 70 MG: 80 INJECTION SUBCUTANEOUS at 08:09

## 2020-12-31 RX ADMIN — FUROSEMIDE 40 MG: 40 TABLET ORAL at 08:09

## 2020-12-31 NOTE — CARE COORDINATION
IF PATIENT'S PRECERT IS RECEIVED-   PLEASE CALL REPORT AND [facility] AT   Phone: [phone]         Fax: [phone]        CALL NEXT OF KIN    CALL [facility] ([phone]) OR [employer] ([phone]) FOR TRANSPORT    PAS RR COMPLETED. Sky Verdin VV    5314 Spoke to [employer] at [facility]; precert received. Spoke to [employer]; she will complete MAIA and call report to [facility].  Rapid Covid will be obtained Sky Verdin RN

## 2020-12-31 NOTE — DISCHARGE INSTR - COC
Continuity of Care Form    Patient Name: Sarah Jama   :  1935  MRN:  [id number]    Admit date:  2020  Discharge date:  ***    Code Status Order: DNR-CCA   Advance Directives:      Admitting Physician:  Benjamin Cee MD  PCP: Irwin Zayas MD    Discharging Nurse: Shahid Kristen Unit/Room#: 2106/2106-A  Discharging Unit Phone Number: [phone]    Emergency Contact:   Extended Emergency Contact Information  Primary Emergency Contact: [facility]  Home Phone: [phone]  Mobile Phone: [phone]  Relation: Niece/Nephew  Secondary Emergency Contact: [address], [place] Phone: [phone]  Mobile Phone: [phone]  Relation: Niece/Nephew  Preferred language: English   needed? No    Past Surgical History:  Past Surgical History:   Procedure Laterality Date    FEMORAL ENDARTERECTOMY Right 2020    FEMORAL ENDARTERECTOMY performed by Vance Baumann MD at [address] [place] [address] ([facility])         Immunization History: There is no immunization history on file for this patient.     Active Problems:  Patient Active Problem List   Diagnosis Code    Dizziness R42    Acute cerebrovascular accident (CVA) ([facility] I63.9    Hemiparesis of left nondominant side as late effect of cerebral infarction ([address] I69.354    Dysphagia due to recent stroke I69.391    Dysarthria due to acute stroke ([address] I63.9, R47.1    Gait disturbance R26.9    Simple chronic bronchitis (HCC) J41.0    Urinary tract infection associated with indwelling urethral catheter (HCC) T83.511A, N39.0    Acute cystitis without hematuria N30.00    Femoral artery occlusion, 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    Acute encephalopathy G93.40    Severe malnutrition ([address] E43    NSTEMI (non-ST elevated myocardial infarction) ([facility] I21.4       Isolation/Infection:   Isolation            No Isolation          Patient Infection Status Infection Onset Added Last Indicated Last Indicated By Review Planned Expiration Resolved Resolved By    None active    Resolved    COVID-19 Rule Out 08/25/20 08/25/20 08/25/20 COVID-19 (Ordered)   08/25/20 Rule-Out Test Resulted    COVID-19 Rule Out 08/17/20 08/17/20 08/17/20 Covid-19 Ambulatory (Ordered)   08/18/20 Rule-Out Test Resulted            Nurse Assessment:  Last Vital Signs: BP (!) 120/92   Pulse 88   Temp 97.4 °F (36.3 °C) (Oral)   Resp 19   Ht 5' 5.98\" (1.676 m)   Wt 101 lb 8 oz (46 kg)   SpO2 94%   BMI 16.39 kg/m²     Last documented pain score (0-10 scale): Pain Level: 0  Last Weight:   Wt Readings from Last 1 Encounters:   12/29/20 101 lb 8 oz (46 kg)     Mental Status:  disoriented    IV Access:  - None    Nursing Mobility/ADLs:  Walking   Assisted  Transfer  Assisted  Bathing  Assisted  Dressing  Assisted  Toileting  Assisted  Feeding  Assisted  Med Admin  Assisted  Med Delivery   whole    Wound Care Documentation and Therapy:  Wound 04/20/20 Coccyx Proximal friction shear red blanchable (Active)   Number of days: 255       Wound 08/17/20 Arm Lower;Right;Posterior dime size skin tare (Active)   Number of days: 136       Wound 08/23/20 Toe (Comment  which one) Right great toe  (Active)   Number of days: 130       Wound 08/25/20 Toe (Comment  which one) Right 4th toe (Active)   Number of days: 128       Wound 08/25/20 Toe (Comment  which one) Anterior;Right 5th toe (Active)   Number of days: 128        Elimination:  Continence:   · Bowel: No  · Bladder: No  Urinary Catheter: None   Colostomy/Ileostomy/Ileal Conduit: No       Date of Last BM: 12/31/20    Intake/Output Summary (Last 24 hours) at 12/31/2020 1310  Last data filed at 12/31/2020 0616  Gross per 24 hour   Intake 540 ml   Output 800 ml   Net -260 ml     I/O last 3 completed shifts: In: 1030 [P.O.:1020;  I.V.:10]  Out: 950 [Urine:950]    Safety Concerns:     Sundowners [place]    Impairments/Disabilities:      Dementia Nutrition Therapy:  C  Patient's personal belongings (please select all that are sent with patient):  Glasses, pants, shoes, vest-coat shirt, undergarment    RN SIGNATURE:  Electronically signed by Jose Perez RN on 12/31/20 at 3:04 PM EST    CASE MANAGEMENT/SOCIAL WORK SECTION    Inpatient Status Date: ***    Readmission Risk Assessment Score:  Readmission Risk              Risk of Unplanned Readmission:        25           Discharging to Facility/ Agency   · Name: [address] [place] [address]  · Address:  · Phone:[phone]  · Fax:[phone]      / signature: Electronically signed by Abdi Cruz RN on 12/31/20 at 1:12 PM EST    PHYSICIAN SECTION    Current Nutrition Therapy:   - Oral Diet:  Cardiac    Routes of Feeding: Oral  Liquids: No Restrictions  Daily Fluid Restriction: no  Last Modified Barium Swallow with Video (Video Swallowing Test): not done    Treatments at the Time of Hospital Discharge:   Respiratory Treatments: none  Oxygen Therapy:  is not on home oxygen therapy. Ventilator:    - No ventilator support    Rehab Therapies: Physical Therapy and Occupational Therapy  Weight Bearing Status/Restrictions: No weight bearing restirctions      Prognosis: Good    Condition at Discharge: Stable    Rehab Potential (if transferring to Rehab): Good    Recommended Labs or Other Treatments After Discharge: pt/ot    Physician Certification: I certify the above information and transfer of Harry Kaur  is necessary for the continuing treatment of the diagnosis listed and that he requires [facility] for greater 30 days.      Update Admission H&P: No change in H&P    PHYSICIAN SIGNATURE:  Electronically signed by Aleksey Davila MD on 12/31/20 at 1:24 PM EST

## 2020-12-31 NOTE — DISCHARGE SUMMARY
Transthoracic Echocardiography Report (TTE)  Demographics   Patient Name       Jihan Tinsley   Date of Study       12/29/2020   Date of Birth      1935        Gender              Male   Age                81 year(s)        Race                   Patient Number     [id number]        Room Number         2106   Visit Number       [id number]   Corporate ID       [id number]   Accession Number   [id number]        Abby Cristobal                                                           [facility]   Ordering Physician Ashlyn Hadley MD  Interpreting        Ashlyn Hadley MD                                       Physician  Procedure Type of Study   TTE procedure:ECHOCARDIOGRAM COMPLETE 2D W DOPPLER W COLOR. Procedure Date Date: 12/29/2020 Start: 08:00 AM Study Location: Portable Technical Quality: Adequate visualization Indications:Acute coronary syndrome. Patient Status: Routine Height: 66 inches Weight: 101 pounds BSA: 1.5 m2 BMI: 16.3 kg/m2 HR: 72 bpm BP: 152/70 mmHg  Conclusions   Summary  Left ventricular systolic function is abnormal.  Ejection fraction is visually estimated at 30%. Septal hypokinesis and lateral akinesis. Grade I diastolic dysfunction. Severely dilated left atrium. Mild to moderate aortic regurgitation; PHT: 537 msec. No evidence of any pericardial effusion. MAC noted   Signature   ------------------------------------------------------------------  Electronically signed by Ashlyn Hadley MD (Interpreting  physician) on 12/29/2020 at 10:43 AM  ------------------------------------------------------------------   Findings   Left Ventricle  Left ventricular systolic function is abnormal.  Ejection fraction is visually estimated at 30%. Septal hypokinesis and lateral akinesis. Grade I diastolic dysfunction. Left Atrium  Severely dilated left atrium. Right Atrium  Essentially normal right atrium. Right Ventricle  Essentially normal right ventricle.    Aortic Valve Mild to moderate aortic regurgitation; PHT: 537 msec. Mitral Valve  Structurally normal mitral valve. Tricuspid Valve  Tricuspid valve is structurally normal.   Pulmonic Valve  Pulmonic valve is structurally normal.   Pericardial Effusion  No evidence of any pericardial effusion.   M-Mode/2D Measurements & Calculations   LV Diastolic Dimension:  LV Systolic Dimension:  LA Dimension: 3.8 cmAO Root  6.21 cm                  5.25 cm                 Dimension: 3.6 cmLA Area:  LV FS:15.5 %             LV Volume Diastolic:    67.8 cm2  LV PW Diastolic: 0.9 cm  145 ml  LV PW Systolic: 6.26 cm  LV Volume Systolic: 89  Septum Diastolic: 3.70   ml  cm                       LV EDV/LV EDV Index:    RV Diastolic Dimension:  Septum Systolic: 5.40 cm 682 RV/36 m2LV ESV/LV   2.23 cm  CO: 2.98 l/min           ESV Index: 89 ml/59 m2  CI: 1.99 l/m*m2          EF Calculated (A4C):    LA/Aorta: 1.06                           23.9 %  LV Area Diastolic: 17.5  EF Calculated (2D):     LA volume/Index: 80 ml  cm2                      32.3 %                  /29A1  LV Area Systolic: 28.1  cm2                      LV Length: 8.95 cm                            LVOT: 1.9 cm  Doppler Measurements & Calculations   MV Peak E-Wave: 44.4    AV Peak Velocity: 90.9 cm/s  LVOT Peak Velocity:  cm/s                    AV Peak Gradient: 3.31 mmHg  86.5 cm/s  MV Peak A-Wave: 105     AV Mean Velocity: 60.3 cm/s  LVOT Mean Velocity:  cm/s                    AV Mean Gradient: 2 mmHg     50.4 cm/s  MV E/A Ratio: 0.42      AV VTI: 15.4 cm              LVOT Peak Gradient: 3  MV Peak Gradient: 0.79  AV Area (Continuity):2.69    mmHgLVOT Mean Gradient:  mmHg                    cm2                          1 mmHg   MV P1/2t: 52 msec       LVOT VTI: 14.6 cm  MVA by PHT:4.23 cm2     AV P1/2t: 537 msec   MV E' Septal Velocity:  3.18 cm/s  MV E' Lateral Velocity:  4.39 cm/s  MV E/E' septal: 13.96  MV E/E' lateral: 10.11      Ct Head Wo Contrast Result Date: 12/28/2020  EXAMINATION: CT OF THE HEAD WITHOUT CONTRAST  12/28/2020 4:31 pm TECHNIQUE: CT of the head was performed without the administration of intravenous contrast. Dose modulation, iterative reconstruction, and/or weight based adjustment of the mA/kV was utilized to reduce the radiation dose to as low as reasonably achievable. COMPARISON: 08/22/2020 HISTORY: ORDERING SYSTEM PROVIDED HISTORY: altered mental status TECHNOLOGIST PROVIDED HISTORY: Reason for exam:->altered mental status Has a \"code stroke\" or \"stroke alert\" been called? ->No Reason for Exam: AMS FINDINGS: BRAIN/VENTRICLES: There is no acute intracranial hemorrhage, mass effect or midline shift. No abnormal extra-axial fluid collection. The gray-white differentiation is maintained without evidence of an acute infarct. There is no evidence of hydrocephalus. Similar right parietal and left occipital encephalomalacia. Moderate chronic microvascular ischemic change. ORBITS: The visualized portion of the orbits demonstrate no acute abnormality. SINUSES: The visualized paranasal sinuses and mastoid air cells demonstrate no acute abnormality. SOFT TISSUES/SKULL:  No acute abnormality of the visualized skull or soft tissues. No acute intracranial abnormality. Xr Chest Portable    Result Date: 12/28/2020  EXAMINATION: ONE XRAY VIEW OF THE CHEST 12/28/2020 7:33 pm COMPARISON: August 13, 2020 HISTORY: ORDERING SYSTEM PROVIDED HISTORY: altered mental status TECHNOLOGIST PROVIDED HISTORY: Reason for exam:->altered mental status Reason for Exam: ams Acuity: Unknown Type of Exam: Unknown Relevant Medical/Surgical History: copd FINDINGS: Redemonstration of hyperinflation with COPD. Cardiomegaly. There is evidence of congestion and mild interstitial edema consistent with early CHF. No evidence of pleural effusion or pneumothorax. No focal infiltrates are noted. Congestion and mild interstitial edema suggestive of early CHF. Redemonstration of cardiomegaly and COPD.        Significant Diagnostic Studies at discharge:   CBC:   Lab Results   Component Value Date    WBC 6.8 12/31/2020    RBC 4.68 12/31/2020    HGB 12.2 12/31/2020    HCT 38.8 12/31/2020    MCV 82.9 12/31/2020    MCH 26.1 12/31/2020    MCHC 31.4 12/31/2020    RDW 16.8 12/31/2020     12/31/2020    MPV 11.0 12/31/2020       Patient Instructions:     Medication List      START taking these medications    furosemide 40 MG tablet  Commonly known as: LASIX  Take 1 tablet by mouth daily  Start taking on: January 1, 2021     ranolazine 500 MG extended release tablet  Commonly known as: RANEXA  Take 1 tablet by mouth 2 times daily        CONTINUE taking these medications    aspirin 81 MG EC tablet  Take 1 tablet by mouth daily     atorvastatin 80 MG tablet  Commonly known as: LIPITOR  Take 1 tablet by mouth nightly     clopidogrel 75 MG tablet  Commonly known as: PLAVIX  Take 1 tablet by mouth daily     finasteride 5 MG tablet  Commonly known as: Proscar  Take 1 tablet by mouth daily     lisinopril 5 MG tablet  Commonly known as: PRINIVIL;ZESTRIL  Take 1 tablet by mouth daily     metoprolol succinate 25 MG extended release tablet  Commonly known as: TOPROL XL  Take 1 tablet by mouth daily     tamsulosin 0.4 MG capsule  Commonly known as: FLOMAX  Take 1 capsule by mouth daily           Where to Get Your Medications      Information about where to get these medications is not yet available    Ask your nurse or doctor about these medications  · furosemide 40 MG tablet  · ranolazine 500 MG extended release tablet            Code Status: DNR-CCA     Consults:   IP CONSULT TO CARDIOLOGY  IP CONSULT TO HOSPITALIST  IP CONSULT TO DIETITIAN  IP CONSULT TO SOCIAL WORK  IP CONSULT TO SOCIAL WORK    Diet: cardiac diet    Activity: activity as tolerated   Work:    Discharged Condition: good    Prognosis: Aydin Newell Disposition: SNF      Follow-up with   1. PCP within   5-7    Days    Follow up labs: none       Discharge Physician Signed: Ke Das M.D. The patient was seen and examined on day of discharge and this discharge summary is in conjunction with any daily progress note from day of discharge.   Time spent on discharge in the examination, evaluation, counseling and review of medications and discharge plan: 34 minutes

## 2020-12-31 NOTE — PLAN OF CARE
Problem: Falls - Risk of:  Goal: Will remain free from falls  Description: Will remain free from falls  12/31/2020 0820 by Job Davidson RN  Outcome: Ongoing  12/30/2020 2106 by Tammy Adames RN  Outcome: Ongoing  Goal: Absence of physical injury  Description: Absence of physical injury  12/31/2020 0820 by Job Davidson RN  Outcome: Ongoing  12/30/2020 2106 by Tammy Adames RN  Outcome: Ongoing     Problem: Skin Integrity:  Goal: Will show no infection signs and symptoms  Description: Will show no infection signs and symptoms  12/31/2020 0820 by Job Davidson RN  Outcome: Ongoing  12/30/2020 2106 by Tammy Adames RN  Outcome: Ongoing  Goal: Absence of new skin breakdown  Description: Absence of new skin breakdown  12/31/2020 0820 by Job Davidson RN  Outcome: Ongoing  12/30/2020 2106 by Tammy Adames RN  Outcome: Ongoing     Problem:  Activity:  Goal: Ability to tolerate increased activity will improve  Description: Ability to tolerate increased activity will improve  12/31/2020 0820 by Job Davidson RN  Outcome: Ongoing  12/30/2020 2106 by Tammy Adames RN  Outcome: Ongoing     Problem: Cardiac:  Goal: Ability to achieve and maintain adequate cardiopulmonary perfusion will improve  Description: Ability to achieve and maintain adequate cardiopulmonary perfusion will improve  12/31/2020 0820 by Job Davidson RN  Outcome: Ongoing  12/30/2020 2106 by Tammy Adames RN  Outcome: Ongoing  Goal: Hemodynamic stability will improve  Description: Hemodynamic stability will improve  12/31/2020 0820 by Job Davidson RN  Outcome: Ongoing  12/30/2020 2106 by Tammy Adames RN  Outcome: Ongoing  Goal: Will show no evidence of cardiac arrhythmias  Description: Will show no evidence of cardiac arrhythmias  12/31/2020 0820 by Job Davidson RN  Outcome: Ongoing 12/30/2020 2106 by Chato Garcia RN  Outcome: Ongoing  Goal: Satisfaction with pain management regimen will improve  Description: Satisfaction with pain management regimen will improve  12/31/2020 0820 by Faustino Mendoza RN  Outcome: Ongoing  12/30/2020 2106 by Chato Garcia RN  Outcome: Ongoing  Goal: Diagnostic test results will improve  Description: Diagnostic test results will improve  12/31/2020 0820 by Faustino Mendoza RN  Outcome: Ongoing  12/30/2020 2106 by Chato Garcia RN  Outcome: Ongoing  Goal: Complications related to the disease process, condition or treatment will be avoided or minimized  Description: Complications related to the disease process, condition or treatment will be avoided or minimized  12/31/2020 0820 by Faustino Mendoza RN  Outcome: Ongoing  12/30/2020 2106 by Chato Garcia RN  Outcome: Ongoing     Problem: Coping:  Goal: Level of anxiety will decrease  Description: Level of anxiety will decrease  12/31/2020 0820 by Faustino Mendoza RN  Outcome: Ongoing  12/30/2020 2106 by Chato Garcia RN  Outcome: Ongoing  Goal: Ability to cope will improve  Description: Ability to cope will improve  12/31/2020 0820 by Faustino Mendoza RN  Outcome: Ongoing  12/30/2020 2106 by Chato Garcia RN  Outcome: Ongoing  Goal: Understanding of sexual limitations or changes related to disease process or condition will improve  Description: Understanding of sexual limitations or changes related to disease process or condition will improve  12/31/2020 0820 by Faustino Mendoza RN  Outcome: Ongoing  12/30/2020 2106 by Chato Garcia RN  Outcome: Ongoing     Problem: Fluid Volume:  Goal: Risk for excess fluid volume will decrease  Description: Risk for excess fluid volume will decrease  12/31/2020 0820 by Faustino Mendoza RN  Outcome: Ongoing 12/30/2020 2106 by Mendez Marti RN  Outcome: Ongoing     Problem: Health Behavior:  Goal: Identification of resources available to assist in meeting health care needs will improve  Description: Identification of resources available to assist in meeting health care needs will improve  12/31/2020 0820 by Lucy Segura RN  Outcome: Ongoing  12/30/2020 2106 by Mendez Marti RN  Outcome: Ongoing     Problem: Nutritional:  Goal: Ability to identify appropriate dietary choices will improve  Description: Ability to identify appropriate dietary choices will improve  12/31/2020 0820 by Lucy Segura RN  Outcome: Ongoing  12/30/2020 2106 by Mendez Marti RN  Outcome: Ongoing     Problem: Respiratory:  Goal: Levels of oxygenation will improve  Description: Levels of oxygenation will improve  12/31/2020 0820 by Lucy Segura RN  Outcome: Ongoing  12/30/2020 2106 by Mendez Marti RN  Outcome: Ongoing     Problem: Safety:  Goal: Ability to remain free from injury will improve  Description: Ability to remain free from injury will improve  12/31/2020 0820 by Lucy Segura RN  Outcome: Ongoing  12/30/2020 2106 by Mendez Marti RN  Outcome: Ongoing     Problem: ABCDS Injury Assessment  Goal: Absence of physical injury  12/31/2020 0820 by Lucy Segura RN  Outcome: Ongoing  12/30/2020 2106 by Mendez Marti RN  Outcome: Ongoing

## 2020-12-31 NOTE — PROGRESS NOTES
Physician Progress Note      PATIENT:               Vicente Bennett  CSN #:                  [id number]  :                       1935  ADMIT DATE:       2020 4:10 PM  [address] [place] DATE:  RESPONDING  PROVIDER #:        Yovanny Velásquez MD          QUERY TEXT:    Dear Hospitalist,    Patient admitted with metabolic encephalopathy and NSTEMI was noted to have   severe malnutrition per dietician paul on 20. If possible, please   document in progress notes and discharge summary if you are evaluating and /or   treating any of the following: The medical record reflects the following:  Risk Factors: Dementia, elderly  Clinical Indicators: BMI 16.4, dietician noted in  progress note \"Severe   malnutrition related to inadequate protein-energy intake as evidenced by 12.1%   wt loss x 4 mo per chart review, severe muscle wasting and severe fat loss\"  Treatment: Dietician consult, dietician ordered nutrition supplements, ordered   daily weights and I&O    Thank you,  YOAN ProN, RN, [facility] Specialist  [phone]  Options provided:  -- Protein calorie malnutrition severe  -- Other - I will add my own diagnosis  -- Disagree - Not applicable / Not valid  -- Disagree - Clinically unable to determine / Unknown  -- Refer to Clinical Documentation Reviewer    PROVIDER RESPONSE TEXT:    This patient has severe protein calorie malnutrition. Query created by: Arya De La Rosa on 2020 12:10 PM      QUERY TEXT:    Dear Jameel Gonzalez,    Pt admitted with metabolic encephalopathy. Pt noted to have NSTEMI and PMH of   HFrEF in Dr. Chin salazar. Lenny's progress note on 20 . If possible, please   document in progress notes and discharge summary if you are evaluating and/or   treating any of the following:     The medical record reflects the following:  Risk Factors: NSTEMI, smoking  Clinical Indicators: CXR on  shows \"Congestion and mild interstitial edema suggestive of early CHF\", Pro-BNP 2,347,  echo showed \" Left ventricular   systolic function is abnormal. Ejection fraction is visually estimated at   30%. Grade I diastolic dysfunction. Severely dilated left atrium. Mild to moderate aortic regurgitation; PHT: 537 msec\"  Treatment: PO Lasix, tele, Echo, cardiac consult, serial Pro-BNP orders. NETTE Montenegro, RN, [facility]  [phone]  Options provided:  -- Acute on Chronic Systolic CHF/HFrEF  -- Acute on Chronic Systolic and Diastolic CHF  -- Acute Systolic CHF/HFrEF  -- Acute Systolic and Diastolic CHF  -- Chronic Systolic CHF/HFrEF  -- Chronic Systolic and Diastolic CHF  -- Other - I will add my own diagnosis  -- Disagree - Not applicable / Not valid  -- Disagree - Clinically unable to determine / Unknown  -- Refer to Clinical Documentation Reviewer    PROVIDER RESPONSE TEXT:    This patient is in acute systolic CHF/HFrEF.     Query created by: Mattie Israel on 12/31/2020 12:12 PM      Electronically signed by:  Robby Phelps MD 12/31/2020 1:00 PM

## 2020-12-31 NOTE — PROGRESS NOTES
Hospitalist Progress Note      Name:  Torri Gonzalez /Age/Sex: 1935  (81 y.o. male)   MRN & CSN:  [id number] & [id number] Admission Date/Time: 2020  4:10 PM   Location:  -A PCP: Rich Stanley MD       Torri Gonzalez is a 81 y.o.  male  who presents with Altered Mental Status (confusion and strange behavior since this am, left aparmtent was found on 2nd floor instead of 7th was found wandering the area. )      Assessment and Plan:   NSTEMI  -Troponins positive, abnormal EKG  - cardio consulted, recommended conservative mx  -Continue aspirin, Plavix, Lovenox, lisinopril, metoprolol succinate, statin  - echo: ef 30%, grade 1 DD  - on lasix 40  -- ranexa     Metabolic encephalopathy:  -Patient is alert and oriented now  - UDS negative  - UA no signs of infection  -Patient had a recent stroke 2020  -Patient unable to take care of himself.  Consult case management/ for safe discharge, placement pending, okay to d/c once approved  - CT head non acute        Chronic:  coronary artery disease, peripheral arterial disease status post peripheral angiogram and stent placement  COPD   CVA   Dementia?   chronic tobacco dependence      DVT Prophylaxis: On Lovenox 1 mg/kg twice daily  GI Prophylaxis: Not indicated  Code Status: As per previous documentation-DNR CCA.                 Diet DIET GENERAL; Low Sodium (2 GM);  No Caffeine  Dietary Nutrition Supplements: Standard High Calorie Oral Supplement   Code Status DNR-CCA     Medications:   Medications:    furosemide  40 mg Oral Daily    sodium chloride flush  10 mL Intravenous 2 times per day    aspirin  81 mg Oral Daily    atorvastatin  40 mg Oral Nightly    clopidogrel  75 mg Oral Daily    finasteride  5 mg Oral Daily    metoprolol succinate  25 mg Oral Daily    tamsulosin  0.4 mg Oral Daily    enoxaparin  1.5 mg/kg Subcutaneous Daily    ranolazine  500 mg Oral BID    lisinopril  10 mg Oral Daily      Infusions: PRN Meds:     melatonin, 3 mg, Nightly PRN      sodium chloride flush, 10 mL, PRN      promethazine, 12.5 mg, Q6H PRN    Or      ondansetron, 4 mg, Q6H PRN      acetaminophen, 650 mg, Q6H PRN    Or      acetaminophen, 650 mg, Q6H PRN      polyethylene glycol, 17 g, Daily PRN      Subjective:   No CP, no distress    Objective: Intake/Output Summary (Last 24 hours) at 12/31/2020 0924  Last data filed at 12/31/2020 6450  Gross per 24 hour   Intake 1030 ml   Output 950 ml   Net 80 ml      Vitals:   Vitals:    12/31/20 0808   BP: (!) 146/68   Pulse:    Resp:    Temp:    SpO2:      Physical Exam:   Gen:  awake, alert, no apparent distress  Head/Eyes:  Normocephalic atraumatic, EOMI   NECK:   symmetrical, trachea midline  LUNGS: Normal Effort   CARDIOVASCULAR:  Normal rate  ABDOMEN:  non distended  MUSCULOSKELETAL:  ROM WNL  NEUROLOGIC: Alert and Oriented,  Cranial nerves II-XII are grossly intact.    SKIN:  no bruising or bleeding, normal skin color,  no redness      Data:       CBC   Recent Labs     12/29/20  0350 12/30/20  0830 12/31/20  0530   WBC 6.6 6.8 6.8   HGB 11.7* 12.0* 12.2*   HCT 37.6* 39.3* 38.8*    198 207      BMP   Recent Labs     12/29/20  0350 12/30/20  0830 12/31/20  0530    132* 135   K 4.1 4.0 4.0    97* 98*   CO2 24 28 29   BUN 23 25* 29*   CREATININE 0.8* 1.0 1.1         Electronically signed by Phoebe Hightower MD on 12/31/2020 at 9:24 AM

## 2020-12-31 NOTE — PROGRESS NOTES
Pt belongings going with pt to [facility]:    Shoes  Pants  Shirt  Coat - vest  Undergarments  Glasses

## 2020-12-31 NOTE — PROGRESS NOTES
Daily Progress Note     patient is awake less confused  Heart rate and BP stable  NSTEMI does not want cath, conservative treatment  PAD medical treatment  HFrEf medical treatment  Conservative treatment  Ok to move out of ICU  Dementia stable  Agree with placement   Ok to move out of ICU  He is clinically stable has no chest pain   Keep on lovenox while here for now     Echo 12/29/2020  Summary   Left ventricular systolic function is abnormal.   Ejection fraction is visually estimated at 30%.  Septal hypokinesis and lateral akinesis.   Grade I diastolic dysfunction.   Severely dilated left atrium.   Mild to moderate aortic regurgitation; PHT: 537 msec.   No evidence of any pericardial effusion.   MAC noted      Objective:   BP (!) 120/92   Pulse 88   Temp 97.4 °F (36.3 °C) (Oral)   Resp 19   Ht 5' 5.98\" (1.676 m)   Wt 101 lb 8 oz (46 kg)   SpO2 94%   BMI 16.39 kg/m²       Intake/Output Summary (Last 24 hours) at 12/31/2020 1114  Last data filed at 12/31/2020 2838  Gross per 24 hour   Intake 780 ml   Output 800 ml   Net -20 ml       Medications:   Scheduled Meds:   furosemide  40 mg Oral Daily    sodium chloride flush  10 mL Intravenous 2 times per day    aspirin  81 mg Oral Daily    atorvastatin  40 mg Oral Nightly    clopidogrel  75 mg Oral Daily    finasteride  5 mg Oral Daily    metoprolol succinate  25 mg Oral Daily    tamsulosin  0.4 mg Oral Daily    enoxaparin  1.5 mg/kg Subcutaneous Daily    ranolazine  500 mg Oral BID    lisinopril  10 mg Oral Daily      Infusions:     PRN Meds:  melatonin, sodium chloride flush, promethazine **OR** ondansetron, acetaminophen **OR** acetaminophen, polyethylene glycol       Physical Exam:  Vitals:    12/31/20 1027   BP: (!) 120/92   Pulse: 88   Resp: 19   Temp:    SpO2:         General: awake aelrt   Chest: Nontender  Cardiac: sinus   Lungs:Clear to auscultation and percussion.   Abdomen: Soft, NT, ND, +BS  Extremities: no edema Vascular:  Equal 2+ peripheral pulses. Lab Data:  CBC:   Recent Labs     12/29/20  0350 12/30/20  0830 12/31/20  0530   WBC 6.6 6.8 6.8   HGB 11.7* 12.0* 12.2*   HCT 37.6* 39.3* 38.8*   MCV 83.4 83.4 82.9    198 207     BMP:   Recent Labs     12/29/20  0350 12/30/20  0830 12/31/20  0530    132* 135   K 4.1 4.0 4.0    97* 98*   CO2 24 28 29   BUN 23 25* 29*   CREATININE 0.8* 1.0 1.1     LIVER PROFILE:   Recent Labs     12/28/20  1812 12/28/20 1928   AST WRONG PATIENT 34   ALT WRONG PATIENT 32   BILITOT WRONG PATIENT 0.6   ALKPHOS WRONG PATIENT 119     PT/INR:   Recent Labs     12/28/20 1948   PROTIME 14.0   INR 1.16     APTT:   Recent Labs     12/28/20 1948   APTT 34.7     BNP:  No results for input(s): BNP in the last 72 hours.       Assessment:  Patient Active Problem List    Diagnosis Date Noted    NSTEMI (non-ST elevated myocardial infarction) ([address] 12/28/2020    Severe malnutrition ([address] 08/20/2020    Femoral artery occlusion, right ([address] 08/13/2020    Acute encephalopathy 08/13/2020    Hemiparesis of left nondominant side as late effect of cerebral infarction ([address]     Dysphagia due to recent stroke     Dysarthria due to acute stroke ([address]     Gait disturbance     Simple chronic bronchitis (HCC)     Urinary tract infection associated with indwelling urethral catheter ([address]     Acute cystitis without hematuria     Acute cerebrovascular accident (CVA) ([address] 04/10/2020    Dizziness 04/08/2020       Kristie Cabral MD 12/31/2020 11:14 AM

## 2021-01-05 ENCOUNTER — HOSPITAL ENCOUNTER (OUTPATIENT)
Age: 86
Setting detail: SPECIMEN
Discharge: HOME | End: 2021-01-05

## 2021-01-05 LAB
ANION GAP SERPL CALCULATED.3IONS-SCNC: 8 MMOL/L (ref 4–16)
BUN BLDV-MCNC: 21 MG/DL (ref 6–23)
CALCIUM SERPL-MCNC: 8.1 MG/DL (ref 8.3–10.6)
CHLORIDE BLD-SCNC: 91 MMOL/L (ref 99–110)
CO2: 27 MMOL/L (ref 21–32)
CREAT SERPL-MCNC: 1.2 MG/DL (ref 0.9–1.3)
GFR AFRICAN AMERICAN: >60 ML/MIN/1.73M2
GFR NON-AFRICAN AMERICAN: 58 ML/MIN/1.73M2
GLUCOSE BLD-MCNC: 78 MG/DL (ref 70–99)
HCT VFR BLD CALC: 37.9 % (ref 42–52)
HEMOGLOBIN: 11.8 GM/DL (ref 13.5–18)
MCH RBC QN AUTO: 25.8 PG (ref 27–31)
MCHC RBC AUTO-ENTMCNC: 31.1 % (ref 32–36)
MCV RBC AUTO: 82.8 FL (ref 78–100)
PDW BLD-RTO: 16.7 % (ref 11.7–14.9)
PLATELET # BLD: 322 K/CU MM (ref 140–440)
PMV BLD AUTO: 11.9 FL (ref 7.5–11.1)
POTASSIUM SERPL-SCNC: 4.9 MMOL/L (ref 3.5–5.1)
RBC # BLD: 4.58 M/CU MM (ref 4.6–6.2)
SODIUM BLD-SCNC: 126 MMOL/L (ref 135–145)
WBC # BLD: 5.6 K/CU MM (ref 4–10.5)

## 2021-01-05 PROCEDURE — 80048 BASIC METABOLIC PNL TOTAL CA: CPT

## 2021-01-05 PROCEDURE — 85027 COMPLETE CBC AUTOMATED: CPT

## 2021-01-05 PROCEDURE — 36415 COLL VENOUS BLD VENIPUNCTURE: CPT

## 2021-03-11 ENCOUNTER — HOSPITAL ENCOUNTER (OUTPATIENT)
Age: 86
Setting detail: SPECIMEN
Discharge: HOME | End: 2021-03-11
Payer: MEDICARE

## 2021-03-11 LAB
ALBUMIN SERPL-MCNC: 3.5 GM/DL (ref 3.4–5)
ALP BLD-CCNC: 80 IU/L (ref 40–128)
ALT SERPL-CCNC: 19 U/L (ref 10–40)
ANION GAP SERPL CALCULATED.3IONS-SCNC: 10 MMOL/L (ref 4–16)
AST SERPL-CCNC: 20 IU/L (ref 15–37)
BASOPHILS ABSOLUTE: 0 K/CU MM
BASOPHILS RELATIVE PERCENT: 0.3 % (ref 0–1)
BILIRUB SERPL-MCNC: 0.8 MG/DL (ref 0–1)
BUN BLDV-MCNC: 35 MG/DL (ref 6–23)
CALCIUM SERPL-MCNC: 8.3 MG/DL (ref 8.3–10.6)
CHLORIDE BLD-SCNC: 99 MMOL/L (ref 99–110)
CO2: 25 MMOL/L (ref 21–32)
CREAT SERPL-MCNC: 1.4 MG/DL (ref 0.9–1.3)
DIFFERENTIAL TYPE: ABNORMAL
EOSINOPHILS ABSOLUTE: 0.1 K/CU MM
EOSINOPHILS RELATIVE PERCENT: 0.5 % (ref 0–3)
GFR AFRICAN AMERICAN: 58 ML/MIN/1.73M2
GFR NON-AFRICAN AMERICAN: 48 ML/MIN/1.73M2
GLUCOSE BLD-MCNC: 114 MG/DL (ref 70–99)
HCT VFR BLD CALC: 31.2 % (ref 42–52)
HEMOGLOBIN: 10.2 GM/DL (ref 13.5–18)
IMMATURE NEUTROPHIL %: 0.4 % (ref 0–0.43)
LYMPHOCYTES ABSOLUTE: 0.8 K/CU MM
LYMPHOCYTES RELATIVE PERCENT: 8.7 % (ref 24–44)
MCH RBC QN AUTO: 28.3 PG (ref 27–31)
MCHC RBC AUTO-ENTMCNC: 32.7 % (ref 32–36)
MCV RBC AUTO: 86.4 FL (ref 78–100)
MONOCYTES ABSOLUTE: 1.5 K/CU MM
MONOCYTES RELATIVE PERCENT: 15.5 % (ref 0–4)
NUCLEATED RBC %: 0 %
PDW BLD-RTO: 21.7 % (ref 11.7–14.9)
PLATELET # BLD: 320 K/CU MM (ref 140–440)
PMV BLD AUTO: 11.2 FL (ref 7.5–11.1)
POTASSIUM SERPL-SCNC: 4.5 MMOL/L (ref 3.5–5.1)
RBC # BLD: 3.61 M/CU MM (ref 4.6–6.2)
SEGMENTED NEUTROPHILS ABSOLUTE COUNT: 7 K/CU MM
SEGMENTED NEUTROPHILS RELATIVE PERCENT: 74.6 % (ref 36–66)
SODIUM BLD-SCNC: 134 MMOL/L (ref 135–145)
TOTAL IMMATURE NEUTOROPHIL: 0.04 K/CU MM
TOTAL NUCLEATED RBC: 0 K/CU MM
TOTAL PROTEIN: 5.8 GM/DL (ref 6.4–8.2)
WBC # BLD: 9.4 K/CU MM (ref 4–10.5)

## 2021-03-11 PROCEDURE — 85025 COMPLETE CBC W/AUTO DIFF WBC: CPT

## 2021-03-11 PROCEDURE — 36415 COLL VENOUS BLD VENIPUNCTURE: CPT

## 2021-03-11 PROCEDURE — 80053 COMPREHEN METABOLIC PANEL: CPT

## 2021-03-12 ENCOUNTER — HOSPITAL ENCOUNTER (OUTPATIENT)
Age: 86
Setting detail: SPECIMEN
Discharge: HOME | End: 2021-03-12
Payer: MEDICARE

## 2021-03-12 PROCEDURE — 87086 URINE CULTURE/COLONY COUNT: CPT

## 2021-03-12 PROCEDURE — 81001 URINALYSIS AUTO W/SCOPE: CPT

## 2021-03-13 LAB
BACTERIA: ABNORMAL /HPF
BILIRUBIN URINE: NEGATIVE MG/DL
BLOOD, URINE: NEGATIVE
CLARITY: CLEAR
COLOR: YELLOW
CULTURE: NORMAL
GLUCOSE, URINE: NEGATIVE MG/DL
HYALINE CASTS: 5 /LPF
KETONES, URINE: NEGATIVE MG/DL
LEUKOCYTE ESTERASE, URINE: ABNORMAL
Lab: NORMAL
MUCUS: ABNORMAL HPF
NITRITE URINE, QUANTITATIVE: NEGATIVE
PH, URINE: 5 (ref 5–8)
PROTEIN UA: NEGATIVE MG/DL
RBC URINE: ABNORMAL /HPF (ref 0–3)
SPECIFIC GRAVITY UA: 1.02 (ref 1–1.03)
SPECIMEN: NORMAL
TRICHOMONAS: ABNORMAL /HPF
UROBILINOGEN, URINE: NEGATIVE MG/DL (ref 0.2–1)
WBC UA: 16 /HPF (ref 0–2)

## 2021-03-18 ENCOUNTER — HOSPITAL ENCOUNTER (OUTPATIENT)
Age: 86
Setting detail: SPECIMEN
Discharge: HOME | End: 2021-03-18
Payer: MEDICARE

## 2021-03-18 LAB
ANION GAP SERPL CALCULATED.3IONS-SCNC: 10 MMOL/L (ref 4–16)
BASOPHILS ABSOLUTE: 0 K/CU MM
BASOPHILS RELATIVE PERCENT: 0.5 % (ref 0–1)
BUN BLDV-MCNC: 31 MG/DL (ref 6–23)
CALCIUM SERPL-MCNC: 8.6 MG/DL (ref 8.3–10.6)
CHLORIDE BLD-SCNC: 100 MMOL/L (ref 99–110)
CO2: 29 MMOL/L (ref 21–32)
CREAT SERPL-MCNC: 1.5 MG/DL (ref 0.9–1.3)
DIFFERENTIAL TYPE: ABNORMAL
EOSINOPHILS ABSOLUTE: 0.2 K/CU MM
EOSINOPHILS RELATIVE PERCENT: 2.2 % (ref 0–3)
FERRITIN: 104 NG/ML (ref 30–400)
FOLATE: 18.6 NG/ML (ref 3.1–17.5)
GFR AFRICAN AMERICAN: 54 ML/MIN/1.73M2
GFR NON-AFRICAN AMERICAN: 45 ML/MIN/1.73M2
GLUCOSE BLD-MCNC: 104 MG/DL (ref 70–99)
HCT VFR BLD CALC: 33.4 % (ref 42–52)
HEMOGLOBIN: 10.3 GM/DL (ref 13.5–18)
IMMATURE NEUTROPHIL %: 0.3 % (ref 0–0.43)
IRON: 56 UG/DL (ref 59–158)
LYMPHOCYTES ABSOLUTE: 0.9 K/CU MM
LYMPHOCYTES RELATIVE PERCENT: 12.2 % (ref 24–44)
MCH RBC QN AUTO: 28.1 PG (ref 27–31)
MCHC RBC AUTO-ENTMCNC: 30.8 % (ref 32–36)
MCV RBC AUTO: 91 FL (ref 78–100)
MONOCYTES ABSOLUTE: 0.8 K/CU MM
MONOCYTES RELATIVE PERCENT: 10.6 % (ref 0–4)
NUCLEATED RBC %: 0 %
PCT TRANSFERRIN: 27 % (ref 10–44)
PDW BLD-RTO: 21.4 % (ref 11.7–14.9)
PLATELET # BLD: 438 K/CU MM (ref 140–440)
PMV BLD AUTO: 10.6 FL (ref 7.5–11.1)
POTASSIUM SERPL-SCNC: 4.6 MMOL/L (ref 3.5–5.1)
RBC # BLD: 3.67 M/CU MM (ref 4.6–6.2)
SEGMENTED NEUTROPHILS ABSOLUTE COUNT: 5.5 K/CU MM
SEGMENTED NEUTROPHILS RELATIVE PERCENT: 74.2 % (ref 36–66)
SODIUM BLD-SCNC: 139 MMOL/L (ref 135–145)
TOTAL IMMATURE NEUTOROPHIL: 0.02 K/CU MM
TOTAL IRON BINDING CAPACITY: 207 UG/DL (ref 250–450)
TOTAL NUCLEATED RBC: 0 K/CU MM
UNSATURATED IRON BINDING CAPACITY: 151 UG/DL (ref 110–370)
VITAMIN B-12: 653.8 PG/ML (ref 211–911)
WBC # BLD: 7.4 K/CU MM (ref 4–10.5)

## 2021-03-18 PROCEDURE — 82728 ASSAY OF FERRITIN: CPT

## 2021-03-18 PROCEDURE — 80048 BASIC METABOLIC PNL TOTAL CA: CPT

## 2021-03-18 PROCEDURE — 82607 VITAMIN B-12: CPT

## 2021-03-18 PROCEDURE — 82746 ASSAY OF FOLIC ACID SERUM: CPT

## 2021-03-18 PROCEDURE — 83540 ASSAY OF IRON: CPT

## 2021-03-18 PROCEDURE — 85025 COMPLETE CBC W/AUTO DIFF WBC: CPT

## 2021-03-18 PROCEDURE — 36415 COLL VENOUS BLD VENIPUNCTURE: CPT

## 2021-03-18 PROCEDURE — 83550 IRON BINDING TEST: CPT

## 2021-06-18 NOTE — FLOWSHEET NOTE
Assumed pt care at 1900. A/OX4,VSS. C/o pain on buttocks/ BLE, medicated per
EMAR with relief reported. Pt is amx assist with cares, can feed self at times
with set-up. Penaloza patent to DD, refused wound care as well as to be
repositioned. Lymphedema wraps in place to BLE C/D/I. Fall precautions in
place,calls approp for help. Dr Femi Gutierrez notified of troponin results. Orders rec'd for NPO diet and to call Dr Rudy Gimenez at 0600 with Troponin results. Notified that pt has no c/o chest pain or c/o cardiac issues tonight.

## 2021-07-20 ENCOUNTER — HOSPITAL ENCOUNTER (OUTPATIENT)
Age: 86
Setting detail: SPECIMEN
Discharge: HOME | End: 2021-07-20
Payer: MEDICARE

## 2021-07-20 LAB
ANION GAP SERPL CALCULATED.3IONS-SCNC: 10 MMOL/L (ref 4–16)
BUN BLDV-MCNC: 33 MG/DL (ref 6–23)
CALCIUM SERPL-MCNC: 9 MG/DL (ref 8.3–10.6)
CHLORIDE BLD-SCNC: 100 MMOL/L (ref 99–110)
CO2: 26 MMOL/L (ref 21–32)
CREAT SERPL-MCNC: 1.3 MG/DL (ref 0.9–1.3)
GFR AFRICAN AMERICAN: >60 ML/MIN/1.73M2
GFR NON-AFRICAN AMERICAN: 53 ML/MIN/1.73M2
GLUCOSE BLD-MCNC: 127 MG/DL (ref 70–99)
HCT VFR BLD CALC: 33.3 % (ref 42–52)
HEMOGLOBIN: 10.5 GM/DL (ref 13.5–18)
MCH RBC QN AUTO: 29.7 PG (ref 27–31)
MCHC RBC AUTO-ENTMCNC: 31.5 % (ref 32–36)
MCV RBC AUTO: 94.1 FL (ref 78–100)
PDW BLD-RTO: 14 % (ref 11.7–14.9)
PLATELET # BLD: 326 K/CU MM (ref 140–440)
PMV BLD AUTO: 10.8 FL (ref 7.5–11.1)
POTASSIUM SERPL-SCNC: 5.3 MMOL/L (ref 3.5–5.1)
RBC # BLD: 3.54 M/CU MM (ref 4.6–6.2)
SODIUM BLD-SCNC: 136 MMOL/L (ref 135–145)
WBC # BLD: 5.4 K/CU MM (ref 4–10.5)

## 2021-07-20 PROCEDURE — 36415 COLL VENOUS BLD VENIPUNCTURE: CPT

## 2021-07-20 PROCEDURE — 85027 COMPLETE CBC AUTOMATED: CPT

## 2021-07-20 PROCEDURE — 80048 BASIC METABOLIC PNL TOTAL CA: CPT

## 2021-08-03 ENCOUNTER — HOSPITAL ENCOUNTER (OUTPATIENT)
Age: 86
Setting detail: SPECIMEN
Discharge: HOME | End: 2021-08-03
Payer: MEDICARE

## 2021-08-03 LAB
ANION GAP SERPL CALCULATED.3IONS-SCNC: 12 MMOL/L (ref 4–16)
BUN BLDV-MCNC: 50 MG/DL (ref 6–23)
CALCIUM SERPL-MCNC: 8.5 MG/DL (ref 8.3–10.6)
CHLORIDE BLD-SCNC: 97 MMOL/L (ref 99–110)
CO2: 24 MMOL/L (ref 21–32)
CREAT SERPL-MCNC: 1.7 MG/DL (ref 0.9–1.3)
GFR AFRICAN AMERICAN: 47 ML/MIN/1.73M2
GFR NON-AFRICAN AMERICAN: 39 ML/MIN/1.73M2
GLUCOSE BLD-MCNC: 129 MG/DL (ref 70–99)
HCT VFR BLD CALC: 29.7 % (ref 42–52)
HEMOGLOBIN: 9.4 GM/DL (ref 13.5–18)
MCH RBC QN AUTO: 29.5 PG (ref 27–31)
MCHC RBC AUTO-ENTMCNC: 31.6 % (ref 32–36)
MCV RBC AUTO: 93.1 FL (ref 78–100)
PDW BLD-RTO: 14.6 % (ref 11.7–14.9)
PLATELET # BLD: 294 K/CU MM (ref 140–440)
PMV BLD AUTO: 11.1 FL (ref 7.5–11.1)
POTASSIUM SERPL-SCNC: 4.7 MMOL/L (ref 3.5–5.1)
RBC # BLD: 3.19 M/CU MM (ref 4.6–6.2)
SODIUM BLD-SCNC: 133 MMOL/L (ref 135–145)
WBC # BLD: 4.8 K/CU MM (ref 4–10.5)

## 2021-08-03 PROCEDURE — 80048 BASIC METABOLIC PNL TOTAL CA: CPT

## 2021-08-03 PROCEDURE — 36415 COLL VENOUS BLD VENIPUNCTURE: CPT

## 2021-08-03 PROCEDURE — 85027 COMPLETE CBC AUTOMATED: CPT

## 2021-08-06 ENCOUNTER — HOSPITAL ENCOUNTER (OUTPATIENT)
Age: 86
Setting detail: SPECIMEN
Discharge: HOME | End: 2021-08-06
Payer: MEDICARE

## 2021-08-06 PROCEDURE — G0328 FECAL BLOOD SCRN IMMUNOASSAY: HCPCS

## 2021-08-08 LAB — HEMOCCULT SP1 STL QL: NEGATIVE

## 2021-08-17 ENCOUNTER — HOSPITAL ENCOUNTER (OUTPATIENT)
Age: 86
Setting detail: SPECIMEN
Discharge: HOME | End: 2021-08-17
Payer: MEDICARE

## 2021-08-17 LAB
HCT VFR BLD CALC: 28.3 % (ref 42–52)
HEMOGLOBIN: 8.8 GM/DL (ref 13.5–18)

## 2021-08-17 PROCEDURE — 85018 HEMOGLOBIN: CPT

## 2021-08-17 PROCEDURE — 36415 COLL VENOUS BLD VENIPUNCTURE: CPT

## 2021-08-17 PROCEDURE — 85014 HEMATOCRIT: CPT

## 2021-08-31 ENCOUNTER — HOSPITAL ENCOUNTER (OUTPATIENT)
Age: 86
Setting detail: SPECIMEN
Discharge: HOME | End: 2021-08-31
Payer: MEDICARE

## 2021-08-31 LAB
ANION GAP SERPL CALCULATED.3IONS-SCNC: 10 MMOL/L (ref 4–16)
BUN BLDV-MCNC: 40 MG/DL (ref 6–23)
CALCIUM SERPL-MCNC: 8.5 MG/DL (ref 8.3–10.6)
CHLORIDE BLD-SCNC: 101 MMOL/L (ref 99–110)
CO2: 26 MMOL/L (ref 21–32)
CREAT SERPL-MCNC: 1.5 MG/DL (ref 0.9–1.3)
GFR AFRICAN AMERICAN: 54 ML/MIN/1.73M2
GFR NON-AFRICAN AMERICAN: 45 ML/MIN/1.73M2
GLUCOSE BLD-MCNC: 77 MG/DL (ref 70–99)
POTASSIUM SERPL-SCNC: 5.1 MMOL/L (ref 3.5–5.1)
SODIUM BLD-SCNC: 137 MMOL/L (ref 135–145)

## 2021-08-31 PROCEDURE — 36415 COLL VENOUS BLD VENIPUNCTURE: CPT

## 2021-08-31 PROCEDURE — 80048 BASIC METABOLIC PNL TOTAL CA: CPT

## 2021-09-27 ENCOUNTER — HOSPITAL ENCOUNTER (OUTPATIENT)
Age: 86
Setting detail: SPECIMEN
Discharge: HOME | End: 2021-09-27
Payer: MEDICARE

## 2021-09-27 PROCEDURE — 82270 OCCULT BLOOD FECES: CPT

## 2021-09-29 LAB — HEMOCCULT SP1 STL QL: NEGATIVE

## 2022-01-13 ENCOUNTER — HOSPITAL ENCOUNTER (OUTPATIENT)
Age: 87
Setting detail: SPECIMEN
Discharge: HOME | End: 2022-01-13
Payer: MEDICARE

## 2022-01-13 LAB
ALBUMIN SERPL-MCNC: 3.9 GM/DL (ref 3.4–5)
ALP BLD-CCNC: 89 IU/L (ref 40–128)
ALT SERPL-CCNC: 13 U/L (ref 10–40)
ANION GAP SERPL CALCULATED.3IONS-SCNC: 15 MMOL/L (ref 4–16)
AST SERPL-CCNC: 17 IU/L (ref 15–37)
BILIRUB SERPL-MCNC: 0.3 MG/DL (ref 0–1)
BUN BLDV-MCNC: 42 MG/DL (ref 6–23)
CALCIUM SERPL-MCNC: 8.7 MG/DL (ref 8.3–10.6)
CHLORIDE BLD-SCNC: 99 MMOL/L (ref 99–110)
CHOLESTEROL: 111 MG/DL
CO2: 23 MMOL/L (ref 21–32)
CREAT SERPL-MCNC: 1.5 MG/DL (ref 0.9–1.3)
GFR AFRICAN AMERICAN: 54 ML/MIN/1.73M2
GFR NON-AFRICAN AMERICAN: 44 ML/MIN/1.73M2
GLUCOSE BLD-MCNC: 72 MG/DL (ref 70–99)
HCT VFR BLD CALC: 35.3 % (ref 42–52)
HDLC SERPL-MCNC: 39 MG/DL
HEMOGLOBIN: 10.9 GM/DL (ref 13.5–18)
LDL CHOLESTEROL DIRECT: 53 MG/DL
MCH RBC QN AUTO: 29.7 PG (ref 27–31)
MCHC RBC AUTO-ENTMCNC: 30.9 % (ref 32–36)
MCV RBC AUTO: 96.2 FL (ref 78–100)
PDW BLD-RTO: 14.6 % (ref 11.7–14.9)
PLATELET # BLD: 341 K/CU MM (ref 140–440)
PMV BLD AUTO: 11.3 FL (ref 7.5–11.1)
POTASSIUM SERPL-SCNC: 5.1 MMOL/L (ref 3.5–5.1)
PROSTATE SPECIFIC ANTIGEN: 0.11 NG/ML (ref 0–4)
RBC # BLD: 3.67 M/CU MM (ref 4.6–6.2)
SODIUM BLD-SCNC: 137 MMOL/L (ref 135–145)
TOTAL PROTEIN: 6.2 GM/DL (ref 6.4–8.2)
TRIGL SERPL-MCNC: 95 MG/DL
WBC # BLD: 5.9 K/CU MM (ref 4–10.5)

## 2022-01-13 PROCEDURE — G0103 PSA SCREENING: HCPCS

## 2022-01-13 PROCEDURE — 85027 COMPLETE CBC AUTOMATED: CPT

## 2022-01-13 PROCEDURE — 83721 ASSAY OF BLOOD LIPOPROTEIN: CPT

## 2022-01-13 PROCEDURE — 36415 COLL VENOUS BLD VENIPUNCTURE: CPT

## 2022-01-13 PROCEDURE — 80061 LIPID PANEL: CPT

## 2022-01-13 PROCEDURE — 80053 COMPREHEN METABOLIC PANEL: CPT

## 2022-01-25 ENCOUNTER — HOSPITAL ENCOUNTER (OUTPATIENT)
Age: 87
Setting detail: SPECIMEN
Discharge: HOME | End: 2022-01-25
Payer: MEDICARE

## 2022-01-25 LAB — D DIMER: 340 NG/ML(DDU)

## 2022-01-25 PROCEDURE — 85379 FIBRIN DEGRADATION QUANT: CPT

## 2022-01-25 PROCEDURE — 36415 COLL VENOUS BLD VENIPUNCTURE: CPT

## 2022-01-31 ENCOUNTER — HOSPITAL ENCOUNTER (OUTPATIENT)
Age: 87
Setting detail: SPECIMEN
Discharge: HOME | End: 2022-01-31
Payer: MEDICARE

## 2022-01-31 LAB
ANION GAP SERPL CALCULATED.3IONS-SCNC: 12 MMOL/L (ref 4–16)
BUN BLDV-MCNC: 39 MG/DL (ref 6–23)
CALCIUM SERPL-MCNC: 8.3 MG/DL (ref 8.3–10.6)
CHLORIDE BLD-SCNC: 97 MMOL/L (ref 99–110)
CO2: 23 MMOL/L (ref 21–32)
CREAT SERPL-MCNC: 1.2 MG/DL (ref 0.9–1.3)
GFR AFRICAN AMERICAN: >60 ML/MIN/1.73M2
GFR NON-AFRICAN AMERICAN: 58 ML/MIN/1.73M2
GLUCOSE BLD-MCNC: 100 MG/DL (ref 70–99)
POTASSIUM SERPL-SCNC: 5.1 MMOL/L (ref 3.5–5.1)
SODIUM BLD-SCNC: 132 MMOL/L (ref 135–145)

## 2022-01-31 PROCEDURE — 80048 BASIC METABOLIC PNL TOTAL CA: CPT

## 2022-01-31 PROCEDURE — 36415 COLL VENOUS BLD VENIPUNCTURE: CPT

## 2022-02-01 ENCOUNTER — HOSPITAL ENCOUNTER (OUTPATIENT)
Age: 87
Setting detail: SPECIMEN
Discharge: HOME | End: 2022-02-01
Payer: MEDICARE

## 2022-02-01 LAB — D DIMER: 233 NG/ML(DDU)

## 2022-02-01 PROCEDURE — 36415 COLL VENOUS BLD VENIPUNCTURE: CPT

## 2022-02-01 PROCEDURE — 85379 FIBRIN DEGRADATION QUANT: CPT

## 2022-02-02 ENCOUNTER — HOSPITAL ENCOUNTER (OUTPATIENT)
Age: 87
Setting detail: SPECIMEN
Discharge: HOME | End: 2022-02-02
Payer: MEDICARE

## 2022-02-02 PROCEDURE — 81001 URINALYSIS AUTO W/SCOPE: CPT

## 2022-02-02 PROCEDURE — 87086 URINE CULTURE/COLONY COUNT: CPT

## 2022-02-03 LAB
BACTERIA: NEGATIVE /HPF
BILIRUBIN URINE: NEGATIVE MG/DL
BLOOD, URINE: NEGATIVE
CLARITY: CLEAR
COLOR: YELLOW
GLUCOSE, URINE: NEGATIVE MG/DL
HYALINE CASTS: 5 /LPF
KETONES, URINE: NEGATIVE MG/DL
LEUKOCYTE ESTERASE, URINE: NEGATIVE
NITRITE URINE, QUANTITATIVE: NEGATIVE
PH, URINE: 6 (ref 5–8)
PROTEIN UA: NEGATIVE MG/DL
RBC URINE: NORMAL /HPF (ref 0–3)
SPECIFIC GRAVITY UA: 1.01 (ref 1–1.03)
UROBILINOGEN, URINE: 0.2 MG/DL (ref 0.2–1)
WBC UA: <1 /HPF (ref 0–2)

## 2022-02-04 LAB
CULTURE: NORMAL
Lab: NORMAL
SPECIMEN: NORMAL

## 2022-02-08 ENCOUNTER — HOSPITAL ENCOUNTER (OUTPATIENT)
Age: 87
Setting detail: SPECIMEN
Discharge: HOME | End: 2022-02-08
Payer: MEDICARE

## 2022-02-08 LAB
ANION GAP SERPL CALCULATED.3IONS-SCNC: 10 MMOL/L (ref 4–16)
BUN BLDV-MCNC: 35 MG/DL (ref 6–23)
CALCIUM SERPL-MCNC: 8.3 MG/DL (ref 8.3–10.6)
CHLORIDE BLD-SCNC: 94 MMOL/L (ref 99–110)
CO2: 26 MMOL/L (ref 21–32)
CREAT SERPL-MCNC: 1.1 MG/DL (ref 0.9–1.3)
D DIMER: 282 NG/ML(DDU)
GFR AFRICAN AMERICAN: >60 ML/MIN/1.73M2
GFR NON-AFRICAN AMERICAN: >60 ML/MIN/1.73M2
GLUCOSE BLD-MCNC: 67 MG/DL (ref 70–99)
POTASSIUM SERPL-SCNC: 4.9 MMOL/L (ref 3.5–5.1)
SODIUM BLD-SCNC: 130 MMOL/L (ref 135–145)

## 2022-02-08 PROCEDURE — 80048 BASIC METABOLIC PNL TOTAL CA: CPT

## 2022-02-08 PROCEDURE — 36415 COLL VENOUS BLD VENIPUNCTURE: CPT

## 2022-02-08 PROCEDURE — 85379 FIBRIN DEGRADATION QUANT: CPT

## 2022-06-02 ENCOUNTER — HOSPITAL ENCOUNTER (OUTPATIENT)
Age: 87
Setting detail: SPECIMEN
Discharge: HOME | End: 2022-06-02
Payer: MEDICARE

## 2022-06-02 LAB
BACTERIA: NEGATIVE /HPF
BILIRUBIN URINE: NEGATIVE MG/DL
BLOOD, URINE: NEGATIVE
CLARITY: CLEAR
COLOR: YELLOW
GLUCOSE, URINE: NEGATIVE MG/DL
HYALINE CASTS: 5 /LPF
KETONES, URINE: NEGATIVE MG/DL
LEUKOCYTE ESTERASE, URINE: NEGATIVE
NITRITE URINE, QUANTITATIVE: NEGATIVE
PH, URINE: 5 (ref 5–8)
PROTEIN UA: NEGATIVE MG/DL
RBC URINE: NORMAL /HPF (ref 0–3)
SPECIFIC GRAVITY UA: 1.02 (ref 1–1.03)
TRICHOMONAS: NORMAL /HPF
UROBILINOGEN, URINE: NORMAL MG/DL (ref 0.2–1)
WBC UA: NORMAL /HPF (ref 0–2)

## 2022-06-02 PROCEDURE — 87086 URINE CULTURE/COLONY COUNT: CPT

## 2022-06-02 PROCEDURE — 81001 URINALYSIS AUTO W/SCOPE: CPT

## 2022-06-03 LAB
CULTURE: NORMAL
Lab: NORMAL
SPECIMEN: NORMAL

## 2022-07-14 ENCOUNTER — HOSPITAL ENCOUNTER (OUTPATIENT)
Age: 87
Setting detail: SPECIMEN
Discharge: HOME | End: 2022-07-14
Payer: MEDICARE

## 2022-07-14 LAB
ALBUMIN SERPL-MCNC: 4.1 GM/DL (ref 3.4–5)
ALP BLD-CCNC: 94 IU/L (ref 40–128)
ALT SERPL-CCNC: 11 U/L (ref 10–40)
ANION GAP SERPL CALCULATED.3IONS-SCNC: 9 MMOL/L (ref 4–16)
AST SERPL-CCNC: 17 IU/L (ref 15–37)
BILIRUB SERPL-MCNC: 0.4 MG/DL (ref 0–1)
BUN BLDV-MCNC: 29 MG/DL (ref 6–23)
CALCIUM SERPL-MCNC: 9 MG/DL (ref 8.3–10.6)
CHLORIDE BLD-SCNC: 99 MMOL/L (ref 99–110)
CO2: 29 MMOL/L (ref 21–32)
CREAT SERPL-MCNC: 1.2 MG/DL (ref 0.9–1.3)
GFR AFRICAN AMERICAN: >60 ML/MIN/1.73M2
GFR NON-AFRICAN AMERICAN: 58 ML/MIN/1.73M2
GLUCOSE BLD-MCNC: 88 MG/DL (ref 70–99)
HCT VFR BLD CALC: 37.7 % (ref 42–52)
HEMOGLOBIN: 12.1 GM/DL (ref 13.5–18)
MCH RBC QN AUTO: 30.3 PG (ref 27–31)
MCHC RBC AUTO-ENTMCNC: 32.1 % (ref 32–36)
MCV RBC AUTO: 94.5 FL (ref 78–100)
PDW BLD-RTO: 14.5 % (ref 11.7–14.9)
PLATELET # BLD: 362 K/CU MM (ref 140–440)
PMV BLD AUTO: 10.8 FL (ref 7.5–11.1)
POTASSIUM SERPL-SCNC: 4.5 MMOL/L (ref 3.5–5.1)
RBC # BLD: 3.99 M/CU MM (ref 4.6–6.2)
SODIUM BLD-SCNC: 137 MMOL/L (ref 135–145)
TOTAL PROTEIN: 6.2 GM/DL (ref 6.4–8.2)
WBC # BLD: 7.2 K/CU MM (ref 4–10.5)

## 2022-07-14 PROCEDURE — 80053 COMPREHEN METABOLIC PANEL: CPT

## 2022-07-14 PROCEDURE — 36415 COLL VENOUS BLD VENIPUNCTURE: CPT

## 2022-07-14 PROCEDURE — 85027 COMPLETE CBC AUTOMATED: CPT

## 2023-01-12 ENCOUNTER — HOSPITAL ENCOUNTER (OUTPATIENT)
Age: 88
Setting detail: SPECIMEN
Discharge: HOME | End: 2023-01-12
Payer: MEDICARE

## 2023-01-12 LAB
ALBUMIN SERPL-MCNC: 3.7 GM/DL (ref 3.4–5)
ALP BLD-CCNC: 89 IU/L (ref 40–128)
ALT SERPL-CCNC: 12 U/L (ref 10–40)
ANION GAP SERPL CALCULATED.3IONS-SCNC: 11 MMOL/L (ref 4–16)
AST SERPL-CCNC: 18 IU/L (ref 15–37)
BILIRUB SERPL-MCNC: 0.4 MG/DL (ref 0–1)
BUN BLDV-MCNC: 30 MG/DL (ref 6–23)
CALCIUM SERPL-MCNC: 8.6 MG/DL (ref 8.3–10.6)
CHLORIDE BLD-SCNC: 96 MMOL/L (ref 99–110)
CHOLESTEROL: 109 MG/DL
CO2: 25 MMOL/L (ref 21–32)
CREAT SERPL-MCNC: 1.2 MG/DL (ref 0.9–1.3)
GFR SERPL CREATININE-BSD FRML MDRD: 59 ML/MIN/1.73M2
GLUCOSE BLD-MCNC: 76 MG/DL (ref 70–99)
HCT VFR BLD CALC: 34.6 % (ref 42–52)
HDLC SERPL-MCNC: 34 MG/DL
HEMOGLOBIN: 10.9 GM/DL (ref 13.5–18)
LDL CHOLESTEROL CALCULATED: 56 MG/DL
MCH RBC QN AUTO: 29.5 PG (ref 27–31)
MCHC RBC AUTO-ENTMCNC: 31.5 % (ref 32–36)
MCV RBC AUTO: 93.5 FL (ref 78–100)
PDW BLD-RTO: 14.7 % (ref 11.7–14.9)
PLATELET # BLD: 351 K/CU MM (ref 140–440)
PMV BLD AUTO: 11 FL (ref 7.5–11.1)
POTASSIUM SERPL-SCNC: 5 MMOL/L (ref 3.5–5.1)
PROSTATE SPECIFIC ANTIGEN: 0.09 NG/ML (ref 0–4)
RBC # BLD: 3.7 M/CU MM (ref 4.6–6.2)
SODIUM BLD-SCNC: 132 MMOL/L (ref 135–145)
TOTAL PROTEIN: 5.7 GM/DL (ref 6.4–8.2)
TRIGL SERPL-MCNC: 97 MG/DL
WBC # BLD: 7 K/CU MM (ref 4–10.5)

## 2023-01-12 PROCEDURE — 80061 LIPID PANEL: CPT

## 2023-01-12 PROCEDURE — G0103 PSA SCREENING: HCPCS

## 2023-01-12 PROCEDURE — 85027 COMPLETE CBC AUTOMATED: CPT

## 2023-01-12 PROCEDURE — 36415 COLL VENOUS BLD VENIPUNCTURE: CPT

## 2023-01-12 PROCEDURE — 80053 COMPREHEN METABOLIC PANEL: CPT

## 2023-02-02 ENCOUNTER — HOSPITAL ENCOUNTER (OUTPATIENT)
Age: 88
Setting detail: SPECIMEN
Discharge: HOME | End: 2023-02-02
Payer: MEDICARE

## 2023-02-02 LAB
ANION GAP SERPL CALCULATED.3IONS-SCNC: 7 MMOL/L (ref 4–16)
BUN SERPL-MCNC: 23 MG/DL (ref 6–23)
CALCIUM SERPL-MCNC: 8.3 MG/DL (ref 8.3–10.6)
CHLORIDE BLD-SCNC: 101 MMOL/L (ref 99–110)
CO2: 27 MMOL/L (ref 21–32)
CREAT SERPL-MCNC: 1 MG/DL (ref 0.9–1.3)
GFR SERPL CREATININE-BSD FRML MDRD: >60 ML/MIN/1.73M2
GLUCOSE SERPL-MCNC: 87 MG/DL (ref 70–99)
HCT VFR BLD CALC: 32.1 % (ref 42–52)
HEMOGLOBIN: 10.1 GM/DL (ref 13.5–18)
MCH RBC QN AUTO: 29.1 PG (ref 27–31)
MCHC RBC AUTO-ENTMCNC: 31.5 % (ref 32–36)
MCV RBC AUTO: 92.5 FL (ref 78–100)
PDW BLD-RTO: 14.6 % (ref 11.7–14.9)
PLATELET # BLD: 332 K/CU MM (ref 140–440)
PMV BLD AUTO: 10.8 FL (ref 7.5–11.1)
POTASSIUM SERPL-SCNC: 4.5 MMOL/L (ref 3.5–5.1)
RBC # BLD: 3.47 M/CU MM (ref 4.6–6.2)
SODIUM BLD-SCNC: 135 MMOL/L (ref 135–145)
WBC # BLD: 5 K/CU MM (ref 4–10.5)

## 2023-02-02 PROCEDURE — 81001 URINALYSIS AUTO W/SCOPE: CPT

## 2023-02-02 PROCEDURE — 80048 BASIC METABOLIC PNL TOTAL CA: CPT

## 2023-02-02 PROCEDURE — 85027 COMPLETE CBC AUTOMATED: CPT

## 2023-02-02 PROCEDURE — 36415 COLL VENOUS BLD VENIPUNCTURE: CPT

## 2023-02-02 PROCEDURE — 87086 URINE CULTURE/COLONY COUNT: CPT

## 2023-02-03 LAB
CULTURE: NORMAL
Lab: NORMAL
SPECIMEN: NORMAL

## 2023-02-17 ENCOUNTER — HOSPITAL ENCOUNTER (OUTPATIENT)
Age: 88
Setting detail: SPECIMEN
Discharge: HOME | End: 2023-02-17

## 2023-02-17 LAB
ALBUMIN SERPL-MCNC: 4.3 GM/DL (ref 3.4–5)
ALP BLD-CCNC: 98 IU/L (ref 40–129)
ALT SERPL-CCNC: 15 U/L (ref 10–40)
ANION GAP SERPL CALCULATED.3IONS-SCNC: 10 MMOL/L (ref 4–16)
AST SERPL-CCNC: 22 IU/L (ref 15–37)
BILIRUB SERPL-MCNC: 0.3 MG/DL (ref 0–1)
BUN SERPL-MCNC: 28 MG/DL (ref 6–23)
CALCIUM SERPL-MCNC: 9.2 MG/DL (ref 8.3–10.6)
CHLORIDE BLD-SCNC: 96 MMOL/L (ref 99–110)
CO2: 27 MMOL/L (ref 21–32)
CREAT SERPL-MCNC: 1.1 MG/DL (ref 0.9–1.3)
GFR SERPL CREATININE-BSD FRML MDRD: >60 ML/MIN/1.73M2
GLUCOSE SERPL-MCNC: 156 MG/DL (ref 70–99)
HCT VFR BLD CALC: 38.4 % (ref 42–52)
HEMOGLOBIN: 12.2 GM/DL (ref 13.5–18)
MCH RBC QN AUTO: 29.1 PG (ref 27–31)
MCHC RBC AUTO-ENTMCNC: 31.8 % (ref 32–36)
MCV RBC AUTO: 91.6 FL (ref 78–100)
PDW BLD-RTO: 14.6 % (ref 11.7–14.9)
PLATELET # BLD: 364 K/CU MM (ref 140–440)
PMV BLD AUTO: 10.7 FL (ref 7.5–11.1)
POTASSIUM SERPL-SCNC: 4.7 MMOL/L (ref 3.5–5.1)
RBC # BLD: 4.19 M/CU MM (ref 4.6–6.2)
SODIUM BLD-SCNC: 133 MMOL/L (ref 135–145)
TOTAL PROTEIN: 6.6 GM/DL (ref 6.4–8.2)
WBC # BLD: 7.6 K/CU MM (ref 4–10.5)

## 2023-02-17 PROCEDURE — 87086 URINE CULTURE/COLONY COUNT: CPT

## 2023-02-17 PROCEDURE — 9900360100 HC STAT COLLECTION FEE SNF

## 2023-02-17 PROCEDURE — 81003 URINALYSIS AUTO W/O SCOPE: CPT

## 2023-02-17 PROCEDURE — 80053 COMPREHEN METABOLIC PANEL: CPT

## 2023-02-17 PROCEDURE — 36415 COLL VENOUS BLD VENIPUNCTURE: CPT

## 2023-02-17 PROCEDURE — 85027 COMPLETE CBC AUTOMATED: CPT

## 2023-02-18 LAB
BILIRUBIN URINE: NEGATIVE MG/DL
BLOOD, URINE: NEGATIVE
CLARITY: CLEAR
COLOR: YELLOW
GLUCOSE, URINE: NEGATIVE MG/DL
KETONES, URINE: NEGATIVE MG/DL
LEUKOCYTE ESTERASE, URINE: NEGATIVE
NITRITE URINE, QUANTITATIVE: NEGATIVE
PH, URINE: 5.5 (ref 5–8)
PROTEIN UA: NEGATIVE MG/DL
SPECIFIC GRAVITY UA: 1.01 (ref 1–1.03)
UROBILINOGEN, URINE: 0.2 MG/DL (ref 0.2–1)

## 2023-02-19 LAB
CULTURE: NORMAL
Lab: NORMAL
SPECIMEN: NORMAL

## 2023-02-21 ENCOUNTER — HOSPITAL ENCOUNTER (OUTPATIENT)
Age: 88
Setting detail: SPECIMEN
Discharge: HOME | End: 2023-02-21
Payer: MEDICARE

## 2023-02-21 LAB
25(OH)D3 SERPL-MCNC: 37.54 NG/ML
ANION GAP SERPL CALCULATED.3IONS-SCNC: 10 MMOL/L (ref 4–16)
BUN SERPL-MCNC: 34 MG/DL (ref 6–23)
CALCIUM SERPL-MCNC: 9.2 MG/DL (ref 8.3–10.6)
CHLORIDE BLD-SCNC: 98 MMOL/L (ref 99–110)
CO2: 29 MMOL/L (ref 21–32)
CREAT SERPL-MCNC: 1.2 MG/DL (ref 0.9–1.3)
GFR SERPL CREATININE-BSD FRML MDRD: 59 ML/MIN/1.73M2
GLUCOSE SERPL-MCNC: 85 MG/DL (ref 70–99)
POTASSIUM SERPL-SCNC: 4.8 MMOL/L (ref 3.5–5.1)
SODIUM BLD-SCNC: 137 MMOL/L (ref 135–145)
TSH SERPL DL<=0.005 MIU/L-ACNC: 3.2 UIU/ML (ref 0.27–4.2)

## 2023-02-21 PROCEDURE — 84443 ASSAY THYROID STIM HORMONE: CPT

## 2023-02-21 PROCEDURE — 36415 COLL VENOUS BLD VENIPUNCTURE: CPT

## 2023-02-21 PROCEDURE — 82306 VITAMIN D 25 HYDROXY: CPT

## 2023-02-21 PROCEDURE — 80048 BASIC METABOLIC PNL TOTAL CA: CPT

## 2023-02-21 PROCEDURE — 82607 VITAMIN B-12: CPT

## 2023-02-22 LAB — VITAMIN B-12: >2000 PG/ML (ref 211–911)

## 2023-04-28 ENCOUNTER — HOSPITAL ENCOUNTER (OUTPATIENT)
Age: 88
Setting detail: SPECIMEN
Discharge: HOME | End: 2023-04-28
Payer: MEDICARE

## 2023-04-28 LAB
ANION GAP SERPL CALCULATED.3IONS-SCNC: 11 MMOL/L (ref 4–16)
BUN SERPL-MCNC: 21 MG/DL (ref 6–23)
CALCIUM SERPL-MCNC: 8.4 MG/DL (ref 8.3–10.6)
CHLORIDE BLD-SCNC: 102 MMOL/L (ref 99–110)
CO2: 25 MMOL/L (ref 21–32)
CREAT SERPL-MCNC: 1 MG/DL (ref 0.9–1.3)
GFR SERPL CREATININE-BSD FRML MDRD: >60 ML/MIN/1.73M2
GLUCOSE SERPL-MCNC: 69 MG/DL (ref 70–99)
HCT VFR BLD CALC: 36 % (ref 42–52)
HEMOGLOBIN: 11.4 GM/DL (ref 13.5–18)
MCH RBC QN AUTO: 28.9 PG (ref 27–31)
MCHC RBC AUTO-ENTMCNC: 31.7 % (ref 32–36)
MCV RBC AUTO: 91.1 FL (ref 78–100)
PDW BLD-RTO: 15.3 % (ref 11.7–14.9)
PLATELET # BLD: 355 K/CU MM (ref 140–440)
PMV BLD AUTO: 10.6 FL (ref 7.5–11.1)
POTASSIUM SERPL-SCNC: 5.2 MMOL/L (ref 3.5–5.1)
RBC # BLD: 3.95 M/CU MM (ref 4.6–6.2)
SODIUM BLD-SCNC: 138 MMOL/L (ref 135–145)
WBC # BLD: 6 K/CU MM (ref 4–10.5)

## 2023-04-28 PROCEDURE — 36415 COLL VENOUS BLD VENIPUNCTURE: CPT

## 2023-04-28 PROCEDURE — 80048 BASIC METABOLIC PNL TOTAL CA: CPT

## 2023-04-28 PROCEDURE — 85027 COMPLETE CBC AUTOMATED: CPT

## 2023-06-03 ENCOUNTER — HOSPITAL ENCOUNTER (OUTPATIENT)
Age: 88
Setting detail: SPECIMEN
Discharge: HOME | End: 2023-06-03

## 2023-06-03 LAB
ANION GAP SERPL CALCULATED.3IONS-SCNC: 13 MMOL/L (ref 4–16)
BUN SERPL-MCNC: 20 MG/DL (ref 6–23)
CALCIUM SERPL-MCNC: 8.8 MG/DL (ref 8.3–10.6)
CHLORIDE BLD-SCNC: 95 MMOL/L (ref 99–110)
CO2: 27 MMOL/L (ref 21–32)
CREAT SERPL-MCNC: 1.1 MG/DL (ref 0.9–1.3)
GFR SERPL CREATININE-BSD FRML MDRD: >60 ML/MIN/1.73M2
GLUCOSE SERPL-MCNC: 105 MG/DL (ref 70–99)
HCT VFR BLD CALC: 40.9 % (ref 42–52)
HEMOGLOBIN: 12.9 GM/DL (ref 13.5–18)
MCH RBC QN AUTO: 28.9 PG (ref 27–31)
MCHC RBC AUTO-ENTMCNC: 31.5 % (ref 32–36)
MCV RBC AUTO: 91.5 FL (ref 78–100)
PDW BLD-RTO: 15.9 % (ref 11.7–14.9)
PLATELET # BLD: 397 K/CU MM (ref 140–440)
PMV BLD AUTO: 11.3 FL (ref 7.5–11.1)
POTASSIUM SERPL-SCNC: 4.5 MMOL/L (ref 3.5–5.1)
RBC # BLD: 4.47 M/CU MM (ref 4.6–6.2)
SODIUM BLD-SCNC: 135 MMOL/L (ref 135–145)
WBC # BLD: 6.2 K/CU MM (ref 4–10.5)

## 2023-06-03 PROCEDURE — 36415 COLL VENOUS BLD VENIPUNCTURE: CPT

## 2023-06-03 PROCEDURE — 9900360100 HC STAT COLLECTION FEE SNF

## 2023-06-03 PROCEDURE — 81003 URINALYSIS AUTO W/O SCOPE: CPT

## 2023-06-03 PROCEDURE — 85027 COMPLETE CBC AUTOMATED: CPT

## 2023-06-03 PROCEDURE — 80048 BASIC METABOLIC PNL TOTAL CA: CPT

## 2023-06-04 LAB
BILIRUBIN URINE: NEGATIVE MG/DL
BLOOD, URINE: NEGATIVE
CLARITY: CLEAR
COLOR: YELLOW
COMMENT UA: NORMAL
GLUCOSE, URINE: NEGATIVE MG/DL
KETONES, URINE: NEGATIVE MG/DL
LEUKOCYTE ESTERASE, URINE: NEGATIVE
NITRITE URINE, QUANTITATIVE: NEGATIVE
PH, URINE: 7.5 (ref 5–8)
PROTEIN UA: NEGATIVE MG/DL
SPECIFIC GRAVITY UA: 1.01 (ref 1–1.03)
UROBILINOGEN, URINE: 0.2 MG/DL (ref 0.2–1)

## 2023-06-06 ENCOUNTER — HOSPITAL ENCOUNTER (OUTPATIENT)
Age: 88
Setting detail: SPECIMEN
Discharge: HOME | End: 2023-06-06
Payer: MEDICARE

## 2023-06-06 LAB
ALBUMIN SERPL-MCNC: 3.6 GM/DL (ref 3.4–5)
ALP BLD-CCNC: 72 IU/L (ref 40–129)
ALT SERPL-CCNC: 9 U/L (ref 10–40)
ANION GAP SERPL CALCULATED.3IONS-SCNC: 7 MMOL/L (ref 4–16)
AST SERPL-CCNC: 17 IU/L (ref 15–37)
BILIRUB SERPL-MCNC: 0.4 MG/DL (ref 0–1)
BUN SERPL-MCNC: 19 MG/DL (ref 6–23)
CALCIUM SERPL-MCNC: 8.4 MG/DL (ref 8.3–10.6)
CHLORIDE BLD-SCNC: 103 MMOL/L (ref 99–110)
CO2: 27 MMOL/L (ref 21–32)
CREAT SERPL-MCNC: 1 MG/DL (ref 0.9–1.3)
GFR SERPL CREATININE-BSD FRML MDRD: >60 ML/MIN/1.73M2
GLUCOSE SERPL-MCNC: 79 MG/DL (ref 70–99)
HCT VFR BLD CALC: 36.6 % (ref 42–52)
HEMOGLOBIN: 11.5 GM/DL (ref 13.5–18)
MCH RBC QN AUTO: 29 PG (ref 27–31)
MCHC RBC AUTO-ENTMCNC: 31.4 % (ref 32–36)
MCV RBC AUTO: 92.2 FL (ref 78–100)
PDW BLD-RTO: 15.9 % (ref 11.7–14.9)
PLATELET # BLD: 334 K/CU MM (ref 140–440)
PMV BLD AUTO: 11.2 FL (ref 7.5–11.1)
POTASSIUM SERPL-SCNC: 4.5 MMOL/L (ref 3.5–5.1)
RBC # BLD: 3.97 M/CU MM (ref 4.6–6.2)
SODIUM BLD-SCNC: 137 MMOL/L (ref 135–145)
TOTAL PROTEIN: 5.6 GM/DL (ref 6.4–8.2)
WBC # BLD: 4.7 K/CU MM (ref 4–10.5)

## 2023-06-06 PROCEDURE — 80053 COMPREHEN METABOLIC PANEL: CPT

## 2023-06-06 PROCEDURE — 36415 COLL VENOUS BLD VENIPUNCTURE: CPT

## 2023-06-06 PROCEDURE — 85027 COMPLETE CBC AUTOMATED: CPT

## 2023-06-09 ENCOUNTER — HOSPITAL ENCOUNTER (OUTPATIENT)
Age: 88
Setting detail: SPECIMEN
Discharge: HOME | End: 2023-06-09

## 2023-06-09 LAB
ALBUMIN SERPL-MCNC: 3.3 GM/DL (ref 3.4–5)
ALP BLD-CCNC: 65 IU/L (ref 40–129)
ALT SERPL-CCNC: 6 U/L (ref 10–40)
ANION GAP SERPL CALCULATED.3IONS-SCNC: 11 MMOL/L (ref 4–16)
AST SERPL-CCNC: 13 IU/L (ref 15–37)
BILIRUB SERPL-MCNC: 0.5 MG/DL (ref 0–1)
BUN SERPL-MCNC: 20 MG/DL (ref 6–23)
CALCIUM SERPL-MCNC: 8.1 MG/DL (ref 8.3–10.6)
CHLORIDE BLD-SCNC: 99 MMOL/L (ref 99–110)
CO2: 23 MMOL/L (ref 21–32)
CREAT SERPL-MCNC: 1.1 MG/DL (ref 0.9–1.3)
GFR SERPL CREATININE-BSD FRML MDRD: >60 ML/MIN/1.73M2
GLUCOSE SERPL-MCNC: 77 MG/DL (ref 70–99)
HCT VFR BLD CALC: 37.2 % (ref 42–52)
HEMOGLOBIN: 11.5 GM/DL (ref 13.5–18)
MCH RBC QN AUTO: 28.7 PG (ref 27–31)
MCHC RBC AUTO-ENTMCNC: 30.9 % (ref 32–36)
MCV RBC AUTO: 92.8 FL (ref 78–100)
PDW BLD-RTO: 15.9 % (ref 11.7–14.9)
PLATELET # BLD: 327 K/CU MM (ref 140–440)
PMV BLD AUTO: 11.1 FL (ref 7.5–11.1)
POTASSIUM SERPL-SCNC: 4.3 MMOL/L (ref 3.5–5.1)
RBC # BLD: 4.01 M/CU MM (ref 4.6–6.2)
SODIUM BLD-SCNC: 133 MMOL/L (ref 135–145)
TOTAL PROTEIN: 5.3 GM/DL (ref 6.4–8.2)
WBC # BLD: 9.2 K/CU MM (ref 4–10.5)

## 2023-06-09 PROCEDURE — 85027 COMPLETE CBC AUTOMATED: CPT

## 2023-06-09 PROCEDURE — 36415 COLL VENOUS BLD VENIPUNCTURE: CPT

## 2023-06-09 PROCEDURE — 9900360100 HC STAT COLLECTION FEE SNF

## 2023-06-09 PROCEDURE — 80053 COMPREHEN METABOLIC PANEL: CPT

## 2023-06-10 ENCOUNTER — HOSPITAL ENCOUNTER (OUTPATIENT)
Age: 88
Setting detail: SPECIMEN
Discharge: HOME | End: 2023-06-10
Payer: MEDICARE

## 2023-06-10 LAB
BACTERIA: NEGATIVE /HPF
BILIRUBIN URINE: ABNORMAL MG/DL
BLOOD, URINE: NEGATIVE
CLARITY: CLEAR
COLOR: YELLOW
GLUCOSE, URINE: NEGATIVE MG/DL
HYALINE CASTS: 0 /LPF
KETONES, URINE: 40 MG/DL
LEUKOCYTE ESTERASE, URINE: NEGATIVE
MUCUS: ABNORMAL HPF
NITRITE URINE, QUANTITATIVE: NEGATIVE
PH, URINE: 5.5 (ref 5–8)
PROTEIN UA: ABNORMAL MG/DL
RBC URINE: 1 /HPF (ref 0–3)
SPECIFIC GRAVITY UA: >1.03 (ref 1–1.03)
SQUAMOUS EPITHELIAL: <1 /HPF
TRICHOMONAS: ABNORMAL /HPF
UROBILINOGEN, URINE: 0.2 MG/DL (ref 0.2–1)
WBC UA: 1 /HPF (ref 0–2)

## 2023-06-10 PROCEDURE — 81001 URINALYSIS AUTO W/SCOPE: CPT

## 2023-09-28 ENCOUNTER — HOSPITAL ENCOUNTER (OUTPATIENT)
Age: 88
Setting detail: SPECIMEN
Discharge: HOME | End: 2023-09-28
Payer: MEDICARE

## 2023-09-28 LAB
BACTERIA: NEGATIVE /HPF
BILIRUBIN URINE: ABNORMAL MG/DL
BLOOD, URINE: NEGATIVE
CLARITY: CLEAR
COLOR: ABNORMAL
GLUCOSE, URINE: NEGATIVE MG/DL
KETONES, URINE: ABNORMAL MG/DL
LEUKOCYTE ESTERASE, URINE: NEGATIVE
MUCUS: ABNORMAL HPF
NITRITE URINE, QUANTITATIVE: POSITIVE
PH, URINE: 5.5 (ref 5–8)
PROTEIN UA: ABNORMAL MG/DL
RBC URINE: 1 /HPF (ref 0–3)
SPECIFIC GRAVITY UA: >1.03 (ref 1–1.03)
SQUAMOUS EPITHELIAL: <1 /HPF
TRICHOMONAS: ABNORMAL /HPF
UROBILINOGEN, URINE: 0.2 MG/DL (ref 0.2–1)
WBC UA: 3 /HPF (ref 0–2)

## 2023-09-28 PROCEDURE — 81001 URINALYSIS AUTO W/SCOPE: CPT

## 2023-09-28 PROCEDURE — 87086 URINE CULTURE/COLONY COUNT: CPT

## 2023-09-29 LAB
CULTURE: NORMAL
Lab: NORMAL
SPECIMEN: NORMAL

## 2023-11-22 ENCOUNTER — HOSPITAL ENCOUNTER (OUTPATIENT)
Age: 88
Setting detail: SPECIMEN
Discharge: HOME | End: 2023-11-22
Payer: MEDICARE

## 2023-11-22 PROCEDURE — 87086 URINE CULTURE/COLONY COUNT: CPT

## 2023-11-22 PROCEDURE — 81001 URINALYSIS AUTO W/SCOPE: CPT

## 2023-11-23 LAB
BACTERIA: ABNORMAL /HPF
BILIRUBIN URINE: NEGATIVE MG/DL
BLOOD, URINE: NEGATIVE
CALCIUM OXALATE CRYSTALS: ABNORMAL /HPF
CLARITY: ABNORMAL
COLOR: YELLOW
CULTURE: NORMAL
GLUCOSE, URINE: NEGATIVE MG/DL
KETONES, URINE: NEGATIVE MG/DL
LEUKOCYTE ESTERASE, URINE: NEGATIVE
Lab: NORMAL
MUCUS: ABNORMAL HPF
NITRITE URINE, QUANTITATIVE: NEGATIVE
PH, URINE: 5.5 (ref 5–8)
PROTEIN UA: NEGATIVE MG/DL
RBC URINE: 1 /HPF (ref 0–3)
SPECIFIC GRAVITY UA: >1.03 (ref 1–1.03)
SPECIMEN: NORMAL
TRICHOMONAS: ABNORMAL /HPF
URIC ACID CRYSTALS: ABNORMAL /HPF
UROBILINOGEN, URINE: 0.2 MG/DL (ref 0.2–1)
WBC UA: 1 /HPF (ref 0–2)

## 2023-11-27 ENCOUNTER — HOSPITAL ENCOUNTER (OUTPATIENT)
Age: 88
Setting detail: SPECIMEN
Discharge: HOME | End: 2023-11-27
Payer: MEDICARE

## 2023-11-27 PROCEDURE — 82272 OCCULT BLD FECES 1-3 TESTS: CPT

## 2023-11-28 LAB — HEMOCCULT STL QL: NEGATIVE

## 2024-01-11 ENCOUNTER — HOSPITAL ENCOUNTER (OUTPATIENT)
Age: 89
Setting detail: SPECIMEN
Discharge: HOME | End: 2024-01-11
Payer: MEDICARE

## 2024-01-11 LAB
25(OH)D3 SERPL-MCNC: 17.47 NG/ML
ALBUMIN SERPL-MCNC: 3.4 GM/DL (ref 3.4–5)
ALBUMIN SERPL-MCNC: 3.4 GM/DL (ref 3.4–5)
ALP BLD-CCNC: 73 IU/L (ref 40–128)
ALP BLD-CCNC: 73 IU/L (ref 40–129)
ALT SERPL-CCNC: 13 U/L (ref 10–40)
ALT SERPL-CCNC: 13 U/L (ref 10–40)
ANION GAP SERPL CALCULATED.3IONS-SCNC: 9 MMOL/L (ref 7–16)
AST SERPL-CCNC: 17 IU/L (ref 15–37)
AST SERPL-CCNC: 17 IU/L (ref 15–37)
BILIRUB SERPL-MCNC: 0.2 MG/DL (ref 0–1)
BILIRUB SERPL-MCNC: 0.2 MG/DL (ref 0–1)
BILIRUBIN DIRECT: 0.2 MG/DL (ref 0–0.3)
BILIRUBIN, INDIRECT: 0 MG/DL (ref 0–0.7)
BUN SERPL-MCNC: 25 MG/DL (ref 6–23)
CALCIUM SERPL-MCNC: 8.3 MG/DL (ref 8.3–10.6)
CHLORIDE BLD-SCNC: 102 MMOL/L (ref 99–110)
CO2: 26 MMOL/L (ref 21–32)
CREAT SERPL-MCNC: 1 MG/DL (ref 0.9–1.3)
GFR SERPL CREATININE-BSD FRML MDRD: >60 ML/MIN/1.73M2
GLUCOSE SERPL-MCNC: 102 MG/DL (ref 70–99)
HCT VFR BLD CALC: 35 % (ref 42–52)
HEMOGLOBIN: 10.7 GM/DL (ref 13.5–18)
MCH RBC QN AUTO: 27 PG (ref 27–31)
MCHC RBC AUTO-ENTMCNC: 30.6 % (ref 32–36)
MCV RBC AUTO: 88.4 FL (ref 78–100)
PDW BLD-RTO: 19.1 % (ref 11.7–14.9)
PLATELET # BLD: 353 K/CU MM (ref 140–440)
PMV BLD AUTO: 11.2 FL (ref 7.5–11.1)
POTASSIUM SERPL-SCNC: 4.8 MMOL/L (ref 3.5–5.1)
RBC # BLD: 3.96 M/CU MM (ref 4.6–6.2)
SODIUM BLD-SCNC: 137 MMOL/L (ref 135–145)
T3 FREE: 2.7 PG/ML (ref 2.3–4.2)
T4 FREE SERPL-MCNC: 2.12 NG/DL (ref 0.9–1.8)
TOTAL PROTEIN: 5.3 GM/DL (ref 6.4–8.2)
TOTAL PROTEIN: 5.3 GM/DL (ref 6.4–8.2)
TSH SERPL DL<=0.005 MIU/L-ACNC: 0.03 UIU/ML (ref 0.27–4.2)
WBC # BLD: 6 K/CU MM (ref 4–10.5)

## 2024-01-11 PROCEDURE — 84481 FREE ASSAY (FT-3): CPT

## 2024-01-11 PROCEDURE — 36415 COLL VENOUS BLD VENIPUNCTURE: CPT

## 2024-01-11 PROCEDURE — 80053 COMPREHEN METABOLIC PANEL: CPT

## 2024-01-11 PROCEDURE — 84443 ASSAY THYROID STIM HORMONE: CPT

## 2024-01-11 PROCEDURE — 84439 ASSAY OF FREE THYROXINE: CPT

## 2024-01-11 PROCEDURE — 82306 VITAMIN D 25 HYDROXY: CPT

## 2024-01-11 PROCEDURE — 85027 COMPLETE CBC AUTOMATED: CPT

## 2024-01-11 PROCEDURE — 80076 HEPATIC FUNCTION PANEL: CPT

## 2024-03-21 ENCOUNTER — HOSPITAL ENCOUNTER (OUTPATIENT)
Age: 89
Setting detail: SPECIMEN
Discharge: HOME | End: 2024-03-21
Payer: MEDICARE

## 2024-03-21 LAB
ANION GAP SERPL CALCULATED.3IONS-SCNC: 11 MMOL/L (ref 7–16)
BUN SERPL-MCNC: 19 MG/DL (ref 6–23)
CALCIUM SERPL-MCNC: 8.5 MG/DL (ref 8.3–10.6)
CHLORIDE BLD-SCNC: 102 MMOL/L (ref 99–110)
CO2: 25 MMOL/L (ref 21–32)
CREAT SERPL-MCNC: 1.1 MG/DL (ref 0.9–1.3)
GFR SERPL CREATININE-BSD FRML MDRD: >60 ML/MIN/1.73M2
GLUCOSE SERPL-MCNC: 90 MG/DL (ref 70–99)
HCT VFR BLD CALC: 38.8 % (ref 42–52)
HEMOGLOBIN: 11.8 GM/DL (ref 13.5–18)
MCH RBC QN AUTO: 27.8 PG (ref 27–31)
MCHC RBC AUTO-ENTMCNC: 30.4 % (ref 32–36)
MCV RBC AUTO: 91.5 FL (ref 78–100)
PDW BLD-RTO: 17.6 % (ref 11.7–14.9)
PLATELET # BLD: 213 K/CU MM (ref 140–440)
PMV BLD AUTO: 12 FL (ref 7.5–11.1)
POTASSIUM SERPL-SCNC: 4.7 MMOL/L (ref 3.5–5.1)
RBC # BLD: 4.24 M/CU MM (ref 4.6–6.2)
SODIUM BLD-SCNC: 138 MMOL/L (ref 135–145)
WBC # BLD: 3.9 K/CU MM (ref 4–10.5)

## 2024-03-21 PROCEDURE — 80048 BASIC METABOLIC PNL TOTAL CA: CPT

## 2024-03-21 PROCEDURE — 85027 COMPLETE CBC AUTOMATED: CPT

## 2024-03-21 PROCEDURE — 36415 COLL VENOUS BLD VENIPUNCTURE: CPT

## 2024-03-22 ENCOUNTER — HOSPITAL ENCOUNTER (OUTPATIENT)
Age: 89
Setting detail: SPECIMEN
Discharge: HOME | End: 2024-03-22
Payer: MEDICARE

## 2024-03-22 PROCEDURE — 87502 INFLUENZA DNA AMP PROBE: CPT

## 2024-03-24 LAB
INFLUENZA A ANTIGEN: DETECTED
INFLUENZA B ANTIGEN: NOT DETECTED

## 2024-09-25 ENCOUNTER — APPOINTMENT (OUTPATIENT)
Dept: GENERAL RADIOLOGY | Age: 89
DRG: 056 | End: 2024-09-25
Payer: MEDICARE

## 2024-09-25 ENCOUNTER — HOSPITAL ENCOUNTER (INPATIENT)
Age: 89
LOS: 2 days | Discharge: NURSING, REHAB OR LONG-TERM CARE | DRG: 056 | End: 2024-09-27
Attending: STUDENT IN AN ORGANIZED HEALTH CARE EDUCATION/TRAINING PROGRAM | Admitting: STUDENT IN AN ORGANIZED HEALTH CARE EDUCATION/TRAINING PROGRAM
Payer: MEDICARE

## 2024-09-25 ENCOUNTER — APPOINTMENT (OUTPATIENT)
Dept: CT IMAGING | Age: 89
DRG: 056 | End: 2024-09-25
Payer: MEDICARE

## 2024-09-25 DIAGNOSIS — U07.1 COVID-19 VIRUS INFECTION: Primary | ICD-10-CM

## 2024-09-25 DIAGNOSIS — Z79.899 CHRONIC USE OF BENZODIAZEPINE FOR THERAPEUTIC PURPOSE: ICD-10-CM

## 2024-09-25 PROBLEM — R41.0 ACUTE CONFUSION: Status: ACTIVE | Noted: 2024-09-25

## 2024-09-25 LAB
ALBUMIN SERPL-MCNC: 3.8 G/DL (ref 3.4–5)
ALBUMIN/GLOB SERPL: 1.8 {RATIO} (ref 1.1–2.2)
ALP SERPL-CCNC: 99 U/L (ref 40–129)
ALT SERPL-CCNC: 20 U/L (ref 10–40)
AMMONIA PLAS-SCNC: 22 UMOL/L (ref 16–60)
AMPHET UR QL SCN: NEGATIVE
ANION GAP SERPL CALCULATED.3IONS-SCNC: 11 MMOL/L (ref 9–17)
AST SERPL-CCNC: 22 U/L (ref 15–37)
B PARAP IS1001 DNA NPH QL NAA+NON-PROBE: NOT DETECTED
B PERT DNA SPEC QL NAA+PROBE: NOT DETECTED
BARBITURATES UR QL SCN: NEGATIVE
BASOPHILS # BLD: 0.05 K/UL
BASOPHILS NFR BLD: 1 % (ref 0–1)
BENZODIAZ UR QL: POSITIVE
BILIRUB SERPL-MCNC: 0.5 MG/DL (ref 0–1)
BILIRUB UR QL STRIP: NEGATIVE
BUN SERPL-MCNC: 24 MG/DL (ref 7–20)
C PNEUM DNA NPH QL NAA+NON-PROBE: NOT DETECTED
CALCIUM SERPL-MCNC: 9 MG/DL (ref 8.3–10.6)
CANNABINOIDS UR QL SCN: NEGATIVE
CASTS #/AREA URNS LPF: ABNORMAL /LPF
CHLORIDE SERPL-SCNC: 101 MMOL/L (ref 99–110)
CLARITY UR: CLEAR
CO2 SERPL-SCNC: 24 MMOL/L (ref 21–32)
COCAINE UR QL SCN: NEGATIVE
COLOR UR: YELLOW
CREAT SERPL-MCNC: 1.1 MG/DL (ref 0.8–1.3)
EKG ATRIAL RATE: 63 BPM
EKG DIAGNOSIS: NORMAL
EKG P AXIS: 65 DEGREES
EKG P-R INTERVAL: 134 MS
EKG Q-T INTERVAL: 450 MS
EKG QRS DURATION: 120 MS
EKG QTC CALCULATION (BAZETT): 460 MS
EKG R AXIS: 73 DEGREES
EKG T AXIS: 72 DEGREES
EKG VENTRICULAR RATE: 63 BPM
EOSINOPHIL # BLD: 0.07 K/UL
EOSINOPHILS RELATIVE PERCENT: 1 % (ref 0–3)
ERYTHROCYTE [DISTWIDTH] IN BLOOD BY AUTOMATED COUNT: 16.4 % (ref 11.7–14.9)
FENTANYL UR QL: NEGATIVE
FLUAV RNA NPH QL NAA+NON-PROBE: NOT DETECTED
FLUBV RNA NPH QL NAA+NON-PROBE: NOT DETECTED
FOLATE SERPL-MCNC: 35 NG/ML (ref 4.8–24.2)
GFR, ESTIMATED: 66 ML/MIN/1.73M2
GLUCOSE SERPL-MCNC: 90 MG/DL (ref 74–99)
GLUCOSE UR STRIP-MCNC: NEGATIVE MG/DL
HADV DNA NPH QL NAA+NON-PROBE: NOT DETECTED
HCOV 229E RNA NPH QL NAA+NON-PROBE: NOT DETECTED
HCOV HKU1 RNA NPH QL NAA+NON-PROBE: NOT DETECTED
HCOV NL63 RNA NPH QL NAA+NON-PROBE: NOT DETECTED
HCOV OC43 RNA NPH QL NAA+NON-PROBE: NOT DETECTED
HCT VFR BLD AUTO: 45.6 % (ref 42–52)
HGB BLD-MCNC: 14.7 G/DL (ref 13.5–18)
HGB UR QL STRIP.AUTO: NEGATIVE
HMPV RNA NPH QL NAA+NON-PROBE: NOT DETECTED
HPIV1 RNA NPH QL NAA+NON-PROBE: NOT DETECTED
HPIV2 RNA NPH QL NAA+NON-PROBE: NOT DETECTED
HPIV3 RNA NPH QL NAA+NON-PROBE: NOT DETECTED
HPIV4 RNA NPH QL NAA+NON-PROBE: NOT DETECTED
IMM GRANULOCYTES # BLD AUTO: 0.03 K/UL
IMM GRANULOCYTES NFR BLD: 1 %
KETONES UR STRIP-MCNC: 15 MG/DL
LEUKOCYTE ESTERASE UR QL STRIP: NEGATIVE
LYMPHOCYTES NFR BLD: 0.61 K/UL
LYMPHOCYTES RELATIVE PERCENT: 9 % (ref 24–44)
M PNEUMO DNA NPH QL NAA+NON-PROBE: NOT DETECTED
MCH RBC QN AUTO: 29.4 PG (ref 27–31)
MCHC RBC AUTO-ENTMCNC: 32.2 G/DL (ref 32–36)
MCV RBC AUTO: 91.2 FL (ref 78–100)
MONOCYTES NFR BLD: 0.59 K/UL
MONOCYTES NFR BLD: 9 % (ref 0–4)
MUCOUS THREADS URNS QL MICRO: ABNORMAL
NEUTROPHILS NFR BLD: 79 % (ref 36–66)
NEUTS SEG NFR BLD: 5.11 K/UL
NITRITE UR QL STRIP: NEGATIVE
OPIATES UR QL SCN: NEGATIVE
OXYCODONE UR QL SCN: NEGATIVE
PH UR STRIP: 5.5 [PH] (ref 5–8)
PLATELET # BLD AUTO: 334 K/UL (ref 140–440)
PMV BLD AUTO: 10.9 FL (ref 7.5–11.1)
POTASSIUM SERPL-SCNC: 4.7 MMOL/L (ref 3.5–5.1)
PROT SERPL-MCNC: 5.9 G/DL (ref 6.4–8.2)
PROT UR STRIP-MCNC: 100 MG/DL
RBC # BLD AUTO: 5 M/UL (ref 4.6–6.2)
RBC #/AREA URNS HPF: <1 /HPF (ref 0–2)
RSV RNA NPH QL NAA+NON-PROBE: NOT DETECTED
RV+EV RNA NPH QL NAA+NON-PROBE: NOT DETECTED
SARS-COV-2 RNA NPH QL NAA+NON-PROBE: NOT DETECTED
SODIUM SERPL-SCNC: 136 MMOL/L (ref 136–145)
SP GR UR STRIP: >1.03 (ref 1–1.03)
SPECIMEN DESCRIPTION: NORMAL
TEST INFORMATION: ABNORMAL
TRICHOMONAS #/AREA URNS HPF: ABNORMAL /[HPF]
TSH SERPL DL<=0.05 MIU/L-ACNC: 2.39 UIU/ML (ref 0.27–4.2)
UROBILINOGEN UR STRIP-ACNC: 0.2 EU/DL (ref 0–1)
VIT B12 SERPL-MCNC: 668 PG/ML (ref 211–911)
WBC #/AREA URNS HPF: 1 /HPF (ref 0–5)
WBC OTHER # BLD: 6.5 K/UL (ref 4–10.5)

## 2024-09-25 PROCEDURE — 1200000000 HC SEMI PRIVATE

## 2024-09-25 PROCEDURE — 84443 ASSAY THYROID STIM HORMONE: CPT

## 2024-09-25 PROCEDURE — G0378 HOSPITAL OBSERVATION PER HR: HCPCS

## 2024-09-25 PROCEDURE — 2580000003 HC RX 258: Performed by: STUDENT IN AN ORGANIZED HEALTH CARE EDUCATION/TRAINING PROGRAM

## 2024-09-25 PROCEDURE — 70450 CT HEAD/BRAIN W/O DYE: CPT

## 2024-09-25 PROCEDURE — 6370000000 HC RX 637 (ALT 250 FOR IP): Performed by: STUDENT IN AN ORGANIZED HEALTH CARE EDUCATION/TRAINING PROGRAM

## 2024-09-25 PROCEDURE — 93005 ELECTROCARDIOGRAM TRACING: CPT | Performed by: STUDENT IN AN ORGANIZED HEALTH CARE EDUCATION/TRAINING PROGRAM

## 2024-09-25 PROCEDURE — 82140 ASSAY OF AMMONIA: CPT

## 2024-09-25 PROCEDURE — 99285 EMERGENCY DEPT VISIT HI MDM: CPT

## 2024-09-25 PROCEDURE — 80307 DRUG TEST PRSMV CHEM ANLYZR: CPT

## 2024-09-25 PROCEDURE — 85025 COMPLETE CBC W/AUTO DIFF WBC: CPT

## 2024-09-25 PROCEDURE — 80053 COMPREHEN METABOLIC PANEL: CPT

## 2024-09-25 PROCEDURE — 93010 ELECTROCARDIOGRAM REPORT: CPT | Performed by: INTERNAL MEDICINE

## 2024-09-25 PROCEDURE — 0202U NFCT DS 22 TRGT SARS-COV-2: CPT

## 2024-09-25 PROCEDURE — 71045 X-RAY EXAM CHEST 1 VIEW: CPT

## 2024-09-25 PROCEDURE — 82607 VITAMIN B-12: CPT

## 2024-09-25 PROCEDURE — 82746 ASSAY OF FOLIC ACID SERUM: CPT

## 2024-09-25 PROCEDURE — 81001 URINALYSIS AUTO W/SCOPE: CPT

## 2024-09-25 RX ORDER — ALBUTEROL SULFATE 90 UG/1
2 INHALANT RESPIRATORY (INHALATION) EVERY 4 HOURS PRN
COMMUNITY
Start: 2023-12-17

## 2024-09-25 RX ORDER — MAGNESIUM SULFATE IN WATER 40 MG/ML
2000 INJECTION, SOLUTION INTRAVENOUS PRN
Status: DISCONTINUED | OUTPATIENT
Start: 2024-09-25 | End: 2024-09-27 | Stop reason: HOSPADM

## 2024-09-25 RX ORDER — METOPROLOL SUCCINATE 25 MG/1
25 TABLET, EXTENDED RELEASE ORAL DAILY
Status: DISCONTINUED | OUTPATIENT
Start: 2024-09-26 | End: 2024-09-26

## 2024-09-25 RX ORDER — LISINOPRIL 2.5 MG/1
2.5 TABLET ORAL DAILY
COMMUNITY
Start: 2024-09-24

## 2024-09-25 RX ORDER — SENNOSIDES A AND B 8.6 MG/1
1 TABLET, FILM COATED ORAL NIGHTLY
COMMUNITY

## 2024-09-25 RX ORDER — POLYETHYLENE GLYCOL 3350 17 G/17G
17 POWDER, FOR SOLUTION ORAL DAILY PRN
Status: DISCONTINUED | OUTPATIENT
Start: 2024-09-25 | End: 2024-09-27 | Stop reason: HOSPADM

## 2024-09-25 RX ORDER — DONEPEZIL HYDROCHLORIDE 10 MG/1
10 TABLET, FILM COATED ORAL NIGHTLY
Status: DISCONTINUED | OUTPATIENT
Start: 2024-09-26 | End: 2024-09-27 | Stop reason: HOSPADM

## 2024-09-25 RX ORDER — SENNOSIDES A AND B 8.6 MG/1
1 TABLET, FILM COATED ORAL NIGHTLY
Status: DISCONTINUED | OUTPATIENT
Start: 2024-09-26 | End: 2024-09-27 | Stop reason: HOSPADM

## 2024-09-25 RX ORDER — ACETAMINOPHEN 325 MG/1
650 TABLET ORAL EVERY 6 HOURS PRN
Status: DISCONTINUED | OUTPATIENT
Start: 2024-09-25 | End: 2024-09-27 | Stop reason: HOSPADM

## 2024-09-25 RX ORDER — SODIUM CHLORIDE 0.9 % (FLUSH) 0.9 %
5-40 SYRINGE (ML) INJECTION PRN
Status: DISCONTINUED | OUTPATIENT
Start: 2024-09-25 | End: 2024-09-27 | Stop reason: HOSPADM

## 2024-09-25 RX ORDER — SODIUM CHLORIDE, SODIUM LACTATE, POTASSIUM CHLORIDE, CALCIUM CHLORIDE 600; 310; 30; 20 MG/100ML; MG/100ML; MG/100ML; MG/100ML
INJECTION, SOLUTION INTRAVENOUS CONTINUOUS
Status: DISPENSED | OUTPATIENT
Start: 2024-09-25 | End: 2024-09-25

## 2024-09-25 RX ORDER — CYANOCOBALAMIN 1000 UG/ML
1000 INJECTION, SOLUTION INTRAMUSCULAR; SUBCUTANEOUS
COMMUNITY

## 2024-09-25 RX ORDER — ALBUTEROL SULFATE 90 UG/1
2 INHALANT RESPIRATORY (INHALATION) EVERY 4 HOURS PRN
Status: DISCONTINUED | OUTPATIENT
Start: 2024-09-25 | End: 2024-09-27 | Stop reason: HOSPADM

## 2024-09-25 RX ORDER — QUETIAPINE FUMARATE 25 MG/1
12.5 TABLET, FILM COATED ORAL ONCE
Status: COMPLETED | OUTPATIENT
Start: 2024-09-25 | End: 2024-09-25

## 2024-09-25 RX ORDER — POTASSIUM CHLORIDE 7.45 MG/ML
10 INJECTION INTRAVENOUS PRN
Status: DISCONTINUED | OUTPATIENT
Start: 2024-09-25 | End: 2024-09-27 | Stop reason: HOSPADM

## 2024-09-25 RX ORDER — DONEPEZIL HYDROCHLORIDE 10 MG/1
10 TABLET, FILM COATED ORAL NIGHTLY
COMMUNITY
Start: 2023-12-17

## 2024-09-25 RX ORDER — MIRTAZAPINE 15 MG/1
7.5 TABLET, FILM COATED ORAL NIGHTLY
Status: DISCONTINUED | OUTPATIENT
Start: 2024-09-26 | End: 2024-09-27 | Stop reason: HOSPADM

## 2024-09-25 RX ORDER — SODIUM CHLORIDE 0.9 % (FLUSH) 0.9 %
5-40 SYRINGE (ML) INJECTION 2 TIMES DAILY
Status: DISCONTINUED | OUTPATIENT
Start: 2024-09-26 | End: 2024-09-27 | Stop reason: HOSPADM

## 2024-09-25 RX ORDER — ACETAMINOPHEN 325 MG/1
650 TABLET ORAL EVERY 6 HOURS PRN
COMMUNITY

## 2024-09-25 RX ORDER — RANOLAZINE 500 MG/1
500 TABLET, EXTENDED RELEASE ORAL 2 TIMES DAILY
Status: DISCONTINUED | OUTPATIENT
Start: 2024-09-26 | End: 2024-09-27 | Stop reason: HOSPADM

## 2024-09-25 RX ORDER — TAMSULOSIN HYDROCHLORIDE 0.4 MG/1
0.4 CAPSULE ORAL DAILY
Status: DISCONTINUED | OUTPATIENT
Start: 2024-09-26 | End: 2024-09-27 | Stop reason: HOSPADM

## 2024-09-25 RX ORDER — LORAZEPAM 0.5 MG/1
0.5 TABLET ORAL DAILY
Status: DISCONTINUED | OUTPATIENT
Start: 2024-09-26 | End: 2024-09-26

## 2024-09-25 RX ORDER — POTASSIUM CHLORIDE 1500 MG/1
40 TABLET, EXTENDED RELEASE ORAL PRN
Status: DISCONTINUED | OUTPATIENT
Start: 2024-09-25 | End: 2024-09-27 | Stop reason: HOSPADM

## 2024-09-25 RX ORDER — MIRTAZAPINE 7.5 MG/1
7.5 TABLET, FILM COATED ORAL NIGHTLY
COMMUNITY
Start: 2024-08-21

## 2024-09-25 RX ORDER — FERROUS SULFATE 325(65) MG
325 TABLET ORAL
Status: DISCONTINUED | OUTPATIENT
Start: 2024-09-26 | End: 2024-09-27 | Stop reason: HOSPADM

## 2024-09-25 RX ORDER — FUROSEMIDE 40 MG
40 TABLET ORAL DAILY
Status: DISCONTINUED | OUTPATIENT
Start: 2024-09-26 | End: 2024-09-27 | Stop reason: HOSPADM

## 2024-09-25 RX ORDER — CLOPIDOGREL BISULFATE 75 MG/1
75 TABLET ORAL DAILY
Status: DISCONTINUED | OUTPATIENT
Start: 2024-09-26 | End: 2024-09-27 | Stop reason: HOSPADM

## 2024-09-25 RX ORDER — SODIUM CHLORIDE 9 MG/ML
INJECTION, SOLUTION INTRAVENOUS PRN
Status: DISCONTINUED | OUTPATIENT
Start: 2024-09-25 | End: 2024-09-27 | Stop reason: HOSPADM

## 2024-09-25 RX ORDER — CALCIUM CARBONATE 500(1250)
500 TABLET ORAL DAILY
COMMUNITY

## 2024-09-25 RX ORDER — ATORVASTATIN CALCIUM 40 MG/1
80 TABLET, FILM COATED ORAL NIGHTLY
Status: DISCONTINUED | OUTPATIENT
Start: 2024-09-26 | End: 2024-09-27 | Stop reason: HOSPADM

## 2024-09-25 RX ORDER — ACETAMINOPHEN 650 MG/1
650 SUPPOSITORY RECTAL EVERY 6 HOURS PRN
Status: DISCONTINUED | OUTPATIENT
Start: 2024-09-25 | End: 2024-09-27 | Stop reason: HOSPADM

## 2024-09-25 RX ORDER — ENOXAPARIN SODIUM 100 MG/ML
30 INJECTION SUBCUTANEOUS DAILY
Status: DISCONTINUED | OUTPATIENT
Start: 2024-09-26 | End: 2024-09-27 | Stop reason: HOSPADM

## 2024-09-25 RX ORDER — SERTRALINE HYDROCHLORIDE 100 MG/1
100 TABLET, FILM COATED ORAL DAILY
COMMUNITY
Start: 2023-11-22

## 2024-09-25 RX ORDER — FERROUS SULFATE 325(65) MG
325 TABLET ORAL
COMMUNITY

## 2024-09-25 RX ORDER — ONDANSETRON 2 MG/ML
4 INJECTION INTRAMUSCULAR; INTRAVENOUS EVERY 6 HOURS PRN
Status: DISCONTINUED | OUTPATIENT
Start: 2024-09-25 | End: 2024-09-27 | Stop reason: HOSPADM

## 2024-09-25 RX ORDER — ONDANSETRON 4 MG/1
4 TABLET, ORALLY DISINTEGRATING ORAL EVERY 8 HOURS PRN
Status: DISCONTINUED | OUTPATIENT
Start: 2024-09-25 | End: 2024-09-27 | Stop reason: HOSPADM

## 2024-09-25 RX ORDER — FINASTERIDE 5 MG/1
5 TABLET, FILM COATED ORAL DAILY
Status: DISCONTINUED | OUTPATIENT
Start: 2024-09-26 | End: 2024-09-27 | Stop reason: HOSPADM

## 2024-09-25 RX ORDER — CYANOCOBALAMIN 1000 UG/ML
1000 INJECTION, SOLUTION INTRAMUSCULAR; SUBCUTANEOUS
Status: DISCONTINUED | OUTPATIENT
Start: 2024-09-26 | End: 2024-09-27 | Stop reason: HOSPADM

## 2024-09-25 RX ORDER — LISINOPRIL 5 MG/1
2.5 TABLET ORAL DAILY
Status: DISCONTINUED | OUTPATIENT
Start: 2024-09-26 | End: 2024-09-26

## 2024-09-25 RX ORDER — CALCIUM CARBONATE 500(1250)
500 TABLET ORAL DAILY
Status: DISCONTINUED | OUTPATIENT
Start: 2024-09-26 | End: 2024-09-27 | Stop reason: HOSPADM

## 2024-09-25 RX ORDER — LORAZEPAM 0.5 MG/1
0.5 TABLET ORAL DAILY
Status: ON HOLD | COMMUNITY
Start: 2024-03-16 | End: 2024-09-27 | Stop reason: HOSPADM

## 2024-09-25 RX ORDER — ASPIRIN 81 MG/1
81 TABLET, CHEWABLE ORAL DAILY
Status: DISCONTINUED | OUTPATIENT
Start: 2024-09-26 | End: 2024-09-27 | Stop reason: HOSPADM

## 2024-09-25 RX ADMIN — SODIUM CHLORIDE, POTASSIUM CHLORIDE, SODIUM LACTATE AND CALCIUM CHLORIDE: 600; 310; 30; 20 INJECTION, SOLUTION INTRAVENOUS at 18:28

## 2024-09-25 RX ADMIN — QUETIAPINE FUMARATE 12.5 MG: 25 TABLET ORAL at 18:21

## 2024-09-25 ASSESSMENT — PAIN SCALES - PAIN ASSESSMENT IN ADVANCED DEMENTIA (PAINAD)
BODYLANGUAGE: RELAXED
FACIALEXPRESSION: SMILING OR INEXPRESSIVE
BREATHING: NORMAL
TOTALSCORE: 0
CONSOLABILITY: NO NEED TO CONSOLE

## 2024-09-25 ASSESSMENT — PAIN - FUNCTIONAL ASSESSMENT: PAIN_FUNCTIONAL_ASSESSMENT: NONE - DENIES PAIN

## 2024-09-25 NOTE — CARE COORDINATION
CM consult per Dr Elizabeth for discharge planning. Review of pt chart pt has insurance and PCP.   EMS report states  from [facility] called due to pt who has been dx with COVID was more confused today.  is familiar with this pt and stated this is not his normal mentation, feels pt is more confused today.    Per staff and per my visit with pt, pt is confused. Pt was pleasant during my visit with him but was unable to answer questions appropriately. Not knowing this pt baseline his mentation at present, demonstrates, confusion. Pt was unable to answer any of the questions I asked him: unable to tell me where he is at this time, unable to tell me where he lives, even when I asked if he lived a [facility], he attempts to answer questions but unable to due answer any question, he searched for answers. Pt did state he felt well.    Update to Dr Elizabeth who plans for Admission for pt AMS reported by  at [facility] apts. GERHARD,RN/CM

## 2024-09-25 NOTE — ED PROVIDER NOTES
Emergency Department Encounter    Patient: Xavier Lancaster  MRN: [id number]  : 1935  Date of Evaluation: 2024  ED Provider:  Eugenio Finch DO    Triage Chief Complaint:   Positive For Covid-19 ([facility] facility sends patient due to being covid positive and having increased confusion.  Patient is normally confused but facility reports symptom has worsened.)    Tanana:  Xavier Lancaster is a 88 y.o. male that presents due to COVID-19 positive test and desaturation to 70 SpO2 earlier today.  At baseline patient does have significant dementia and nursing facility nurse over the phone tells me that he is typically ANO x 2 at best.  He has had some confusion earlier today that has been intermittent.  Patient tells me that he feels okay but otherwise does not provide much history likely secondary to his dementia.    MDM:    History from : Patient and [facility] nurse    On arrival patient very well-appearing with normal vital signs.  He is in absolutely no respiratory distress and has 90% room air SpO2.  Clear lung sounds.  EKG without any acute changes.  Chest x-ray without any signs of edema or effusions or consolidations.  Patient is urinalysis pending and head CT pending.  If these are unremarkable I think it be reasonable for him to be transferred back to facility.  Patient will be signed out to oncoming physician.    ED Course as of 24 1409   Wed Sep 25, 2024   1238 EKG as interpreted by me  Normal sinus rhythm at 63 bpm  Normal axis  Right bundle branch block  LVH by voltage criteria  Q waves in inferior leads  No STEMI   [CC]      ED Course User Index  [CC] Jai Cardoso DO       Patient was given the following medications:  Medications - No data to display    Discharge condition: stable    I am the Primary Clinician of Record.    Is this patient to be included in the SEP- Core Measure due to severe sepsis or septic shock?   No   Exclusion criteria - the patient is NOT to be  month      donepezil (ARICEPT) 10 MG tablet Take 1 tablet by mouth nightly      ferrous sulfate (IRON 325) 325 (65 Fe) MG tablet Take 1 tablet by mouth daily (with breakfast)      lisinopril (PRINIVIL;ZESTRIL) 2.5 MG tablet Take 1 tablet by mouth daily      melatonin 5 MG TABS tablet Take 1 tablet by mouth nightly      mirtazapine (REMERON) 7.5 MG tablet Take 1 tablet by mouth nightly      calcium carbonate (OSCAL) 500 MG TABS tablet Take 1 tablet by mouth daily      senna (SENOKOT) 8.6 MG tablet Take 1 tablet by mouth nightly      sertraline (ZOLOFT) 100 MG tablet Take 1 tablet by mouth daily      ranolazine (RANEXA) 500 MG extended release tablet Take 1 tablet by mouth 2 times daily 60 tablet 3    metoprolol succinate (TOPROL XL) 25 MG extended release tablet Take 1 tablet by mouth daily 30 tablet 0    aspirin 81 MG EC tablet Take 1 tablet by mouth daily 30 tablet 3    atorvastatin (LIPITOR) 80 MG tablet Take 1 tablet by mouth nightly 30 tablet 0    finasteride (PROSCAR) 5 MG tablet Take 1 tablet by mouth daily 30 tablet 0    tamsulosin (FLOMAX) 0.4 MG capsule Take 1 capsule by mouth daily 30 capsule 0    clopidogrel (PLAVIX) 75 MG tablet Take 1 tablet by mouth daily 30 tablet 0    albuterol sulfate HFA (PROVENTIL;VENTOLIN;PROAIR) 108 (90 Base) MCG/ACT inhaler Inhale 2 puffs into the lungs every 4 hours as needed      magnesium hydroxide (MILK OF MAGNESIA) 400 MG/5ML suspension Take 30 mLs by mouth every 12 hours as needed for Constipation      acetaminophen (TYLENOL) 325 MG tablet Take 2 tablets by mouth every 6 hours as needed for Pain       No Known Allergies    Physical Exam:  Triage VS:      ED Triage Vitals [09/25/24 1239]   Encounter Vitals Group      /86      Systolic BP Percentile       Diastolic BP Percentile       Pulse 58      Respirations 20      Temp 97.5 °F (36.4 °C)      Temp Source Oral      SpO2 98 %      Weight       Height       Head Circumference       Peak Flow       Pain Score        9 - 17 mmol/L    Glucose 90 74 - 99 mg/dL    BUN 24 (H) 7 - 20 mg/dL    Creatinine 1.1 0.8 - 1.3 mg/dL    Est, Glom Filt Rate 66 >60 mL/min/1.73m2    Calcium 9.0 8.3 - 10.6 mg/dL    Total Protein 5.9 (L) 6.4 - 8.2 g/dL    Albumin 3.8 3.4 - 5.0 g/dL    Albumin/Globulin Ratio 1.8 1.1 - 2.2    Total Bilirubin 0.5 0.0 - 1.0 mg/dL    Alkaline Phosphatase 99 40 - 129 U/L    ALT 20 10 - 40 U/L    AST 22 15 - 37 U/L   EKG 12 Lead   Result Value Ref Range    Ventricular Rate 63 BPM    Atrial Rate 63 BPM    P-R Interval 134 ms    QRS Duration 120 ms    Q-T Interval 450 ms    QTc Calculation (Bazett) 460 ms    P Axis 65 degrees    R Axis 73 degrees    T Axis 72 degrees    Diagnosis       Sinus rhythm with premature supraventricular complexes  Right bundle branch block  Voltage criteria for left ventricular hypertrophy  Possible Inferior infarct , age undetermined  When compared with ECG of 29-DEC-2020 04:06,  Right bundle branch block is now present  Criteria for Anterior infarct are no longer present  Borderline criteria for Inferior infarct are now present        Radiographs (if obtained):  Radiologist's Report Reviewed:  XR CHEST PORTABLE    Result Date: 9/25/2024  Chest X-ray INDICATION:  SOB COMPARISON: Chest x-ray 12/28/2020. TECHNIQUE: AP/PA view of the chest was obtained.     Lungs are again hyperinflated with significantly increased interstitial markings, consistent with chronic lung disease. No definite pulmonary edema is seen, although a mild degree of pulmonary edema cannot be entirely excluded given the underlying chronic lung changes. No focal infiltrate is evident. No pleural effusion or pneumothorax is seen. The cardiomediastinal silhouette is remarkable for a calcified and tortuous aorta. No evidence of cardiomegaly. Electronically signed by Fortino Orozco MD        Clinical Impression:  1. COVID-19 virus infection      Disposition referral (if applicable):  No follow-up provider specified.  Disposition

## 2024-09-25 NOTE — H&P
V2.0  History and Physical      Name:  Xavier Lancaster /Age/Sex: 1935  (88 y.o. male)   MRN & CSN:  [id number] & [id number] Encounter Date/Time: 2024 5:57 PM EDT   Location:  ED28/ED-28 PCP: Junior Galvez MD       Hospital Day: 1    Assessment and Plan:   Xavier Lancaster is a 88 y.o. male who presents with Acute confusion    Hospital Problems             Last Modified POA    * (Principal) Acute confusion 2024 Yes       Plan:    Acute confusion  Has hx of Dementia   Likely confusion exacerbated by COVID as per Nursing home report. AO x2 at baseline    Continue Donepezil    CT head , chest xray, UA unremarkable   Obtain UDS, Ammonia, VBG, B12/ folate, Respiratory panel including COVID   Currently saturating well in RA    Seroquel once.    Admit to med surg tele   Neuro check     CHFrEF   On Lasix at home. Currently held   Will resume once patient starts to eat   Continue BB. Aspirin, Plavix    CAD   Continue aspirin and Plavix    COPD   Not in exacerbation   Albuterol as needed           Disposition:   Current Living situation:LTC  Expected Disposition: TBD   Estimated D/C: 1-2 days    Diet No diet orders on file   DVT Prophylaxis [x] Lovenox, []  Heparin, [] SCDs, [] Ambulation,  [] Eliquis, [] Xarelto, [] Coumadin   Code Status Prior   Surrogate Decision Maker/ POA      Personally reviewed Lab Studies and Imaging     Discussed management of the case with ER provider who recommended admisison    EKG interpreted personally and results sinus    Imaging that was interpreted personally includes chest xray and results no acute process     Drugs that require monitoring for toxicity include lovenox and the method of monitoring was CBC        History from:     patient, electronic medical record    History of Present Illness:     Chief Complaint: confusion   Xavier Lancaster is a 88 y.o. male who presents with confusion. Recently was tested +ve for COVID. Patient is Aox2 at baseline. Apparently desaturated  Cultures: No results found for: \"LABURIN\"  Blood Cultures: No results found for: \"BC\"  No results found for: \"BLOODCULT2\"  Organism: No results found for: \"ORG\"    Imaging/Diagnostics Last 24 Hours   CT Head W/O Contrast    Result Date: 9/25/2024  Head CT INDICATION: Stroke/altered mental status TECHNIQUE: Multiple 2D axial images obtained through the brain without intravenous contrast.  Radiation dose reduction techniques were used for this study:  All CT scans performed at this facility use one or all of the following: Automated exposure control, adjustment of the mA and/or kVp according to patient's size, iterative reconstruction. COMPARISON: August 22, 2020 Findings: Remote stroke in the left PCA territory. Remote stroke in the right parietal occipital cortex in the watershed distribution mild chronic changes in the white matter. No focal mass lesion or hemorrhage or large vessel ischemic changes. Chronic mucosal thickening in the left maxillary sinus.     Remote cortical stroke in the PCA/watershed territory bilaterally. Moderate chronic changes in the white matter. No acute hemorrhage or large vessel ischemic changes. Electronically signed by Mike Szymanski MD    XR CHEST PORTABLE    Result Date: 9/25/2024  Chest X-ray INDICATION:  SOB COMPARISON: Chest x-ray 12/28/2020. TECHNIQUE: AP/PA view of the chest was obtained.     Lungs are again hyperinflated with significantly increased interstitial markings, consistent with chronic lung disease. No definite pulmonary edema is seen, although a mild degree of pulmonary edema cannot be entirely excluded given the underlying chronic lung changes. No focal infiltrate is evident. No pleural effusion or pneumothorax is seen. The cardiomediastinal silhouette is remarkable for a calcified and tortuous aorta. No evidence of cardiomegaly. Electronically signed by Fortino Orozco MD        Electronically signed by Gavin Pérez MD on 9/25/2024 at 5:57 PM

## 2024-09-25 NOTE — ED NOTES
ED TO INPATIENT SBAR HANDOFF    Patient Name: Xavier Lancaster   :  1935  88 y.o.   Preferred Name  Xavier   Family/Caregiver Present no   Restraints no   C-SSRS: Risk of Suicide: No Risk  Sitter no   Sepsis Risk Score        Situation  Chief Complaint   Patient presents with    Positive For Covid-19     LT facility sends patient due to being covid positive and having increased confusion.  Patient is normally confused but facility reports symptom has worsened.     Brief Description of Patient's Condition: Pt is alert but confused he is from a nursing facility and has a hx of confusion but more confused today. Pt also diagnosed with covid per facility. Not requiring any oxygen and has no other complaints.   Mental Status: disoriented and alert  Arrived from: home    Imaging:   CT Head W/O Contrast   Final Result   Remote cortical stroke in the PCA/watershed territory bilaterally.   Moderate chronic changes in the white matter. No acute hemorrhage or large   vessel ischemic changes.      Electronically signed by Mike Szymanski MD      XR CHEST PORTABLE   Final Result      Lungs are again hyperinflated with significantly increased interstitial   markings, consistent with chronic lung disease. No definite pulmonary edema is   seen, although a mild degree of pulmonary edema cannot be entirely excluded   given the underlying chronic lung changes.      No focal infiltrate is evident.      No pleural effusion or pneumothorax is seen.      The cardiomediastinal silhouette is remarkable for a calcified and tortuous   aorta. No evidence of cardiomegaly.         Electronically signed by Fortino Orozco MD        Abnormal labs:   Abnormal Labs Reviewed   CBC WITH AUTO DIFFERENTIAL - Abnormal; Notable for the following components:       Result Value    RDW 16.4 (*)     Neutrophils % 79 (*)     Lymphocytes % 9 (*)     Monocytes % 9 (*)     Immature Granulocytes % 1 (*)     All other components within normal limits

## 2024-09-25 NOTE — ED NOTES
Medication History  [facility]    Patient Name: Xavier Lancaster 1935     Medication history has been completed by: Negrita Whiting CPhT    Source(s) of information: Medication list provided by [facility]     Primary Care Physician: Junior Galvez MD     Pharmacy:     Allergies as of 09/25/2024    (No Known Allergies)        Prior to Admission medications    Medication Sig Start Date End Date Taking? Authorizing Provider   ATIVAN 0.5 MG tablet Take 1 tablet by mouth daily. 3/16/24  Yes Paco Higginbotham MD   cyanocobalamin 1000 MCG/ML injection Inject 1 mL into the muscle every 30 days Receives on the 26 th of each month   Yes ProviderPaco MD   donepezil (ARICEPT) 10 MG tablet Take 1 tablet by mouth nightly 12/17/23  Yes Paco Higginbotham MD   ferrous sulfate (IRON 325) 325 (65 Fe) MG tablet Take 1 tablet by mouth daily (with breakfast)   Yes Paco Higginbotham MD   lisinopril (PRINIVIL;ZESTRIL) 2.5 MG tablet Take 1 tablet by mouth daily 9/24/24  Yes ProviderPaco MD   melatonin 5 MG TABS tablet Take 1 tablet by mouth nightly   Yes ProviderPaco MD   mirtazapine (REMERON) 7.5 MG tablet Take 1 tablet by mouth nightly 8/21/24  Yes Paco Higginbotham MD   calcium carbonate (OSCAL) 500 MG TABS tablet Take 1 tablet by mouth daily   Yes ProviderPaco MD   senna (SENOKOT) 8.6 MG tablet Take 1 tablet by mouth nightly   Yes ProviderPaco MD   sertraline (ZOLOFT) 100 MG tablet Take 1 tablet by mouth daily 11/22/23  Yes ProviderPaco MD   ranolazine (RANEXA) 500 MG extended release tablet Take 1 tablet by mouth 2 times daily 12/31/20  Yes Brittany Koroma MD   metoprolol succinate (TOPROL XL) 25 MG extended release tablet Take 1 tablet by mouth daily 8/28/20  Yes Sruthi Mcadams DO   aspirin 81 MG EC tablet Take 1 tablet by mouth daily 4/20/20  Yes FINA Shields MD   atorvastatin (LIPITOR) 80 MG tablet Take 1
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
## 2024-09-26 PROBLEM — F02.C2: Status: ACTIVE | Noted: 2024-09-26

## 2024-09-26 PROBLEM — G30.9: Status: ACTIVE | Noted: 2024-09-26

## 2024-09-26 LAB
ALBUMIN SERPL-MCNC: 3.3 G/DL (ref 3.4–5)
ALBUMIN/GLOB SERPL: 1.8 {RATIO} (ref 1.1–2.2)
ALP SERPL-CCNC: 83 U/L (ref 40–129)
ALT SERPL-CCNC: 16 U/L (ref 10–40)
ANION GAP SERPL CALCULATED.3IONS-SCNC: 11 MMOL/L (ref 9–17)
AST SERPL-CCNC: 24 U/L (ref 15–37)
BASOPHILS # BLD: 0.05 K/UL
BASOPHILS NFR BLD: 1 % (ref 0–1)
BILIRUB SERPL-MCNC: 0.6 MG/DL (ref 0–1)
BUN SERPL-MCNC: 23 MG/DL (ref 7–20)
CALCIUM SERPL-MCNC: 8.7 MG/DL (ref 8.3–10.6)
CHLORIDE SERPL-SCNC: 103 MMOL/L (ref 99–110)
CO2 SERPL-SCNC: 21 MMOL/L (ref 21–32)
CREAT SERPL-MCNC: 0.9 MG/DL (ref 0.8–1.3)
EOSINOPHIL # BLD: 0.14 K/UL
EOSINOPHILS RELATIVE PERCENT: 2 % (ref 0–3)
ERYTHROCYTE [DISTWIDTH] IN BLOOD BY AUTOMATED COUNT: 16.3 % (ref 11.7–14.9)
GFR, ESTIMATED: 78 ML/MIN/1.73M2
GLUCOSE SERPL-MCNC: 87 MG/DL (ref 74–99)
HCT VFR BLD AUTO: 42.6 % (ref 42–52)
HGB BLD-MCNC: 13.7 G/DL (ref 13.5–18)
IMM GRANULOCYTES # BLD AUTO: 0.02 K/UL
IMM GRANULOCYTES NFR BLD: 0 %
LYMPHOCYTES NFR BLD: 0.86 K/UL
LYMPHOCYTES RELATIVE PERCENT: 13 % (ref 24–44)
MCH RBC QN AUTO: 29.1 PG (ref 27–31)
MCHC RBC AUTO-ENTMCNC: 32.2 G/DL (ref 32–36)
MCV RBC AUTO: 90.6 FL (ref 78–100)
MONOCYTES NFR BLD: 0.7 K/UL
MONOCYTES NFR BLD: 10 % (ref 0–4)
NEUTROPHILS NFR BLD: 74 % (ref 36–66)
NEUTS SEG NFR BLD: 4.94 K/UL
PLATELET # BLD AUTO: 274 K/UL (ref 140–440)
PMV BLD AUTO: 11 FL (ref 7.5–11.1)
POTASSIUM SERPL-SCNC: 4.6 MMOL/L (ref 3.5–5.1)
PROT SERPL-MCNC: 5.1 G/DL (ref 6.4–8.2)
RBC # BLD AUTO: 4.7 M/UL (ref 4.6–6.2)
SODIUM SERPL-SCNC: 135 MMOL/L (ref 136–145)
WBC OTHER # BLD: 6.7 K/UL (ref 4–10.5)

## 2024-09-26 PROCEDURE — 99221 1ST HOSP IP/OBS SF/LOW 40: CPT | Performed by: NURSE PRACTITIONER

## 2024-09-26 PROCEDURE — 6370000000 HC RX 637 (ALT 250 FOR IP): Performed by: NURSE PRACTITIONER

## 2024-09-26 PROCEDURE — 97166 OT EVAL MOD COMPLEX 45 MIN: CPT

## 2024-09-26 PROCEDURE — 80053 COMPREHEN METABOLIC PANEL: CPT

## 2024-09-26 PROCEDURE — 1200000000 HC SEMI PRIVATE

## 2024-09-26 PROCEDURE — 99223 1ST HOSP IP/OBS HIGH 75: CPT | Performed by: STUDENT IN AN ORGANIZED HEALTH CARE EDUCATION/TRAINING PROGRAM

## 2024-09-26 PROCEDURE — 2580000003 HC RX 258: Performed by: STUDENT IN AN ORGANIZED HEALTH CARE EDUCATION/TRAINING PROGRAM

## 2024-09-26 PROCEDURE — G0378 HOSPITAL OBSERVATION PER HR: HCPCS

## 2024-09-26 PROCEDURE — 97162 PT EVAL MOD COMPLEX 30 MIN: CPT

## 2024-09-26 PROCEDURE — 6370000000 HC RX 637 (ALT 250 FOR IP): Performed by: STUDENT IN AN ORGANIZED HEALTH CARE EDUCATION/TRAINING PROGRAM

## 2024-09-26 PROCEDURE — 36415 COLL VENOUS BLD VENIPUNCTURE: CPT

## 2024-09-26 PROCEDURE — 6360000002 HC RX W HCPCS: Performed by: STUDENT IN AN ORGANIZED HEALTH CARE EDUCATION/TRAINING PROGRAM

## 2024-09-26 PROCEDURE — 6370000000 HC RX 637 (ALT 250 FOR IP): Performed by: FAMILY MEDICINE

## 2024-09-26 PROCEDURE — 96372 THER/PROPH/DIAG INJ SC/IM: CPT

## 2024-09-26 PROCEDURE — 85025 COMPLETE CBC W/AUTO DIFF WBC: CPT

## 2024-09-26 PROCEDURE — 94761 N-INVAS EAR/PLS OXIMETRY MLT: CPT

## 2024-09-26 RX ORDER — LORAZEPAM 0.5 MG/1
0.25 TABLET ORAL DAILY
Status: DISCONTINUED | OUTPATIENT
Start: 2024-09-27 | End: 2024-09-27 | Stop reason: HOSPADM

## 2024-09-26 RX ORDER — METOPROLOL SUCCINATE 25 MG/1
25 TABLET, EXTENDED RELEASE ORAL DAILY
Status: DISCONTINUED | OUTPATIENT
Start: 2024-09-26 | End: 2024-09-27 | Stop reason: HOSPADM

## 2024-09-26 RX ORDER — RISPERIDONE 0.5 MG/1
0.5 TABLET ORAL NIGHTLY
Status: DISCONTINUED | OUTPATIENT
Start: 2024-09-26 | End: 2024-09-27

## 2024-09-26 RX ORDER — LISINOPRIL 5 MG/1
2.5 TABLET ORAL DAILY
Status: DISCONTINUED | OUTPATIENT
Start: 2024-09-26 | End: 2024-09-27 | Stop reason: HOSPADM

## 2024-09-26 RX ORDER — LORAZEPAM 0.5 MG/1
0.25 TABLET ORAL EVERY OTHER DAY
Status: DISCONTINUED | OUTPATIENT
Start: 2024-10-04 | End: 2024-09-27 | Stop reason: HOSPADM

## 2024-09-26 RX ADMIN — FERROUS SULFATE TAB 325 MG (65 MG ELEMENTAL FE) 325 MG: 325 (65 FE) TAB at 10:01

## 2024-09-26 RX ADMIN — RANOLAZINE 500 MG: 500 TABLET, EXTENDED RELEASE ORAL at 00:07

## 2024-09-26 RX ADMIN — SENNOSIDES 8.6 MG: 8.6 TABLET, FILM COATED ORAL at 00:07

## 2024-09-26 RX ADMIN — ASPIRIN 81 MG: 81 TABLET, CHEWABLE ORAL at 10:00

## 2024-09-26 RX ADMIN — MIRTAZAPINE 7.5 MG: 15 TABLET, FILM COATED ORAL at 21:04

## 2024-09-26 RX ADMIN — RISPERIDONE 0.5 MG: 0.5 TABLET, FILM COATED ORAL at 21:04

## 2024-09-26 RX ADMIN — SODIUM CHLORIDE, PRESERVATIVE FREE 10 ML: 5 INJECTION INTRAVENOUS at 10:02

## 2024-09-26 RX ADMIN — FINASTERIDE 5 MG: 5 TABLET, FILM COATED ORAL at 10:00

## 2024-09-26 RX ADMIN — CLOPIDOGREL BISULFATE 75 MG: 75 TABLET ORAL at 10:01

## 2024-09-26 RX ADMIN — SENNOSIDES 8.6 MG: 8.6 TABLET, FILM COATED ORAL at 21:04

## 2024-09-26 RX ADMIN — SODIUM CHLORIDE, PRESERVATIVE FREE 10 ML: 5 INJECTION INTRAVENOUS at 21:05

## 2024-09-26 RX ADMIN — RANOLAZINE 500 MG: 500 TABLET, EXTENDED RELEASE ORAL at 21:04

## 2024-09-26 RX ADMIN — RANOLAZINE 500 MG: 500 TABLET, EXTENDED RELEASE ORAL at 10:00

## 2024-09-26 RX ADMIN — TAMSULOSIN HYDROCHLORIDE 0.4 MG: 0.4 CAPSULE ORAL at 10:01

## 2024-09-26 RX ADMIN — METOPROLOL SUCCINATE 25 MG: 25 TABLET, FILM COATED, EXTENDED RELEASE ORAL at 03:15

## 2024-09-26 RX ADMIN — ATORVASTATIN CALCIUM 80 MG: 40 TABLET, FILM COATED ORAL at 21:04

## 2024-09-26 RX ADMIN — Medication 5 MG: at 00:07

## 2024-09-26 RX ADMIN — LISINOPRIL 2.5 MG: 5 TABLET ORAL at 03:15

## 2024-09-26 RX ADMIN — SERTRALINE HYDROCHLORIDE 100 MG: 50 TABLET ORAL at 10:00

## 2024-09-26 RX ADMIN — MIRTAZAPINE 7.5 MG: 15 TABLET, FILM COATED ORAL at 00:07

## 2024-09-26 RX ADMIN — CYANOCOBALAMIN 1000 MCG: 1000 INJECTION, SOLUTION INTRAMUSCULAR; SUBCUTANEOUS at 10:01

## 2024-09-26 RX ADMIN — ENOXAPARIN SODIUM 30 MG: 100 INJECTION SUBCUTANEOUS at 10:01

## 2024-09-26 RX ADMIN — ATORVASTATIN CALCIUM 80 MG: 40 TABLET, FILM COATED ORAL at 00:07

## 2024-09-26 RX ADMIN — DONEPEZIL HYDROCHLORIDE 10 MG: 10 TABLET ORAL at 00:08

## 2024-09-26 RX ADMIN — DONEPEZIL HYDROCHLORIDE 10 MG: 10 TABLET ORAL at 21:04

## 2024-09-26 RX ADMIN — Medication 5 MG: at 21:05

## 2024-09-26 RX ADMIN — SODIUM CHLORIDE, PRESERVATIVE FREE 10 ML: 5 INJECTION INTRAVENOUS at 00:08

## 2024-09-26 ASSESSMENT — PAIN SCALES - PAIN ASSESSMENT IN ADVANCED DEMENTIA (PAINAD)
CONSOLABILITY: NO NEED TO CONSOLE
TOTALSCORE: 0
TOTALSCORE: 0
BREATHING: NORMAL
FACIALEXPRESSION: SMILING OR INEXPRESSIVE
BREATHING: NORMAL
BODYLANGUAGE: RELAXED
CONSOLABILITY: NO NEED TO CONSOLE
FACIALEXPRESSION: SMILING OR INEXPRESSIVE
BODYLANGUAGE: RELAXED

## 2024-09-26 NOTE — PROGRESS NOTES
V2.0  Progress Note      Name:  Xavier Lancaster /Age/Sex: 1935  (88 y.o. male)   MRN & CSN:  [id number] & [id number] Encounter Date/Time: 2024 5:57 PM EDT   Location:  [address] [place] [address] PCP: Junior Galvez MD       Hospital Day: 2    Assessment and Plan:   Xavier Lancaster is a 88 y.o. male who presents with Acute confusion    Hospital Problems             Last Modified POA    * (Principal) Acute confusion 2024 Yes    Severe malnutrition (HCC) (Chronic) 2024 Yes       Plan:    Acute confusion  Has hx of Dementia   Likely confusion exacerbated by COVID as per Nursing home report. AO x2 at baseline    Continue Donepezil    CT head , chest xray, UA unremarkable   UDS - Positive for Benzo, Ammonia, B12/ folate, Respiratory panel including COVID unremarkable    Currently saturating well in RA   Admit to med surg tele   Neuro check    Neurology and Psych consulted. Family  updated     CHFrEF   On Lasix at home. Currently held   Will resume once patient starts to eat   Continue BB. Aspirin, Plavix    CAD   Continue aspirin and Plavix    COPD   Not in exacerbation   Albuterol as needed           Disposition:   Current Living situation:LTC  Expected Disposition: TBD   Estimated D/C: 1-2 days    Diet ADULT DIET; Regular  ADULT ORAL NUTRITION SUPPLEMENT; Breakfast, Dinner, Lunch; Diabetic Oral Supplement   DVT Prophylaxis [x] Lovenox, []  Heparin, [] SCDs, [] Ambulation,  [] Eliquis, [] Xarelto, [] Coumadin   Code Status DNR-CCA   Surrogate Decision Maker/ POA      Personally reviewed Lab Studies and Imaging     Discussed management of the case with ER provider who recommended admisison    EKG interpreted personally and results sinus    Imaging that was interpreted personally includes chest xray and results no acute process     Drugs that require monitoring for toxicity include lovenox and the method of monitoring was CBC        History from:     patient, electronic medical record    History of Present  Illness:     Chief Complaint: confusion   Xavier Lancaster is a 88 y.o. male who presents with confusion.     Patient continues to be confused.  Discussed with family, has not seen him in years so unclear of his current base line       Review of Systems:        Pertinent positives and negatives discussed in HPI     Objective:     Intake/Output Summary (Last 24 hours) at 9/26/2024 1221  Last data filed at 9/26/2024 0349  Gross per 24 hour   Intake 539.8 ml   Output --   Net 539.8 ml      Vitals:   Vitals:    09/26/24 0250 09/26/24 0330 09/26/24 0958 09/26/24 1049   BP: (!) 194/82 (!) 159/79 (!) 165/71    Pulse: 72 68 62    Resp: 18 20 18    Temp: 97.4 °F (36.3 °C)  97.6 °F (36.4 °C)    TempSrc: Axillary  Oral    SpO2: 95% 94% 99%    Weight:       Height:    1.676 m (5' 5.98\")       Personally Reviewed Medications Prior to Admission     Prior to Admission medications    Medication Sig Start Date End Date Taking? Authorizing Provider   ATIVAN 0.5 MG tablet Take 1 tablet by mouth daily. 3/16/24  Yes Paco Higginbotham MD   cyanocobalamin 1000 MCG/ML injection Inject 1 mL into the muscle every 30 days Receives on the 26 th of each month   Yes Paco Higginbotham MD   donepezil (ARICEPT) 10 MG tablet Take 1 tablet by mouth nightly 12/17/23  Yes Paco Higginbotham MD   ferrous sulfate (IRON 325) 325 (65 Fe) MG tablet Take 1 tablet by mouth daily (with breakfast)   Yes Paco Higginbotham MD   lisinopril (PRINIVIL;ZESTRIL) 2.5 MG tablet Take 1 tablet by mouth daily 9/24/24  Yes Paco Higginbotham MD   melatonin 5 MG TABS tablet Take 1 tablet by mouth nightly   Yes Paco Higginbotham MD   mirtazapine (REMERON) 7.5 MG tablet Take 1 tablet by mouth nightly 8/21/24  Yes Paco Higginbotham MD   calcium carbonate (OSCAL) 500 MG TABS tablet Take 1 tablet by mouth daily   Yes Paco Higginbotham MD   senna (SENOKOT) 8.6 MG tablet Take 1 tablet by mouth nightly   Yes Paco Higginbotham MD   sertraline (ZOLOFT)    GLUCOSE 90 87     Hepatic:   Recent Labs     09/25/24  1234 09/26/24  0458   AST 22 24   ALT 20 16   BILITOT 0.5 0.6   ALKPHOS 99 83     Lipids:   Lab Results   Component Value Date/Time    CHOL 109 01/12/2023 06:14 AM    HDL 34 01/12/2023 06:14 AM    TRIG 97 01/12/2023 06:14 AM     Hemoglobin A1C:   Lab Results   Component Value Date/Time    LABA1C 5.7 04/09/2020 01:22 AM     TSH:   Lab Results   Component Value Date/Time    TSH 2.39 09/25/2024 12:34 PM     Troponin:   Lab Results   Component Value Date/Time    TROPONINT 0.397 12/31/2020 05:30 AM    TROPONINT 0.325 12/30/2020 08:30 AM    TROPONINT 0.369 12/29/2020 03:50 AM     Lactic Acid: No results for input(s): \"LACTA\" in the last 72 hours.  BNP: No results for input(s): \"PROBNP\" in the last 72 hours.  UA:  Lab Results   Component Value Date/Time    NITRU NEGATIVE 09/25/2024 02:06 PM    NITRU NEGATIVE 07/01/2011 09:05 PM    COLORU Yellow 09/25/2024 02:06 PM    PHUR 5.5 09/25/2024 02:06 PM    WBCUA 1 09/25/2024 02:06 PM    RBCUA <1 09/25/2024 02:06 PM    RBCUA 1 11/22/2023 08:34 AM    MUCUS OCCASIONAL 09/25/2024 02:06 PM    TRICHOMONAS None seen 09/25/2024 02:06 PM    YEAST MANY 08/13/2020 02:11 PM    BACTERIA RARE 11/22/2023 08:34 AM    CLARITYU SLIGHTLY CLOUDY 11/22/2023 08:34 AM    LEUKOCYTESUR NEGATIVE 09/25/2024 02:06 PM    UROBILINOGEN 0.2 09/25/2024 02:06 PM    BILIRUBINUR NEGATIVE 09/25/2024 02:06 PM    BLOODU NEGATIVE 11/22/2023 08:34 AM    GLUCOSEU NEGATIVE 09/25/2024 02:06 PM    KETUA 15 09/25/2024 02:06 PM     Urine Cultures: No results found for: \"LABURIN\"  Blood Cultures: No results found for: \"BC\"  No results found for: \"BLOODCULT2\"  Organism: No results found for: \"ORG\"    Imaging/Diagnostics Last 24 Hours   CT Head W/O Contrast    Result Date: 9/25/2024  Head CT INDICATION: Stroke/altered mental status TECHNIQUE: Multiple 2D axial images obtained through the brain without intravenous contrast.  Radiation dose reduction techniques were used for

## 2024-09-26 NOTE — CONSULTS
4.0 - 10.5 k/uL    RBC 5.00 4.60 - 6.20 m/uL    Hemoglobin 14.7 13.5 - 18.0 g/dL    Hematocrit 45.6 42.0 - 52.0 %    MCV 91.2 78.0 - 100.0 fL    MCH 29.4 27.0 - 31.0 pg    MCHC 32.2 32.0 - 36.0 g/dL    RDW 16.4 (H) 11.7 - 14.9 %    Platelets 334 140 - 440 k/uL    MPV 10.9 7.5 - 11.1 fL    Neutrophils % 79 (H) 36 - 66 %    Lymphocytes % 9 (L) 24 - 44 %    Monocytes % 9 (H) 0 - 4 %    Eosinophils % 1 0.0 - 3.0 %    Basophils % 1 0 - 1 %    Immature Granulocytes % 1 (H) 0 %    Neutrophils Absolute 5.11 k/uL    Lymphocytes Absolute 0.61 k/uL    Monocytes Absolute 0.59 k/uL    Eosinophils Absolute 0.07 k/uL    Basophils Absolute 0.05 k/uL    Immature Granulocytes Absolute 0.03 k/uL   Comprehensive Metabolic Panel    Collection Time: 09/25/24 12:34 PM   Result Value Ref Range    Sodium 136 136 - 145 mmol/L    Potassium 4.7 3.5 - 5.1 mmol/L    Chloride 101 99 - 110 mmol/L    CO2 24 21 - 32 mmol/L    Anion Gap 11 9 - 17 mmol/L    Glucose 90 74 - 99 mg/dL    BUN 24 (H) 7 - 20 mg/dL    Creatinine 1.1 0.8 - 1.3 mg/dL    Est, Glom Filt Rate 66 >60 mL/min/1.73m2    Calcium 9.0 8.3 - 10.6 mg/dL    Total Protein 5.9 (L) 6.4 - 8.2 g/dL    Albumin 3.8 3.4 - 5.0 g/dL    Albumin/Globulin Ratio 1.8 1.1 - 2.2    Total Bilirubin 0.5 0.0 - 1.0 mg/dL    Alkaline Phosphatase 99 40 - 129 U/L    ALT 20 10 - 40 U/L    AST 22 15 - 37 U/L   Vitamin B12 & Folate    Collection Time: 09/25/24 12:34 PM   Result Value Ref Range    Vitamin B-12 668 211 - 911 pg/mL    Folate 35.0 (H) 4.8 - 24.2 ng/mL   TSH w/reflex to FT4    Collection Time: 09/25/24 12:34 PM   Result Value Ref Range    TSH 2.39 0.27 - 4.20 uIU/mL   EKG 12 Lead    Collection Time: 09/25/24 12:44 PM   Result Value Ref Range    Ventricular Rate 63 BPM    Atrial Rate 63 BPM    P-R Interval 134 ms    QRS Duration 120 ms    Q-T Interval 450 ms    QTc Calculation (Bazett) 460 ms    P Axis 65 degrees    R Axis 73 degrees    T Axis 72 degrees    Diagnosis       Sinus rhythm with premature  Good eye contact. No prominent physical abnormalities.  Attitude: Manner is cooperative and pleasant but confused  Motor: No psychomotor agitation, retardation or abnormal movements noted  Speech: Clearly articulated; normal rate, volume, tone & amount.  Language: intact understanding and production  Mood:depressed  Affect: flat non-labile, congruent with mood and content of speech  Thought Production: Spontaneous.  Thought Form: Coherent, linear, logical & goal-directed. No tangentiality or circumstantiality. No flight of ideas or loosening of associations.  Thought Content/Perceptions: No LUIS ANTONIO, no AVH, no delusion  Insight: impaired  Judgment impaired  Memory: Immediate, recent, and remote appear intact, though not formally tested.  Attention: maintained throughout interview  Fund of knowledge: Average  Gait/Balance: WNL/WNL  Overall level of suicide risk:denies      9/25/2024    12:38 PM   C-SSRS Suicide Screening   1) Within the past month, have you wished you were dead or wished you could go to sleep and not wake up?  No   2) Have you actually had any thoughts of killing yourself?  No   6) Have you ever done anything, started to do anything, or prepared to do anything to end your life? No      Impression:   Acute confusion  Delirium due to medical  MDD, recurrent moderate    Plan:  Recommend Risperdal 0.5 mg po at bedtime to address visual hallucinations plus depression.  Recommend restarting Ativan at lower dose 0.25 mg po daily x one week and then every other day x one week and stopping all together due to SE.  Continue Sertraline 100 mg po daily for mood  Continue Mirtazapine 7.5 mg po at bedtime for insomnia, and appetite  Psychiatry will follow loosely  Ty for this consult   Labs and EKG reviewed  9/25 EKG qtc  460  9/26 Na+ 135 , K+  4.6, BUN 23 , Cr  0.9, ALT 16 , AST 24 , WBC 6.7, Hemoglobin 13.7 , platelets 274 ,   9/25 ammonia  22 vit b 12 668, folate 35 , TSH 2.39  UDS + benzodiazepine, UA neg for

## 2024-09-26 NOTE — CONSULTS
Neurology Service Consult Note  [facility]   Patient Name: Xavier Lancaster  : 1935        Subjective:   Reason for consult: Acute confusion  88 y.o.  male with history of CAD, COPD, dementia, prostatitis, stroke presenting to [facility] from facility with increased confusion. Facility reports positive Covid 19 test however respiratory panel here was negative. Patient is confused at baseline (per facility report at best oriented x 2) but facility feels he is worse hence transfer to hospital for further evaluation.     Chart was reviewed in detail, patient was seen and assessed. He is sleeping but wakes easily. He is alert to self (able to tell me his first name only). Unable to state month or year. He is not sure where he is today. Glasses were placed on patient which does improve his alertness. When asked to identify objects he tells me \"I'm not sure\". He is able to tell me the blanket is white but is unable to identify the blanket itself. He moves all four extremities without any focal weakness. He tries to follow commands to squeeze hands but is unsure how to complete this task. No family is at bedside to help provide history or baseline.     Past Medical History:   Diagnosis Date    CAD (coronary artery disease)     COPD (chronic obstructive pulmonary disease) (HCC)     Dementia (HCC)     Prostatitis     :   Past Surgical History:   Procedure Laterality Date    FEMORAL ENDARTERECTOMY Right 2020    FEMORAL ENDARTERECTOMY performed by Roberth Cruz MD at [facility] OR    HERNIA REPAIR       Medications:  Scheduled Meds:   lisinopril  2.5 mg Oral Daily    metoprolol succinate  25 mg Oral Daily    sodium chloride flush  5-40 mL IntraVENous BID    enoxaparin  30 mg SubCUTAneous Daily    aspirin  81 mg Oral Daily    [Held by provider] LORazepam  0.5 mg Oral Daily    atorvastatin  80 mg Oral Nightly    calcium elemental  500 mg Oral Daily    clopidogrel  75 mg  Oral Daily    cyanocobalamin  1,000 mcg IntraMUSCular Q30 Days    donepezil  10 mg Oral Nightly    ferrous sulfate  325 mg Oral Daily with breakfast    finasteride  5 mg Oral Daily    melatonin  5 mg Oral Nightly    mirtazapine  7.5 mg Oral Nightly    ranolazine  500 mg Oral BID    senna  1 tablet Oral Nightly    sertraline  100 mg Oral Daily    tamsulosin  0.4 mg Oral Daily    [Held by provider] furosemide  40 mg Oral Daily     Continuous Infusions:   sodium chloride       PRN Meds:.sodium chloride flush, sodium chloride, potassium chloride **OR** potassium alternative oral replacement **OR** potassium chloride, magnesium sulfate, ondansetron **OR** ondansetron, polyethylene glycol, acetaminophen **OR** acetaminophen, albuterol sulfate HFA    No Known Allergies  Social History     Socioeconomic History    Marital status:      Spouse name: Not on file    Number of children: Not on file    Years of education: Not on file    Highest education level: Not on file   Occupational History    Not on file   Tobacco Use    Smoking status: Every Day     Current packs/day: 0.50     Types: Cigarettes    Smokeless tobacco: Not on file   Vaping Use    Vaping status: Never Used   Substance and Sexual Activity    Alcohol use: Yes    Drug use: Not Currently    Sexual activity: Not Currently   Other Topics Concern    Not on file   Social History Narrative    Not on file     Social Determinants of Health     Financial Resource Strain: Not on file   Food Insecurity: Not on file   Transportation Needs: Not on file   Physical Activity: Not on file   Stress: Not on file   Social Connections: Not on file   Intimate Partner Violence: Not on file   Housing Stability: Not on file      Family History   Problem Relation Age of Onset    No Known Problems Mother     No Known Problems Father          ROS (10 systems)  Patient is altered,  unable to answer questions    Physical Exam:       Wt Readings from Last 3 Encounters:   09/25/24 46 kg

## 2024-09-26 NOTE — CONSULTS
[facility] ACUTE CARE PHYSICAL THERAPY EVALUATION  Xavier Lancaster, 1935, 3006/3006-A, 9/26/2024    History  [place]:  The encounter diagnosis was COVID-19 virus infection.  Patient  has a past medical history of CAD (coronary artery disease), COPD (chronic obstructive pulmonary disease) (HCC), Dementia (HCC), and Prostatitis.  Patient  has a past surgical history that includes hernia repair and Femoral Endarterectomy (Right, 8/24/2020).    Recommendation: LTC with PT Trial    Subjective:  Patient states: \"I didn't write songs, she did\".    Pain:  denies.    Communication with other providers:  RN approval for therapy session, co-eval with [facility] [place]  Restrictions: general precautions, falls    Home Setup/Prior level of function    Unable to collect social/functional information at this time as pt is disoriented and poor historian. Per chart pt is from LTC at [facility]. Information should be confirmed with family     Examination of body systems (includes body structures/functions, activity/participation limitations):  Observation:  Supine in bed upon arrival, agreeable to therapy  Vision:  glasses  Hearing:  WFL  Cardiopulmonary:  stable vitals on room air  Cognition: AxO x1 to self only, see OT/SLP note for further evaluation.    Musculoskeletal  ROM R/L:  WFL BLEs.    Strength R/L:  grossly 3 to 3+/5 BLEs, difficulty with command follow throughout, moderate impairment in function and endurance.    Neuro:  unable to assess due to cognition      Mobility:  Rolling L/R:  NT  Supine to sit:  maxA for trunk uprighting and BLE advancement to EOB. Pt with difficulty initiating movement with increased time. Verbal cues for sequencing  Sit to supine: maxA for advancing BLEs back into bed and controlled trunk lowering. Verbal cues for sequencing  maxA x2 to scoot to HOB  Transfers: NT d/t safety concerns  Sitting balance:  Fair static sitting at EOB with BUE support, CGA to SBA.    Standing balance:  NT  d/t safety concerns.    Gait: NT d/t safety concerns  Educated pt on PT POC, role of PT    [facility] 6 Clicks Inpatient Mobility:  AM-PAC Inpatient Mobility Raw Score : 11    Safety: patient left in bed with alarm, call light within reach, gait belt used.    Assessment:  Patient is an 88 year old male who presents with acute confusion. Upon discharge, recommend LTC with PT trial. Unable to identify patient's baseline at this time due to cognition. They present with impairments in strength, balance, gait, and activity tolerance. They would benefit from continued therapy to address their deficits, reduce fall risk, decrease burden of care, and restore PLOF.    Complexity: Moderate  Prognosis: Good, no significant barriers to participation at this time.    General Plan:  (2+ times per week)  Equipment: continue to assess    Goals:  Short Term Goals  Time Frame for Short Term Goals: 2 weeks  Short Term Goal 1: Pt will complete supine <> sit modA  Short Term Goal 2: Pt will complete sit <> stand modA  Short Term Goal 3: Pt will complete SPT between level surfaces modA  Short Term Goal 4: Pt will complete x5 minutes of light dynamic seated activity with single UE support, SBA  Short Term Goal 5: Pt will ambulate 10ft with LRAD modA       Treatment plan:  Bed mobility, transfers, balance, gait, TA, TX    Recommendations for NURSING mobility: [place] EOB    Time:   Time in: 1321  Time out: 1329  Timed treatment minutes: 0  Total time: 8    Electronically signed by:    Sugar Sanders, PT  9/26/2024, 4:08 PM

## 2024-09-26 NOTE — PLAN OF CARE
Problem: Discharge Planning  Goal: Discharge to home or other facility with appropriate resources  9/26/2024 1051 by Dalia Mcdonald RN  Outcome: Progressing  9/26/2024 0459 by Verona Napier RN  Outcome: Progressing     Problem: Pain  Goal: Verbalizes/displays adequate comfort level or baseline comfort level  9/26/2024 1051 by Dalia Mcdonald RN  Outcome: Progressing  9/26/2024 0459 by Verona Napier RN  Outcome: Progressing     Problem: Safety - Adult  Goal: Free from fall injury  9/26/2024 1051 by Dalia Mcdonald RN  Outcome: Progressing  9/26/2024 0459 by Verona Napier RN  Outcome: Progressing     Problem: Skin/Tissue Integrity  Goal: Absence of new skin breakdown  Description: 1.  Monitor for areas of redness and/or skin breakdown  2.  Assess vascular access sites hourly  3.  Every 4-6 hours minimum:  Change oxygen saturation probe site  4.  Every 4-6 hours:  If on nasal continuous positive airway pressure, respiratory therapy assess nares and determine need for appliance change or resting period.  Outcome: Progressing     Problem: Chronic Conditions and Co-morbidities  Goal: Patient's chronic conditions and co-morbidity symptoms are monitored and maintained or improved  Outcome: Progressing

## 2024-09-26 NOTE — CARE COORDINATION
LSW received phone call from Sonia with [facility]. Sonia stated that pt is a LTC pt there and is on a bed hold. Pt can return to [facility] once medically ready. If pt needs skilled, pt can be skilled at [facility].     Cm attempted to call pt's niece, Julia, again. No answer. Cm left  requesting return call.     Cm attempted to call pt's nephew, Jad, no answer. Cm left  requesting return call.     Humberto sent PS message to Dr. Shay informing him that pt is from [facility] and return there whenever medically ready. Humberto is still attempting to get consent from next of kin for pt to return. Cm to follow.

## 2024-09-26 NOTE — PROGRESS NOTES
Comprehensive Nutrition Assessment    Type and Reason for Visit:  Initial (Low BMI for age)    Nutrition Recommendations/Plan:   Begin diabetic/standard oral nutrition supplement TID   Trial diabetic first as pt has early satiety   Continue Regular Diet   Encourage proper hydration/fluids intake  Please document all PO intakes in I/O   Assist with meal reminders/set up please     Malnutrition Assessment:  Malnutrition Status:  Severe malnutrition (09/26/24 1100)    Context:  Social/Environmental Circumstances     Findings of the 6 clinical characteristics of malnutrition:  Energy Intake:  Mild decrease in energy intake (Comment) (chronic poor intake)  Weight Loss:  No significant weight loss (chronic low weight)     Body Fat Loss:  Severe body fat loss Orbital, Triceps, Buccal region, Fat Overlying Ribs (moderate triceps, buccal)   Muscle Mass Loss:  Severe muscle mass loss Clavicles (pectoralis & deltoids), Hand (interosseous), Calf (gastrocnemius), Thigh (quadraceps)  Fluid Accumulation:  No significant fluid accumulation Extremities   Strength:  Not Performed    Nutrition Assessment:    Admitted with acute confusion from LTC. Hx dementia, COPD, CAD. Pt was confused at visit. Reports usually only eats once per day, denies any chewing/swallowing issues. States \"Sometimes I eat in bed!\" Pt has been chronically low in weight over the past 5 years. However, significant muscle and fat wasting findings meet criteria for malnutrition. Pt is a high nutrtion risk. Will begin oral nutrition supplements.    Nutrition Related Findings:    appears frail; Na 135 Wound Type: None       Current Nutrition Intake & Therapies:    Average Meal Intake: 1-25%  Average Supplements Intake: None Ordered  ADULT DIET; Regular  ADULT ORAL NUTRITION SUPPLEMENT; Breakfast, Dinner, Lunch; Diabetic Oral Supplement    Anthropometric Measures:  Height: 167.6 cm (5' 5.98\")  Ideal Body Weight (IBW): 142 lbs (65 kg)    Admission Body Weight: 46

## 2024-09-26 NOTE — PROGRESS NOTES
Occupational Therapy  [facility] ACUTE CARE OCCUPATIONAL THERAPY EVALUATION  Xavier Lancaster, 1935, 3006/3006-A, 9/26/2024    Discharge Recommendation: LTC w/ OT trial     History  [place]:  The encounter diagnosis was COVID-19 virus infection.  Patient  has a past medical history of CAD (coronary artery disease), COPD (chronic obstructive pulmonary disease) (HCC), Dementia (HCC), and Prostatitis.  Patient  has a past surgical history that includes hernia repair and Femoral Endarterectomy (Right, 8/24/2020).    Subjective:  Patient comments: \"I didn't but she might have!\".    Pain: Denied.    Communication with other providers: Nurse matti session, co-eval with PT Sugar.  Restrictions: General Precautions, Fall Risk     Home Setup/Prior level of function    Pt is from [facility]. Pt is a poor historian and unable to provide baseline status.     Examination of body systems (includes body structures/functions, activity/participation limitations):  Observation:  Semi-fowlers in bed upon arrival, agreeable to therapy  Vision:  Glasses   Hearing:  WFL  Cardiopulmonary:  On room air, tele, vitals remained stable throughout session    Body Systems and functions:  ROM R/L:  GIOVANA d/t impaired cognition, able to reach to top of head     Strength R/L:  BUE 3+/5,   3+/5  Sensation: WFL  Tone: Normal  Coordination: WFL  Perception: WFL    Cognitive and Psychosocial Functioning:  Overall cognitive status:  Pt is A&Ox1 (self only), intermittently follows commands, and is consistently confused during session.   Affect: Pleasantly confused        Activities of Daily Living (ADLs):  Feeding: Sarahi   Grooming: modA (pt able to bring brush to head, required assist for posterior aspect and task completion)   UB bathing: maxA   LB bathing: dependent   UB dressing: maxA   LB dressing: dependent   Toileting: dependent     Pt ADL function inferred from observation of gross motor function, and assessment of mobility,  balance, posture, safety awareness, cognition, and activity tolerance.     AM-PAC 6 click short form for inpatient daily activity:   How much help from another person does the patient currently need... Unable  Dep A Lot  Max A A Lot   Mod A A Little  Min A A Little   CGA  SBA None   Mod I  Indep  Sup   1.  Putting on and taking off regular lower body clothing? [x] 1    [] 2   [] 2   [] 3   [] 3   [] 4      2. Bathing (including washing, rinsing, drying)? [] 1   [x] 2   [] 2 [] 3 [] 3 [] 4   3. Toileting, which includes using toilet, bedpan, or urinal? [x] 1    [] 2   [] 2   [] 3   [] 3   [] 4     4. Putting on and taking off regular upper body clothing? [] 1   [x] 2   [] 2   [] 3   [] 3    [] 4      5. Taking care of personal grooming such as brushing teeth? [] 1   [] 2    [x] 2 [] 3    [] 3   [] 4      6. Eating meals?   [] 1   [] 2   [] 2   [x] 3   [] 3   [] 4      Raw Score:  11    [24=0% impaired(CH), 23=1-19%(CI), 20-22=20-39%(CJ), 15-19=40-59%(CK), 10-14=60-79%(CL), 7-9=80-99%(CM), 6=100%(CN)]     Mobility:  Supine to sit: maxA (to bring torso upright and to advance BLE OOB)   Sit to stand: Deferred d/t safety  Sit to supine: maxA (to advance BLE into bed and for safe eccentric torso control)     Balance:   Sitting balance: Fair    Pt educated on role of therapy, POC, safety awareness, importance of OOB activity     Safety: Patient left semi fowlers in bed with alarm, call light within reach, RN notified, gait belt used.    Assessment:  Pt is a 87 y/o male admitted from LT for acute confusion. Pt at baseline needs assist for ADLs and for functional transfers/mobility. Pt currently presents w/ deficits relating to ADLs, IADLs, UE strength/ROM, functional activity tolerance, cognition, safety awareness, and functional mobility. Pt would benefit from continued acute care OT services and upon discharge would benefit from LTC w/ OT trial     Complexity: Moderate   Prognosis: Good, no significant barriers to  participation at this time.   Occupational Therapy Plan  Times Per Week: 2x+  Times Per Day: Once a day  Current Treatment Recommendations: Strengthening, Cognitive reorientation, ROM, Balance training, Pain management, Functional mobility training, Safety education & training, Endurance training, Patient/Caregiver education & training, Wheelchair mobility training, Equipment evaluation, education, & procurement, Neuromuscular re-education, Positioning, Cognitive/Perceptual training, Home management training, Self-Care / ADL, Co-Treatment, Coordination training       Goals: To be achieved prior to discharge  Goal 1: Pt will perform UE ADLs Sarahi   Goal 2: Pt will perform LE ADLs modA   Goal 3: Pt will perform toileting modA   Goal 4: Pt will perform HH distance functional mobility modA  in preparation for return to home   Goal 5: Pt will perform functional transfers to/from bed, chair, and toilet modA   Goal 6: Pt will perform therex/theract in order to increase functional activity tolerance  Goal 7: Pt will perform all aspects of session w/ G safety awareness to demonstrate capability to safely discharge to a lower level of supervision/assistance     Treatment plan:  Pt will perform therex/theract in order to increase functional activity tolerance in preparation for ADL participation.     Recommendations for NURSING activity: [place] EOB    Time:   Time in: 1321  Time out: 1329  Timed treatment minutes: 0  Total time: 8    Electronically signed by:    SHEMAR Whiting/ANA [id number]  9/26/2024, 2:47 PM

## 2024-09-26 NOTE — CARE COORDINATION
Cm attempted to call pt's niece, Julia, no answer. Cm left VM requesting return call. Cm to follow.     1210 Cm received VM from pt's niece requesting return call. Cm attempted to call pt's niece back, no answer. Cm left VM requesting return call. Cm to follow.

## 2024-09-26 NOTE — PROGRESS NOTES
4 Eyes Skin Assessment     NAME:  Xavier Lancaster  YOB: 1935  MEDICAL RECORD NUMBER:  [id number]    The patient is being assessed for  Admission    I agree that at least one RN has performed a thorough Head to Toe Skin Assessment on the patient. ALL assessment sites listed below have been assessed.      Areas assessed by both nurses:    Head, Face, Ears, Shoulders, Back, Chest, Arms, Elbows, Hands, Sacrum. Buttock, Coccyx, Ischium, Legs. Feet and Heels, and Under Medical Devices         Does the Patient have a Wound? No noted wound(s)       Kenneth Prevention initiated by RN: Yes  Wound Care Orders initiated by RN: No    Pressure Injury (Stage 3,4, Unstageable, DTI, NWPT, and Complex wounds) if present, place Wound referral order by RN under : No    New Ostomies, if present place, Ostomy referral order under : No     Nurse 1 eSignature: Electronically signed by Verona Napier RN on 9/26/24 at 1:30 AM EDT    **SHARE this note so that the co-signing nurse can place an eSignature**    Nurse 2 eSignature: Electronically signed by Augustus Diamond RN on 9/26/24 at 4:30 AM EDT

## 2024-09-27 VITALS
WEIGHT: 101.41 LBS | OXYGEN SATURATION: 92 % | RESPIRATION RATE: 26 BRPM | HEART RATE: 66 BPM | BODY MASS INDEX: 16.3 KG/M2 | TEMPERATURE: 98 F | DIASTOLIC BLOOD PRESSURE: 49 MMHG | HEIGHT: 66 IN | SYSTOLIC BLOOD PRESSURE: 123 MMHG

## 2024-09-27 LAB
ALBUMIN SERPL-MCNC: 3.3 G/DL (ref 3.4–5)
ALBUMIN/GLOB SERPL: 1.9 {RATIO} (ref 1.1–2.2)
ALP SERPL-CCNC: 82 U/L (ref 40–129)
ALT SERPL-CCNC: 13 U/L (ref 10–40)
ANION GAP SERPL CALCULATED.3IONS-SCNC: 9 MMOL/L (ref 9–17)
AST SERPL-CCNC: 14 U/L (ref 15–37)
BASOPHILS # BLD: 0.04 K/UL
BASOPHILS NFR BLD: 1 % (ref 0–1)
BILIRUB SERPL-MCNC: 0.5 MG/DL (ref 0–1)
BUN SERPL-MCNC: 23 MG/DL (ref 7–20)
CALCIUM SERPL-MCNC: 8.7 MG/DL (ref 8.3–10.6)
CHLORIDE SERPL-SCNC: 104 MMOL/L (ref 99–110)
CO2 SERPL-SCNC: 24 MMOL/L (ref 21–32)
CREAT SERPL-MCNC: 0.9 MG/DL (ref 0.8–1.3)
EOSINOPHIL # BLD: 0.07 K/UL
EOSINOPHILS RELATIVE PERCENT: 1 % (ref 0–3)
ERYTHROCYTE [DISTWIDTH] IN BLOOD BY AUTOMATED COUNT: 16.1 % (ref 11.7–14.9)
GFR, ESTIMATED: 81 ML/MIN/1.73M2
GLUCOSE SERPL-MCNC: 88 MG/DL (ref 74–99)
HCT VFR BLD AUTO: 40.2 % (ref 42–52)
HGB BLD-MCNC: 13 G/DL (ref 13.5–18)
IMM GRANULOCYTES # BLD AUTO: 0.03 K/UL
IMM GRANULOCYTES NFR BLD: 1 %
LYMPHOCYTES NFR BLD: 0.71 K/UL
LYMPHOCYTES RELATIVE PERCENT: 13 % (ref 24–44)
MCH RBC QN AUTO: 29 PG (ref 27–31)
MCHC RBC AUTO-ENTMCNC: 32.3 G/DL (ref 32–36)
MCV RBC AUTO: 89.7 FL (ref 78–100)
MONOCYTES NFR BLD: 0.51 K/UL
MONOCYTES NFR BLD: 9 % (ref 0–4)
NEUTROPHILS NFR BLD: 76 % (ref 36–66)
NEUTS SEG NFR BLD: 4.34 K/UL
PLATELET # BLD AUTO: 269 K/UL (ref 140–440)
PMV BLD AUTO: 10.7 FL (ref 7.5–11.1)
POTASSIUM SERPL-SCNC: 4.1 MMOL/L (ref 3.5–5.1)
PROT SERPL-MCNC: 4.9 G/DL (ref 6.4–8.2)
RBC # BLD AUTO: 4.48 M/UL (ref 4.6–6.2)
SODIUM SERPL-SCNC: 136 MMOL/L (ref 136–145)
WBC OTHER # BLD: 5.7 K/UL (ref 4–10.5)

## 2024-09-27 PROCEDURE — 99233 SBSQ HOSP IP/OBS HIGH 50: CPT | Performed by: NURSE PRACTITIONER

## 2024-09-27 PROCEDURE — 85025 COMPLETE CBC W/AUTO DIFF WBC: CPT

## 2024-09-27 PROCEDURE — 2580000003 HC RX 258: Performed by: STUDENT IN AN ORGANIZED HEALTH CARE EDUCATION/TRAINING PROGRAM

## 2024-09-27 PROCEDURE — 36415 COLL VENOUS BLD VENIPUNCTURE: CPT

## 2024-09-27 PROCEDURE — 96372 THER/PROPH/DIAG INJ SC/IM: CPT

## 2024-09-27 PROCEDURE — 6370000000 HC RX 637 (ALT 250 FOR IP): Performed by: NURSE PRACTITIONER

## 2024-09-27 PROCEDURE — 6370000000 HC RX 637 (ALT 250 FOR IP): Performed by: STUDENT IN AN ORGANIZED HEALTH CARE EDUCATION/TRAINING PROGRAM

## 2024-09-27 PROCEDURE — 6370000000 HC RX 637 (ALT 250 FOR IP): Performed by: FAMILY MEDICINE

## 2024-09-27 PROCEDURE — 80053 COMPREHEN METABOLIC PANEL: CPT

## 2024-09-27 PROCEDURE — G0378 HOSPITAL OBSERVATION PER HR: HCPCS

## 2024-09-27 PROCEDURE — 6360000002 HC RX W HCPCS: Performed by: STUDENT IN AN ORGANIZED HEALTH CARE EDUCATION/TRAINING PROGRAM

## 2024-09-27 RX ORDER — RISPERIDONE 0.5 MG/1
0.5 TABLET ORAL NIGHTLY
Qty: 60 TABLET | Refills: 3 | Status: SHIPPED | OUTPATIENT
Start: 2024-09-27

## 2024-09-27 RX ORDER — RISPERIDONE 0.25 MG/1
0.75 TABLET ORAL NIGHTLY
Qty: 60 TABLET | Refills: 3 | OUTPATIENT
Start: 2024-09-27

## 2024-09-27 RX ORDER — RISPERIDONE 0.5 MG/1
0.75 TABLET ORAL NIGHTLY
Status: DISCONTINUED | OUTPATIENT
Start: 2024-09-27 | End: 2024-09-27 | Stop reason: HOSPADM

## 2024-09-27 RX ORDER — LORAZEPAM 0.5 MG/1
TABLET ORAL
Qty: 7 TABLET | Refills: 0 | Status: SHIPPED | OUTPATIENT
Start: 2024-09-28 | End: 2024-10-18

## 2024-09-27 RX ADMIN — FERROUS SULFATE TAB 325 MG (65 MG ELEMENTAL FE) 325 MG: 325 (65 FE) TAB at 10:04

## 2024-09-27 RX ADMIN — CLOPIDOGREL BISULFATE 75 MG: 75 TABLET ORAL at 10:04

## 2024-09-27 RX ADMIN — LORAZEPAM 0.25 MG: 0.5 TABLET ORAL at 10:04

## 2024-09-27 RX ADMIN — RANOLAZINE 500 MG: 500 TABLET, EXTENDED RELEASE ORAL at 10:03

## 2024-09-27 RX ADMIN — FINASTERIDE 5 MG: 5 TABLET, FILM COATED ORAL at 10:04

## 2024-09-27 RX ADMIN — SODIUM CHLORIDE, PRESERVATIVE FREE 10 ML: 5 INJECTION INTRAVENOUS at 10:06

## 2024-09-27 RX ADMIN — ENOXAPARIN SODIUM 30 MG: 100 INJECTION SUBCUTANEOUS at 10:06

## 2024-09-27 RX ADMIN — LISINOPRIL 2.5 MG: 5 TABLET ORAL at 10:03

## 2024-09-27 RX ADMIN — ASPIRIN 81 MG: 81 TABLET, CHEWABLE ORAL at 10:03

## 2024-09-27 RX ADMIN — METOPROLOL SUCCINATE 25 MG: 25 TABLET, FILM COATED, EXTENDED RELEASE ORAL at 10:03

## 2024-09-27 RX ADMIN — TAMSULOSIN HYDROCHLORIDE 0.4 MG: 0.4 CAPSULE ORAL at 10:03

## 2024-09-27 RX ADMIN — SERTRALINE HYDROCHLORIDE 100 MG: 50 TABLET ORAL at 10:03

## 2024-09-27 NOTE — PLAN OF CARE
Problem: Discharge Planning  Goal: Discharge to home or other facility with appropriate resources  9/27/2024 1157 by Dalia Mcdonald RN  Outcome: Progressing  9/27/2024 0143 by Verona Napier RN  Outcome: Progressing     Problem: Pain  Goal: Verbalizes/displays adequate comfort level or baseline comfort level  9/27/2024 1157 by Dalia Mcdonald RN  Outcome: Progressing  9/27/2024 0143 by Verona Napier RN  Outcome: Progressing     Problem: Safety - Adult  Goal: Free from fall injury  9/27/2024 1157 by Dalia Mcdonald RN  Outcome: Progressing  9/27/2024 0143 by Verona Napier RN  Outcome: Progressing     Problem: Skin/Tissue Integrity  Goal: Absence of new skin breakdown  Description: 1.  Monitor for areas of redness and/or skin breakdown  2.  Assess vascular access sites hourly  3.  Every 4-6 hours minimum:  Change oxygen saturation probe site  4.  Every 4-6 hours:  If on nasal continuous positive airway pressure, respiratory therapy assess nares and determine need for appliance change or resting period.  9/27/2024 1157 by Dalia Mcdonald RN  Outcome: Progressing  9/27/2024 0143 by Verona Napier RN  Outcome: Progressing     Problem: Chronic Conditions and Co-morbidities  Goal: Patient's chronic conditions and co-morbidity symptoms are monitored and maintained or improved  9/27/2024 1157 by Dalia Mcdonald RN  Outcome: Progressing  9/27/2024 0143 by Verona Napier RN  Outcome: Progressing     Problem: Nutrition Deficit:  Goal: Optimize nutritional status  9/27/2024 1157 by Dalia Mcdonald RN  Outcome: Progressing  9/27/2024 0143 by Verona Napier RN  Outcome: Progressing

## 2024-09-27 NOTE — CARE COORDINATION
Chart reviewed. Cm attempted to call pt's family multiple times, no answer. Pt is confused. Sonia with [facility] informed this Cm that pt is a LTC resident there. Cm to return to [facility]. Pt has PCP and insurance to assist with medical expenses.    09/27/24 1130   Service Assessment   Patient Orientation Other (see comment)  (Pt is confused)   Cognition Other (see comment)  (Pt is confused)   History Provided By Medical Record   Primary Caregiver Other (Comment)  ([facility])   Accompanied By/Relationship N/A   Support Systems Family Members;/;Home Care Staff   Patient's Healthcare Decision Maker is: Named in Scanned ACP Document   PCP Verified by CM Yes   Last Visit to PCP Within last 3 months   Prior Functional Level Assistance with the following:;Mobility;Housework;Cooking;Shopping   Current Functional Level Mobility;Housework;Cooking;Shopping   Can patient return to prior living arrangement Yes   Ability to make needs known: Other (see comment)  (Pt confused upon assessment)   Family able to assist with home care needs: Yes   Would you like for me to discuss the discharge plan with any other family members/significant others, and if so, who? Yes  (Legal next of kin and family as needed)   Financial Resources Medicare;Medicaid   Community Resources ECF/Home Care   CM/SW Referral Other (see comment)  (Discharge planning assessment)   Condition of Participation: Discharge Planning   The Patient and/or Patient Representative was provided with a Choice of Provider? Patient   The Patient and/Or Patient Representative agree with the Discharge Plan? Yes   Freedom of Choice list was provided with basic dialogue that supports the patient's individualized plan of care/goals, treatment preferences, and shares the quality data associated with the providers?  Yes

## 2024-09-27 NOTE — PROGRESS NOTES
Psychiatric Progress Note    Xavier Lancaster  [id number]  09/27/24    CHIEF COMPLAINT: :Hallucination     HPI: 88 y.o.  male with history of CAD, COPD, dementia(vascular), prostatitis presenting to [facility] ED via EMS from facility with increased confusion. Patient was Covid+ at [facility] with desaturation earlier in the day.  Patient endorsing visual hallucinations with agitation while in the ED, believing he saw \"25 people in and out of his room.\"  Consults include Neurology. Psychiatry consulted by Dr Shay due to \"Hallucination.\"    Met with patient and his nurse at bedside. Patient more alert and oriented to name only . He is very engaged and conversational. Continues to endorse visual hallucinations. He is polite and calm today. Insight and judgment continue to be impaired.    No Known Allergies    Medications Prior to Admission: cyanocobalamin 1000 MCG/ML injection, Inject 1 mL into the muscle every 30 days Receives on the 26 th of each month  donepezil (ARICEPT) 10 MG tablet, Take 1 tablet by mouth nightly  ferrous sulfate (IRON 325) 325 (65 Fe) MG tablet, Take 1 tablet by mouth daily (with breakfast)  lisinopril (PRINIVIL;ZESTRIL) 2.5 MG tablet, Take 1 tablet by mouth daily  melatonin 5 MG TABS tablet, Take 1 tablet by mouth nightly  mirtazapine (REMERON) 7.5 MG tablet, Take 1 tablet by mouth nightly  calcium carbonate (OSCAL) 500 MG TABS tablet, Take 1 tablet by mouth daily  senna (SENOKOT) 8.6 MG tablet, Take 1 tablet by mouth nightly  sertraline (ZOLOFT) 100 MG tablet, Take 1 tablet by mouth daily  ranolazine (RANEXA) 500 MG extended release tablet, Take 1 tablet by mouth 2 times daily  metoprolol succinate (TOPROL XL) 25 MG extended release tablet, Take 1 tablet by mouth daily  aspirin 81 MG EC tablet, Take 1 tablet by mouth daily  atorvastatin (LIPITOR) 80 MG tablet, Take 1 tablet by mouth nightly  finasteride (PROSCAR) 5 MG tablet, Take 1 tablet by mouth daily  tamsulosin (FLOMAX) 0.4 MG capsule, Take 1  123/49, pulse 66, temperature 98 °F (36.7 °C), temperature source Oral, resp. rate 26, height 1.676 m (5' 5.98\"), weight 46 kg (101 lb 6.6 oz), SpO2 92%.  CONSTITUTIONAL:    Appearance: appears stated age. alert and oriented to person, disoriented to place, time & situation. no acute distress. Adequate grooming and hygeine. Good eye contact. No prominent physical abnormalities.  Attitude: Manner is cooperative and pleasant but confused  Motor: No psychomotor agitation, retardation or abnormal movements noted  Speech: Clearly articulated; normal rate, volume, tone & amount.  Language: intact understanding and production  Mood: I mproved  Affect: euthymic, full range, non-labile, congruent with mood and content of speech  Thought Production: Spontaneous.  Thought Form: Coherent, linear, logical & goal-directed. No tangentiality or circumstantiality. No flight of ideas or loosening of associations.  Thought Content/Perceptions: No LUIS ANTONIO, noted VH denies AH   no delusion  Insight: impaired  Judgment impaired  Memory: Immediate, recent, and remote appear impaired, though not formally tested.  Attention: maintained throughout interview  Fund of knowledge: Average  Gait/Balance: GIOVANA         IMPRESSION:   Acute confusion  Delirium due to medical  MDD, recurrent moderate     Plan:  Recommend increase Risperdal 075 mg po at bedtime to address visual hallucinations plus depression.  Recommend restarting Ativan at lower dose 0.25 mg po daily x one week and then every other day x one week and stopping all together due to SE.  Continue Sertraline 100 mg po daily for mood  Continue Mirtazapine 7.5 mg po at bedtime for insomnia, and appetite  Psychiatry will follow loosely  Ty for this consult   Labs and EKG reviewed  9/25 EKG qtc  460  9/26 Na+ 135 , K+  4.6, BUN 23 , Cr  0.9, ALT 16 , AST 24 , WBC 6.7, Hemoglobin 13.7 , platelets 274 ,   9/25 ammonia  22 vit b 12 668, folate 35 , TSH 2.39  UDS + benzodiazepine, UA neg for

## 2024-09-27 NOTE — CARE COORDINATION
Cm noted discharge order. Cm printed AVS and placed on packet. Cm set up transportation with [facility] for 1230. Cm attempted to call Sonia with [facility], no answer. Cm left VM with transport time. Cm attempted to call pt's niece again, no answer. Cm left VM requesting return call. Cm notified pt's RN of transport time and updated white board. Packet placed on pt's soft chart. Cm to follow.

## 2024-09-27 NOTE — CARE COORDINATION
Pt is from [facility], ([facility]).  Plan is for pt to return to  once stable.  No precert needed. Pt on bed hold.  Packet started and placed with soft chart.      CM will need MAIA to be completed by RN and doctor. If pt is discharged after hours or over the weekend please complete the following.... Call report to [phone]   Fax completed AVS with both MAIA on the AVS and any written Rx [phone].  Set up transportation with [facility] [phone] and call family.

## 2024-09-27 NOTE — DISCHARGE SUMMARY
V2.0  Discharge Summary    Name:  Xavier Lancaster /Age/Sex: 1935 (88 y.o. male)   Admit Date: 2024  Discharge Date: 24    MRN & CSN:  [id number] & [id number] Encounter Date and Time 24 12:04 PM EDT    Attending:  Gavin Navarro,* Discharging Provider: Gavin Pérez MD       Hospital Course:     Brief HPI: Xavier Lancaster is a 88 y.o. male who presented with Acute confusion. Recently was tested +ve for COVID. Patient is Aox2 at baseline. Apparently desaturated to 70 at NH but in ED sat was 90 in RA. Not in respiratory distress. Has been having intermittent confusion. Was unable to provide much history. Plan was to DC back to NH however later he started hallucinating and now plan to admit for further work up of Confusion.     Brief Problem Based Course:       Patient overall stable, calm and co-operative. Work up for reversible causes of delirium is negative. His confusion likely related to worsening dementia. Was evaluated both by Psych and neurology. Plan to DC  to LTC. We will DC with tapering dose of ativan since he has been chronically using it to avoid withdrawals     Acute confusion  Has hx of Dementia              Likely confusion exacerbated by COVID as per Nursing home report. AO x2 at baseline               Continue Donepezil               CT head , chest xray, UA unremarkable              UDS - Positive for Benzo, Ammonia, B12/ folate, Respiratory panel including COVID unremarkable               Currently saturating well in RA              Admit to med surg tele              Neuro check               Neurology and Psych consulted. Family  updated      CHFrEF              On Lasix at home. Currently held              Will resume once patient starts to eat              Continue BB. Aspirin, Plavix     CAD              Continue aspirin and Plavix     COPD              Not in exacerbation              Albuterol as needed       The patient expressed appropriate    Component Value Date/Time    TSH 2.39 09/25/2024 12:34 PM     Troponin:   Lab Results   Component Value Date/Time    TROPONINT 0.397 12/31/2020 05:30 AM    TROPONINT 0.325 12/30/2020 08:30 AM    TROPONINT 0.369 12/29/2020 03:50 AM     Lactic Acid: No results for input(s): \"LACTA\" in the last 72 hours.  BNP: No results for input(s): \"PROBNP\" in the last 72 hours.  UA:  Lab Results   Component Value Date/Time    NITRU NEGATIVE 09/25/2024 02:06 PM    NITRU NEGATIVE 07/01/2011 09:05 PM    COLORU Yellow 09/25/2024 02:06 PM    PHUR 5.5 09/25/2024 02:06 PM    WBCUA 1 09/25/2024 02:06 PM    RBCUA <1 09/25/2024 02:06 PM    RBCUA 1 11/22/2023 08:34 AM    MUCUS OCCASIONAL 09/25/2024 02:06 PM    TRICHOMONAS None seen 09/25/2024 02:06 PM    YEAST MANY 08/13/2020 02:11 PM    BACTERIA RARE 11/22/2023 08:34 AM    CLARITYU SLIGHTLY CLOUDY 11/22/2023 08:34 AM    LEUKOCYTESUR NEGATIVE 09/25/2024 02:06 PM    UROBILINOGEN 0.2 09/25/2024 02:06 PM    BILIRUBINUR NEGATIVE 09/25/2024 02:06 PM    BLOODU NEGATIVE 11/22/2023 08:34 AM    GLUCOSEU NEGATIVE 09/25/2024 02:06 PM    KETUA 15 09/25/2024 02:06 PM     Urine Cultures: No results found for: \"LABURIN\"  Blood Cultures: No results found for: \"BC\"  No results found for: \"BLOODCULT2\"  Organism: No results found for: \"ORG\"    Time Spent Discharging patient 55 minutes    Electronically signed by Gavin Pérez MD on 9/27/2024 at 12:04 PM

## 2024-09-27 NOTE — PROGRESS NOTES
Called report to Pamela ANDERSON at [facility]. All questions answered.

## 2024-09-27 NOTE — DISCHARGE INSTR - COC
Continuity of Care Form    Patient Name: Xavier Lancaster   :  1935  MRN:  [id number]    Admit date:  2024  Discharge date:  24    Code Status Order: DNR-CCA   Advance Directives:   Advance Care Flowsheet Documentation             Admitting Physician:  Gavin Pérez MD  PCP: Junior Galvez MD    Discharging Nurse: Dalia RIVERA  Discharging Hospital Unit/Room#: 3006/3006-A  Discharging Unit Phone Number: [phone]    Emergency Contact:   Extended Emergency Contact Information  Primary Emergency Contact: Julia Reyes  Home Phone: [phone]  Mobile Phone: [phone]  Relation: Niece/Nephew  Secondary Emergency Contact: Jad Dodson  Home Phone: [phone]  Mobile Phone: [phone]  Relation: Niece/Nephew  Preferred language: English   needed? No    Past Surgical History:  Past Surgical History:   Procedure Laterality Date    FEMORAL ENDARTERECTOMY Right 2020    FEMORAL ENDARTERECTOMY performed by Roberth Cruz MD at [facility] OR    HERNIA REPAIR         Immunization History:     There is no immunization history on file for this patient.    Active Problems:  Patient Active Problem List   Diagnosis Code    Dizziness R42    Acute cerebrovascular accident (CVA) ([facility]) I63.9    Hemiparesis of left nondominant side as late effect of cerebral infarction ([facility]) I69.354    Dysphagia due to recent stroke I69.391    Dysarthria due to acute stroke ([facility]) I63.9, R47.1    Gait disturbance R26.9    Simple chronic bronchitis ([facility]) J41.0    Urinary tract infection associated with indwelling urethral catheter ([facility]) T83.511A, N39.0    Acute cystitis without hematuria N30.00    Femoral artery occlusion, 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    Acute encephalopathy G93.40    Severe malnutrition ([facility]) E43    NSTEMI (non-ST elevated myocardial infarction) ([facility]) I21.4    Acute confusion R41.0    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, F02.C2       Isolation/Infection:   Isolation             No Isolation          Patient Infection Status       Infection Onset Added Last Indicated Last Indicated By Review Planned Expiration Resolved Resolved By    None active    Resolved    Influenza 24 Influenza A/B, Molecular   24 Infection                        Nurse Assessment:  Last Vital Signs: /60   Pulse 55   Temp 97.8 °F (36.6 °C) (Oral)   Resp 22   Ht 1.676 m (5' 5.98\")   Wt 46 kg (101 lb 6.6 oz)   SpO2 96%   BMI 16.38 kg/m²     Last documented pain score (0-10 scale):    Last Weight:   Wt Readings from Last 1 Encounters:   24 46 kg (101 lb 6.6 oz)     Mental Status:  disoriented and alert    IV Access:  - None    Nursing Mobility/ADLs:  Walking   Dependent  Transfer  Assisted  Bathing  Dependent  Dressing  Dependent  Toileting  Dependent  Feeding  Independent  Med Admin  Dependent  Med Delivery   whole in applesauce    Wound Care Documentation and Therapy:  Wound 20 Coccyx Proximal friction shear red blanchable (Active)   Number of days: 1620       Wound 20 Arm Lower;Right;Posterior dime size skin tare (Active)   Number of days: 1501       Wound 20 Toe (Comment  which one) Right great toe  (Active)   Number of days: 1495       Wound 20 Toe (Comment  which one) Right 4th toe (Active)   Number of days: 1493       Wound 20 Toe (Comment  which one) Anterior;Right 5th toe (Active)   Number of days: 1493       Incision 20 Femoral Anterior;Proximal;Right (Active)   Number of days: 1494        Elimination:  Continence:   Bowel: No  Bladder: No  Urinary Catheter: None   Colostomy/Ileostomy/Ileal Conduit: No       Date of Last BM: 24    Intake/Output Summary (Last 24 hours) at 2024 0809  Last data filed at 2024 0256  Gross per 24 hour   Intake 60 ml   Output 1450 ml   Net -1390 ml     I/O last 3 completed shifts:  In: 599.8 [P.O.:60; I.V.:539.8]  Out: 1450 [Urine:1450]    Safety Concerns:

## 2024-10-14 NOTE — PROGRESS NOTES
Physician Progress Note      PATIENT:               ARPITA FERNANDEZ  CSN #:                  [id number]  :                       1935  ADMIT DATE:       2024 12:24 PM  DISCH DATE:        2024 12:52 PM  RESPONDING  PROVIDER #:        Gavin Pérez MD          QUERY TEXT:    Pt admitted with confusion.  Pt noted to have  previous CVA and vascular   dementia. If possible, please document in progress notes and discharge summary   the relationship, if any, between vascular dementia  and CVA.    The medical record reflects the following:  Risk Factors: previous CVA (CT shows Remote cortical stroke in the   PCA/watershed territory bilaterally)  Clinical Indicators: ED notes, \"He does have a vascular dementia also   apparently not able to give any detailed history\" per query response -   confusion due to advanced dementia  Treatment: neuro consult, CT of head, psych consult  Options provided:  -- confusion due to vascular dementia from previous CVA  -- Other - I will add my own diagnosis  -- Disagree - Not applicable / Not valid  -- Disagree - Clinically unable to determine / Unknown  -- Refer to Clinical Documentation Reviewer    PROVIDER RESPONSE TEXT:    confusion due to vascular dementia from previous CVA    Query created by: Mela Maxwell on 10/9/2024 2:43 PM      Electronically signed by:  Gavin Pérez MD 10/14/2024 7:42 PM

## 2024-12-20 ENCOUNTER — APPOINTMENT (OUTPATIENT)
Dept: GENERAL RADIOLOGY | Age: 89
End: 2024-12-20
Payer: MEDICARE

## 2024-12-20 ENCOUNTER — HOSPITAL ENCOUNTER (EMERGENCY)
Age: 89
Discharge: NURSING, REHAB OR LONG-TERM CARE | End: 2024-12-20
Payer: MEDICARE

## 2024-12-20 VITALS
TEMPERATURE: 97.5 F | HEART RATE: 68 BPM | OXYGEN SATURATION: 94 % | RESPIRATION RATE: 16 BRPM | SYSTOLIC BLOOD PRESSURE: 177 MMHG | DIASTOLIC BLOOD PRESSURE: 69 MMHG

## 2024-12-20 DIAGNOSIS — J18.9 PNEUMONIA OF BOTH LOWER LOBES DUE TO INFECTIOUS ORGANISM: Primary | ICD-10-CM

## 2024-12-20 LAB
ALBUMIN SERPL-MCNC: 3.9 G/DL (ref 3.4–5)
ALBUMIN/GLOB SERPL: 1.4 {RATIO} (ref 1.1–2.2)
ALP SERPL-CCNC: 122 U/L (ref 40–129)
ALT SERPL-CCNC: 18 U/L (ref 10–40)
ANION GAP SERPL CALCULATED.3IONS-SCNC: 9 MMOL/L (ref 9–17)
AST SERPL-CCNC: 24 U/L (ref 15–37)
BASOPHILS # BLD: 0.05 K/UL
BASOPHILS NFR BLD: 1 % (ref 0–1)
BILIRUB SERPL-MCNC: 0.8 MG/DL (ref 0–1)
BNP SERPL-MCNC: 6049 PG/ML (ref 0–450)
BUN SERPL-MCNC: 17 MG/DL (ref 7–20)
CALCIUM SERPL-MCNC: 9.6 MG/DL (ref 8.3–10.6)
CHLORIDE SERPL-SCNC: 100 MMOL/L (ref 99–110)
CO2 SERPL-SCNC: 30 MMOL/L (ref 21–32)
CREAT SERPL-MCNC: 1 MG/DL (ref 0.8–1.3)
EOSINOPHIL # BLD: 0.13 K/UL
EOSINOPHILS RELATIVE PERCENT: 2 % (ref 0–3)
ERYTHROCYTE [DISTWIDTH] IN BLOOD BY AUTOMATED COUNT: 14.5 % (ref 11.7–14.9)
GFR, ESTIMATED: 72 ML/MIN/1.73M2
GLUCOSE SERPL-MCNC: 104 MG/DL (ref 74–99)
HCT VFR BLD AUTO: 44.3 % (ref 42–52)
HGB BLD-MCNC: 14.6 G/DL (ref 13.5–18)
IMM GRANULOCYTES # BLD AUTO: 0.03 K/UL
IMM GRANULOCYTES NFR BLD: 1 %
LYMPHOCYTES NFR BLD: 0.56 K/UL
LYMPHOCYTES RELATIVE PERCENT: 9 % (ref 24–44)
MCH RBC QN AUTO: 29.9 PG (ref 27–31)
MCHC RBC AUTO-ENTMCNC: 33 G/DL (ref 32–36)
MCV RBC AUTO: 90.8 FL (ref 78–100)
MONOCYTES NFR BLD: 0.65 K/UL
MONOCYTES NFR BLD: 10 % (ref 0–4)
NEUTROPHILS NFR BLD: 78 % (ref 36–66)
NEUTS SEG NFR BLD: 5.07 K/UL
PLATELET # BLD AUTO: 291 K/UL (ref 140–440)
PMV BLD AUTO: 10.2 FL (ref 7.5–11.1)
POTASSIUM SERPL-SCNC: 4.3 MMOL/L (ref 3.5–5.1)
PROT SERPL-MCNC: 6.7 G/DL (ref 6.4–8.2)
RBC # BLD AUTO: 4.88 M/UL (ref 4.6–6.2)
SODIUM SERPL-SCNC: 139 MMOL/L (ref 136–145)
WBC OTHER # BLD: 6.5 K/UL (ref 4–10.5)

## 2024-12-20 PROCEDURE — 99284 EMERGENCY DEPT VISIT MOD MDM: CPT

## 2024-12-20 PROCEDURE — 96375 TX/PRO/DX INJ NEW DRUG ADDON: CPT

## 2024-12-20 PROCEDURE — 85025 COMPLETE CBC W/AUTO DIFF WBC: CPT

## 2024-12-20 PROCEDURE — 2580000003 HC RX 258: Performed by: NURSE PRACTITIONER

## 2024-12-20 PROCEDURE — 71045 X-RAY EXAM CHEST 1 VIEW: CPT

## 2024-12-20 PROCEDURE — 80053 COMPREHEN METABOLIC PANEL: CPT

## 2024-12-20 PROCEDURE — 2500000003 HC RX 250 WO HCPCS: Performed by: NURSE PRACTITIONER

## 2024-12-20 PROCEDURE — 6360000002 HC RX W HCPCS: Performed by: NURSE PRACTITIONER

## 2024-12-20 PROCEDURE — 96365 THER/PROPH/DIAG IV INF INIT: CPT

## 2024-12-20 PROCEDURE — 83880 ASSAY OF NATRIURETIC PEPTIDE: CPT

## 2024-12-20 RX ORDER — AZITHROMYCIN 250 MG/1
TABLET, FILM COATED ORAL
Qty: 6 TABLET | Refills: 0 | Status: SHIPPED | OUTPATIENT
Start: 2024-12-20 | End: 2024-12-30

## 2024-12-20 RX ORDER — AZITHROMYCIN 250 MG/1
TABLET, FILM COATED ORAL
Qty: 6 TABLET | Refills: 0 | Status: SHIPPED | OUTPATIENT
Start: 2024-12-20 | End: 2024-12-20

## 2024-12-20 RX ADMIN — AZITHROMYCIN MONOHYDRATE 500 MG: 500 INJECTION, POWDER, LYOPHILIZED, FOR SOLUTION INTRAVENOUS at 19:50

## 2024-12-20 RX ADMIN — WATER 1000 MG: 1 INJECTION INTRAMUSCULAR; INTRAVENOUS; SUBCUTANEOUS at 19:48

## 2024-12-20 ASSESSMENT — LIFESTYLE VARIABLES
HOW OFTEN DO YOU HAVE A DRINK CONTAINING ALCOHOL: NEVER
HOW MANY STANDARD DRINKS CONTAINING ALCOHOL DO YOU HAVE ON A TYPICAL DAY: PATIENT DOES NOT DRINK

## 2024-12-20 NOTE — ED PROVIDER NOTES
[facility] EMERGENCY DEPARTMENT  EMERGENCY DEPARTMENT ENCOUNTER        Pt Name: Xavier Lancaster  MRN: [id number]  Birthdate 1935  Date of evaluation: 12/20/2024  Provider: MADY GONZALEZ - CNP  PCP: No primary care provider on file.    [facility]FF[facility]. I have evaluated this patient.        Triage CHIEF COMPLAINT       Chief Complaint   Patient presents with    Shortness of Breath     Per facility pt was having difficulty breathing          HISTORY OF PRESENT ILLNESS      Chief Complaint: Shortness of breath    Xavier Lancaster is a 89 y.o. male who presents for evaluation by EMS for reported shortness of breath.  Patient has dementia and is not able to provide any history.  Per EMS they were advised by staff that the patient was more short of breath than usual, was requiring increased oxygen but when they arrived his oxygen levels were normal on room air and he has not been placed on any supplemental oxygen here.  They were not provided any additional history.  Attempted to contact both the patient's next of kin as well as the nursing staff at [facility] where he came from but unfortunately were not able to get a hold of either for additional history.    Nursing Notes were all reviewed and agreed with or any disagreements were addressed in the HPI.    REVIEW OF SYSTEMS     Pertinent ROS as noted in HPI.      PAST MEDICAL HISTORY     Past Medical History:   Diagnosis Date    CAD (coronary artery disease)     COPD (chronic obstructive pulmonary disease) (HCC)     Dementia (HCC)     Prostatitis        SURGICAL HISTORY     Past Surgical History:   Procedure Laterality Date    FEMORAL ENDARTERECTOMY Right 8/24/2020    FEMORAL ENDARTERECTOMY performed by Roberth Cruz MD at [facility] OR    HERNIA REPAIR         CURRENTMEDICATIONS       Discharge Medication List as of 12/20/2024 10:12 PM        CONTINUE these medications which have NOT CHANGED    Details   risperiDONE

## 2024-12-20 NOTE — ED TRIAGE NOTES
Patient presented to ED via EMS for complaints of shortness of breath. Patient has dementia at baseline.

## 2024-12-20 NOTE — CARE COORDINATION
Room  ED-14 - Call to [facility] [phone] -no ans. Called  Sonia on call  --for report  call this line --if no answer --this   will contact on call Adms and share pt report . Pt is with [facility] . If an Admission is warranted [facility] can be contacted -- if there is medical clearance pt will be returned to [facility].

## 2024-12-21 NOTE — ED NOTES
Report called to Rosemary at [facility]. All questions answered at this time.

## 2024-12-22 LAB
EKG ATRIAL RATE: 75 BPM
EKG DIAGNOSIS: NORMAL
EKG P AXIS: 75 DEGREES
EKG P-R INTERVAL: 146 MS
EKG Q-T INTERVAL: 454 MS
EKG QRS DURATION: 116 MS
EKG QTC CALCULATION (BAZETT): 506 MS
EKG R AXIS: 86 DEGREES
EKG T AXIS: 52 DEGREES
EKG VENTRICULAR RATE: 75 BPM

## (undated) DEVICE — TAPE MED W1/8XL30IN WHT POLY

## (undated) DEVICE — SUTURE MCRYL SZ 3-0 L27IN ABSRB UD L24MM PS-1 3/8 CIR PRIM Y936H

## (undated) DEVICE — TOWEL,OR,DSP,ST,BLUE,STD,6/PK,12PK/CS: Brand: MEDLINE

## (undated) DEVICE — GOWN,SIRUS,FABRNF,RAGLAN,L,ST,30/CS: Brand: MEDLINE

## (undated) DEVICE — DRESSING TRNSPAR W5XL4.5IN FLM SHT SEMIPERMEABLE WIND

## (undated) DEVICE — TUBING, SUCTION, 3/16" X 10', STRAIGHT: Brand: MEDLINE

## (undated) DEVICE — SUTURE PERMAHAND SZ 2-0 L17X18IN NONABSORBABLE BLK SILK SA65H

## (undated) DEVICE — SUTURE NONABSORBABLE MONOFILAMENT 6-0 C-1 4X18 IN PROLENE M8718

## (undated) DEVICE — SUTURE PROL 7-0 L18IN NONABSORBABLE BLU L9.3MM BV-1 3/8 CIR M8701

## (undated) DEVICE — ADHESIVE SKIN CLSR 0.7ML TOP DERMBND ADV

## (undated) DEVICE — SPONGE LAP W18XL18IN WHT COT 4 PLY FLD STRUNG RADPQ DISP ST

## (undated) DEVICE — GEL US 20GM NONIRRITATING OVERWRAPPED FILE PCH TRNSMIT

## (undated) DEVICE — TOTAL TRAY, 16FR 10ML SIL FOLEY, URN: Brand: MEDLINE

## (undated) DEVICE — SOLUTION IV 1000ML 0.9% SOD CHL FOR IRRIG PLAS CONT

## (undated) DEVICE — GLOVE ORANGE PI 7   MSG9070

## (undated) DEVICE — CONTAINER,SPECIMEN,OR STERILE,4OZ: Brand: MEDLINE

## (undated) DEVICE — GLOVE ORANGE PI 8   MSG9080

## (undated) DEVICE — INTENDED FOR TISSUE SEPARATION, AND OTHER PROCEDURES THAT REQUIRE A SHARP SURGICAL BLADE TO PUNCTURE OR CUT.: Brand: BARD-PARKER ® STAINLESS STEEL BLADES

## (undated) DEVICE — TUBING, SUCTION, 9/32" X 10', STRAIGHT: Brand: MEDLINE

## (undated) DEVICE — TOWEL,OR,DSP,ST,WHITE,DLX,XR,4/PK,20PK/C: Brand: MEDLINE

## (undated) DEVICE — CLIP INT SM WIDE RED TI TRNSVRS GRV CHEVRON SHP W/ PRECIS

## (undated) DEVICE — BLADE CLIPPER GEN PURP NS

## (undated) DEVICE — PLEDGET VASC W3/16XL3/8IN THK1/16IN PTFE SFT

## (undated) DEVICE — COUNTER NDL 30 COUNT FOAM STRP SGL MAG

## (undated) DEVICE — SUTURE VCRL SZ 2-0 L27IN ABSRB UD L26MM CT-2 1/2 CIR J269H

## (undated) DEVICE — PENCIL ES CRD L10FT HND SWCHING ROCK SWCH W/ EDGE COAT BLDE

## (undated) DEVICE — Device

## (undated) DEVICE — CLIP SM RED INTERN HMOCLP TITAN LIGATING

## (undated) DEVICE — 3M™ IOBAN™ 2 ANTIMICROBIAL INCISE DRAPE 6650EZ: Brand: IOBAN™ 2

## (undated) DEVICE — MARKER SURG SKIN UTIL REGULAR/FINE 2 TIP W/ RUL AND 9 LBL

## (undated) DEVICE — DRAPE,UTILITY,XL,4/PK,STERILE: Brand: MEDLINE

## (undated) DEVICE — AMD ANTIMICROBIAL NON-ADHERENT PAD,0.2% POLYHEXAMETHYLENE BIGUANIDE HCI (PHMB): Brand: TELFA

## (undated) DEVICE — LOOP VES W25MM THK1MM MAXI RED SIL FLD REPELLENT 100 PER

## (undated) DEVICE — CHLORAPREP 26ML ORANGE

## (undated) DEVICE — APPLICATOR MEDICATED 26 CC SOLUTION HI LT ORNG CHLORAPREP

## (undated) DEVICE — AGENT HEMOSTATIC SURGIFLOW MATRIX KIT W/THROMBIN: Type: IMPLANTABLE DEVICE | Status: NON-FUNCTIONAL

## (undated) DEVICE — SYRINGE IRRIG 60ML SFT PLIABLE BLB EZ TO GRP 1 HND USE W/

## (undated) DEVICE — Z INACTIVE USE 2641837 CLIP LIG M BLU TI HRT SHP WIRE HORZ 600 PER BX

## (undated) DEVICE — GAUZE,SPONGE,4"X4",16PLY,XRAY,STRL,LF: Brand: MEDLINE

## (undated) DEVICE — ADAPTER,CATHETER/SYRINGE/LUER,STERILE: Brand: MEDLINE

## (undated) DEVICE — SUTURE PROL SZ 6-0 L24IN NONABSORBABLE BLU L13MM C-1 3/8 8726H

## (undated) DEVICE — SUTURE PROL SZ 6-0 L18IN NONABSORBABLE BLU L13MM C-1 3/8 8718H

## (undated) DEVICE — ELECTRODE ES AD CRDLSS PT RET REM POLYHESIVE

## (undated) DEVICE — COVER,C-ARM,41X74: Brand: MEDLINE

## (undated) DEVICE — GOWN,SIRUS,FABRNF,RAGLAN,XL,ST,28/CS: Brand: MEDLINE

## (undated) DEVICE — LOOP,VESSEL,MINI,BLUE,2/PK,STERILE: Brand: MEDLINE

## (undated) DEVICE — SUTURE PROL SZ 5-0 L18IN NONABSORBABLE BLU C-1 L13MM 3/8 8717H

## (undated) DEVICE — ELECTRODE ES L2.75IN S STL INSUL BLDE W/ SL EDGE

## (undated) DEVICE — SUTURE VCRL 2-0 L36IN ABSRB UD CTX L48MM 1/2 CIR TAPERPOINT J979H

## (undated) DEVICE — SHEET, T, LAPAROTOMY, STERILE: Brand: MEDLINE

## (undated) DEVICE — TUBING SUCT 9 11FR L475IN RIG SHFT MINI SUC TIP DLP

## (undated) DEVICE — GLOVE SURG SZ 75 CRM LTX FREE POLYISOPRENE POLYMER BEAD ANTI

## (undated) DEVICE — DECANTER FLD 9IN ST BG FOR ASEP TRNSF OF FLD

## (undated) DEVICE — FOGARTY - HYDRAGRIP SURGICAL - CLAMP INSERTS: Brand: FOGARTY SOFTJAW

## (undated) DEVICE — LINER,SEMI-RIGID,3000CC,50EA/CS: Brand: MEDLINE

## (undated) DEVICE — YANKAUER,FLEXIBLE HANDLE,REGLR CAPACITY: Brand: MEDLINE INDUSTRIES, INC.